# Patient Record
Sex: FEMALE | Race: WHITE | Employment: OTHER | URBAN - METROPOLITAN AREA
[De-identification: names, ages, dates, MRNs, and addresses within clinical notes are randomized per-mention and may not be internally consistent; named-entity substitution may affect disease eponyms.]

---

## 2017-01-06 RX ORDER — SITAGLIPTIN 100 MG/1
TABLET, FILM COATED ORAL
Qty: 90 TABLET | Refills: 0 | Status: SHIPPED | OUTPATIENT
Start: 2017-01-06 | End: 2017-04-12 | Stop reason: SDUPTHER

## 2017-01-13 RX ORDER — METFORMIN HYDROCHLORIDE 500 MG/1
TABLET, EXTENDED RELEASE ORAL
Qty: 90 TABLET | Refills: 0 | Status: SHIPPED | OUTPATIENT
Start: 2017-01-13 | End: 2017-08-16 | Stop reason: SDUPTHER

## 2017-01-16 ENCOUNTER — OFFICE VISIT (OUTPATIENT)
Dept: FAMILY MEDICINE CLINIC | Age: 72
End: 2017-01-16

## 2017-01-16 VITALS
WEIGHT: 282 LBS | BODY MASS INDEX: 46.98 KG/M2 | HEART RATE: 70 BPM | HEIGHT: 65 IN | DIASTOLIC BLOOD PRESSURE: 72 MMHG | TEMPERATURE: 98.7 F | RESPIRATION RATE: 14 BRPM | SYSTOLIC BLOOD PRESSURE: 130 MMHG

## 2017-01-16 DIAGNOSIS — D50.0 IRON DEFICIENCY ANEMIA DUE TO CHRONIC BLOOD LOSS: ICD-10-CM

## 2017-01-16 DIAGNOSIS — E03.9 HYPOTHYROIDISM, UNSPECIFIED TYPE: ICD-10-CM

## 2017-01-16 DIAGNOSIS — E78.2 MIXED HYPERLIPIDEMIA: ICD-10-CM

## 2017-01-16 DIAGNOSIS — E11.40 TYPE 2 DIABETES MELLITUS WITH DIABETIC NEUROPATHY, WITHOUT LONG-TERM CURRENT USE OF INSULIN (HCC): Primary | ICD-10-CM

## 2017-01-16 DIAGNOSIS — E53.8 B12 DEFICIENCY: ICD-10-CM

## 2017-01-16 DIAGNOSIS — E11.40 TYPE 2 DIABETES MELLITUS WITH DIABETIC NEUROPATHY, WITHOUT LONG-TERM CURRENT USE OF INSULIN (HCC): ICD-10-CM

## 2017-01-16 LAB
ALBUMIN SERPL-MCNC: 3.9 G/DL (ref 3.9–4.9)
ALP BLD-CCNC: 87 U/L (ref 40–130)
ALT SERPL-CCNC: 28 U/L (ref 0–33)
ANION GAP SERPL CALCULATED.3IONS-SCNC: 15 MEQ/L (ref 7–13)
ANISOCYTOSIS: 0
AST SERPL-CCNC: 19 U/L (ref 0–35)
BASOPHILS ABSOLUTE: 0 K/UL (ref 0–0.2)
BASOPHILS RELATIVE PERCENT: 1.3 %
BILIRUB SERPL-MCNC: 0.5 MG/DL (ref 0–1.2)
BUN BLDV-MCNC: 20 MG/DL (ref 8–23)
CALCIUM SERPL-MCNC: 9.5 MG/DL (ref 8.6–10.2)
CHLORIDE BLD-SCNC: 99 MEQ/L (ref 98–107)
CO2: 25 MEQ/L (ref 22–29)
CREAT SERPL-MCNC: 0.98 MG/DL (ref 0.5–0.9)
EOSINOPHILS ABSOLUTE: 0.4 K/UL (ref 0–0.7)
EOSINOPHILS RELATIVE PERCENT: 4 %
GFR AFRICAN AMERICAN: >60
GFR NON-AFRICAN AMERICAN: 55.7
GLOBULIN: 3.1 G/DL (ref 2.3–3.5)
GLUCOSE BLD-MCNC: 208 MG/DL (ref 74–109)
HBA1C MFR BLD: 7 % (ref 4.8–5.9)
HCT VFR BLD CALC: 39.2 % (ref 37–47)
HEMOGLOBIN: 13 G/DL (ref 12–16)
HYPOCHROMIA: 0
LYMPHOCYTES ABSOLUTE: 1.7 K/UL (ref 1–4.8)
LYMPHOCYTES RELATIVE PERCENT: 18 %
MACROCYTES: 0
MCH RBC QN AUTO: 30.4 PG (ref 27–31.3)
MCHC RBC AUTO-ENTMCNC: 33.1 % (ref 33–37)
MCV RBC AUTO: 91.8 FL (ref 82–100)
METAMYELOCYTES RELATIVE PERCENT: 1 %
MICROCYTES: 0
MONOCYTES ABSOLUTE: 0.8 K/UL (ref 0.2–0.8)
MONOCYTES RELATIVE PERCENT: 7.7 %
NEUTROPHILS ABSOLUTE: 6.8 K/UL (ref 1.4–6.5)
NEUTROPHILS RELATIVE PERCENT: 69 %
PDW BLD-RTO: 13.9 % (ref 11.5–14.5)
PLATELET # BLD: 279 K/UL (ref 130–400)
PLATELET SLIDE REVIEW: NORMAL
POIKILOCYTES: 0
POLYCHROMASIA: 0
POTASSIUM SERPL-SCNC: 4.6 MEQ/L (ref 3.5–5.1)
RBC # BLD: 4.27 M/UL (ref 4.2–5.4)
SMUDGE CELLS: 5.8
SODIUM BLD-SCNC: 139 MEQ/L (ref 132–144)
T4 TOTAL: 8.7 UG/DL (ref 4.5–11.7)
TOTAL PROTEIN: 7 G/DL (ref 6.4–8.1)
TSH SERPL DL<=0.05 MIU/L-ACNC: 6.81 UIU/ML (ref 0.27–4.2)
WBC # BLD: 9.7 K/UL (ref 4.8–10.8)

## 2017-01-16 PROCEDURE — 96372 THER/PROPH/DIAG INJ SC/IM: CPT | Performed by: FAMILY MEDICINE

## 2017-01-16 PROCEDURE — 1090F PRES/ABSN URINE INCON ASSESS: CPT | Performed by: FAMILY MEDICINE

## 2017-01-16 PROCEDURE — G8400 PT W/DXA NO RESULTS DOC: HCPCS | Performed by: FAMILY MEDICINE

## 2017-01-16 PROCEDURE — G8428 CUR MEDS NOT DOCUMENT: HCPCS | Performed by: FAMILY MEDICINE

## 2017-01-16 PROCEDURE — 1123F ACP DISCUSS/DSCN MKR DOCD: CPT | Performed by: FAMILY MEDICINE

## 2017-01-16 PROCEDURE — G8419 CALC BMI OUT NRM PARAM NOF/U: HCPCS | Performed by: FAMILY MEDICINE

## 2017-01-16 PROCEDURE — 4040F PNEUMOC VAC/ADMIN/RCVD: CPT | Performed by: FAMILY MEDICINE

## 2017-01-16 PROCEDURE — 3045F PR MOST RECENT HEMOGLOBIN A1C LEVEL 7.0-9.0%: CPT | Performed by: FAMILY MEDICINE

## 2017-01-16 PROCEDURE — 3017F COLORECTAL CA SCREEN DOC REV: CPT | Performed by: FAMILY MEDICINE

## 2017-01-16 PROCEDURE — G8484 FLU IMMUNIZE NO ADMIN: HCPCS | Performed by: FAMILY MEDICINE

## 2017-01-16 PROCEDURE — 3014F SCREEN MAMMO DOC REV: CPT | Performed by: FAMILY MEDICINE

## 2017-01-16 PROCEDURE — 99214 OFFICE O/P EST MOD 30 MIN: CPT | Performed by: FAMILY MEDICINE

## 2017-01-16 PROCEDURE — 1036F TOBACCO NON-USER: CPT | Performed by: FAMILY MEDICINE

## 2017-01-16 RX ORDER — CYANOCOBALAMIN 1000 UG/ML
1000 INJECTION INTRAMUSCULAR; SUBCUTANEOUS ONCE
Status: COMPLETED | OUTPATIENT
Start: 2017-01-16 | End: 2017-01-16

## 2017-01-16 RX ORDER — FLUOCINOLONE ACETONIDE 0.25 MG/G
CREAM TOPICAL
COMMUNITY
Start: 2016-11-04 | End: 2017-10-19 | Stop reason: SDUPTHER

## 2017-01-16 RX ORDER — METFORMIN HYDROCHLORIDE 500 MG/1
TABLET, EXTENDED RELEASE ORAL
Qty: 120 TABLET | Refills: 3 | Status: SHIPPED | OUTPATIENT
Start: 2017-01-16 | End: 2017-06-13 | Stop reason: SDUPTHER

## 2017-01-16 RX ADMIN — CYANOCOBALAMIN 1000 MCG: 1000 INJECTION INTRAMUSCULAR; SUBCUTANEOUS at 14:55

## 2017-01-16 ASSESSMENT — ENCOUNTER SYMPTOMS
DIARRHEA: 0
SHORTNESS OF BREATH: 0
EYES NEGATIVE: 1
NAUSEA: 0
CONSTIPATION: 0
COUGH: 0
ABDOMINAL PAIN: 0
VOMITING: 0

## 2017-01-17 ENCOUNTER — CARE COORDINATION (OUTPATIENT)
Dept: CARE COORDINATION | Age: 72
End: 2017-01-17

## 2017-01-27 ENCOUNTER — CARE COORDINATION (OUTPATIENT)
Dept: CARE COORDINATION | Age: 72
End: 2017-01-27

## 2017-02-27 RX ORDER — LEVOTHYROXINE SODIUM 175 UG/1
TABLET ORAL
Qty: 30 TABLET | Refills: 5 | Status: SHIPPED | OUTPATIENT
Start: 2017-02-27 | End: 2017-10-19 | Stop reason: SDUPTHER

## 2017-02-28 ENCOUNTER — NURSE ONLY (OUTPATIENT)
Dept: FAMILY MEDICINE CLINIC | Age: 72
End: 2017-02-28

## 2017-02-28 DIAGNOSIS — E53.8 B12 DEFICIENCY: Primary | ICD-10-CM

## 2017-03-03 PROCEDURE — 96372 THER/PROPH/DIAG INJ SC/IM: CPT | Performed by: FAMILY MEDICINE

## 2017-03-03 RX ORDER — CYANOCOBALAMIN 1000 UG/ML
1000 INJECTION INTRAMUSCULAR; SUBCUTANEOUS ONCE
Status: COMPLETED | OUTPATIENT
Start: 2017-03-03 | End: 2017-03-03

## 2017-03-03 RX ADMIN — CYANOCOBALAMIN 1000 MCG: 1000 INJECTION INTRAMUSCULAR; SUBCUTANEOUS at 10:03

## 2017-03-10 RX ORDER — ATORVASTATIN CALCIUM 20 MG/1
TABLET, FILM COATED ORAL
Qty: 90 TABLET | Refills: 1 | Status: SHIPPED | OUTPATIENT
Start: 2017-03-10 | End: 2017-09-12 | Stop reason: SDUPTHER

## 2017-03-29 ENCOUNTER — NURSE ONLY (OUTPATIENT)
Dept: FAMILY MEDICINE CLINIC | Age: 72
End: 2017-03-29

## 2017-03-29 DIAGNOSIS — E53.8 B12 DEFICIENCY: Primary | ICD-10-CM

## 2017-03-29 PROCEDURE — 96372 THER/PROPH/DIAG INJ SC/IM: CPT | Performed by: FAMILY MEDICINE

## 2017-03-29 RX ORDER — CYANOCOBALAMIN 1000 UG/ML
1000 INJECTION INTRAMUSCULAR; SUBCUTANEOUS ONCE
Status: COMPLETED | OUTPATIENT
Start: 2017-03-29 | End: 2017-03-29

## 2017-03-29 RX ADMIN — CYANOCOBALAMIN 1000 MCG: 1000 INJECTION INTRAMUSCULAR; SUBCUTANEOUS at 10:13

## 2017-04-03 RX ORDER — POTASSIUM CHLORIDE 20 MEQ/1
TABLET, EXTENDED RELEASE ORAL
Qty: 30 TABLET | Refills: 1 | Status: SHIPPED | OUTPATIENT
Start: 2017-04-03 | End: 2017-10-19 | Stop reason: SDUPTHER

## 2017-04-12 RX ORDER — SITAGLIPTIN 100 MG/1
TABLET, FILM COATED ORAL
Qty: 90 TABLET | Refills: 0 | Status: SHIPPED | OUTPATIENT
Start: 2017-04-12 | End: 2017-07-14 | Stop reason: SDUPTHER

## 2017-04-20 ENCOUNTER — CARE COORDINATION (OUTPATIENT)
Dept: FAMILY MEDICINE CLINIC | Age: 72
End: 2017-04-20

## 2017-04-28 ENCOUNTER — NURSE ONLY (OUTPATIENT)
Dept: FAMILY MEDICINE CLINIC | Age: 72
End: 2017-04-28

## 2017-04-28 DIAGNOSIS — E53.8 B12 DEFICIENCY: Primary | ICD-10-CM

## 2017-04-28 PROCEDURE — 96372 THER/PROPH/DIAG INJ SC/IM: CPT | Performed by: FAMILY MEDICINE

## 2017-04-28 RX ORDER — CYANOCOBALAMIN 1000 UG/ML
1000 INJECTION INTRAMUSCULAR; SUBCUTANEOUS ONCE
Status: COMPLETED | OUTPATIENT
Start: 2017-04-28 | End: 2017-04-28

## 2017-04-28 RX ADMIN — CYANOCOBALAMIN 1000 MCG: 1000 INJECTION INTRAMUSCULAR; SUBCUTANEOUS at 11:15

## 2017-05-22 ENCOUNTER — CARE COORDINATOR VISIT (OUTPATIENT)
Dept: FAMILY MEDICINE CLINIC | Age: 72
End: 2017-05-22

## 2017-05-22 ENCOUNTER — OFFICE VISIT (OUTPATIENT)
Dept: FAMILY MEDICINE CLINIC | Age: 72
End: 2017-05-22

## 2017-05-22 VITALS
BODY MASS INDEX: 46.82 KG/M2 | SYSTOLIC BLOOD PRESSURE: 112 MMHG | TEMPERATURE: 96.3 F | HEART RATE: 75 BPM | OXYGEN SATURATION: 96 % | DIASTOLIC BLOOD PRESSURE: 78 MMHG | WEIGHT: 281 LBS | HEIGHT: 65 IN

## 2017-05-22 DIAGNOSIS — E11.40 TYPE 2 DIABETES MELLITUS WITH DIABETIC NEUROPATHY, WITHOUT LONG-TERM CURRENT USE OF INSULIN (HCC): Primary | ICD-10-CM

## 2017-05-22 DIAGNOSIS — E53.8 B12 DEFICIENCY: ICD-10-CM

## 2017-05-22 DIAGNOSIS — E55.9 VITAMIN D DEFICIENCY: ICD-10-CM

## 2017-05-22 DIAGNOSIS — E03.9 HYPOTHYROIDISM, UNSPECIFIED TYPE: ICD-10-CM

## 2017-05-22 DIAGNOSIS — G63 POLYNEUROPATHY ASSOCIATED WITH UNDERLYING DISEASE (HCC): ICD-10-CM

## 2017-05-22 DIAGNOSIS — R53.83 FATIGUE, UNSPECIFIED TYPE: ICD-10-CM

## 2017-05-22 DIAGNOSIS — E78.2 MIXED HYPERLIPIDEMIA: ICD-10-CM

## 2017-05-22 LAB
ALBUMIN SERPL-MCNC: 3.9 G/DL (ref 3.9–4.9)
ALP BLD-CCNC: 90 U/L (ref 40–130)
ALT SERPL-CCNC: 29 U/L (ref 0–33)
ANION GAP SERPL CALCULATED.3IONS-SCNC: 13 MEQ/L (ref 7–13)
AST SERPL-CCNC: 22 U/L (ref 0–35)
BASOPHILS ABSOLUTE: 0.1 K/UL (ref 0–0.2)
BASOPHILS RELATIVE PERCENT: 1.1 %
BILIRUB SERPL-MCNC: 0.5 MG/DL (ref 0–1.2)
BUN BLDV-MCNC: 23 MG/DL (ref 8–23)
CALCIUM SERPL-MCNC: 9.4 MG/DL (ref 8.6–10.2)
CHLORIDE BLD-SCNC: 96 MEQ/L (ref 98–107)
CO2: 27 MEQ/L (ref 22–29)
CREAT SERPL-MCNC: 0.82 MG/DL (ref 0.5–0.9)
EOSINOPHILS ABSOLUTE: 0.4 K/UL (ref 0–0.7)
EOSINOPHILS RELATIVE PERCENT: 3.9 %
FOLATE: 8.9 NG/ML (ref 7.3–26.1)
GFR AFRICAN AMERICAN: >60
GFR NON-AFRICAN AMERICAN: >60
GLOBULIN: 3.1 G/DL (ref 2.3–3.5)
GLUCOSE BLD-MCNC: 208 MG/DL (ref 74–109)
HCT VFR BLD CALC: 41.4 % (ref 37–47)
HEMOGLOBIN: 13.5 G/DL (ref 12–16)
LYMPHOCYTES ABSOLUTE: 2.1 K/UL (ref 1–4.8)
LYMPHOCYTES RELATIVE PERCENT: 21.6 %
MCH RBC QN AUTO: 28.6 PG (ref 27–31.3)
MCHC RBC AUTO-ENTMCNC: 32.5 % (ref 33–37)
MCV RBC AUTO: 88.1 FL (ref 82–100)
MONOCYTES ABSOLUTE: 0.5 K/UL (ref 0.2–0.8)
MONOCYTES RELATIVE PERCENT: 5.6 %
NEUTROPHILS ABSOLUTE: 6.6 K/UL (ref 1.4–6.5)
NEUTROPHILS RELATIVE PERCENT: 67.8 %
PDW BLD-RTO: 13.4 % (ref 11.5–14.5)
PLATELET # BLD: 254 K/UL (ref 130–400)
POTASSIUM SERPL-SCNC: 5.3 MEQ/L (ref 3.5–5.1)
RBC # BLD: 4.7 M/UL (ref 4.2–5.4)
SODIUM BLD-SCNC: 136 MEQ/L (ref 132–144)
T4 FREE: 1.74 NG/DL (ref 0.93–1.7)
TOTAL PROTEIN: 7 G/DL (ref 6.4–8.1)
TSH SERPL DL<=0.05 MIU/L-ACNC: 2.98 UIU/ML (ref 0.27–4.2)
VITAMIN B-12: >2000 PG/ML (ref 211–946)
VITAMIN D 25-HYDROXY: 9.6 NG/ML (ref 30–100)
WBC # BLD: 9.8 K/UL (ref 4.8–10.8)

## 2017-05-22 PROCEDURE — G8400 PT W/DXA NO RESULTS DOC: HCPCS | Performed by: FAMILY MEDICINE

## 2017-05-22 PROCEDURE — G0444 DEPRESSION SCREEN ANNUAL: HCPCS | Performed by: FAMILY MEDICINE

## 2017-05-22 PROCEDURE — 1123F ACP DISCUSS/DSCN MKR DOCD: CPT | Performed by: FAMILY MEDICINE

## 2017-05-22 PROCEDURE — 3017F COLORECTAL CA SCREEN DOC REV: CPT | Performed by: FAMILY MEDICINE

## 2017-05-22 PROCEDURE — G8427 DOCREV CUR MEDS BY ELIG CLIN: HCPCS | Performed by: FAMILY MEDICINE

## 2017-05-22 PROCEDURE — 3045F PR MOST RECENT HEMOGLOBIN A1C LEVEL 7.0-9.0%: CPT | Performed by: FAMILY MEDICINE

## 2017-05-22 PROCEDURE — 96372 THER/PROPH/DIAG INJ SC/IM: CPT | Performed by: FAMILY MEDICINE

## 2017-05-22 PROCEDURE — G8417 CALC BMI ABV UP PARAM F/U: HCPCS | Performed by: FAMILY MEDICINE

## 2017-05-22 PROCEDURE — 1036F TOBACCO NON-USER: CPT | Performed by: FAMILY MEDICINE

## 2017-05-22 PROCEDURE — 3014F SCREEN MAMMO DOC REV: CPT | Performed by: FAMILY MEDICINE

## 2017-05-22 PROCEDURE — 1090F PRES/ABSN URINE INCON ASSESS: CPT | Performed by: FAMILY MEDICINE

## 2017-05-22 PROCEDURE — 99214 OFFICE O/P EST MOD 30 MIN: CPT | Performed by: FAMILY MEDICINE

## 2017-05-22 PROCEDURE — 4040F PNEUMOC VAC/ADMIN/RCVD: CPT | Performed by: FAMILY MEDICINE

## 2017-05-22 RX ORDER — CYANOCOBALAMIN 1000 UG/ML
1000 INJECTION INTRAMUSCULAR; SUBCUTANEOUS ONCE
Status: COMPLETED | OUTPATIENT
Start: 2017-05-22 | End: 2017-05-22

## 2017-05-22 RX ADMIN — CYANOCOBALAMIN 1000 MCG: 1000 INJECTION INTRAMUSCULAR; SUBCUTANEOUS at 12:37

## 2017-05-22 ASSESSMENT — PATIENT HEALTH QUESTIONNAIRE - PHQ9
9. THOUGHTS THAT YOU WOULD BE BETTER OFF DEAD, OR OF HURTING YOURSELF: 0
1. LITTLE INTEREST OR PLEASURE IN DOING THINGS: 3
7. TROUBLE CONCENTRATING ON THINGS, SUCH AS READING THE NEWSPAPER OR WATCHING TELEVISION: 0
8. MOVING OR SPEAKING SO SLOWLY THAT OTHER PEOPLE COULD HAVE NOTICED. OR THE OPPOSITE, BEING SO FIGETY OR RESTLESS THAT YOU HAVE BEEN MOVING AROUND A LOT MORE THAN USUAL: 0
10. IF YOU CHECKED OFF ANY PROBLEMS, HOW DIFFICULT HAVE THESE PROBLEMS MADE IT FOR YOU TO DO YOUR WORK, TAKE CARE OF THINGS AT HOME, OR GET ALONG WITH OTHER PEOPLE: 1
2. FEELING DOWN, DEPRESSED OR HOPELESS: 3
SUM OF ALL RESPONSES TO PHQ9 QUESTIONS 1 & 2: 6
3. TROUBLE FALLING OR STAYING ASLEEP: 3
SUM OF ALL RESPONSES TO PHQ QUESTIONS 1-9: 15
6. FEELING BAD ABOUT YOURSELF - OR THAT YOU ARE A FAILURE OR HAVE LET YOURSELF OR YOUR FAMILY DOWN: 0
4. FEELING TIRED OR HAVING LITTLE ENERGY: 3
5. POOR APPETITE OR OVEREATING: 3

## 2017-05-23 ENCOUNTER — TELEPHONE (OUTPATIENT)
Dept: FAMILY MEDICINE CLINIC | Age: 72
End: 2017-05-23

## 2017-05-25 DIAGNOSIS — E55.9 VITAMIN D DEFICIENCY: Primary | ICD-10-CM

## 2017-05-25 RX ORDER — ERGOCALCIFEROL 1.25 MG/1
50000 CAPSULE ORAL WEEKLY
Qty: 12 CAPSULE | Refills: 1 | Status: SHIPPED | OUTPATIENT
Start: 2017-05-25 | End: 2017-05-25 | Stop reason: SDUPTHER

## 2017-05-26 RX ORDER — ERGOCALCIFEROL 1.25 MG/1
CAPSULE ORAL
Qty: 12 CAPSULE | Refills: 1 | Status: SHIPPED | OUTPATIENT
Start: 2017-05-26 | End: 2017-10-19 | Stop reason: SDUPTHER

## 2017-06-07 RX ORDER — LEVOTHYROXINE SODIUM 175 UG/1
TABLET ORAL
Qty: 90 TABLET | Refills: 0 | Status: SHIPPED | OUTPATIENT
Start: 2017-06-07 | End: 2017-07-13 | Stop reason: SDUPTHER

## 2017-06-09 ENCOUNTER — TELEPHONE (OUTPATIENT)
Dept: FAMILY MEDICINE CLINIC | Age: 72
End: 2017-06-09

## 2017-06-10 ENCOUNTER — TELEPHONE (OUTPATIENT)
Dept: FAMILY MEDICINE CLINIC | Age: 72
End: 2017-06-10

## 2017-06-10 RX ORDER — AMOXICILLIN 875 MG/1
875 TABLET, COATED ORAL 2 TIMES DAILY
Qty: 20 TABLET | Refills: 0 | Status: SHIPPED | OUTPATIENT
Start: 2017-06-10 | End: 2017-06-20

## 2017-06-14 RX ORDER — METFORMIN HYDROCHLORIDE 500 MG/1
TABLET, EXTENDED RELEASE ORAL
Qty: 120 TABLET | Refills: 1 | Status: SHIPPED | OUTPATIENT
Start: 2017-06-14 | End: 2017-08-12 | Stop reason: SDUPTHER

## 2017-06-20 RX ORDER — CHOLECALCIFEROL (VITAMIN D3) 1250 MCG
CAPSULE ORAL
Qty: 12 CAPSULE | Refills: 1 | Status: SHIPPED | OUTPATIENT
Start: 2017-06-20 | End: 2017-10-19 | Stop reason: SDUPTHER

## 2017-07-12 ENCOUNTER — CARE COORDINATION (OUTPATIENT)
Dept: FAMILY MEDICINE CLINIC | Age: 72
End: 2017-07-12

## 2017-07-13 RX ORDER — DILTIAZEM HYDROCHLORIDE 240 MG/1
240 CAPSULE, EXTENDED RELEASE ORAL DAILY
COMMUNITY
Start: 2017-06-09 | End: 2017-10-19 | Stop reason: SDUPTHER

## 2017-07-15 RX ORDER — SITAGLIPTIN 100 MG/1
TABLET, FILM COATED ORAL
Qty: 90 TABLET | Refills: 0 | Status: SHIPPED | OUTPATIENT
Start: 2017-07-15 | End: 2017-10-06 | Stop reason: SDUPTHER

## 2017-07-17 RX ORDER — GLIMEPIRIDE 4 MG/1
TABLET ORAL
Qty: 180 TABLET | Refills: 1 | Status: SHIPPED | OUTPATIENT
Start: 2017-07-17 | End: 2017-10-19 | Stop reason: SDUPTHER

## 2017-07-27 ENCOUNTER — TELEPHONE (OUTPATIENT)
Dept: FAMILY MEDICINE CLINIC | Age: 72
End: 2017-07-27

## 2017-07-27 LAB — GLUCOSE BLD-MCNC: 289 MG/DL

## 2017-08-14 RX ORDER — METFORMIN HYDROCHLORIDE 500 MG/1
TABLET, EXTENDED RELEASE ORAL
Qty: 120 TABLET | Refills: 0 | Status: SHIPPED | OUTPATIENT
Start: 2017-08-14 | End: 2017-08-14 | Stop reason: SDUPTHER

## 2017-08-15 RX ORDER — METFORMIN HYDROCHLORIDE 500 MG/1
TABLET, EXTENDED RELEASE ORAL
Qty: 360 TABLET | Refills: 0 | Status: SHIPPED | OUTPATIENT
Start: 2017-08-15 | End: 2017-10-19 | Stop reason: SDUPTHER

## 2017-08-16 ENCOUNTER — OFFICE VISIT (OUTPATIENT)
Dept: FAMILY MEDICINE CLINIC | Age: 72
End: 2017-08-16

## 2017-08-16 VITALS
OXYGEN SATURATION: 95 % | HEIGHT: 65 IN | BODY MASS INDEX: 46.34 KG/M2 | DIASTOLIC BLOOD PRESSURE: 60 MMHG | TEMPERATURE: 97.7 F | SYSTOLIC BLOOD PRESSURE: 124 MMHG | HEART RATE: 88 BPM | WEIGHT: 278.13 LBS

## 2017-08-16 DIAGNOSIS — E11.40 TYPE 2 DIABETES MELLITUS WITH DIABETIC NEUROPATHY, WITHOUT LONG-TERM CURRENT USE OF INSULIN (HCC): Primary | ICD-10-CM

## 2017-08-16 DIAGNOSIS — E03.9 HYPOTHYROIDISM, UNSPECIFIED TYPE: ICD-10-CM

## 2017-08-16 DIAGNOSIS — E55.9 VITAMIN D DEFICIENCY: ICD-10-CM

## 2017-08-16 DIAGNOSIS — E53.8 B12 DEFICIENCY: ICD-10-CM

## 2017-08-16 DIAGNOSIS — E11.40 TYPE 2 DIABETES MELLITUS WITH DIABETIC NEUROPATHY, WITHOUT LONG-TERM CURRENT USE OF INSULIN (HCC): ICD-10-CM

## 2017-08-16 DIAGNOSIS — E78.2 MIXED HYPERLIPIDEMIA: ICD-10-CM

## 2017-08-16 DIAGNOSIS — E66.01 MORBID OBESITY WITH BMI OF 45.0-49.9, ADULT (HCC): ICD-10-CM

## 2017-08-16 LAB
ALBUMIN SERPL-MCNC: 4.1 G/DL (ref 3.9–4.9)
ALP BLD-CCNC: 86 U/L (ref 40–130)
ALT SERPL-CCNC: 37 U/L (ref 0–33)
ANION GAP SERPL CALCULATED.3IONS-SCNC: 16 MEQ/L (ref 7–13)
AST SERPL-CCNC: 31 U/L (ref 0–35)
BASOPHILS ABSOLUTE: 0.1 K/UL (ref 0–0.2)
BASOPHILS RELATIVE PERCENT: 1.1 %
BILIRUB SERPL-MCNC: 0.5 MG/DL (ref 0–1.2)
BUN BLDV-MCNC: 17 MG/DL (ref 8–23)
CALCIUM SERPL-MCNC: 9.5 MG/DL (ref 8.6–10.2)
CHLORIDE BLD-SCNC: 98 MEQ/L (ref 98–107)
CO2: 25 MEQ/L (ref 22–29)
CREAT SERPL-MCNC: 0.8 MG/DL (ref 0.5–0.9)
EOSINOPHILS ABSOLUTE: 0.2 K/UL (ref 0–0.7)
EOSINOPHILS RELATIVE PERCENT: 1.8 %
FOLATE: 8.1 NG/ML (ref 7.3–26.1)
GFR AFRICAN AMERICAN: >60
GFR NON-AFRICAN AMERICAN: >60
GLOBULIN: 3.3 G/DL (ref 2.3–3.5)
GLUCOSE BLD-MCNC: 202 MG/DL (ref 74–109)
HBA1C MFR BLD: 8.6 % (ref 4.8–5.9)
HCT VFR BLD CALC: 39.7 % (ref 37–47)
HEMOGLOBIN: 13 G/DL (ref 12–16)
LYMPHOCYTES ABSOLUTE: 1.7 K/UL (ref 1–4.8)
LYMPHOCYTES RELATIVE PERCENT: 18.8 %
MCH RBC QN AUTO: 28.6 PG (ref 27–31.3)
MCHC RBC AUTO-ENTMCNC: 32.6 % (ref 33–37)
MCV RBC AUTO: 87.5 FL (ref 82–100)
MONOCYTES ABSOLUTE: 0.5 K/UL (ref 0.2–0.8)
MONOCYTES RELATIVE PERCENT: 5.8 %
NEUTROPHILS ABSOLUTE: 6.6 K/UL (ref 1.4–6.5)
NEUTROPHILS RELATIVE PERCENT: 72.5 %
PDW BLD-RTO: 13.8 % (ref 11.5–14.5)
PLATELET # BLD: 291 K/UL (ref 130–400)
POTASSIUM SERPL-SCNC: 5.1 MEQ/L (ref 3.5–5.1)
RBC # BLD: 4.54 M/UL (ref 4.2–5.4)
SODIUM BLD-SCNC: 139 MEQ/L (ref 132–144)
TOTAL PROTEIN: 7.4 G/DL (ref 6.4–8.1)
VITAMIN B-12: 791 PG/ML (ref 211–946)
VITAMIN D 25-HYDROXY: 22.8 NG/ML (ref 30–100)
WBC # BLD: 9.1 K/UL (ref 4.8–10.8)

## 2017-08-16 PROCEDURE — 1123F ACP DISCUSS/DSCN MKR DOCD: CPT | Performed by: FAMILY MEDICINE

## 2017-08-16 PROCEDURE — 3046F HEMOGLOBIN A1C LEVEL >9.0%: CPT | Performed by: FAMILY MEDICINE

## 2017-08-16 PROCEDURE — 3014F SCREEN MAMMO DOC REV: CPT | Performed by: FAMILY MEDICINE

## 2017-08-16 PROCEDURE — G8400 PT W/DXA NO RESULTS DOC: HCPCS | Performed by: FAMILY MEDICINE

## 2017-08-16 PROCEDURE — G8417 CALC BMI ABV UP PARAM F/U: HCPCS | Performed by: FAMILY MEDICINE

## 2017-08-16 PROCEDURE — 4040F PNEUMOC VAC/ADMIN/RCVD: CPT | Performed by: FAMILY MEDICINE

## 2017-08-16 PROCEDURE — 99214 OFFICE O/P EST MOD 30 MIN: CPT | Performed by: FAMILY MEDICINE

## 2017-08-16 PROCEDURE — 96372 THER/PROPH/DIAG INJ SC/IM: CPT | Performed by: FAMILY MEDICINE

## 2017-08-16 PROCEDURE — 3017F COLORECTAL CA SCREEN DOC REV: CPT | Performed by: FAMILY MEDICINE

## 2017-08-16 PROCEDURE — G8428 CUR MEDS NOT DOCUMENT: HCPCS | Performed by: FAMILY MEDICINE

## 2017-08-16 PROCEDURE — 1036F TOBACCO NON-USER: CPT | Performed by: FAMILY MEDICINE

## 2017-08-16 PROCEDURE — 1090F PRES/ABSN URINE INCON ASSESS: CPT | Performed by: FAMILY MEDICINE

## 2017-08-16 RX ORDER — CYANOCOBALAMIN 1000 UG/ML
1000 INJECTION INTRAMUSCULAR; SUBCUTANEOUS ONCE
Status: COMPLETED | OUTPATIENT
Start: 2017-08-16 | End: 2017-08-16

## 2017-08-16 RX ADMIN — CYANOCOBALAMIN 1000 MCG: 1000 INJECTION INTRAMUSCULAR; SUBCUTANEOUS at 14:33

## 2017-08-21 ENCOUNTER — TELEPHONE (OUTPATIENT)
Dept: FAMILY MEDICINE CLINIC | Age: 72
End: 2017-08-21

## 2017-08-21 ENCOUNTER — CARE COORDINATION (OUTPATIENT)
Dept: FAMILY MEDICINE CLINIC | Age: 72
End: 2017-08-21

## 2017-08-22 ENCOUNTER — CARE COORDINATION (OUTPATIENT)
Dept: FAMILY MEDICINE CLINIC | Age: 72
End: 2017-08-22

## 2017-08-28 ENCOUNTER — TELEPHONE (OUTPATIENT)
Dept: FAMILY MEDICINE CLINIC | Age: 72
End: 2017-08-28

## 2017-09-05 ENCOUNTER — TELEPHONE (OUTPATIENT)
Dept: FAMILY MEDICINE CLINIC | Age: 72
End: 2017-09-05

## 2017-09-13 RX ORDER — ATORVASTATIN CALCIUM 20 MG/1
TABLET, FILM COATED ORAL
Qty: 90 TABLET | Refills: 1 | Status: SHIPPED | OUTPATIENT
Start: 2017-09-13 | End: 2017-10-19 | Stop reason: SDUPTHER

## 2017-09-15 ENCOUNTER — OFFICE VISIT (OUTPATIENT)
Dept: FAMILY MEDICINE CLINIC | Age: 72
End: 2017-09-15

## 2017-09-15 ENCOUNTER — TELEPHONE (OUTPATIENT)
Dept: FAMILY MEDICINE CLINIC | Age: 72
End: 2017-09-15

## 2017-09-15 ENCOUNTER — CARE COORDINATOR VISIT (OUTPATIENT)
Dept: FAMILY MEDICINE CLINIC | Age: 72
End: 2017-09-15

## 2017-09-15 VITALS
HEIGHT: 65 IN | HEART RATE: 86 BPM | BODY MASS INDEX: 45.15 KG/M2 | WEIGHT: 271 LBS | RESPIRATION RATE: 18 BRPM | DIASTOLIC BLOOD PRESSURE: 70 MMHG | SYSTOLIC BLOOD PRESSURE: 126 MMHG | TEMPERATURE: 97.9 F

## 2017-09-15 DIAGNOSIS — L24.9 IRRITANT CONTACT DERMATITIS, UNSPECIFIED TRIGGER: ICD-10-CM

## 2017-09-15 DIAGNOSIS — E53.8 B12 DEFICIENCY: ICD-10-CM

## 2017-09-15 DIAGNOSIS — E11.40 TYPE 2 DIABETES MELLITUS WITH DIABETIC NEUROPATHY, WITHOUT LONG-TERM CURRENT USE OF INSULIN (HCC): Primary | ICD-10-CM

## 2017-09-15 DIAGNOSIS — E11.40 TYPE 2 DIABETES MELLITUS WITH DIABETIC NEUROPATHY, WITHOUT LONG-TERM CURRENT USE OF INSULIN (HCC): ICD-10-CM

## 2017-09-15 DIAGNOSIS — Z23 NEED FOR INFLUENZA VACCINATION: ICD-10-CM

## 2017-09-15 LAB
CREATININE URINE: 59.8 MG/DL
MICROALBUMIN UR-MCNC: 1.8 MG/DL
MICROALBUMIN/CREAT UR-RTO: 30.1 MG/G (ref 0–30)

## 2017-09-15 PROCEDURE — 1036F TOBACCO NON-USER: CPT | Performed by: FAMILY MEDICINE

## 2017-09-15 PROCEDURE — 3017F COLORECTAL CA SCREEN DOC REV: CPT | Performed by: FAMILY MEDICINE

## 2017-09-15 PROCEDURE — 99213 OFFICE O/P EST LOW 20 MIN: CPT | Performed by: FAMILY MEDICINE

## 2017-09-15 PROCEDURE — G8400 PT W/DXA NO RESULTS DOC: HCPCS | Performed by: FAMILY MEDICINE

## 2017-09-15 PROCEDURE — G8427 DOCREV CUR MEDS BY ELIG CLIN: HCPCS | Performed by: FAMILY MEDICINE

## 2017-09-15 PROCEDURE — 1123F ACP DISCUSS/DSCN MKR DOCD: CPT | Performed by: FAMILY MEDICINE

## 2017-09-15 PROCEDURE — 3014F SCREEN MAMMO DOC REV: CPT | Performed by: FAMILY MEDICINE

## 2017-09-15 PROCEDURE — G8417 CALC BMI ABV UP PARAM F/U: HCPCS | Performed by: FAMILY MEDICINE

## 2017-09-15 PROCEDURE — 4040F PNEUMOC VAC/ADMIN/RCVD: CPT | Performed by: FAMILY MEDICINE

## 2017-09-15 PROCEDURE — 3046F HEMOGLOBIN A1C LEVEL >9.0%: CPT | Performed by: FAMILY MEDICINE

## 2017-09-15 PROCEDURE — 1090F PRES/ABSN URINE INCON ASSESS: CPT | Performed by: FAMILY MEDICINE

## 2017-09-15 PROCEDURE — 96372 THER/PROPH/DIAG INJ SC/IM: CPT | Performed by: FAMILY MEDICINE

## 2017-09-15 RX ORDER — CYANOCOBALAMIN 1000 UG/ML
1000 INJECTION INTRAMUSCULAR; SUBCUTANEOUS ONCE
Status: COMPLETED | OUTPATIENT
Start: 2017-09-15 | End: 2017-09-15

## 2017-09-15 RX ADMIN — CYANOCOBALAMIN 1000 MCG: 1000 INJECTION INTRAMUSCULAR; SUBCUTANEOUS at 11:30

## 2017-09-18 PROCEDURE — 90662 IIV NO PRSV INCREASED AG IM: CPT | Performed by: FAMILY MEDICINE

## 2017-09-18 PROCEDURE — G0008 ADMIN INFLUENZA VIRUS VAC: HCPCS | Performed by: FAMILY MEDICINE

## 2017-09-25 ENCOUNTER — TELEPHONE (OUTPATIENT)
Dept: FAMILY MEDICINE CLINIC | Age: 72
End: 2017-09-25

## 2017-10-06 RX ORDER — SITAGLIPTIN 100 MG/1
TABLET, FILM COATED ORAL
Qty: 90 TABLET | Refills: 1 | Status: SHIPPED | OUTPATIENT
Start: 2017-10-06 | End: 2017-10-19 | Stop reason: SDUPTHER

## 2017-10-19 ENCOUNTER — CARE COORDINATION (OUTPATIENT)
Dept: CARE COORDINATION | Age: 72
End: 2017-10-19

## 2017-10-19 RX ORDER — GLIMEPIRIDE 2 MG/1
2 TABLET ORAL 2 TIMES DAILY
COMMUNITY
End: 2018-05-02 | Stop reason: DRUGHIGH

## 2017-10-19 RX ORDER — MAGNESIUM OXIDE 400 MG/1
400 TABLET ORAL 2 TIMES DAILY
COMMUNITY

## 2017-10-19 RX ORDER — CHOLECALCIFEROL (VITAMIN D3) 1250 MCG
CAPSULE ORAL
COMMUNITY

## 2017-10-19 RX ORDER — CLOPIDOGREL BISULFATE 75 MG/1
75 TABLET ORAL DAILY
COMMUNITY
End: 2018-05-13 | Stop reason: SDUPTHER

## 2017-10-19 RX ORDER — NITROGLYCERIN 0.4 MG/1
0.4 TABLET SUBLINGUAL EVERY 5 MIN PRN
COMMUNITY

## 2017-10-19 RX ORDER — HYDROXYZINE HYDROCHLORIDE 10 MG/1
10 TABLET, FILM COATED ORAL DAILY
COMMUNITY

## 2017-10-19 RX ORDER — FUROSEMIDE 40 MG/1
40 TABLET ORAL SEE ADMIN INSTRUCTIONS
COMMUNITY

## 2017-10-19 RX ORDER — LOSARTAN POTASSIUM 25 MG/1
25 TABLET ORAL 2 TIMES DAILY
COMMUNITY
End: 2017-12-02 | Stop reason: SDUPTHER

## 2017-10-19 RX ORDER — CYANOCOBALAMIN (VITAMIN B-12) 1000 MCG
1000 TABLET, EXTENDED RELEASE ORAL DAILY
COMMUNITY

## 2017-10-19 RX ORDER — ATORVASTATIN CALCIUM 20 MG/1
20 TABLET, FILM COATED ORAL DAILY
COMMUNITY
End: 2018-05-02 | Stop reason: SDUPTHER

## 2017-10-19 RX ORDER — SPIRONOLACTONE 25 MG/1
25 TABLET ORAL DAILY
COMMUNITY

## 2017-10-19 RX ORDER — DILTIAZEM HYDROCHLORIDE 240 MG/1
240 CAPSULE, COATED, EXTENDED RELEASE ORAL DAILY
COMMUNITY

## 2017-10-19 RX ORDER — ALBUTEROL SULFATE 90 UG/1
2 AEROSOL, METERED RESPIRATORY (INHALATION) EVERY 4 HOURS PRN
COMMUNITY

## 2017-10-19 RX ORDER — POTASSIUM CHLORIDE 750 MG/1
20 CAPSULE, EXTENDED RELEASE ORAL DAILY
COMMUNITY

## 2017-10-19 RX ORDER — LACTOBACILLUS RHAMNOSUS GG 10B CELL
1 CAPSULE ORAL 3 TIMES DAILY
COMMUNITY

## 2017-10-19 RX ORDER — HYDROCHLOROTHIAZIDE 25 MG/1
25 TABLET ORAL DAILY
COMMUNITY
End: 2019-03-23 | Stop reason: SDUPTHER

## 2017-10-19 RX ORDER — VITAMIN B COMPLEX
100 TABLET ORAL DAILY
COMMUNITY

## 2017-10-19 RX ORDER — OXYCODONE HYDROCHLORIDE AND ACETAMINOPHEN 5; 325 MG/1; MG/1
1 TABLET ORAL EVERY 4 HOURS PRN
COMMUNITY
End: 2017-11-17

## 2017-10-19 RX ORDER — LEVOTHYROXINE SODIUM 175 UG/1
175 TABLET ORAL DAILY
COMMUNITY
End: 2018-08-15 | Stop reason: SDUPTHER

## 2017-10-19 NOTE — CARE COORDINATION
Pt was admitted to [facility] on 10/10/17 for right great toe infection. ID was consulted and pt was treated for diabetic foot ulcer and osteomyelitis with IV Zosyn. Vascular surgery was also consulted for revascularization of lower extremity and pt had an angiogram with balloon angioplasty. Pt was discharged to [address] [place] [address], will continue 2 weeks of IV antibiotics and follow up with Dr. Diaz Galdamez. Pt was given podiatrist, Dr. Melanie Worley information to schedule appointment and an appt was made with Dr. Citlalli Gallegos.

## 2017-10-21 ENCOUNTER — OFFICE VISIT (OUTPATIENT)
Dept: GERIATRIC MEDICINE | Age: 72
End: 2017-10-21

## 2017-10-21 DIAGNOSIS — I99.8 ISCHEMIA OF TOE: ICD-10-CM

## 2017-10-21 DIAGNOSIS — I25.10 CORONARY ARTERY DISEASE INVOLVING NATIVE HEART, ANGINA PRESENCE UNSPECIFIED, UNSPECIFIED VESSEL OR LESION TYPE: ICD-10-CM

## 2017-10-21 DIAGNOSIS — E11.628 TYPE 2 DIABETES MELLITUS WITH RIGHT DIABETIC FOOT INFECTION (HCC): Primary | ICD-10-CM

## 2017-10-21 DIAGNOSIS — L08.9 TYPE 2 DIABETES MELLITUS WITH RIGHT DIABETIC FOOT INFECTION (HCC): Primary | ICD-10-CM

## 2017-10-21 PROCEDURE — 99309 SBSQ NF CARE MODERATE MDM 30: CPT | Performed by: NURSE PRACTITIONER

## 2017-10-21 PROCEDURE — 1123F ACP DISCUSS/DSCN MKR DOCD: CPT | Performed by: NURSE PRACTITIONER

## 2017-10-21 PROCEDURE — 3045F PR MOST RECENT HEMOGLOBIN A1C LEVEL 7.0-9.0%: CPT | Performed by: NURSE PRACTITIONER

## 2017-10-21 PROCEDURE — 3017F COLORECTAL CA SCREEN DOC REV: CPT | Performed by: NURSE PRACTITIONER

## 2017-10-24 ENCOUNTER — OFFICE VISIT (OUTPATIENT)
Dept: GERIATRIC MEDICINE | Age: 72
End: 2017-10-24

## 2017-10-24 DIAGNOSIS — E08.621 DIABETIC ULCER OF HEEL ASSOCIATED WITH DIABETES MELLITUS DUE TO UNDERLYING CONDITION, LIMITED TO BREAKDOWN OF SKIN, UNSPECIFIED LATERALITY (HCC): Primary | ICD-10-CM

## 2017-10-24 DIAGNOSIS — G47.33 OSA ON CPAP: ICD-10-CM

## 2017-10-24 DIAGNOSIS — R53.1 WEAKNESS: ICD-10-CM

## 2017-10-24 DIAGNOSIS — L97.401 DIABETIC ULCER OF HEEL ASSOCIATED WITH DIABETES MELLITUS DUE TO UNDERLYING CONDITION, LIMITED TO BREAKDOWN OF SKIN, UNSPECIFIED LATERALITY (HCC): Primary | ICD-10-CM

## 2017-10-24 DIAGNOSIS — Z99.89 OSA ON CPAP: ICD-10-CM

## 2017-10-24 PROCEDURE — 3017F COLORECTAL CA SCREEN DOC REV: CPT | Performed by: INTERNAL MEDICINE

## 2017-10-24 PROCEDURE — 99305 1ST NF CARE MODERATE MDM 35: CPT | Performed by: INTERNAL MEDICINE

## 2017-10-24 PROCEDURE — 1123F ACP DISCUSS/DSCN MKR DOCD: CPT | Performed by: INTERNAL MEDICINE

## 2017-10-25 ENCOUNTER — OFFICE VISIT (OUTPATIENT)
Dept: GERIATRIC MEDICINE | Age: 72
End: 2017-10-25

## 2017-10-25 DIAGNOSIS — I50.9 CONGESTIVE HEART FAILURE, UNSPECIFIED CONGESTIVE HEART FAILURE CHRONICITY, UNSPECIFIED CONGESTIVE HEART FAILURE TYPE: ICD-10-CM

## 2017-10-25 LAB
AVERAGE GLUCOSE: 157
BASOPHILS ABSOLUTE: ABNORMAL /ΜL
BASOPHILS RELATIVE PERCENT: ABNORMAL %
BUN BLDV-MCNC: 30 MG/DL
CALCIUM SERPL-MCNC: 9.9 MG/DL
CHLORIDE BLD-SCNC: 91 MMOL/L
CHOLESTEROL, TOTAL: 169 MG/DL
CHOLESTEROL/HDL RATIO: 3.19
CO2: 26 MMOL/L
CREAT SERPL-MCNC: 1.81 MG/DL
EOSINOPHILS ABSOLUTE: ABNORMAL /ΜL
EOSINOPHILS RELATIVE PERCENT: ABNORMAL %
GFR CALCULATED: NORMAL
GLUCOSE BLD-MCNC: 227 MG/DL
HBA1C MFR BLD: 7.1 %
HCT VFR BLD CALC: 38.2 % (ref 36–46)
HDLC SERPL-MCNC: 32 MG/DL (ref 35–70)
HEMOGLOBIN: 11.7 G/DL (ref 12–16)
LDL CHOLESTEROL CALCULATED: 102 MG/DL (ref 0–160)
LYMPHOCYTES ABSOLUTE: ABNORMAL /ΜL
LYMPHOCYTES RELATIVE PERCENT: ABNORMAL %
MAGNESIUM: 2.3 MG/DL
MCH RBC QN AUTO: 26.7 PG
MCHC RBC AUTO-ENTMCNC: 30.6 G/DL
MCV RBC AUTO: 87 FL
MONOCYTES ABSOLUTE: ABNORMAL /ΜL
MONOCYTES RELATIVE PERCENT: ABNORMAL %
NEUTROPHILS ABSOLUTE: ABNORMAL /ΜL
NEUTROPHILS RELATIVE PERCENT: ABNORMAL %
PLATELET # BLD: 308 K/ΜL
PMV BLD AUTO: ABNORMAL FL
POTASSIUM SERPL-SCNC: 5 MMOL/L
RBC # BLD: 4.39 10^6/ΜL
SODIUM BLD-SCNC: 136 MMOL/L
TRIGL SERPL-MCNC: 174 MG/DL
TSH SERPL DL<=0.05 MIU/L-ACNC: 1.44 UIU/ML
VLDLC SERPL CALC-MCNC: 35 MG/DL
WBC # BLD: 12.2 10^3/ML

## 2017-10-25 PROCEDURE — 1123F ACP DISCUSS/DSCN MKR DOCD: CPT | Performed by: NURSE PRACTITIONER

## 2017-10-25 PROCEDURE — 3017F COLORECTAL CA SCREEN DOC REV: CPT | Performed by: NURSE PRACTITIONER

## 2017-10-25 PROCEDURE — 99308 SBSQ NF CARE LOW MDM 20: CPT | Performed by: NURSE PRACTITIONER

## 2017-10-27 LAB
BUN BLDV-MCNC: 20 MG/DL
CALCIUM SERPL-MCNC: 9.2 MG/DL
CHLORIDE BLD-SCNC: 97 MMOL/L
CO2: 27 MMOL/L
CREAT SERPL-MCNC: 1.18 MG/DL
GFR CALCULATED: NORMAL
GLUCOSE BLD-MCNC: 108 MG/DL
POTASSIUM SERPL-SCNC: 4 MMOL/L
SODIUM BLD-SCNC: 140 MMOL/L

## 2017-10-30 VITALS — DIASTOLIC BLOOD PRESSURE: 66 MMHG | HEART RATE: 86 BPM | TEMPERATURE: 98.1 F | SYSTOLIC BLOOD PRESSURE: 113 MMHG

## 2017-10-30 NOTE — PROGRESS NOTES
every 6 hours for 10 days. FAMILY HISTORY:  Positive for diabetes and coronary artery disease. SOCIAL HISTORY:  The patient is currently nonsmoker. REVIEW OF SYSTEMS:  The patient has ongoing intermittent GERD type symptoms. She has not had any confusional episodes. No bleeding diathesis. No change in her bowel or bladder habits. No evidence of bleeding diathesis. Rest of 14-point review of systems is unremarkable. PHYSICAL EXAMINATION:  The patient's vital signs are stable. She was afebrile 98.1, pulse 86, blood pressure 113/66. She is normocephalic, atraumatic. Pupils are equal and reactive. Oral mucosa is moist.  Chest showed no crackles, no wheezing. Cardiovascular exam showed a regular rate. Abdomen was soft, nontender. No involuntary guarding or rigidity. Extremity showed a +1 dorsal pedal pulse on the right. The left foot is dressed at this time. Trace edema. ASSESSMENT AND PLAN:  1. Diabetic ulcer: The patient remains on antibiotic therapy, is on Zosyn. Continue with local wound care. 2.   Uncontrolled diabetes:  Blood sugars are stable. Continue with home regimen. No symptomatic hypoglycemia. 3.   Weakness: The patient will undergo a course of physical therapy, occupational therapy, skin surveillance, nutritional support with a goal of maximization of functional status. 4.   LISA:  The patient is on CPAP. Encouraged to continue the use. The patient's sleep may be disturbed at times because of her pain, may need titration of medication. We will monitor. Please note, hospital records, imaging reports, consultant notes, and culture reports were reviewed at this time.          Electronically Signed By: Desmond Bates M.D. on 10/25/2017 23:25:19  ______________________________  Desmond Bates M.D.  [id number]  D: 10/24/2017 18:12:44  T: 10/25/2017 04:10:17    cc: - [id number]

## 2017-11-06 ENCOUNTER — OFFICE VISIT (OUTPATIENT)
Dept: INFECTIOUS DISEASES | Age: 72
End: 2017-11-06

## 2017-11-06 VITALS
HEIGHT: 65 IN | BODY MASS INDEX: 43.49 KG/M2 | SYSTOLIC BLOOD PRESSURE: 134 MMHG | HEART RATE: 95 BPM | RESPIRATION RATE: 18 BRPM | WEIGHT: 261 LBS | TEMPERATURE: 97.7 F | DIASTOLIC BLOOD PRESSURE: 66 MMHG

## 2017-11-06 DIAGNOSIS — L08.9 TYPE 2 DIABETES MELLITUS WITH RIGHT DIABETIC FOOT INFECTION (HCC): Primary | ICD-10-CM

## 2017-11-06 DIAGNOSIS — E11.628 TYPE 2 DIABETES MELLITUS WITH RIGHT DIABETIC FOOT INFECTION (HCC): ICD-10-CM

## 2017-11-06 DIAGNOSIS — E11.628 TYPE 2 DIABETES MELLITUS WITH RIGHT DIABETIC FOOT INFECTION (HCC): Primary | ICD-10-CM

## 2017-11-06 DIAGNOSIS — L08.9 TYPE 2 DIABETES MELLITUS WITH RIGHT DIABETIC FOOT INFECTION (HCC): ICD-10-CM

## 2017-11-06 DIAGNOSIS — A49.8 PSEUDOMONAS AERUGINOSA INFECTION: ICD-10-CM

## 2017-11-06 PROCEDURE — G8484 FLU IMMUNIZE NO ADMIN: HCPCS | Performed by: INTERNAL MEDICINE

## 2017-11-06 PROCEDURE — 3014F SCREEN MAMMO DOC REV: CPT | Performed by: INTERNAL MEDICINE

## 2017-11-06 PROCEDURE — 4040F PNEUMOC VAC/ADMIN/RCVD: CPT | Performed by: INTERNAL MEDICINE

## 2017-11-06 PROCEDURE — 3017F COLORECTAL CA SCREEN DOC REV: CPT | Performed by: INTERNAL MEDICINE

## 2017-11-06 PROCEDURE — 99213 OFFICE O/P EST LOW 20 MIN: CPT | Performed by: INTERNAL MEDICINE

## 2017-11-06 PROCEDURE — 3045F PR MOST RECENT HEMOGLOBIN A1C LEVEL 7.0-9.0%: CPT | Performed by: INTERNAL MEDICINE

## 2017-11-06 PROCEDURE — G8417 CALC BMI ABV UP PARAM F/U: HCPCS | Performed by: INTERNAL MEDICINE

## 2017-11-06 PROCEDURE — G8428 CUR MEDS NOT DOCUMENT: HCPCS | Performed by: INTERNAL MEDICINE

## 2017-11-06 PROCEDURE — 1090F PRES/ABSN URINE INCON ASSESS: CPT | Performed by: INTERNAL MEDICINE

## 2017-11-06 PROCEDURE — 1036F TOBACCO NON-USER: CPT | Performed by: INTERNAL MEDICINE

## 2017-11-06 PROCEDURE — G8400 PT W/DXA NO RESULTS DOC: HCPCS | Performed by: INTERNAL MEDICINE

## 2017-11-06 PROCEDURE — 1123F ACP DISCUSS/DSCN MKR DOCD: CPT | Performed by: INTERNAL MEDICINE

## 2017-11-06 RX ORDER — FLUCONAZOLE 100 MG/1
100 TABLET ORAL DAILY
Qty: 11 TABLET | Refills: 0 | Status: SHIPPED | OUTPATIENT
Start: 2017-11-06 | End: 2017-11-17

## 2017-11-07 ENCOUNTER — TELEPHONE (OUTPATIENT)
Dept: FAMILY MEDICINE CLINIC | Age: 72
End: 2017-11-07

## 2017-11-09 ENCOUNTER — TELEPHONE (OUTPATIENT)
Dept: INFECTIOUS DISEASES | Age: 72
End: 2017-11-09

## 2017-11-09 LAB
ANAEROBIC CULTURE: ABNORMAL
GRAM STAIN RESULT: ABNORMAL
ORGANISM: ABNORMAL
WOUND/ABSCESS: ABNORMAL

## 2017-11-09 RX ORDER — CIPROFLOXACIN 500 MG/1
500 TABLET, FILM COATED ORAL 2 TIMES DAILY
Qty: 56 TABLET | Refills: 0 | Status: SHIPPED | OUTPATIENT
Start: 2017-11-09 | End: 2017-12-07

## 2017-11-09 NOTE — TELEPHONE ENCOUNTER
call to patient with an update. She is to stop the glimeperide(amaryl) today and start taking the Cipro as directed. she is to update her PCP. Pt verbalized understanding. Due to the interaction of Ciprofloxacin with the glimeperide, she was advised to update her PCP. Patient states her glucose levels have been running low and she is on other meds as well. She was still at the [address] [place] [address] when we spoke. Advised the labs and Cx results were Faxed to [facility] to update Dr Kishore Banda.

## 2017-11-09 NOTE — PROGRESS NOTES
line in her left upper extremity. DSD to right foot, no drainage noted. ASSESSMENT AND PLAN:   1. Right great toe ischemia with infection. Resident is tolerating her antibiotic without incident. She will follow up with Infectious Disease as ordered. 2. CAD with CABG x3. I have discussed the importance with the resident of taking her Lasix as she has gained 20 plus pounds since her hospital stay. We will put her on daily weights. Resident assures me now that she will take her Lasix. She was not taking it before because it made her to go the bathroom too much. 3. DM. We will consult Dr. Hernán Heck from endocrinology. We will do Accu-Cheks a.c. and h.s. Return in about 1 week (around 10/28/2017) for DM.    ________________________  Kel Shaw. MADDISON Gutierrze    Cc.  [employer]

## 2017-11-12 PROBLEM — I25.10 CAD (CORONARY ARTERY DISEASE): Status: ACTIVE | Noted: 2017-10-21

## 2017-11-12 PROBLEM — I99.8 ISCHEMIA OF TOE: Status: ACTIVE | Noted: 2017-10-21

## 2017-11-13 ENCOUNTER — TELEPHONE (OUTPATIENT)
Dept: FAMILY MEDICINE CLINIC | Age: 72
End: 2017-11-13

## 2017-11-13 RX ORDER — OMEPRAZOLE 40 MG/1
CAPSULE, DELAYED RELEASE ORAL
Refills: 0 | COMMUNITY
Start: 2017-10-29 | End: 2017-11-13 | Stop reason: SDUPTHER

## 2017-11-13 RX ORDER — OMEPRAZOLE 40 MG/1
CAPSULE, DELAYED RELEASE ORAL
Qty: 30 CAPSULE | Refills: 3 | Status: SHIPPED | OUTPATIENT
Start: 2017-11-13 | End: 2018-03-12 | Stop reason: SDUPTHER

## 2017-11-13 NOTE — TELEPHONE ENCOUNTER
While pt was in the nursing home she was started on Omeprazole 40mg. Should would like to continue this medication. Can Dr Debbie Fofana call in a Rx for her?

## 2017-11-14 VITALS
DIASTOLIC BLOOD PRESSURE: 50 MMHG | HEART RATE: 93 BPM | TEMPERATURE: 97.8 F | OXYGEN SATURATION: 94 % | SYSTOLIC BLOOD PRESSURE: 110 MMHG | RESPIRATION RATE: 20 BRPM

## 2017-11-15 PROBLEM — I50.9 CHF (CONGESTIVE HEART FAILURE) (HCC): Status: ACTIVE | Noted: 2017-10-25

## 2017-11-17 ENCOUNTER — CARE COORDINATION (OUTPATIENT)
Dept: CARE COORDINATION | Age: 72
End: 2017-11-17

## 2017-11-17 NOTE — CARE COORDINATION
Goals Addressed             Most Recent     Reduce Falls    On track (11/17/2017)             I will reduce my risk of falls by the following: Remove rugs or use non slip rugs  Use walking aids like cane or walker  pacing activity    Barriers: impairment:  physical: fatigues easily  Plan for overcoming my barriers: I will take frequent rest breaks, and pace actvities, I will use a device , cane or walker, as needed for extra support, I will resume use of CPAP   Confidence: 5/10  Anticipated Goal Completion Date: 1 month       Self Monitoring   On track (11/17/2017)             Self-Monitored Blood Glucose - I will check my blood sugar Fasting blood sugar  Daily Weights - I will weight myself as directed - Daily and write down weights  I will notify my provider of any increase in weight by 3 or more pounds in 2 days OR 5 or more pounds in a week. Blood Pressure - I will take my blood pressure as directed - Daily    Patient Reported Blood Pressure No flowsheet data found. Patient Reported Weight No flowsheet data found. Patient Reported Blood Glucose No flowsheet data found. Barriers: none  Plan for overcoming my barriers: N/A  Confidence: 7/10  Anticipated Goal Completion Date: 8/22/2017              Prior to Admission medications    Medication Sig Start Date End Date Taking?  Authorizing Provider   omeprazole (PRILOSEC) 40 MG delayed release capsule One capsule daily 11/13/17  Yes Teo Aldana DO   ciprofloxacin (CIPRO) 500 MG tablet Take 1 tablet by mouth 2 times daily for 28 days 11/9/17 12/7/17 Yes Denise Short MD   aspirin 81 MG tablet Take 81 mg by mouth daily   Yes Historical Provider, MD   atorvastatin (LIPITOR) 20 MG tablet Take 20 mg by mouth daily   Yes Historical Provider, MD   clopidogrel (PLAVIX) 75 MG tablet Take 75 mg by mouth daily   Yes Historical Provider, MD   Coenzyme Q10 (COQ10) 100 MG CAPS Take 100 mg by mouth daily   Yes Historical Provider, MD   lactobacillus (Flavio Essex)

## 2017-12-04 RX ORDER — LOSARTAN POTASSIUM 25 MG/1
TABLET ORAL
Qty: 180 TABLET | Refills: 0 | Status: SHIPPED | OUTPATIENT
Start: 2017-12-04 | End: 2018-03-13 | Stop reason: SDUPTHER

## 2017-12-18 ENCOUNTER — NURSE ONLY (OUTPATIENT)
Dept: GERIATRIC MEDICINE | Age: 72
End: 2017-12-18

## 2017-12-18 DIAGNOSIS — E11.621 TYPE 2 DIABETES MELLITUS WITH DIABETIC TOE ULCER (HCC): Primary | ICD-10-CM

## 2017-12-18 DIAGNOSIS — L97.509 TYPE 2 DIABETES MELLITUS WITH DIABETIC TOE ULCER (HCC): Primary | ICD-10-CM

## 2017-12-18 PROCEDURE — G0180 MD CERTIFICATION HHA PATIENT: HCPCS | Performed by: INTERNAL MEDICINE

## 2017-12-22 ENCOUNTER — OFFICE VISIT (OUTPATIENT)
Dept: FAMILY MEDICINE CLINIC | Age: 72
End: 2017-12-22

## 2017-12-22 VITALS
SYSTOLIC BLOOD PRESSURE: 102 MMHG | WEIGHT: 258 LBS | HEIGHT: 65 IN | OXYGEN SATURATION: 96 % | RESPIRATION RATE: 16 BRPM | HEART RATE: 87 BPM | BODY MASS INDEX: 42.99 KG/M2 | DIASTOLIC BLOOD PRESSURE: 80 MMHG

## 2017-12-22 DIAGNOSIS — E53.8 B12 DEFICIENCY: ICD-10-CM

## 2017-12-22 DIAGNOSIS — L08.9 TYPE 2 DIABETES MELLITUS WITH RIGHT DIABETIC FOOT INFECTION (HCC): Primary | ICD-10-CM

## 2017-12-22 DIAGNOSIS — E11.628 TYPE 2 DIABETES MELLITUS WITH RIGHT DIABETIC FOOT INFECTION (HCC): Primary | ICD-10-CM

## 2017-12-22 LAB — HBA1C MFR BLD: 6.2 %

## 2017-12-22 PROCEDURE — 1090F PRES/ABSN URINE INCON ASSESS: CPT | Performed by: FAMILY MEDICINE

## 2017-12-22 PROCEDURE — 83036 HEMOGLOBIN GLYCOSYLATED A1C: CPT | Performed by: FAMILY MEDICINE

## 2017-12-22 PROCEDURE — 3044F HG A1C LEVEL LT 7.0%: CPT | Performed by: FAMILY MEDICINE

## 2017-12-22 PROCEDURE — 1036F TOBACCO NON-USER: CPT | Performed by: FAMILY MEDICINE

## 2017-12-22 PROCEDURE — 4040F PNEUMOC VAC/ADMIN/RCVD: CPT | Performed by: FAMILY MEDICINE

## 2017-12-22 PROCEDURE — 99213 OFFICE O/P EST LOW 20 MIN: CPT | Performed by: FAMILY MEDICINE

## 2017-12-22 PROCEDURE — G8417 CALC BMI ABV UP PARAM F/U: HCPCS | Performed by: FAMILY MEDICINE

## 2017-12-22 PROCEDURE — 96372 THER/PROPH/DIAG INJ SC/IM: CPT | Performed by: FAMILY MEDICINE

## 2017-12-22 PROCEDURE — 1123F ACP DISCUSS/DSCN MKR DOCD: CPT | Performed by: FAMILY MEDICINE

## 2017-12-22 PROCEDURE — 3014F SCREEN MAMMO DOC REV: CPT | Performed by: FAMILY MEDICINE

## 2017-12-22 PROCEDURE — G8598 ASA/ANTIPLAT THER USED: HCPCS | Performed by: FAMILY MEDICINE

## 2017-12-22 PROCEDURE — 3017F COLORECTAL CA SCREEN DOC REV: CPT | Performed by: FAMILY MEDICINE

## 2017-12-22 PROCEDURE — G8427 DOCREV CUR MEDS BY ELIG CLIN: HCPCS | Performed by: FAMILY MEDICINE

## 2017-12-22 PROCEDURE — G8484 FLU IMMUNIZE NO ADMIN: HCPCS | Performed by: FAMILY MEDICINE

## 2017-12-22 PROCEDURE — G8400 PT W/DXA NO RESULTS DOC: HCPCS | Performed by: FAMILY MEDICINE

## 2017-12-22 RX ORDER — CYANOCOBALAMIN 1000 UG/ML
1000 INJECTION INTRAMUSCULAR; SUBCUTANEOUS ONCE
Status: COMPLETED | OUTPATIENT
Start: 2017-12-22 | End: 2017-12-22

## 2017-12-22 RX ORDER — GENTAMICIN SULFATE 1 MG/G
CREAM TOPICAL
Refills: 3 | COMMUNITY
Start: 2017-12-14 | End: 2018-05-02 | Stop reason: ALTCHOICE

## 2017-12-22 RX ADMIN — CYANOCOBALAMIN 1000 MCG: 1000 INJECTION INTRAMUSCULAR; SUBCUTANEOUS at 11:45

## 2017-12-22 NOTE — PROGRESS NOTES
Subjective:      Patient ID: Kaleb Wadsworth is a 67 y.o. female.   Chief Complaint   Patient presents with    Diabetes     pt here for follow up pt checks her sugars twice a day, they have been running around 120-140's pt watches her carbs       HPI    Here today follow-up on her sugars doing okay she's taken them twice a day they're running in the 120-140 range      She will see discuss the hospital and also rehab for surgery on her right foot to increased blood flow        Also has a little ulceration ear where she is be healing nicely and is seeing Dr. Burrows Diss closely for this  Allergies   Allergen Reactions    Coreg [Carvedilol]     Lactose     Lisinopril [Lisinopril]      cough     Outpatient Encounter Prescriptions as of 12/22/2017   Medication Sig Dispense Refill    gentamicin (GARAMYCIN) 0.1 % cream APPLY QD UTD  3    losartan (COZAAR) 25 MG tablet TAKE 1 TABLET BY MOUTH TWICE DAILY 180 tablet 0    omeprazole (PRILOSEC) 40 MG delayed release capsule One capsule daily 30 capsule 3    aspirin 81 MG tablet Take 81 mg by mouth daily      atorvastatin (LIPITOR) 20 MG tablet Take 20 mg by mouth daily      clopidogrel (PLAVIX) 75 MG tablet Take 75 mg by mouth daily      Coenzyme Q10 (COQ10) 100 MG CAPS Take 100 mg by mouth daily      lactobacillus (CULTURELLE) capsule Take 1 capsule by mouth 3 times daily      diltiazem (CARDIZEM CD) 240 MG extended release capsule Take 240 mg by mouth daily      furosemide (LASIX) 40 MG tablet Take 40 mg by mouth See Admin Instructions EVERY SUN, TUE, THU, SAT;  GIVE 80 MG BY MOUTH EVERY MON, WED, FRI      glimepiride (AMARYL) 2 MG tablet Take 2 mg by mouth 2 times daily      hydrochlorothiazide (HYDRODIURIL) 25 MG tablet Take 25 mg by mouth daily      hydrOXYzine (ATARAX) 10 MG tablet Take 10 mg by mouth daily      canagliflozin (INVOKANA) 300 MG TABS tablet Take 300 mg by mouth every morning (before breakfast)      levothyroxine (SYNTHROID) 175 MCG tablet Take 175 6.2 on her hemoglobin A1c      Health Maintenance Due   Topic Date Due    Breast cancer screen  08/12/2015    Diabetic retinal exam  09/19/2015    Diabetic foot exam  06/01/2016    Pneumococcal low/med risk (2 of 2 - PPSV23) 03/01/2017             Controlled Substances Monitoring:              If anything worsens or changes please call us at once , follow-up in the office as planned,

## 2017-12-27 RX ORDER — CANAGLIFLOZIN 300 MG/1
TABLET, FILM COATED ORAL
Qty: 30 TABLET | Refills: 0 | Status: SHIPPED | OUTPATIENT
Start: 2017-12-27 | End: 2018-02-08 | Stop reason: SDUPTHER

## 2018-01-02 ENCOUNTER — CARE COORDINATION (OUTPATIENT)
Dept: CARE COORDINATION | Age: 73
End: 2018-01-02

## 2018-01-02 ENCOUNTER — TELEPHONE (OUTPATIENT)
Dept: FAMILY MEDICINE CLINIC | Age: 73
End: 2018-01-02

## 2018-01-02 NOTE — TELEPHONE ENCOUNTER
Do not want to gas. Many different things are out there.   Please give her an appointment with Dr. Panda Broussard tomorrow

## 2018-01-03 RX ORDER — METFORMIN HYDROCHLORIDE 500 MG/1
TABLET, EXTENDED RELEASE ORAL
Qty: 360 TABLET | Refills: 1 | Status: SHIPPED | OUTPATIENT
Start: 2018-01-03 | End: 2018-07-01 | Stop reason: SDUPTHER

## 2018-01-22 RX ORDER — GLIMEPIRIDE 4 MG/1
TABLET ORAL
Qty: 180 TABLET | Refills: 0 | Status: SHIPPED | OUTPATIENT
Start: 2018-01-22 | End: 2018-04-17 | Stop reason: SDUPTHER

## 2018-02-08 ENCOUNTER — OFFICE VISIT (OUTPATIENT)
Dept: FAMILY MEDICINE CLINIC | Age: 73
End: 2018-02-08
Payer: MEDICARE

## 2018-02-08 VITALS
DIASTOLIC BLOOD PRESSURE: 70 MMHG | HEIGHT: 65 IN | TEMPERATURE: 98.3 F | SYSTOLIC BLOOD PRESSURE: 130 MMHG | RESPIRATION RATE: 16 BRPM | HEART RATE: 88 BPM

## 2018-02-08 DIAGNOSIS — E66.01 MORBID OBESITY (HCC): ICD-10-CM

## 2018-02-08 DIAGNOSIS — E11.628 TYPE 2 DIABETES MELLITUS WITH RIGHT DIABETIC FOOT INFECTION (HCC): ICD-10-CM

## 2018-02-08 DIAGNOSIS — I50.9 CONGESTIVE HEART FAILURE, UNSPECIFIED CONGESTIVE HEART FAILURE CHRONICITY, UNSPECIFIED CONGESTIVE HEART FAILURE TYPE: ICD-10-CM

## 2018-02-08 DIAGNOSIS — E53.8 B12 DEFICIENCY: ICD-10-CM

## 2018-02-08 DIAGNOSIS — I50.42 HEART FAILURE, SYSTOLIC AND DIASTOLIC, CHRONIC (HCC): ICD-10-CM

## 2018-02-08 DIAGNOSIS — E66.01 MORBID OBESITY WITH BMI OF 40.0-44.9, ADULT (HCC): ICD-10-CM

## 2018-02-08 DIAGNOSIS — L30.4 INTERTRIGO: ICD-10-CM

## 2018-02-08 DIAGNOSIS — F41.9 ANXIETY: ICD-10-CM

## 2018-02-08 DIAGNOSIS — L08.9 TYPE 2 DIABETES MELLITUS WITH RIGHT DIABETIC FOOT INFECTION (HCC): ICD-10-CM

## 2018-02-08 DIAGNOSIS — R30.0 DYSURIA: Primary | ICD-10-CM

## 2018-02-08 LAB
BILIRUBIN, POC: ABNORMAL
BLOOD URINE, POC: ABNORMAL
CLARITY, POC: ABNORMAL
COLOR, POC: ABNORMAL
GLUCOSE URINE, POC: 1000
KETONES, POC: ABNORMAL
LEUKOCYTE EST, POC: 70
NITRITE, POC: ABNORMAL
PH, POC: 6
PROTEIN, POC: ABNORMAL
SPECIFIC GRAVITY, POC: 1.01
UROBILINOGEN, POC: ABNORMAL

## 2018-02-08 PROCEDURE — 3017F COLORECTAL CA SCREEN DOC REV: CPT | Performed by: FAMILY MEDICINE

## 2018-02-08 PROCEDURE — 3046F HEMOGLOBIN A1C LEVEL >9.0%: CPT | Performed by: FAMILY MEDICINE

## 2018-02-08 PROCEDURE — 81003 URINALYSIS AUTO W/O SCOPE: CPT | Performed by: FAMILY MEDICINE

## 2018-02-08 PROCEDURE — G8400 PT W/DXA NO RESULTS DOC: HCPCS | Performed by: FAMILY MEDICINE

## 2018-02-08 PROCEDURE — 1090F PRES/ABSN URINE INCON ASSESS: CPT | Performed by: FAMILY MEDICINE

## 2018-02-08 PROCEDURE — G8484 FLU IMMUNIZE NO ADMIN: HCPCS | Performed by: FAMILY MEDICINE

## 2018-02-08 PROCEDURE — 1123F ACP DISCUSS/DSCN MKR DOCD: CPT | Performed by: FAMILY MEDICINE

## 2018-02-08 PROCEDURE — 96372 THER/PROPH/DIAG INJ SC/IM: CPT | Performed by: FAMILY MEDICINE

## 2018-02-08 PROCEDURE — G8598 ASA/ANTIPLAT THER USED: HCPCS | Performed by: FAMILY MEDICINE

## 2018-02-08 PROCEDURE — 3014F SCREEN MAMMO DOC REV: CPT | Performed by: FAMILY MEDICINE

## 2018-02-08 PROCEDURE — G8427 DOCREV CUR MEDS BY ELIG CLIN: HCPCS | Performed by: FAMILY MEDICINE

## 2018-02-08 PROCEDURE — 1036F TOBACCO NON-USER: CPT | Performed by: FAMILY MEDICINE

## 2018-02-08 PROCEDURE — 4040F PNEUMOC VAC/ADMIN/RCVD: CPT | Performed by: FAMILY MEDICINE

## 2018-02-08 PROCEDURE — G8417 CALC BMI ABV UP PARAM F/U: HCPCS | Performed by: FAMILY MEDICINE

## 2018-02-08 PROCEDURE — 99213 OFFICE O/P EST LOW 20 MIN: CPT | Performed by: FAMILY MEDICINE

## 2018-02-08 RX ORDER — FLUCONAZOLE 100 MG/1
100 TABLET ORAL DAILY
Qty: 7 TABLET | Refills: 0 | Status: SHIPPED | OUTPATIENT
Start: 2018-02-08 | End: 2018-02-15

## 2018-02-08 RX ORDER — CYANOCOBALAMIN 1000 UG/ML
1000 INJECTION INTRAMUSCULAR; SUBCUTANEOUS ONCE
Status: COMPLETED | OUTPATIENT
Start: 2018-02-08 | End: 2018-02-08

## 2018-02-08 RX ORDER — KETOCONAZOLE 20 MG/G
CREAM TOPICAL
Qty: 1 TUBE | Refills: 5 | Status: SHIPPED | OUTPATIENT
Start: 2018-02-08

## 2018-02-08 RX ORDER — NITROFURANTOIN 25; 75 MG/1; MG/1
100 CAPSULE ORAL 2 TIMES DAILY
Qty: 20 CAPSULE | Refills: 0 | Status: SHIPPED | OUTPATIENT
Start: 2018-02-08 | End: 2018-02-18

## 2018-02-08 RX ADMIN — CYANOCOBALAMIN 1000 MCG: 1000 INJECTION INTRAMUSCULAR; SUBCUTANEOUS at 13:51

## 2018-02-08 NOTE — PROGRESS NOTES
 clopidogrel (PLAVIX) 75 MG tablet Take 75 mg by mouth daily      Coenzyme Q10 (COQ10) 100 MG CAPS Take 100 mg by mouth daily      lactobacillus (CULTURELLE) capsule Take 1 capsule by mouth 3 times daily      diltiazem (CARDIZEM CD) 240 MG extended release capsule Take 240 mg by mouth daily      furosemide (LASIX) 40 MG tablet Take 40 mg by mouth See Admin Instructions EVERY SUN, TUE, THU, SAT;  GIVE 80 MG BY MOUTH EVERY MON, WED, FRI      glimepiride (AMARYL) 2 MG tablet Take 2 mg by mouth 2 times daily      hydrochlorothiazide (HYDRODIURIL) 25 MG tablet Take 25 mg by mouth daily      hydrOXYzine (ATARAX) 10 MG tablet Take 10 mg by mouth daily      levothyroxine (SYNTHROID) 175 MCG tablet Take 175 mcg by mouth Daily      magnesium oxide (MAG-OX) 400 MG tablet Take 400 mg by mouth 2 times daily      metFORMIN (GLUCOPHAGE) 500 MG tablet Take 500 mg by mouth 2 times daily (with meals)      nitroGLYCERIN (NITROSTAT) 0.4 MG SL tablet Place 0.4 mg under the tongue every 5 minutes as needed for Chest pain up to max of 3 total doses. If no relief after 1 dose, call 911.  potassium chloride (MICRO-K) 10 MEQ extended release capsule Take 20 mEq by mouth daily      SITagliptin (JANUVIA) 100 MG tablet Take 100 mg by mouth daily      spironolactone (ALDACTONE) 25 MG tablet Take 25 mg by mouth daily      albuterol sulfate HFA (VENTOLIN HFA) 108 (90 Base) MCG/ACT inhaler Inhale 2 puffs into the lungs every 4 hours as needed for Shortness of Breath      Cyanocobalamin (VITAMIN B-12) 1000 MCG extended release tablet Take 1,000 mcg by mouth daily      Cholecalciferol (VITAMIN D3) 74374 units CAPS Take by mouth every 7 days       [DISCONTINUED] INVOKANA 300 MG TABS tablet TAKE 1 TABLET BY MOUTH EVERY MORNING BEFORE BREAKFAST 30 tablet 0    [] cyanocobalamin injection 1,000 mcg        No facility-administered encounter medications on file as of 2018.       Social History     Social History    Marital status:      Spouse name: N/A    Number of children: N/A    Years of education: N/A     Occupational History    Not on file. Social History Main Topics    Smoking status: Former Smoker     Quit date: 3/3/1982    Smokeless tobacco: Never Used    Alcohol use No    Drug use: No    Sexual activity: Not on file     Other Topics Concern    Not on file     Social History Narrative    No narrative on file     Family History   Problem Relation Age of Onset    Heart Disease Mother     Mental Illness Mother     Kidney Disease Father      Past Medical History:   Diagnosis Date    Hyperlipidemia     Hypothyroidism     Lupus     Rosacea     Type II or unspecified type diabetes mellitus without mention of complication, not stated as uncontrolled      Past Surgical History:   Procedure Laterality Date    CARDIAC SURGERY  12-7-12    triple bypass    FOOT SURGERY      TUBAL LIGATION           REVIEW OF SYSTEMS:   REVIEW OF SYSTEMS:   Patient seen today for exam.  Denies any problems with hearing, headaches or vision. Denies any shortness of breath, chest pain, nausea or vomiting. No black stool, no blood in the stool. No heartburn. Denies any problems with constipation or diarrhea either. No dysuria type symptoms. Objective:     /70 (Site: Left Arm, Position: Sitting, Cuff Size: Medium Adult)   Pulse 88   Temp 98.3 °F (36.8 °C) (Temporal)   Resp 16   Ht 5' 5\" (1.651 m)   LMP 03/03/1989     Physical Exam      O:  Alert and active female in no acute distress  HEENT:  TMs clear. Pharynx neg. Nares clear, no drainage noted  Neck supple/ no adenopathy   HEART:  RRR without murmur/ no carotid bruits  LUNGS:  Clear to auscultation bilaterally, no wheeze or rhonchi noted  THYROID: neg masses or nodularity  ABDOMEN:  Soft x4. Bowel sounds positive. No masses or organomegaly,  Negative tenderness, guarding or rebound.     EXTR:  Without edema./ good pulses bilat

## 2018-02-19 ENCOUNTER — CARE COORDINATION (OUTPATIENT)
Dept: CARE COORDINATION | Age: 73
End: 2018-02-19

## 2018-03-05 ENCOUNTER — CARE COORDINATION (OUTPATIENT)
Dept: CARE COORDINATION | Age: 73
End: 2018-03-05

## 2018-03-05 ENCOUNTER — TELEPHONE (OUTPATIENT)
Dept: FAMILY MEDICINE CLINIC | Age: 73
End: 2018-03-05

## 2018-03-05 NOTE — CARE COORDINATION
Ambulatory Care Coordination Note  3/5/2018  CM Risk Score: 6  Bar Mortality Risk Score: 20.93    ACC: Demetris Goodman, RN    Summary Note: Patient states doing well, continues to see Dr. Anabell Rodriguez for healing foot wound. States pleased with last A1C  6.2, current numbers range 120-150 fasting. Had called in earlier and inquired if she needed to continue Plavix, had cut finger and took forever to stop bleeding. ACC informed per Dr Key Roche she should continue until foot wound healed. Finger no longer bleeding, was cleaned and bandaged by Dr. Anabell Rodriguez. Instructed to observe for/reprot sx infection -redness, swelling, pain , drainage. She received a 3 month supply of Omega 3 capsules , she had received a call from someone , possibly her insurance company, telling her she qualified fo free Omega 3 based on her diabetes dx and lab values, wants Dr. Yohannes espinoza to take. ACC confirmed for patient the results of the  labwork done at Dr. Mirta Murdock office was also sent here as she had requested.         Care Coordination Interventions    Program Enrollment:  Rising Risk  Referral from Primary Care Provider:  No  Suggested Interventions and Community Resources  Fall Risk Prevention:  Completed  Home Health Services:  Completed (Comment: [facility] )  Meals on Wheels:  Completed (Comment: [facility])  Senior Services:  Completed  Transportation Support:  Completed  Zone Management Tools:  Completed         Goals Addressed             Most Recent     Reduce Falls    On track (3/5/2018)             I will reduce my risk of falls by the following: Remove rugs or use non slip rugs  Use walking aids like cane or walker  pacing activity    Barriers: impairment:  physical: fatigues easily  Plan for overcoming my barriers: I will take frequent rest breaks, and pace actvities, I will use a device , cane or walker, as needed for extra support, I will resume use of CPAP   Confidence: 5/10  Anticipated Goal Completion Date: 1 month  Self Monitoring   On track (3/5/2018)             Self-Monitored Blood Glucose - I will check my blood sugar Fasting blood sugar  Daily Weights - I will weight myself as directed - Daily and write down weights  I will notify my provider of any increase in weight by 3 or more pounds in 2 days OR 5 or more pounds in a week. Blood Pressure - I will take my blood pressure as directed - Daily    Patient Reported Blood Pressure No flowsheet data found. Patient Reported Weight No flowsheet data found. Patient Reported Blood Glucose No flowsheet data found. Barriers: none  Plan for overcoming my barriers: N/A  Confidence: 7/10  Anticipated Goal Completion Date: 8/22/2017              Prior to Admission medications    Medication Sig Start Date End Date Taking? Authorizing Provider   canagliflozin (INVOKANA) 300 MG TABS tablet TAKE 1 TABLET BY MOUTH EVERY MORNING BEFORE BREAKFAST 2/8/18  Yes Teo Aldana DO   ketoconazole (NIZORAL) 2 % cream Apply topically daily.  2/8/18  Yes Teo Aldana DO   glimepiride (AMARYL) 4 MG tablet TAKE 1 TABLET BY MOUTH TWICE DAILY(BEFORE BREAKFAST AND DINNER) 1/22/18  Yes Teo Aldana DO   metFORMIN (GLUCOPHAGE-XR) 500 MG extended release tablet TAKE 2 TABLET BY MOUTH EVERY MORNING AND 2 TABLET BY MOUTH EVERY EVENING WITH DINNER 1/3/18  Yes Teo Aldana DO   gentamicin (GARAMYCIN) 0.1 % cream APPLY QD UTD 12/14/17  Yes Historical Provider, MD   losartan (COZAAR) 25 MG tablet TAKE 1 TABLET BY MOUTH TWICE DAILY 12/4/17  Yes Yesenia Zhao MD   omeprazole (PRILOSEC) 40 MG delayed release capsule One capsule daily 11/13/17  Yes Teo Aldana DO   aspirin 81 MG tablet Take 81 mg by mouth daily   Yes Historical Provider, MD   atorvastatin (LIPITOR) 20 MG tablet Take 20 mg by mouth daily   Yes Historical Provider, MD   clopidogrel (PLAVIX) 75 MG tablet Take 75 mg by mouth daily   Yes Historical Provider, MD   Coenzyme Q10 (COQ10) 100 MG CAPS Take 100 mg by mouth daily   Yes Historical Provider, MD   lactobacillus (CULTURELLE) capsule Take 1 capsule by mouth 3 times daily   Yes Historical Provider, MD   diltiazem (CARDIZEM CD) 240 MG extended release capsule Take 240 mg by mouth daily   Yes Historical Provider, MD   furosemide (LASIX) 40 MG tablet Take 40 mg by mouth See Admin Instructions EVERY SUN, TUE, THU, SAT;  GIVE 80 MG BY MOUTH EVERY MON, WED, FRI   Yes Historical Provider, MD   hydrochlorothiazide (HYDRODIURIL) 25 MG tablet Take 25 mg by mouth daily   Yes Historical Provider, MD   hydrOXYzine (ATARAX) 10 MG tablet Take 10 mg by mouth daily   Yes Historical Provider, MD   levothyroxine (SYNTHROID) 175 MCG tablet Take 175 mcg by mouth Daily   Yes Historical Provider, MD   magnesium oxide (MAG-OX) 400 MG tablet Take 400 mg by mouth 2 times daily   Yes Historical Provider, MD   nitroGLYCERIN (NITROSTAT) 0.4 MG SL tablet Place 0.4 mg under the tongue every 5 minutes as needed for Chest pain up to max of 3 total doses. If no relief after 1 dose, call 911.    Yes Historical Provider, MD   potassium chloride (MICRO-K) 10 MEQ extended release capsule Take 20 mEq by mouth daily   Yes Historical Provider, MD   SITagliptin (JANUVIA) 100 MG tablet Take 100 mg by mouth daily   Yes Historical Provider, MD   albuterol sulfate HFA (VENTOLIN HFA) 108 (90 Base) MCG/ACT inhaler Inhale 2 puffs into the lungs every 4 hours as needed for Shortness of Breath   Yes Historical Provider, MD   Cyanocobalamin (VITAMIN B-12) 1000 MCG extended release tablet Take 1,000 mcg by mouth daily   Yes Historical Provider, MD   Cholecalciferol (VITAMIN D3) 73139 units CAPS Take by mouth every 7 days [facility]   Yes Historical Provider, MD   glimepiride (AMARYL) 2 MG tablet Take 2 mg by mouth 2 times daily    Historical Provider, MD   metFORMIN (GLUCOPHAGE) 500 MG tablet Take 500 mg by mouth 2 times daily (with meals)    Historical Provider, MD   spironolactone (ALDACTONE) 25 MG tablet Take 25 mg by mouth daily

## 2018-03-05 NOTE — TELEPHONE ENCOUNTER
Pt is calling because she had surgery on her leg back in October 2017 by Dr Jordyn Tejada. He put her on a blood thinner, clopidogrel 75 mg. She thought this was only going to be temporary that she was going to be on it. Yesterday, she nicked her thumb cutting vegetables and it wouldn't stop bleeding. She states it is not deep enough for stitches but she's used multiple band aids and it's not any better. Dr Jordyn Tejada, who used to be in [facility] is no longer there. He is now in [place]. They told her to call her PCP. She wanted to know if she could stop taking the Clopidogrel.  Please advise  Thanks

## 2018-03-07 ENCOUNTER — CARE COORDINATION (OUTPATIENT)
Dept: CARE COORDINATION | Age: 73
End: 2018-03-07

## 2018-03-12 RX ORDER — OMEPRAZOLE 40 MG/1
CAPSULE, DELAYED RELEASE ORAL
Qty: 30 CAPSULE | Refills: 4 | Status: SHIPPED | OUTPATIENT
Start: 2018-03-12 | End: 2018-07-02 | Stop reason: SDUPTHER

## 2018-03-13 RX ORDER — LOSARTAN POTASSIUM 25 MG/1
TABLET ORAL
Qty: 180 TABLET | Refills: 1 | Status: SHIPPED | OUTPATIENT
Start: 2018-03-13 | End: 2018-09-09 | Stop reason: SDUPTHER

## 2018-03-22 RX ORDER — SITAGLIPTIN 100 MG/1
TABLET, FILM COATED ORAL
Qty: 90 TABLET | Refills: 0 | Status: SHIPPED | OUTPATIENT
Start: 2018-03-22

## 2018-03-22 RX ORDER — LEVOTHYROXINE SODIUM 175 UG/1
TABLET ORAL
Qty: 90 TABLET | Refills: 0 | Status: SHIPPED | OUTPATIENT
Start: 2018-03-22 | End: 2018-06-20 | Stop reason: SDUPTHER

## 2018-03-28 RX ORDER — ATORVASTATIN CALCIUM 20 MG/1
TABLET, FILM COATED ORAL
Qty: 90 TABLET | Refills: 1 | Status: SHIPPED | OUTPATIENT
Start: 2018-03-28 | End: 2018-05-02 | Stop reason: SDUPTHER

## 2018-04-18 RX ORDER — GLIMEPIRIDE 4 MG/1
TABLET ORAL
Qty: 180 TABLET | Refills: 1 | Status: SHIPPED | OUTPATIENT
Start: 2018-04-18 | End: 2018-08-15 | Stop reason: SDUPTHER

## 2018-05-02 ENCOUNTER — OFFICE VISIT (OUTPATIENT)
Dept: FAMILY MEDICINE CLINIC | Age: 73
End: 2018-05-02
Payer: MEDICARE

## 2018-05-02 ENCOUNTER — TELEPHONE (OUTPATIENT)
Dept: FAMILY MEDICINE CLINIC | Age: 73
End: 2018-05-02

## 2018-05-02 ENCOUNTER — CARE COORDINATION (OUTPATIENT)
Dept: CARE COORDINATION | Age: 73
End: 2018-05-02

## 2018-05-02 VITALS
HEIGHT: 65 IN | HEART RATE: 85 BPM | RESPIRATION RATE: 12 BRPM | SYSTOLIC BLOOD PRESSURE: 118 MMHG | DIASTOLIC BLOOD PRESSURE: 60 MMHG | TEMPERATURE: 97 F | OXYGEN SATURATION: 97 %

## 2018-05-02 DIAGNOSIS — E03.9 HYPOTHYROIDISM, UNSPECIFIED TYPE: ICD-10-CM

## 2018-05-02 DIAGNOSIS — L08.9 TYPE 2 DIABETES MELLITUS WITH RIGHT DIABETIC FOOT INFECTION (HCC): Primary | ICD-10-CM

## 2018-05-02 DIAGNOSIS — N76.1 CHRONIC VAGINITIS: ICD-10-CM

## 2018-05-02 DIAGNOSIS — E53.8 B12 DEFICIENCY: ICD-10-CM

## 2018-05-02 DIAGNOSIS — E11.628 TYPE 2 DIABETES MELLITUS WITH RIGHT DIABETIC FOOT INFECTION (HCC): Primary | ICD-10-CM

## 2018-05-02 DIAGNOSIS — E78.2 MIXED HYPERLIPIDEMIA: ICD-10-CM

## 2018-05-02 LAB — HBA1C MFR BLD: 6.2 %

## 2018-05-02 PROCEDURE — 2022F DILAT RTA XM EVC RTNOPTHY: CPT | Performed by: FAMILY MEDICINE

## 2018-05-02 PROCEDURE — 4040F PNEUMOC VAC/ADMIN/RCVD: CPT | Performed by: FAMILY MEDICINE

## 2018-05-02 PROCEDURE — 1090F PRES/ABSN URINE INCON ASSESS: CPT | Performed by: FAMILY MEDICINE

## 2018-05-02 PROCEDURE — 1123F ACP DISCUSS/DSCN MKR DOCD: CPT | Performed by: FAMILY MEDICINE

## 2018-05-02 PROCEDURE — G8400 PT W/DXA NO RESULTS DOC: HCPCS | Performed by: FAMILY MEDICINE

## 2018-05-02 PROCEDURE — 99214 OFFICE O/P EST MOD 30 MIN: CPT | Performed by: FAMILY MEDICINE

## 2018-05-02 PROCEDURE — G8427 DOCREV CUR MEDS BY ELIG CLIN: HCPCS | Performed by: FAMILY MEDICINE

## 2018-05-02 PROCEDURE — 83036 HEMOGLOBIN GLYCOSYLATED A1C: CPT | Performed by: FAMILY MEDICINE

## 2018-05-02 PROCEDURE — 3044F HG A1C LEVEL LT 7.0%: CPT | Performed by: FAMILY MEDICINE

## 2018-05-02 PROCEDURE — 3017F COLORECTAL CA SCREEN DOC REV: CPT | Performed by: FAMILY MEDICINE

## 2018-05-02 PROCEDURE — 1036F TOBACCO NON-USER: CPT | Performed by: FAMILY MEDICINE

## 2018-05-02 PROCEDURE — 96372 THER/PROPH/DIAG INJ SC/IM: CPT | Performed by: FAMILY MEDICINE

## 2018-05-02 PROCEDURE — G8417 CALC BMI ABV UP PARAM F/U: HCPCS | Performed by: FAMILY MEDICINE

## 2018-05-02 PROCEDURE — G8598 ASA/ANTIPLAT THER USED: HCPCS | Performed by: FAMILY MEDICINE

## 2018-05-02 RX ORDER — FLUCONAZOLE 100 MG/1
100 TABLET ORAL DAILY
Qty: 7 TABLET | Refills: 2 | Status: SHIPPED | OUTPATIENT
Start: 2018-05-02 | End: 2018-05-09

## 2018-05-02 RX ORDER — CYANOCOBALAMIN 1000 UG/ML
1000 INJECTION INTRAMUSCULAR; SUBCUTANEOUS ONCE
Status: COMPLETED | OUTPATIENT
Start: 2018-05-02 | End: 2018-05-02

## 2018-05-02 RX ORDER — ATORVASTATIN CALCIUM 20 MG/1
TABLET, FILM COATED ORAL
Qty: 135 TABLET | Refills: 1
Start: 2018-05-02 | End: 2018-07-02 | Stop reason: SDUPTHER

## 2018-05-02 RX ADMIN — CYANOCOBALAMIN 1000 MCG: 1000 INJECTION INTRAMUSCULAR; SUBCUTANEOUS at 10:52

## 2018-05-12 ENCOUNTER — TELEPHONE (OUTPATIENT)
Dept: FAMILY MEDICINE CLINIC | Age: 73
End: 2018-05-12

## 2018-05-13 RX ORDER — CLOPIDOGREL BISULFATE 75 MG/1
75 TABLET ORAL DAILY
Qty: 30 TABLET | Refills: 5 | Status: SHIPPED | OUTPATIENT
Start: 2018-05-13

## 2018-06-20 RX ORDER — LEVOTHYROXINE SODIUM 175 UG/1
TABLET ORAL
Qty: 90 TABLET | Refills: 0 | Status: SHIPPED | OUTPATIENT
Start: 2018-06-20 | End: 2018-09-24 | Stop reason: SDUPTHER

## 2018-07-02 RX ORDER — OMEPRAZOLE 40 MG/1
CAPSULE, DELAYED RELEASE ORAL
Qty: 90 CAPSULE | Refills: 1 | Status: SHIPPED | OUTPATIENT
Start: 2018-07-02 | End: 2018-12-26 | Stop reason: SDUPTHER

## 2018-07-02 RX ORDER — METFORMIN HYDROCHLORIDE 500 MG/1
TABLET, EXTENDED RELEASE ORAL
Qty: 360 TABLET | Refills: 0 | Status: SHIPPED | OUTPATIENT
Start: 2018-07-02 | End: 2018-09-07 | Stop reason: SDUPTHER

## 2018-07-02 RX ORDER — ATORVASTATIN CALCIUM 20 MG/1
TABLET, FILM COATED ORAL
Qty: 135 TABLET | Refills: 1 | Status: SHIPPED | OUTPATIENT
Start: 2018-07-02

## 2018-07-16 ENCOUNTER — CARE COORDINATION (OUTPATIENT)
Dept: CARE COORDINATION | Age: 73
End: 2018-07-16

## 2018-07-17 ENCOUNTER — CARE COORDINATION (OUTPATIENT)
Dept: CARE COORDINATION | Age: 73
End: 2018-07-17

## 2018-07-17 RX ORDER — CEPHALEXIN 500 MG/1
500 CAPSULE ORAL 3 TIMES DAILY
Qty: 30 CAPSULE | Refills: 0 | OUTPATIENT
Start: 2018-07-17 | End: 2018-07-27

## 2018-07-17 NOTE — CARE COORDINATION
Completed (Comment: [facility] )  Meals on Wheels:  Completed (Comment: [facility])  Medi Set or Pill Pack:  Completed (Comment: self manages)  Pharmacist:  Declined (Comment: Drug plan requires she meet monthly with her pharmacist at [place] to review medications)  Senior Services:  Completed  Transportation Support:  Completed  Zone Management Tools:  Completed         Goals Addressed             Most Recent     Reduce Falls    On track (7/17/2018)             I will reduce my risk of falls by the following: Remove rugs or use non slip rugs  Use walking aids like cane or walker  pacing activity    Barriers: impairment:  physical: fatigues easily  Plan for overcoming my barriers: I will take frequent rest breaks, and pace actvities, I will use a device , cane or walker, as needed for extra support, I will resume use of CPAP   Confidence: 5/10  Anticipated Goal Completion Date: 1 month       Self Monitoring   On track (7/17/2018)             Self-Monitored Blood Glucose - I will check my blood sugar Fasting blood sugar  Daily Weights - I will weight myself as directed - Daily and write down weights: BILATERAL ORTHO DEVICES ON FEET MAKE PATIENT FEEL UNSAFE STANDING ON A BR SCALE. DOES MONITOR FOR EDEMA OF FEET AND LOWER LEGS  Blood Pressure - I will take my blood pressure as directed - Daily    Patient Reported Blood Pressure No flowsheet data found. Patient Reported Weight No flowsheet data found. Patient Reported Blood Glucose No flowsheet data found. Barriers: none  Plan for overcoming my barriers: N/A  Confidence: 7/10  Anticipated Goal Completion Date: 8/22/2017              Prior to Admission medications    Medication Sig Start Date End Date Taking?  Authorizing Provider   metFORMIN (GLUCOPHAGE-XR) 500 MG extended release tablet TAKE 2 TABLETS BY MOUTH EVERY MORNING AND 2 TABLETS BY MOUTH EVERY EVENING WITH DINNER 7/2/18  Yes Teo Aldana, DO   atorvastatin (LIPITOR) 20 MG tablet TAKE 1  1/2 tab each Assessment    Are you currently restricting fluids?:  No Restriction  Do you understand a low sodium diet?:  Yes  Do you understand how to read food labels?:  Yes  How many restaurant meals do you eat per week?:  0  Do you salt your food before tasting it?:  No     No patient-reported symptoms      Symptoms:   None:  Yes      Symptom course:  stable  Weight trend:  stable  Salt intake watch compared to last visit:  stable      and   General Assessment    Do you have any symptoms that are causing concern?:  No

## 2018-07-25 RX ORDER — SITAGLIPTIN 100 MG/1
TABLET, FILM COATED ORAL
Qty: 90 TABLET | Refills: 0 | Status: SHIPPED | OUTPATIENT
Start: 2018-07-25 | End: 2018-10-22 | Stop reason: SDUPTHER

## 2018-07-25 NOTE — TELEPHONE ENCOUNTER
Medication:   Requested Prescriptions     Pending Prescriptions Disp Refills    JANUVIA 100 MG tablet [Pharmacy Med Name: Ericka [place] 100MG TABLETS] 90 tablet 0     Sig: TAKE 1 TABLET BY MOUTH EVERY DAY         Last Office Visit: 5-2-18    Last Labs: 2-21-18    Last Filled: 3-22-18

## 2018-08-01 RX ORDER — CEPHALEXIN 500 MG/1
500 CAPSULE ORAL 4 TIMES DAILY
Qty: 40 CAPSULE | Refills: 0 | Status: SHIPPED | OUTPATIENT
Start: 2018-08-01 | End: 2018-11-19 | Stop reason: ALTCHOICE

## 2018-08-01 NOTE — TELEPHONE ENCOUNTER
Keflex was called in for pt by Dr Brandy Gonzales on 07/17/2018 Dx rash on scalp. Pt coming in on 08/15 to see Dr Disha Dawkins but would like to get another Rx for Keflex. Scalp was clearing up but feels as if the Rx wasn't long enough.

## 2018-08-15 ENCOUNTER — OFFICE VISIT (OUTPATIENT)
Dept: FAMILY MEDICINE CLINIC | Age: 73
End: 2018-08-15
Payer: MEDICARE

## 2018-08-15 ENCOUNTER — CARE COORDINATION (OUTPATIENT)
Dept: CARE COORDINATION | Age: 73
End: 2018-08-15

## 2018-08-15 VITALS
WEIGHT: 253.56 LBS | RESPIRATION RATE: 16 BRPM | BODY MASS INDEX: 42.24 KG/M2 | HEIGHT: 65 IN | DIASTOLIC BLOOD PRESSURE: 62 MMHG | SYSTOLIC BLOOD PRESSURE: 106 MMHG | TEMPERATURE: 96.3 F | OXYGEN SATURATION: 96 % | HEART RATE: 98 BPM

## 2018-08-15 DIAGNOSIS — L08.9 TYPE 2 DIABETES MELLITUS WITH RIGHT DIABETIC FOOT INFECTION (HCC): Primary | ICD-10-CM

## 2018-08-15 DIAGNOSIS — I10 ESSENTIAL HYPERTENSION: ICD-10-CM

## 2018-08-15 DIAGNOSIS — E03.9 HYPOTHYROIDISM, UNSPECIFIED TYPE: ICD-10-CM

## 2018-08-15 DIAGNOSIS — E78.2 MIXED HYPERLIPIDEMIA: ICD-10-CM

## 2018-08-15 DIAGNOSIS — L89.892 PRESSURE ULCER OF TOE OF RIGHT FOOT, STAGE 2 (HCC): ICD-10-CM

## 2018-08-15 DIAGNOSIS — E11.628 TYPE 2 DIABETES MELLITUS WITH RIGHT DIABETIC FOOT INFECTION (HCC): Primary | ICD-10-CM

## 2018-08-15 LAB — HBA1C MFR BLD: 7.4 %

## 2018-08-15 PROCEDURE — 4040F PNEUMOC VAC/ADMIN/RCVD: CPT | Performed by: FAMILY MEDICINE

## 2018-08-15 PROCEDURE — 1101F PT FALLS ASSESS-DOCD LE1/YR: CPT | Performed by: FAMILY MEDICINE

## 2018-08-15 PROCEDURE — 1036F TOBACCO NON-USER: CPT | Performed by: FAMILY MEDICINE

## 2018-08-15 PROCEDURE — G8598 ASA/ANTIPLAT THER USED: HCPCS | Performed by: FAMILY MEDICINE

## 2018-08-15 PROCEDURE — 3017F COLORECTAL CA SCREEN DOC REV: CPT | Performed by: FAMILY MEDICINE

## 2018-08-15 PROCEDURE — 2022F DILAT RTA XM EVC RTNOPTHY: CPT | Performed by: FAMILY MEDICINE

## 2018-08-15 PROCEDURE — 99213 OFFICE O/P EST LOW 20 MIN: CPT | Performed by: FAMILY MEDICINE

## 2018-08-15 PROCEDURE — G8400 PT W/DXA NO RESULTS DOC: HCPCS | Performed by: FAMILY MEDICINE

## 2018-08-15 PROCEDURE — 1123F ACP DISCUSS/DSCN MKR DOCD: CPT | Performed by: FAMILY MEDICINE

## 2018-08-15 PROCEDURE — 1090F PRES/ABSN URINE INCON ASSESS: CPT | Performed by: FAMILY MEDICINE

## 2018-08-15 PROCEDURE — G8510 SCR DEP NEG, NO PLAN REQD: HCPCS | Performed by: FAMILY MEDICINE

## 2018-08-15 PROCEDURE — 83036 HEMOGLOBIN GLYCOSYLATED A1C: CPT | Performed by: FAMILY MEDICINE

## 2018-08-15 PROCEDURE — G8427 DOCREV CUR MEDS BY ELIG CLIN: HCPCS | Performed by: FAMILY MEDICINE

## 2018-08-15 PROCEDURE — G8417 CALC BMI ABV UP PARAM F/U: HCPCS | Performed by: FAMILY MEDICINE

## 2018-08-15 PROCEDURE — 3045F PR MOST RECENT HEMOGLOBIN A1C LEVEL 7.0-9.0%: CPT | Performed by: FAMILY MEDICINE

## 2018-08-15 ASSESSMENT — PATIENT HEALTH QUESTIONNAIRE - PHQ9
2. FEELING DOWN, DEPRESSED OR HOPELESS: 0
1. LITTLE INTEREST OR PLEASURE IN DOING THINGS: 0
SUM OF ALL RESPONSES TO PHQ QUESTIONS 1-9: 0
SUM OF ALL RESPONSES TO PHQ QUESTIONS 1-9: 0
SUM OF ALL RESPONSES TO PHQ9 QUESTIONS 1 & 2: 0

## 2018-08-15 ASSESSMENT — ENCOUNTER SYMPTOMS
BLURRED VISION: 0
SHORTNESS OF BREATH: 0
ORTHOPNEA: 0

## 2018-08-15 NOTE — PROGRESS NOTES
hypertrophy, PVD or retinopathy. There is no history of chronic renal disease, coarctation of the aorta, hyperaldosteronism, hypercortisolism, hyperparathyroidism, a hypertension causing med, pheochromocytoma, renovascular disease, sleep apnea or a thyroid problem. Foot ulcer  Pt presents today for a foot ulcer on right great toe. Pt is having this problem managed by Dr. Karlene Goodwin. She admits to blood and no puss. H ypothyroidism  Pt presents today for hypothyroidism. She denies weight gain, fatigue or hair loss. Past Medical History:   Diagnosis Date    Hyperlipidemia     Hypothyroidism     Lupus     Rosacea     Type II or unspecified type diabetes mellitus without mention of complication, not stated as uncontrolled      Past Surgical History:   Procedure Laterality Date    CARDIAC SURGERY  12-7-12    triple bypass    FOOT SURGERY      TUBAL LIGATION      VEIN SURGERY Right 10/2017    balloon     Family History   Problem Relation Age of Onset    Heart Disease Mother     Mental Illness Mother     Kidney Disease Father      Social History     Social History    Marital status:      Spouse name: N/A    Number of children: N/A    Years of education: N/A     Occupational History    Not on file. Social History Main Topics    Smoking status: Former Smoker     Packs/day: 0.00     Years: 1.00     Quit date: 3/3/1982    Smokeless tobacco: Never Used    Alcohol use No    Drug use: No    Sexual activity: Not on file     Other Topics Concern    Not on file     Social History Narrative    No narrative on file     Allergies:  Coreg [carvedilol]; Lactose; and Lisinopril [lisinopril]    Review of Systems   Constitutional: Negative for malaise/fatigue. Eyes: Negative for blurred vision. Respiratory: Negative for shortness of breath. Cardiovascular: Negative for chest pain, palpitations, orthopnea and PND. Musculoskeletal: Negative for neck pain. Neurological: Negative for headaches. Objective:   /62 (Site: Left Arm, Position: Sitting, Cuff Size: Large Adult)   Pulse 98   Temp 96.3 °F (35.7 °C) (Temporal)   Resp 16   Ht 5' 5\" (1.651 m)   Wt 253 lb 9 oz (115 kg)   LMP 03/03/1989   SpO2 96%   BMI 42.20 kg/m²     Physical Exam   Musculoskeletal:        Feet:        O:  Alert and active female in no acute distress  HEENT:  TMs clear. Pharynx neg. Nares clear, no drainage noted  Neck supple/ no adenopathy   HEART:  RRR without murmur/ no carotid bruits  LUNGS:  Clear to auscultation bilaterally, no wheeze or rhonchi noted  THYROID: neg masses or nodularity  ABDOMEN:  Soft x4. Bowel sounds positive. No masses or organomegaly,  Negative tenderness, guarding or rebound. EXTR:  Without edema./ good pulses bilat    Neurologic exam unremarkable. DTRs in upper and lower extremities within normal limits. Full strength noted    Skin- no lesions noted       Assessment:       Diagnosis Orders   1. Type 2 diabetes mellitus with right diabetic foot infection (HCC)  POCT glycosylated hemoglobin (Hb A1C)   2. Hypothyroidism, unspecified type     3. Mixed hyperlipidemia     4. Essential hypertension     5. Pressure ulcer of toe of right foot, stage 2               Plan:        No orders of the defined types were placed in this encounter. Orders Placed This Encounter   Procedures    POCT glycosylated hemoglobin (Hb A1C)           Health Maintenance Due   Topic Date Due    Shingles Vaccine (1 of 2 - 2 Dose Series) 04/28/1994    Breast cancer screen  08/12/2015    Diabetic retinal exam  09/19/2015    Diabetic foot exam  06/01/2016    Pneumococcal low/med risk (2 of 2 - PPSV23) 03/01/2017        she'll can see continue seeing Dr. Lenny Tim for her foot ulceration review any C was 7.2 elevated from last time      Controlled Substances Monitoring:     No flowsheet data found.         If anything worsens or changes please call us at once , follow-up in the office as planned,       Assessment:

## 2018-08-15 NOTE — CARE COORDINATION
Teo Aldana, DO   metFORMIN (GLUCOPHAGE-XR) 500 MG extended release tablet TAKE 2 TABLETS BY MOUTH EVERY MORNING AND 2 TABLETS BY MOUTH EVERY EVENING WITH DINNER 7/2/18   Nigel South, DO   atorvastatin (LIPITOR) 20 MG tablet TAKE 1  1/2 tab each day for total ( 30 mg 7/2/18   Nigel South, DO   canagliflozin (INVOKANA) 300 MG TABS tablet TAKE 1 TABLET BY MOUTH EVERY MORNING BEFORE BREAKFAST 7/2/18   Teo Aldana, DO   omeprazole (PRILOSEC) 40 MG delayed release capsule TAKE 1 CAPSULE BY MOUTH DAILY 7/2/18   Nigel South, DO   levothyroxine (SYNTHROID) 175 MCG tablet TAKE 1 TABLET BY MOUTH DAILY 6/20/18   Nigel South, DO   clopidogrel (PLAVIX) 75 MG tablet Take 1 tablet by mouth daily 5/13/18   [facility], DO   CPAP Machine MISC by Does not apply route    Historical Provider, MD   JANUVIA 100 MG tablet TAKE 1 TABLET BY MOUTH EVERY DAY 3/22/18   Teo Aldana, DO   losartan (COZAAR) 25 MG tablet TAKE 1 TABLET BY MOUTH TWICE DAILY 3/13/18   Teo Aldana,    ketoconazole (NIZORAL) 2 % cream Apply topically daily.  2/8/18   [facility], DO   aspirin 81 MG tablet Take 81 mg by mouth daily    Historical Provider, MD   Coenzyme Q10 (COQ10) 100 MG CAPS Take 100 mg by mouth daily    Historical Provider, MD   lactobacillus (CULTURELLE) capsule Take 1 capsule by mouth 3 times daily    Historical Provider, MD   diltiazem (CARDIZEM CD) 240 MG extended release capsule Take 240 mg by mouth daily    Historical Provider, MD   furosemide (LASIX) 40 MG tablet Take 40 mg by mouth See Admin Instructions EVERY SUN, TUE, THU, SAT;  GIVE 80 MG BY MOUTH EVERY MON, WED, [address]    Historical Provider, MD   hydrochlorothiazide (HYDRODIURIL) 25 MG tablet Take 25 mg by mouth daily    Historical Provider, MD   hydrOXYzine (ATARAX) 10 MG tablet Take 10 mg by mouth daily    Historical Provider, MD   magnesium oxide (MAG-OX) 400 MG tablet Take 400 mg by mouth 2 times daily    Historical Provider, MD   nitroGLYCERIN

## 2018-09-04 ENCOUNTER — TELEPHONE (OUTPATIENT)
Dept: FAMILY MEDICINE CLINIC | Age: 73
End: 2018-09-04

## 2018-09-04 RX ORDER — NITROFURANTOIN 25; 75 MG/1; MG/1
100 CAPSULE ORAL 2 TIMES DAILY
Qty: 14 CAPSULE | Refills: 0 | Status: SHIPPED | OUTPATIENT
Start: 2018-09-04 | End: 2018-09-11

## 2018-09-04 NOTE — TELEPHONE ENCOUNTER
I will call something in for her but I want to see her later this week to see how she is feeling,, increase her fluids with this heat and may need to change her diabetic medications

## 2018-09-10 RX ORDER — LOSARTAN POTASSIUM 25 MG/1
TABLET ORAL
Qty: 180 TABLET | Refills: 1 | Status: SHIPPED | OUTPATIENT
Start: 2018-09-10 | End: 2019-03-08 | Stop reason: SDUPTHER

## 2018-09-10 RX ORDER — METFORMIN HYDROCHLORIDE 500 MG/1
TABLET, EXTENDED RELEASE ORAL
Qty: 360 TABLET | Refills: 1 | Status: SHIPPED | OUTPATIENT
Start: 2018-09-10 | End: 2019-03-10 | Stop reason: SDUPTHER

## 2018-09-18 ENCOUNTER — CARE COORDINATION (OUTPATIENT)
Dept: CARE COORDINATION | Age: 73
End: 2018-09-18

## 2018-09-19 NOTE — CARE COORDINATION
Ambulatory Care Coordination Note  9/19/2018  CM Risk Score: 6  Bar Mortality Risk Score: 17.6    ACC: Alysa Rudd, RN    Summary Note: Patient shares she is getting discouraged with blood sugar, seems to be more and more frequently 300-40 range, diet unchanged . Also discouraged over non-healing toe wound, still going to [facility] weekly and feels Dr. Son Lindsey is getting irritated wit her because it isn't healing. She is supposed to walk as little as possible using the surgical shoe  Which she says she is complaint with, only up on feet when has to be. He couldn't wear a \"boot\" he suggested which would take weight off the toe, because she couldn't alk in it, and was afraid she would fall. She requested other options for [facility]  and after discussion she will stay with Dr. Son Lindsey and Venessa Reeves [facility] for now. Discussed having outpatient or home PT to train her on walking with new boot, she sill consider and let [facility] know if she would like to pursue physical therapy. After long discussion patient scheduled f/u with PCP to discuss [facility] Medrano, has seen the commercials \"about the problems people have had with it\".          Care Coordination Interventions    Program Enrollment:  Rising Risk  Referral from Primary Care Provider:  No  Suggested Interventions and Community Resources  Fall Risk Prevention:  Completed  Home Health Services:  Completed (Comment: [facility])  Meals on Wheels:  Completed (Comment: NR Seniors)  Medi Set or Pill Pack:  Completed (Comment: self manages)  Pharmacist:  Declined (Comment: Drug plan requires she meet monthly with her pharmacist at [facility] to review medications)  Senior Services:  Completed  Transportation Support:  Completed  Zone Management Tools:  Completed         Goals Addressed             Most Recent     Reduce Falls    On track (9/18/2018)             I will reduce my risk of falls by the following: Remove rugs or use non slip rugs  Use walking aids like cane or walker  pacing activity    Barriers: impairment:  physical: fatigues easily  Plan for overcoming my barriers: I will take frequent rest breaks, and pace actvities, I will use a device , cane or walker, as needed for extra support, I will resume use of CPAP   Confidence: 5/10  Anticipated Goal Completion Date: 1 month       Self Monitoring   On track (9/18/2018)             Self-Monitored Blood Glucose - I will check my blood sugar Fasting blood sugar  Daily Weights - I will weight myself as directed - Daily and write down weights: BILATERAL ORTHO DEVICES ON FEET MAKE PATIENT FEEL UNSAFE STANDING ON A BR SCALE. DOES MONITOR FOR EDEMA OF FEET AND LOWER LEGS  Blood Pressure - I will take my blood pressure as directed - Daily    Patient Reported Blood Pressure No flowsheet data found. Patient Reported Weight No flowsheet data found. Patient Reported Blood Glucose No flowsheet data found. Barriers: none  Plan for overcoming my barriers: N/A  Confidence: 7/10  Anticipated Goal Completion Date: 8/22/2017              Prior to Admission medications    Medication Sig Start Date End Date Taking?  Authorizing Provider   metFORMIN (GLUCOPHAGE-XR) 500 MG extended release tablet TAKE 2 TABLETS BY MOUTH EVERY MORNING AND 2 TABLETS BY MOUTH EVERY EVENING WITH DINNER 9/10/18  Yes Teo Aldana DO   losartan (COZAAR) 25 MG tablet TAKE 1 TABLET BY MOUTH TWICE DAILY 9/10/18  Yes Teo Aldana DO   cephALEXin (KEFLEX) 500 MG capsule Take 1 capsule by mouth 4 times daily 8/1/18  Yes Teo Aldana DO   atorvastatin (LIPITOR) 20 MG tablet TAKE 1  1/2 tab each day for total ( 30 mg 7/2/18  Yes Teo Aldana DO   canagliflozin (INVOKANA) 300 MG TABS tablet TAKE 1 TABLET BY MOUTH EVERY MORNING BEFORE BREAKFAST 7/2/18  Yes Teo Aldana DO   omeprazole (PRILOSEC) 40 MG delayed release capsule TAKE 1 CAPSULE BY MOUTH DAILY 7/2/18  Yes Teo Aldana DO   levothyroxine (SYNTHROID) 175 MCG tablet TAKE 1 TABLET BY MOUTH DAILY 6/20/18  Yes

## 2018-09-24 RX ORDER — ATORVASTATIN CALCIUM 20 MG/1
TABLET, FILM COATED ORAL
Qty: 90 TABLET | Refills: 0 | Status: SHIPPED | OUTPATIENT
Start: 2018-09-24

## 2018-09-24 RX ORDER — LEVOTHYROXINE SODIUM 175 UG/1
TABLET ORAL
Qty: 90 TABLET | Refills: 1 | Status: SHIPPED | OUTPATIENT
Start: 2018-09-24 | End: 2019-03-23 | Stop reason: SDUPTHER

## 2018-09-26 ENCOUNTER — CARE COORDINATION (OUTPATIENT)
Dept: CARE COORDINATION | Age: 73
End: 2018-09-26

## 2018-09-26 NOTE — CARE COORDINATION
MG capsule Take 1 capsule by mouth 4 times daily 8/1/18   Junita Herter, DO   JANUVIA 100 MG tablet TAKE 1 TABLET BY MOUTH EVERY DAY 7/25/18   Junita Herter, DO   atorvastatin (LIPITOR) 20 MG tablet TAKE 1  1/2 tab each day for total ( 30 mg 7/2/18   Junita Herter, DO   canagliflozin (INVOKANA) 300 MG TABS tablet TAKE 1 TABLET BY MOUTH EVERY MORNING BEFORE BREAKFAST 7/2/18   Junita Herter, DO   omeprazole (PRILOSEC) 40 MG delayed release capsule TAKE 1 CAPSULE BY MOUTH DAILY 7/2/18   Junita Herter, DO   clopidogrel (PLAVIX) 75 MG tablet Take 1 tablet by mouth daily 5/13/18   Junita Herter, DO   CPAP Machine MISC by Does not apply route    Historical Provider, MD KWANUVIA 100 MG tablet TAKE 1 TABLET BY MOUTH EVERY DAY 3/22/18   Junita Herter, DO   ketoconazole (NIZORAL) 2 % cream Apply topically daily. 2/8/18   Junita Herter, DO   aspirin 81 MG tablet Take 81 mg by mouth daily    Historical Provider, MD   Coenzyme Q10 (COQ10) 100 MG CAPS Take 100 mg by mouth daily    Historical Provider, MD   lactobacillus (CULTURELLE) capsule Take 1 capsule by mouth 3 times daily    Historical Provider, MD   diltiazem (CARDIZEM CD) 240 MG extended release capsule Take 240 mg by mouth daily    Historical Provider, MD   furosemide (LASIX) 40 MG tablet Take 40 mg by mouth See Admin Instructions EVERY SUN, TUE, THU, SAT;  GIVE 80 MG BY MOUTH EVERY MON, WED, [address]    Historical Provider, MD   hydrochlorothiazide (HYDRODIURIL) 25 MG tablet Take 25 mg by mouth daily    Historical Provider, MD   hydrOXYzine (ATARAX) 10 MG tablet Take 10 mg by mouth daily    Historical Provider, MD   magnesium oxide (MAG-OX) 400 MG tablet Take 400 mg by mouth 2 times daily    Historical Provider, MD   nitroGLYCERIN (NITROSTAT) 0.4 MG SL tablet Place 0.4 mg under the tongue every 5 minutes as needed for Chest pain up to max of 3 total doses. If no relief after 1 dose, call 911.     Historical Provider, MD   potassium chloride (MICRO-K) 10 MEQ extended release capsule Take 20 mEq by mouth daily    Historical Provider, MD   spironolactone (ALDACTONE) 25 MG tablet Take 25 mg by mouth daily    Historical Provider, MD   albuterol sulfate HFA (VENTOLIN HFA) 108 (90 Base) MCG/ACT inhaler Inhale 2 puffs into the lungs every 4 hours as needed for Shortness of Breath    Historical Provider, MD   Cyanocobalamin (VITAMIN B-12) 1000 MCG extended release tablet Take 1,000 mcg by mouth daily    Historical Provider, MD   Cholecalciferol (VITAMIN D3) 97927 units CAPS Take by mouth every 7 days [facility] Provider, MD       Future Appointments  Date Time Provider Evita Romero   11/15/2018 1:15 PM DO Angus Chavez     ,   Diabetes Assessment    Meal Planning:  Plate Method, Avoidance of concentrated sweets, Carb counting   How often do you test your blood sugar?:  Daily (Comment: 120-150)   Do you have barriers with adherence to non-pharmacologic self-management interventions?  (Nutrition/Exercise/Self-Monitoring):  Yes   Have you ever had to go to the ED for symptoms of low blood sugar?:  No       No patient-reported symptoms      ,   Congestive Heart Failure Assessment    Are you currently restricting fluids?:  No Restriction  Do you understand a low sodium diet?:  Yes  Do you understand how to read food labels?:  Yes  How many restaurant meals do you eat per week?:  0  Do you salt your food before tasting it?:  No     No patient-reported symptoms      Symptoms:   None:  Yes          and   General Assessment

## 2018-09-26 NOTE — PATIENT INSTRUCTIONS
season. But even when the vaccine doesn't exactly match these viruses, it may still provide some protection. Flu vaccine cannot prevent:  · Flu that is caused by a virus not covered by the vaccine. · Illnesses that look like flu but are not. Some people should not get this vaccine  Tell the person who is giving you the vaccine:  · If you have any severe (life-threatening) allergies. If you ever had a life-threatening allergic reaction after a dose of flu vaccine, or have a severe allergy to any part of this vaccine, you may be advised not to get vaccinated. Most, but not all, types of flu vaccine contain a small amount of egg protein. · If you ever had Guillain-Barré syndrome (also called GBS) Some people with a history of GBS should not get this vaccine. This should be discussed with your doctor. · If you are not feeling well. It is usually okay to get flu vaccine when you have a mild illness, but you might be asked to come back when you feel better. Risks of a vaccine reaction  With any medicine, including vaccines, there is a chance of reactions. These are usually mild and go away on their own, but serious reactions are also possible. Most people who get a flu shot do not have any problems with it. Minor problems following a flu shot include:  · Soreness, redness, or swelling where the shot was given  · Hoarseness  · Sore, red or itchy eyes  · Cough  · Fever  · Aches  · Headache  · Itching  · Fatigue  If these problems occur, they usually begin soon after the shot and last 1 or 2 days. More serious problems following a flu shot can include the following:  · There may be a small increased risk of Guillain-Barré Syndrome (GBS) after inactivated flu vaccine. This risk has been estimated at 1 or 2 additional cases per million people vaccinated. This is much lower than the risk of severe complications from flu, which can be prevented by flu vaccine.   · The [employer] children who get the flu shot along with pneumococcal vaccine (PCV13) and/or DTaP vaccine at the same time might be slightly more likely to have a seizure caused by fever. Ask your doctor for more information. Tell your doctor if a child who is getting flu vaccine has ever had a seizure  Problems that could happen after any injected vaccine:  · People sometimes faint after a medical procedure, including vaccination. Sitting or lying down for about 15 minutes can help prevent fainting, and injuries caused by a fall. Tell your doctor if you feel dizzy, or have vision changes or ringing in the ears. · Some people get severe pain in the shoulder and have difficulty moving the arm where a shot was given. This happens very rarely. · Any medication can cause a severe allergic reaction. Such reactions from a vaccine are very rare, estimated at about 1 in a million doses, and would happen within a few minutes to a few hours after the vaccination. As with any medicine, there is a very remote chance of a vaccine causing a serious injury or death. The safety of vaccines is always being monitored. For more information, visit: www.cdc.gov/vaccinesafety/. What if there is a serious reaction? What should I look for? · Look for anything that concerns you, such as signs of a severe allergic reaction, very high fever, or unusual behavior. Signs of a severe allergic reaction can include hives, swelling of the face and throat, difficulty breathing, a fast heartbeat, dizziness, and weakness - usually within a few minutes to a few hours after the vaccination. What should I do? · If you think it is a severe allergic reaction or other emergency that can't wait, call 9-1-1 and get the person to the nearest hospital. Otherwise, call your doctor. · Reactions should be reported to the \"Vaccine Adverse Event Reporting System\" (VAERS). Your doctor should file this report, or you can do it yourself through the VAERS website at www.vaers. [facility].gov, or by calling 2-024-585-408-048-5285. [facility] does not give medical advice. The National Vaccine Injury Compensation Program  The National Vaccine Injury Compensation Program (VICP) is a federal program that was created to compensate people who may have been injured by certain vaccines. Persons who believe they may have been injured by a vaccine can learn about the program and about filing a claim by calling 6-665.698.6584 or visiting the [employer] myeasydocs website at www.[facility].gov/vaccinecompensation. There is a time limit to file a claim for compensation. How can I learn more? · Ask your healthcare provider. He or she can give you the vaccine package insert or suggest other sources of information. · Call your local or state health department. · Contact the Centers for Disease Control and Prevention (CDC):  ¨ Call 9-808.168.6241 (1-800-CDC-INFO) or  ¨ Visit CDC's website at www.cdc.gov/flu  Vaccine Information Statement  Inactivated Influenza Vaccine  8/7/2015)  42 RENU Bear [id number]  Department of Health and Human Services  Centers for Disease Control and Prevention  Many Vaccine Information Statements are available in Citizen of [place] and other languages. See www.immunize.org/vis. Muchas hojas de información sobre vacunas están disponibles en español y en otros idiomas. Visite www.immunize.org/vis. Care instructions adapted under license by [facility] ([facility]). If you have questions about a medical condition or this instruction, always ask your healthcare professional. [place] [address] any warranty or liability for your use of this information.

## 2018-10-22 RX ORDER — SITAGLIPTIN 100 MG/1
TABLET, FILM COATED ORAL
Qty: 90 TABLET | Refills: 0 | Status: SHIPPED | OUTPATIENT
Start: 2018-10-22 | End: 2019-01-20 | Stop reason: SDUPTHER

## 2018-10-22 RX ORDER — GLIMEPIRIDE 4 MG/1
TABLET ORAL
Qty: 180 TABLET | Refills: 0 | Status: SHIPPED | OUTPATIENT
Start: 2018-10-22 | End: 2019-01-19 | Stop reason: SDUPTHER

## 2018-11-19 ENCOUNTER — CARE COORDINATION (OUTPATIENT)
Dept: CARE COORDINATION | Age: 73
End: 2018-11-19

## 2018-11-19 NOTE — CARE COORDINATION
Prior to Admission medications    Medication Sig Start Date End Date Taking? Authorizing Provider   VENTOLIN  (90 Base) MCG/ACT inhaler INHALE 2 PUFFS INTO THE LUNGS EVERY 4 HOURS AS NEEDED FOR WHEEZING 10/31/18  Yes MD AQUILES AshtonUVIA 100 MG tablet TAKE 1 TABLET BY MOUTH EVERY DAY 10/22/18  Yes Teo Aldana DO   glimepiride (AMARYL) 4 MG tablet TAKE 1 TABLET BY MOUTH TWICE DAILY(BEFORE BREAKFAST AND DINNER) 10/22/18  Yes Teo Aldana DO   atorvastatin (LIPITOR) 20 MG tablet TAKE 1 TABLET BY MOUTH EVERY DAY 9/24/18  Yes Teo Aldana DO   levothyroxine (SYNTHROID) 175 MCG tablet TAKE 1 TABLET BY MOUTH DAILY 9/24/18  Yes Teo Aldana DO   metFORMIN (GLUCOPHAGE-XR) 500 MG extended release tablet TAKE 2 TABLETS BY MOUTH EVERY MORNING AND 2 TABLETS BY MOUTH EVERY EVENING WITH DINNER 9/10/18  Yes Teo Aldana DO   losartan (COZAAR) 25 MG tablet TAKE 1 TABLET BY MOUTH TWICE DAILY 9/10/18  Yes Teo Aldana DO   atorvastatin (LIPITOR) 20 MG tablet TAKE 1  1/2 tab each day for total ( 30 mg 7/2/18  Yes Teo Aldana DO   canagliflozin (INVOKANA) 300 MG TABS tablet TAKE 1 TABLET BY MOUTH EVERY MORNING BEFORE BREAKFAST 7/2/18  Yes Teo Aldana DO   omeprazole (PRILOSEC) 40 MG delayed release capsule TAKE 1 CAPSULE BY MOUTH DAILY 7/2/18  Yes Phuong Pretty, DO   clopidogrel (PLAVIX) 75 MG tablet Take 1 tablet by mouth daily 5/13/18  Yes Teo Aldana DO   ketoconazole (NIZORAL) 2 % cream Apply topically daily.  2/8/18  Yes Teo Aldana DO   aspirin 81 MG tablet Take 81 mg by mouth daily   Yes Historical Provider, MD   Coenzyme Q10 (COQ10) 100 MG CAPS Take 100 mg by mouth daily   Yes Historical Provider, MD   lactobacillus (CULTURELLE) capsule Take 1 capsule by mouth 3 times daily   Yes Historical Provider, MD   diltiazem (CARDIZEM CD) 240 MG extended release capsule Take 240 mg by mouth daily   Yes Historical Provider, MD   furosemide (LASIX) 40 MG tablet Take 40 mg

## 2018-12-12 ENCOUNTER — CARE COORDINATION (OUTPATIENT)
Dept: CARE COORDINATION | Age: 73
End: 2018-12-12
Payer: MEDICARE

## 2018-12-12 ENCOUNTER — OFFICE VISIT (OUTPATIENT)
Dept: FAMILY MEDICINE CLINIC | Age: 73
End: 2018-12-12
Payer: MEDICARE

## 2018-12-12 VITALS
HEART RATE: 89 BPM | WEIGHT: 247.8 LBS | SYSTOLIC BLOOD PRESSURE: 116 MMHG | DIASTOLIC BLOOD PRESSURE: 58 MMHG | TEMPERATURE: 97.6 F | BODY MASS INDEX: 41.24 KG/M2

## 2018-12-12 DIAGNOSIS — E11.628 TYPE 2 DIABETES MELLITUS WITH RIGHT DIABETIC FOOT INFECTION (HCC): Primary | ICD-10-CM

## 2018-12-12 DIAGNOSIS — L02.92 FURUNCLES: ICD-10-CM

## 2018-12-12 DIAGNOSIS — E53.8 B12 DEFICIENCY: ICD-10-CM

## 2018-12-12 DIAGNOSIS — L08.9 TYPE 2 DIABETES MELLITUS WITH RIGHT DIABETIC FOOT INFECTION (HCC): Primary | ICD-10-CM

## 2018-12-12 DIAGNOSIS — E11.628 TYPE 2 DIABETES MELLITUS WITH RIGHT DIABETIC FOOT INFECTION (HCC): ICD-10-CM

## 2018-12-12 DIAGNOSIS — L08.9 TYPE 2 DIABETES MELLITUS WITH RIGHT DIABETIC FOOT INFECTION (HCC): ICD-10-CM

## 2018-12-12 DIAGNOSIS — Z23 NEED FOR INFLUENZA VACCINATION: ICD-10-CM

## 2018-12-12 LAB
CREATININE URINE: 122 MG/DL
HBA1C MFR BLD: 6.5 %
MICROALBUMIN UR-MCNC: 3.4 MG/DL
MICROALBUMIN/CREAT UR-RTO: 27.9 MG/G (ref 0–30)

## 2018-12-12 PROCEDURE — G8598 ASA/ANTIPLAT THER USED: HCPCS | Performed by: FAMILY MEDICINE

## 2018-12-12 PROCEDURE — G8427 DOCREV CUR MEDS BY ELIG CLIN: HCPCS | Performed by: FAMILY MEDICINE

## 2018-12-12 PROCEDURE — 1101F PT FALLS ASSESS-DOCD LE1/YR: CPT | Performed by: FAMILY MEDICINE

## 2018-12-12 PROCEDURE — 3044F HG A1C LEVEL LT 7.0%: CPT | Performed by: FAMILY MEDICINE

## 2018-12-12 PROCEDURE — G0444 DEPRESSION SCREEN ANNUAL: HCPCS | Performed by: FAMILY MEDICINE

## 2018-12-12 PROCEDURE — 4040F PNEUMOC VAC/ADMIN/RCVD: CPT | Performed by: FAMILY MEDICINE

## 2018-12-12 PROCEDURE — 1090F PRES/ABSN URINE INCON ASSESS: CPT | Performed by: FAMILY MEDICINE

## 2018-12-12 PROCEDURE — 1036F TOBACCO NON-USER: CPT | Performed by: FAMILY MEDICINE

## 2018-12-12 PROCEDURE — G8400 PT W/DXA NO RESULTS DOC: HCPCS | Performed by: FAMILY MEDICINE

## 2018-12-12 PROCEDURE — G8417 CALC BMI ABV UP PARAM F/U: HCPCS | Performed by: FAMILY MEDICINE

## 2018-12-12 PROCEDURE — 3017F COLORECTAL CA SCREEN DOC REV: CPT | Performed by: FAMILY MEDICINE

## 2018-12-12 PROCEDURE — 1123F ACP DISCUSS/DSCN MKR DOCD: CPT | Performed by: FAMILY MEDICINE

## 2018-12-12 PROCEDURE — G0008 ADMIN INFLUENZA VIRUS VAC: HCPCS | Performed by: FAMILY MEDICINE

## 2018-12-12 PROCEDURE — 83036 HEMOGLOBIN GLYCOSYLATED A1C: CPT | Performed by: FAMILY MEDICINE

## 2018-12-12 PROCEDURE — 96372 THER/PROPH/DIAG INJ SC/IM: CPT | Performed by: FAMILY MEDICINE

## 2018-12-12 PROCEDURE — 99213 OFFICE O/P EST LOW 20 MIN: CPT | Performed by: FAMILY MEDICINE

## 2018-12-12 PROCEDURE — 2022F DILAT RTA XM EVC RTNOPTHY: CPT | Performed by: FAMILY MEDICINE

## 2018-12-12 PROCEDURE — 90662 IIV NO PRSV INCREASED AG IM: CPT | Performed by: FAMILY MEDICINE

## 2018-12-12 PROCEDURE — G8482 FLU IMMUNIZE ORDER/ADMIN: HCPCS | Performed by: FAMILY MEDICINE

## 2018-12-12 RX ORDER — DOXYCYCLINE HYCLATE 100 MG
100 TABLET ORAL 2 TIMES DAILY
Qty: 20 TABLET | Refills: 1 | Status: SHIPPED | OUTPATIENT
Start: 2018-12-12 | End: 2018-12-22

## 2018-12-12 RX ORDER — CYANOCOBALAMIN 1000 UG/ML
1000 INJECTION INTRAMUSCULAR; SUBCUTANEOUS ONCE
Status: COMPLETED | OUTPATIENT
Start: 2018-12-12 | End: 2018-12-12

## 2018-12-12 RX ADMIN — CYANOCOBALAMIN 1000 MCG: 1000 INJECTION INTRAMUSCULAR; SUBCUTANEOUS at 14:27

## 2018-12-12 ASSESSMENT — PATIENT HEALTH QUESTIONNAIRE - PHQ9: SUM OF ALL RESPONSES TO PHQ QUESTIONS 1-9: 2

## 2018-12-14 ENCOUNTER — TELEPHONE (OUTPATIENT)
Dept: FAMILY MEDICINE CLINIC | Age: 73
End: 2018-12-14

## 2018-12-14 NOTE — TELEPHONE ENCOUNTER
Call her pharmacy and reorder it as it was done last time as Dr. dAam Reid may have had a special way and give her refill thank you

## 2018-12-17 RX ORDER — GENTAMICIN SULFATE 1 MG/G
CREAM TOPICAL
Qty: 1 TUBE | Refills: 0 | Status: SHIPPED | OUTPATIENT
Start: 2018-12-17

## 2018-12-26 RX ORDER — OMEPRAZOLE 40 MG/1
CAPSULE, DELAYED RELEASE ORAL
Qty: 90 CAPSULE | Refills: 0 | Status: SHIPPED | OUTPATIENT
Start: 2018-12-26

## 2019-01-11 DIAGNOSIS — L08.9 TYPE 2 DIABETES MELLITUS WITH RIGHT DIABETIC FOOT INFECTION (HCC): Primary | ICD-10-CM

## 2019-01-11 DIAGNOSIS — E11.628 TYPE 2 DIABETES MELLITUS WITH RIGHT DIABETIC FOOT INFECTION (HCC): Primary | ICD-10-CM

## 2019-01-11 RX ORDER — CANAGLIFLOZIN 300 MG/1
TABLET, FILM COATED ORAL
Qty: 90 TABLET | Refills: 0 | Status: SHIPPED | OUTPATIENT
Start: 2019-01-11

## 2019-01-19 DIAGNOSIS — E11.628 TYPE 2 DIABETES MELLITUS WITH RIGHT DIABETIC FOOT INFECTION (HCC): Primary | ICD-10-CM

## 2019-01-19 DIAGNOSIS — L08.9 TYPE 2 DIABETES MELLITUS WITH RIGHT DIABETIC FOOT INFECTION (HCC): Primary | ICD-10-CM

## 2019-01-20 DIAGNOSIS — L08.9 TYPE 2 DIABETES MELLITUS WITH RIGHT DIABETIC FOOT INFECTION (HCC): Primary | ICD-10-CM

## 2019-01-20 DIAGNOSIS — E11.628 TYPE 2 DIABETES MELLITUS WITH RIGHT DIABETIC FOOT INFECTION (HCC): Primary | ICD-10-CM

## 2019-01-21 RX ORDER — SITAGLIPTIN 100 MG/1
TABLET, FILM COATED ORAL
Qty: 90 TABLET | Refills: 1 | Status: SHIPPED | OUTPATIENT
Start: 2019-01-21

## 2019-01-21 RX ORDER — GLIMEPIRIDE 4 MG/1
TABLET ORAL
Qty: 180 TABLET | Refills: 0 | Status: SHIPPED | OUTPATIENT
Start: 2019-01-21 | End: 2019-03-21 | Stop reason: SDUPTHER

## 2019-02-05 ENCOUNTER — NURSE ONLY (OUTPATIENT)
Dept: FAMILY MEDICINE CLINIC | Age: 74
End: 2019-02-05
Payer: MEDICARE

## 2019-02-05 DIAGNOSIS — E53.8 B12 DEFICIENCY: Primary | ICD-10-CM

## 2019-02-05 PROCEDURE — 96372 THER/PROPH/DIAG INJ SC/IM: CPT | Performed by: FAMILY MEDICINE

## 2019-02-05 RX ORDER — CYANOCOBALAMIN 1000 UG/ML
1000 INJECTION INTRAMUSCULAR; SUBCUTANEOUS ONCE
Status: COMPLETED | OUTPATIENT
Start: 2019-02-05 | End: 2019-02-05

## 2019-02-05 RX ADMIN — CYANOCOBALAMIN 1000 MCG: 1000 INJECTION INTRAMUSCULAR; SUBCUTANEOUS at 11:08

## 2019-02-06 RX ORDER — ALBUTEROL SULFATE 90 UG/1
AEROSOL, METERED RESPIRATORY (INHALATION)
Qty: 18 G | Refills: 3 | Status: SHIPPED | OUTPATIENT
Start: 2019-02-06

## 2019-02-27 ENCOUNTER — CARE COORDINATION (OUTPATIENT)
Dept: CARE COORDINATION | Age: 74
End: 2019-02-27

## 2019-02-27 RX ORDER — CIPROFLOXACIN 500 MG/1
500 TABLET, FILM COATED ORAL 2 TIMES DAILY
COMMUNITY
Start: 2019-02-27 | End: 2021-07-21

## 2019-03-08 RX ORDER — LOSARTAN POTASSIUM 25 MG/1
TABLET ORAL
Qty: 180 TABLET | Refills: 0 | Status: SHIPPED | OUTPATIENT
Start: 2019-03-08 | End: 2019-03-22 | Stop reason: SDUPTHER

## 2019-03-10 DIAGNOSIS — L08.9 TYPE 2 DIABETES MELLITUS WITH RIGHT DIABETIC FOOT INFECTION (HCC): Primary | ICD-10-CM

## 2019-03-10 DIAGNOSIS — E11.628 TYPE 2 DIABETES MELLITUS WITH RIGHT DIABETIC FOOT INFECTION (HCC): Primary | ICD-10-CM

## 2019-03-11 RX ORDER — METFORMIN HYDROCHLORIDE 500 MG/1
TABLET, EXTENDED RELEASE ORAL
Qty: 360 TABLET | Refills: 0 | Status: SHIPPED | OUTPATIENT
Start: 2019-03-11

## 2019-03-15 ENCOUNTER — TELEPHONE (OUTPATIENT)
Dept: FAMILY MEDICINE CLINIC | Age: 74
End: 2019-03-15

## 2019-03-15 ENCOUNTER — CARE COORDINATION (OUTPATIENT)
Dept: CARE COORDINATION | Age: 74
End: 2019-03-15

## 2019-03-20 ENCOUNTER — TELEPHONE (OUTPATIENT)
Dept: FAMILY MEDICINE CLINIC | Age: 74
End: 2019-03-20

## 2019-03-21 DIAGNOSIS — L08.9 TYPE 2 DIABETES MELLITUS WITH RIGHT DIABETIC FOOT INFECTION (HCC): ICD-10-CM

## 2019-03-21 DIAGNOSIS — E11.628 TYPE 2 DIABETES MELLITUS WITH RIGHT DIABETIC FOOT INFECTION (HCC): ICD-10-CM

## 2019-03-21 RX ORDER — GLIMEPIRIDE 4 MG/1
TABLET ORAL
Qty: 180 TABLET | Refills: 0 | Status: SHIPPED | OUTPATIENT
Start: 2019-03-21

## 2019-03-22 RX ORDER — LOSARTAN POTASSIUM 25 MG/1
TABLET ORAL
Qty: 180 TABLET | Refills: 0 | Status: SHIPPED | OUTPATIENT
Start: 2019-03-22

## 2019-03-23 DIAGNOSIS — E03.9 HYPOTHYROIDISM, UNSPECIFIED TYPE: Primary | ICD-10-CM

## 2019-03-25 RX ORDER — LEVOTHYROXINE SODIUM 175 UG/1
TABLET ORAL
Qty: 90 TABLET | Refills: 0 | Status: SHIPPED | OUTPATIENT
Start: 2019-03-25

## 2019-03-25 RX ORDER — HYDROCHLOROTHIAZIDE 25 MG/1
TABLET ORAL
Qty: 90 TABLET | Refills: 0 | Status: SHIPPED | OUTPATIENT
Start: 2019-03-25

## 2021-04-13 ENCOUNTER — VIRTUAL VISIT (OUTPATIENT)
Dept: INFECTIOUS DISEASES | Age: 76
End: 2021-04-13
Payer: MEDICARE

## 2021-04-13 DIAGNOSIS — I96 GANGRENE OF TOE OF BOTH FEET (HCC): Primary | ICD-10-CM

## 2021-04-13 DIAGNOSIS — I73.9 PVD (PERIPHERAL VASCULAR DISEASE) (HCC): ICD-10-CM

## 2021-04-13 DIAGNOSIS — M86.09 ACUTE HEMATOGENOUS OSTEOMYELITIS OF MULTIPLE SITES (HCC): ICD-10-CM

## 2021-04-13 PROCEDURE — G8428 CUR MEDS NOT DOCUMENT: HCPCS | Performed by: INTERNAL MEDICINE

## 2021-04-13 PROCEDURE — 4040F PNEUMOC VAC/ADMIN/RCVD: CPT | Performed by: INTERNAL MEDICINE

## 2021-04-13 PROCEDURE — G8421 BMI NOT CALCULATED: HCPCS | Performed by: INTERNAL MEDICINE

## 2021-04-13 PROCEDURE — 1123F ACP DISCUSS/DSCN MKR DOCD: CPT | Performed by: INTERNAL MEDICINE

## 2021-04-13 PROCEDURE — G8400 PT W/DXA NO RESULTS DOC: HCPCS | Performed by: INTERNAL MEDICINE

## 2021-04-13 PROCEDURE — 1090F PRES/ABSN URINE INCON ASSESS: CPT | Performed by: INTERNAL MEDICINE

## 2021-04-13 PROCEDURE — 4004F PT TOBACCO SCREEN RCVD TLK: CPT | Performed by: INTERNAL MEDICINE

## 2021-04-13 PROCEDURE — 99214 OFFICE O/P EST MOD 30 MIN: CPT | Performed by: INTERNAL MEDICINE

## 2021-04-13 NOTE — PROGRESS NOTES
2021    TELEHEALTH EVALUATION -- Audio/Visual (During GGTYN-18 public health emergency)      Jhonatan Ashley (:  1945) has requested an audio/video evaluation for the following concern(s):    OM and gangrene    Due to the COVID-19 outbreak, patient's visit was converted to a virtual visit. Patient agreed to proceed with a virtual visit with me via Doxy. me with my location at the office. Patient reports their location. The risks and benefits of converting to a virtual visit were discussed in light of the current infectious disease epidemic. Services were provided through an audio/video synchronous discussion virtually to substitute for in person clinic visit. THIS virtual visit was conducted via interactive/real-time audio/video. Due to this being a TeleHealth encounter, evaluation of the following organ systems is limited: Vitals/Constitutional/EENT/Resp/CV/GI//MS/Neuro/Skin/Heme-Lymph-Imm.}    Pursuant to the emergency declaration under the [facility], [facility] waiver authority and the [employer] Act, this Virtual Visit was conducted, with patient's consent, to reduce the patient's risk of exposure to COVID-19 and provide care for the patient. Infectious Disease Progress Note       2021    Patient is a followup regarding OM and gangrene of the bilateral great toes in the setting of DM2 and PAD hx    Currently on Zosyn    Subjectively, feels tired, exhausted, and depressed - on a strict diet at the SNF due to glucose levels  Was dropping to 67 up to 175    Complains that the right toe looks ok but the L toe is much more blackened. She has dressings in place, no nurse at bedside during visit, and states that she can't have the dressings removed for exam.   States that she went to see podiatry ( Dr Raúl Lucero ) last week.    Will have angio this week by Dr Krystina Bloom will be subsequently amputated       In [facility]      Lab Results   Component Value Date    WBC 8.0 02/21/2018    HGB 11.9 02/21/2018    HCT 38.1 02/21/2018    MCV 83.0 (L) 02/21/2018     02/21/2018     Lab Results   Component Value Date     02/21/2018    K 4.5 02/21/2018     02/21/2018    CO2 27 10/27/2017    BUN 20 10/27/2017    CREATININE 0.90 02/21/2018    GLUCOSE 129 02/21/2018    CALCIUM 9.6 02/21/2018        WBC trends are being monitored. Antibiotic doses are being adjusted per most recent renal labs. 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 Infected. Now looking more stable      PLAN:  Called and spoke to podiatry regarding her toes. Amputation is pending after vascular eval and revascularization. Per podiatry, the toe is blackened but there is no drainage. No foul smell. After her revasc and amputation, will place on PO Amoxicillin x 2 weeks. She may still need to stay at the facility for wound care. Imaging and labs were reviewed per medical records and any ID pertinent labs were also addressed  Time spent today in combination of reviewing patient's chart, medications, labs, pictures when pertinent, provider communication as necessary, counseling patient, care/coordination not otherwise reported here, and patient face to face virtual visit >25 min.   >50% of that time spent counseling and coordination of patient care  Addressed preventive measures such as vaccinations, the importance of annual exam with the PCP, and the importance of health maintenance by dietary and exercise regimens. All questions were answered from an ID perspective and to the best of my ability.       Social distancing measures, the importance of face masks that properly cover the nose and mouth in public, and proper hand hygiene will continue to be addressed    Risks and benefits of ID prescribed medications were discussed with patient or supporting staff caring for the patient as appropriate and feedback obtained.      Cheri Gómez DO

## 2021-06-14 LAB
ALBUMIN: 3.4 G/DL (ref 3.4–5)
ALP BLD-CCNC: 97 U/L (ref 33–136)
ALT SERPL-CCNC: 21 U/L (ref 7–45)
ANION GAP SERPL CALCULATED.3IONS-SCNC: 12 MMOL/L (ref 10–20)
AST SERPL-CCNC: 20 U/L (ref 9–39)
BICARBONATE: 29 MMOL/L (ref 21–32)
BILIRUB SERPL-MCNC: 0.4 MG/DL (ref 0–1.2)
CALCIUM SERPL-MCNC: 8.9 MG/DL (ref 8.6–10.3)
CHLORIDE BLD-SCNC: 100 MMOL/L (ref 98–107)
CREAT SERPL-MCNC: 0.85 MG/DL (ref 0.5–1)
ERYTHROCYTE [DISTWIDTH] IN BLOOD BY AUTOMATED COUNT: 14.4 % (ref 11.5–14)
GFR AFRICAN AMERICAN: >60 ML/MIN/1.73M2
GFR NON-AFRICAN AMERICAN: >60 ML/MIN/1.73M2
GLUCOSE: 121 MG/DL (ref 74–99)
HCT VFR BLD CALC: 38.4 % (ref 36–46)
HEMOGLOBIN: 12 G/DL (ref 12–16)
MCHC RBC AUTO-ENTMCNC: 31.3 G/DL (ref 32–36)
MCV RBC AUTO: 86 FL (ref 80–100)
PLATELET # BLD: 417 X10E9/L (ref 150–450)
POTASSIUM SERPL-SCNC: 4.8 MMOL/L (ref 3.5–5.3)
RBC # BLD: 4.45 X10E12/L (ref 4–5.2)
SODIUM BLD-SCNC: 136 MMOL/L (ref 136–145)
TOTAL PROTEIN: 6.6 G/DL (ref 6.4–8.2)
UREA NITROGEN: 16 MG/DL (ref 6–23)
WBC: 12.6 X10E9/L (ref 4.4–11.3)

## 2021-06-21 LAB
ALBUMIN: 3.5 G/DL (ref 3.4–5)
ALP BLD-CCNC: 94 U/L (ref 33–136)
ALT SERPL-CCNC: 21 U/L (ref 7–45)
ANION GAP SERPL CALCULATED.3IONS-SCNC: 14 MMOL/L (ref 10–20)
AST SERPL-CCNC: 25 U/L (ref 9–39)
BICARBONATE: 27 MMOL/L (ref 21–32)
BILIRUB SERPL-MCNC: 0.3 MG/DL (ref 0–1.2)
CALCIUM SERPL-MCNC: 8.9 MG/DL (ref 8.6–10.3)
CHLORIDE BLD-SCNC: 100 MMOL/L (ref 98–107)
CREAT SERPL-MCNC: 0.76 MG/DL (ref 0.5–1)
ERYTHROCYTE [DISTWIDTH] IN BLOOD BY AUTOMATED COUNT: 14.4 % (ref 11.5–14)
GFR AFRICAN AMERICAN: >60 ML/MIN/1.73M2
GFR NON-AFRICAN AMERICAN: >60 ML/MIN/1.73M2
GLUCOSE: 145 MG/DL (ref 74–99)
HCT VFR BLD CALC: 38.6 % (ref 36–46)
HEMOGLOBIN: 11.8 G/DL (ref 12–16)
MCHC RBC AUTO-ENTMCNC: 30.6 G/DL (ref 32–36)
MCV RBC AUTO: 88 FL (ref 80–100)
PLATELET # BLD: 351 X10E9/L (ref 150–450)
POTASSIUM SERPL-SCNC: 4.3 MMOL/L (ref 3.5–5.3)
RBC # BLD: 4.41 X10E12/L (ref 4–5.2)
SODIUM BLD-SCNC: 137 MMOL/L (ref 136–145)
TOTAL PROTEIN: 7 G/DL (ref 6.4–8.2)
UREA NITROGEN: 18 MG/DL (ref 6–23)
WBC: 10.1 X10E9/L (ref 4.4–11.3)

## 2021-06-28 LAB
ANION GAP SERPL CALCULATED.3IONS-SCNC: 14 MMOL/L (ref 10–20)
BICARBONATE: 29 MMOL/L (ref 21–32)
C-REACTIVE PROTEIN: 1.3 MG/DL
CALCIUM SERPL-MCNC: 9 MG/DL (ref 8.6–10.3)
CHLORIDE BLD-SCNC: 99 MMOL/L (ref 98–107)
CREAT SERPL-MCNC: 0.85 MG/DL (ref 0.5–1)
ERYTHROCYTE [DISTWIDTH] IN BLOOD BY AUTOMATED COUNT: 14 % (ref 11.5–14)
GFR AFRICAN AMERICAN: >60 ML/MIN/1.73M2
GFR NON-AFRICAN AMERICAN: >60 ML/MIN/1.73M2
GLUCOSE: 95 MG/DL (ref 74–99)
HCT VFR BLD CALC: 39.3 % (ref 36–46)
HEMOGLOBIN: 12 G/DL (ref 12–16)
MCHC RBC AUTO-ENTMCNC: 30.5 G/DL (ref 32–36)
MCV RBC AUTO: 87 FL (ref 80–100)
PLATELET # BLD: 388 X10E9/L (ref 150–450)
POTASSIUM SERPL-SCNC: 5.1 MMOL/L (ref 3.5–5.3)
RBC # BLD: 4.52 X10E12/L (ref 4–5.2)
SODIUM BLD-SCNC: 137 MMOL/L (ref 136–145)
UREA NITROGEN: 18 MG/DL (ref 6–23)
WBC: 11.4 X10E9/L (ref 4.4–11.3)

## 2021-07-05 LAB
ALBUMIN: 3.7 G/DL (ref 3.4–5)
ALP BLD-CCNC: 104 U/L (ref 33–136)
ALT SERPL-CCNC: 22 U/L (ref 7–45)
ANION GAP SERPL CALCULATED.3IONS-SCNC: 11 MMOL/L (ref 10–20)
AST SERPL-CCNC: 23 U/L (ref 9–39)
BICARBONATE: 29 MMOL/L (ref 21–32)
BILIRUB SERPL-MCNC: 0.4 MG/DL (ref 0–1.2)
CALCIUM SERPL-MCNC: 9.3 MG/DL (ref 8.6–10.3)
CHLORIDE BLD-SCNC: 102 MMOL/L (ref 98–107)
CREAT SERPL-MCNC: 0.73 MG/DL (ref 0.5–1)
ERYTHROCYTE [DISTWIDTH] IN BLOOD BY AUTOMATED COUNT: 13.9 % (ref 11.5–14)
GFR AFRICAN AMERICAN: >60 ML/MIN/1.73M2
GFR NON-AFRICAN AMERICAN: >60 ML/MIN/1.73M2
GLUCOSE: 80 MG/DL (ref 74–99)
HCT VFR BLD CALC: 37.2 % (ref 36–46)
HEMOGLOBIN: 11.8 G/DL (ref 12–16)
MCHC RBC AUTO-ENTMCNC: 31.7 G/DL (ref 32–36)
MCV RBC AUTO: 84 FL (ref 80–100)
PLATELET # BLD: 342 X10E9/L (ref 150–450)
POTASSIUM SERPL-SCNC: 4.4 MMOL/L (ref 3.5–5.3)
RBC # BLD: 4.42 X10E12/L (ref 4–5.2)
SODIUM BLD-SCNC: 138 MMOL/L (ref 136–145)
TOTAL PROTEIN: 6.9 G/DL (ref 6.4–8.2)
UREA NITROGEN: 24 MG/DL (ref 6–23)
WBC: 12.5 X10E9/L (ref 4.4–11.3)

## 2021-07-09 ENCOUNTER — TELEPHONE (OUTPATIENT)
Dept: INFECTIOUS DISEASES | Age: 76
End: 2021-07-09

## 2021-07-09 NOTE — TELEPHONE ENCOUNTER
Patient called to inquire of Labs and to Update ID of her healing. Goes to [facility] with Dr Mariel Perez. Advised the labs have resulted and will update ID physician, and a Follow up with Id is expected. Patient not sure if she can complete a Virtual visit, and would prefer to be seen, although she does not like the drive to [employer]. Advised the soonest possible appt will be 7-20-21 or after. Pt verbalized understanding.

## 2021-07-12 LAB
ALBUMIN: 3.6 G/DL (ref 3.4–5)
ALP BLD-CCNC: 98 U/L (ref 33–136)
ALT SERPL-CCNC: 23 U/L (ref 7–45)
ANION GAP SERPL CALCULATED.3IONS-SCNC: 10 MMOL/L (ref 10–20)
AST SERPL-CCNC: 24 U/L (ref 9–39)
BICARBONATE: 29 MMOL/L (ref 21–32)
BILIRUB SERPL-MCNC: 0.3 MG/DL (ref 0–1.2)
CALCIUM SERPL-MCNC: 9.2 MG/DL (ref 8.6–10.3)
CHLORIDE BLD-SCNC: 100 MMOL/L (ref 98–107)
CREAT SERPL-MCNC: 0.81 MG/DL (ref 0.5–1)
ERYTHROCYTE [DISTWIDTH] IN BLOOD BY AUTOMATED COUNT: 14.3 % (ref 11.5–14)
GFR AFRICAN AMERICAN: >60 ML/MIN/1.73M2
GFR NON-AFRICAN AMERICAN: >60 ML/MIN/1.73M2
GLUCOSE: 90 MG/DL (ref 74–99)
HCT VFR BLD CALC: 40.1 % (ref 36–46)
HEMOGLOBIN: 12.3 G/DL (ref 12–16)
MCHC RBC AUTO-ENTMCNC: 30.7 G/DL (ref 32–36)
MCV RBC AUTO: 86 FL (ref 80–100)
PLATELET # BLD: 362 X10E9/L (ref 150–450)
POTASSIUM SERPL-SCNC: 4.4 MMOL/L (ref 3.5–5.3)
RBC # BLD: 4.69 X10E12/L (ref 4–5.2)
SODIUM BLD-SCNC: 135 MMOL/L (ref 136–145)
TOTAL PROTEIN: 7.1 G/DL (ref 6.4–8.2)
UREA NITROGEN: 20 MG/DL (ref 6–23)
WBC: 10.2 X10E9/L (ref 4.4–11.3)

## 2021-07-19 LAB
ALBUMIN: 3.6 G/DL (ref 3.4–5)
ALP BLD-CCNC: 98 U/L (ref 33–136)
ALT SERPL-CCNC: 19 U/L (ref 7–45)
ANION GAP SERPL CALCULATED.3IONS-SCNC: 22 MMOL/L (ref 10–20)
AST SERPL-CCNC: 24 U/L (ref 9–39)
BICARBONATE: 24 MMOL/L (ref 21–32)
BILIRUB SERPL-MCNC: 0.4 MG/DL (ref 0–1.2)
CALCIUM SERPL-MCNC: 9.2 MG/DL (ref 8.6–10.3)
CHLORIDE BLD-SCNC: 97 MMOL/L (ref 98–107)
CREAT SERPL-MCNC: 0.82 MG/DL (ref 0.5–1)
ERYTHROCYTE [DISTWIDTH] IN BLOOD BY AUTOMATED COUNT: 14.3 % (ref 11.5–14)
GFR AFRICAN AMERICAN: >60 ML/MIN/1.73M2
GFR NON-AFRICAN AMERICAN: >60 ML/MIN/1.73M2
GLUCOSE: 93 MG/DL (ref 74–99)
HCT VFR BLD CALC: 42 % (ref 36–46)
HEMOGLOBIN: 12.4 G/DL (ref 12–16)
MCHC RBC AUTO-ENTMCNC: 29.5 G/DL (ref 32–36)
MCV RBC AUTO: 88 FL (ref 80–100)
PLATELET # BLD: 346 X10E9/L (ref 150–450)
POTASSIUM SERPL-SCNC: 4.3 MMOL/L (ref 3.5–5.3)
RBC # BLD: 4.78 X10E12/L (ref 4–5.2)
SODIUM BLD-SCNC: 139 MMOL/L (ref 136–145)
TOTAL PROTEIN: 7.2 G/DL (ref 6.4–8.2)
UREA NITROGEN: 22 MG/DL (ref 6–23)
WBC: 11.7 X10E9/L (ref 4.4–11.3)

## 2021-07-20 ENCOUNTER — OFFICE VISIT (OUTPATIENT)
Dept: INFECTIOUS DISEASES | Age: 76
End: 2021-07-20
Payer: MEDICARE

## 2021-07-20 VITALS — DIASTOLIC BLOOD PRESSURE: 59 MMHG | TEMPERATURE: 97.2 F | SYSTOLIC BLOOD PRESSURE: 134 MMHG | HEART RATE: 73 BPM

## 2021-07-20 DIAGNOSIS — M86.172 OTHER ACUTE OSTEOMYELITIS OF LEFT FOOT (HCC): Primary | ICD-10-CM

## 2021-07-20 PROCEDURE — 1123F ACP DISCUSS/DSCN MKR DOCD: CPT | Performed by: INTERNAL MEDICINE

## 2021-07-20 PROCEDURE — G8421 BMI NOT CALCULATED: HCPCS | Performed by: INTERNAL MEDICINE

## 2021-07-20 PROCEDURE — 4040F PNEUMOC VAC/ADMIN/RCVD: CPT | Performed by: INTERNAL MEDICINE

## 2021-07-20 PROCEDURE — G8427 DOCREV CUR MEDS BY ELIG CLIN: HCPCS | Performed by: INTERNAL MEDICINE

## 2021-07-20 PROCEDURE — 1090F PRES/ABSN URINE INCON ASSESS: CPT | Performed by: INTERNAL MEDICINE

## 2021-07-20 PROCEDURE — 1036F TOBACCO NON-USER: CPT | Performed by: INTERNAL MEDICINE

## 2021-07-20 PROCEDURE — 99213 OFFICE O/P EST LOW 20 MIN: CPT | Performed by: INTERNAL MEDICINE

## 2021-07-20 PROCEDURE — G8400 PT W/DXA NO RESULTS DOC: HCPCS | Performed by: INTERNAL MEDICINE

## 2021-07-20 RX ORDER — CEPHALEXIN 500 MG/1
500 CAPSULE ORAL 3 TIMES DAILY
Qty: 90 CAPSULE | Refills: 0 | Status: SHIPPED | OUTPATIENT
Start: 2021-07-20 | End: 2021-08-19

## 2021-07-20 ASSESSMENT — ENCOUNTER SYMPTOMS
RESPIRATORY NEGATIVE: 1
GASTROINTESTINAL NEGATIVE: 1

## 2021-07-20 NOTE — PROGRESS NOTES
MG extended release tablet TAKE 2 TABLETS BY MOUTH EVERY MORNING AND 2 TABLETS BY MOUTH EVERY EVENING WITH DINNER 360 tablet 0    ciprofloxacin (CIPRO) 500 MG tablet Take 500 mg by mouth 2 times daily      albuterol sulfate HFA (VENTOLIN HFA) 108 (90 Base) MCG/ACT inhaler INHALE 2 PUFFS INTO THE LUNGS EVERY 4 HOURS AS NEEDED FOR WHEEZING 18 g 3    JANUVIA 100 MG tablet TAKE ONE TABLET BY MOUTH EVERY DAY 90 tablet 1    INVOKANA 300 MG TABS tablet TAKE 1 TABLET BY MOUTH EVERY MORNING BEFORE BREAKFAST 90 tablet 0    omeprazole (PRILOSEC) 40 MG delayed release capsule TAKE 1 CAPSULE BY MOUTH DAILY 90 capsule 0    gentamicin (GARAMYCIN) 0.1 % cream Apply topically 3 times daily. 1 Tube 0    atorvastatin (LIPITOR) 20 MG tablet TAKE 1 TABLET BY MOUTH EVERY DAY 90 tablet 0    atorvastatin (LIPITOR) 20 MG tablet TAKE 1  1/2 tab each day for total ( 30 mg 135 tablet 1    clopidogrel (PLAVIX) 75 MG tablet Take 1 tablet by mouth daily 30 tablet 5    CPAP Machine MISC by Does not apply route      JANUVIA 100 MG tablet TAKE 1 TABLET BY MOUTH EVERY DAY 90 tablet 0    ketoconazole (NIZORAL) 2 % cream Apply topically daily. 1 Tube 5    aspirin 81 MG tablet Take 81 mg by mouth daily      Coenzyme Q10 (COQ10) 100 MG CAPS Take 100 mg by mouth daily      lactobacillus (CULTURELLE) capsule Take 1 capsule by mouth 3 times daily      diltiazem (CARDIZEM CD) 240 MG extended release capsule Take 240 mg by mouth daily      furosemide (LASIX) 40 MG tablet Take 40 mg by mouth See Admin Instructions EVERY SUN, TUE, THU, SAT;  GIVE 80 MG BY MOUTH EVERY MON, WED, FRI      hydrOXYzine (ATARAX) 10 MG tablet Take 10 mg by mouth daily      magnesium oxide (MAG-OX) 400 MG tablet Take 400 mg by mouth 2 times daily      nitroGLYCERIN (NITROSTAT) 0.4 MG SL tablet Place 0.4 mg under the tongue every 5 minutes as needed for Chest pain up to max of 3 total doses. If no relief after 1 dose, call 911.       potassium chloride (MICRO-K) 10 MEQ extended release capsule Take 20 mEq by mouth daily      spironolactone (ALDACTONE) 25 MG tablet Take 25 mg by mouth daily      albuterol sulfate HFA (VENTOLIN HFA) 108 (90 Base) MCG/ACT inhaler Inhale 2 puffs into the lungs every 4 hours as needed for Shortness of Breath      Cyanocobalamin (VITAMIN B-12) 1000 MCG extended release tablet Take 1,000 mcg by mouth daily      Cholecalciferol (VITAMIN D3) 29648 units CAPS Take by mouth every 7 days WEDNESDAYS       No current facility-administered medications on file prior to visit. Allergies   Allergen Reactions    Coreg [Carvedilol]     Lactose     Lisinopril [Lisinopril]      cough         Family History   Problem Relation Age of Onset    Heart Disease Mother     Mental Illness Mother     Kidney Disease Father          Physical Exam:      Physical Exam  Cardiovascular:      Pulses: Normal pulses. Heart sounds: Normal heart sounds. No murmur heard. Pulmonary:      Effort: Pulmonary effort is normal. No respiratory distress. Breath sounds: Normal breath sounds. No stridor. No wheezing or rales. Abdominal:      General: Abdomen is flat. Bowel sounds are normal. There is no distension. Palpations: Abdomen is soft. There is no mass. Tenderness: There is no abdominal tenderness. There is no guarding or rebound. Hernia: No hernia is present. Musculoskeletal:        Feet:    Neurological:      Mental Status: She is alert.            .   Lab Results   Component Value Date    WBC 11.7 (H) 07/19/2021    HGB 12.4 07/19/2021    HCT 42.0 07/19/2021    MCV 88 07/19/2021     07/19/2021     Lab Results   Component Value Date     07/19/2021    K 4.3 07/19/2021    CL 97 07/19/2021    CO2 27 10/27/2017    BUN 20 10/27/2017    CREATININE 0.82 07/19/2021    GLUCOSE 93 07/19/2021    CALCIUM 9.2 07/19/2021                ASSESSMENT:  PLAN:    Osteomyelitis left foot    Has completed Invanz therapy discontinue PICC    Place patient on oral Keflex 500 mg 3 times a day for 1 more month follow-up 1 month

## 2022-09-28 ENCOUNTER — TELEPHONE (OUTPATIENT)
Dept: INFECTIOUS DISEASES | Age: 77
End: 2022-09-28

## 2022-09-28 NOTE — TELEPHONE ENCOUNTER
[facility] Nurse calls with Update; states no blood return from [facility]. Patient able to Infuse. She had to draw labs peripherally today. Patient was Dc from [facility] recently on IV Vanco for Dx of DM Foot Ulcer R. Grt toe + L. Lateral foot wound. Request Cath Grzegorz. Will update ID Physician for recommendations and/or orders.
Per consult with Dr Martita Fields in [facility] to provide Cath Grzegorz. Will update [facility] for the Order, per [facility] Nurse Renata Sees. Lmovm with pharmacist Shannan Boland. Order Faxed.
no

## 2022-10-21 ENCOUNTER — OFFICE VISIT (OUTPATIENT)
Dept: INFECTIOUS DISEASES | Age: 77
End: 2022-10-21
Payer: MEDICARE

## 2022-10-21 VITALS — HEART RATE: 74 BPM | SYSTOLIC BLOOD PRESSURE: 137 MMHG | TEMPERATURE: 97.4 F | DIASTOLIC BLOOD PRESSURE: 65 MMHG

## 2022-10-21 DIAGNOSIS — L08.9 TYPE 2 DIABETES MELLITUS WITH LEFT DIABETIC FOOT INFECTION (HCC): Primary | ICD-10-CM

## 2022-10-21 DIAGNOSIS — L97.519 DIABETIC ULCER OF RIGHT GREAT TOE (HCC): ICD-10-CM

## 2022-10-21 DIAGNOSIS — E11.628 TYPE 2 DIABETES MELLITUS WITH LEFT DIABETIC FOOT INFECTION (HCC): Primary | ICD-10-CM

## 2022-10-21 DIAGNOSIS — E11.621 DIABETIC ULCER OF RIGHT GREAT TOE (HCC): ICD-10-CM

## 2022-10-21 PROCEDURE — G8400 PT W/DXA NO RESULTS DOC: HCPCS | Performed by: INTERNAL MEDICINE

## 2022-10-21 PROCEDURE — 1090F PRES/ABSN URINE INCON ASSESS: CPT | Performed by: INTERNAL MEDICINE

## 2022-10-21 PROCEDURE — 1123F ACP DISCUSS/DSCN MKR DOCD: CPT | Performed by: INTERNAL MEDICINE

## 2022-10-21 PROCEDURE — G8421 BMI NOT CALCULATED: HCPCS | Performed by: INTERNAL MEDICINE

## 2022-10-21 PROCEDURE — G8484 FLU IMMUNIZE NO ADMIN: HCPCS | Performed by: INTERNAL MEDICINE

## 2022-10-21 PROCEDURE — G8427 DOCREV CUR MEDS BY ELIG CLIN: HCPCS | Performed by: INTERNAL MEDICINE

## 2022-10-21 PROCEDURE — 99213 OFFICE O/P EST LOW 20 MIN: CPT | Performed by: INTERNAL MEDICINE

## 2022-10-21 PROCEDURE — 1036F TOBACCO NON-USER: CPT | Performed by: INTERNAL MEDICINE

## 2022-10-21 RX ORDER — SULFAMETHOXAZOLE AND TRIMETHOPRIM 800; 160 MG/1; MG/1
1 TABLET ORAL 2 TIMES DAILY
Qty: 60 TABLET | Refills: 1 | Status: SHIPPED | OUTPATIENT
Start: 2022-10-21 | End: 2022-11-20

## 2022-10-21 ASSESSMENT — ENCOUNTER SYMPTOMS
GASTROINTESTINAL NEGATIVE: 1
RESPIRATORY NEGATIVE: 1

## 2022-10-21 NOTE — PROGRESS NOTES
INHALE 2 PUFFS INTO THE LUNGS EVERY 4 HOURS AS NEEDED FOR WHEEZING 18 g 3    JANUVIA 100 MG tablet TAKE ONE TABLET BY MOUTH EVERY DAY 90 tablet 1    INVOKANA 300 MG TABS tablet TAKE 1 TABLET BY MOUTH EVERY MORNING BEFORE BREAKFAST 90 tablet 0    omeprazole (PRILOSEC) 40 MG delayed release capsule TAKE 1 CAPSULE BY MOUTH DAILY 90 capsule 0    gentamicin (GARAMYCIN) 0.1 % cream Apply topically 3 times daily. 1 Tube 0    atorvastatin (LIPITOR) 20 MG tablet TAKE 1 TABLET BY MOUTH EVERY DAY 90 tablet 0    atorvastatin (LIPITOR) 20 MG tablet TAKE 1  1/2 tab each day for total ( 30 mg 135 tablet 1    clopidogrel (PLAVIX) 75 MG tablet Take 1 tablet by mouth daily 30 tablet 5    CPAP Machine MISC by Does not apply route      JANUVIA 100 MG tablet TAKE 1 TABLET BY MOUTH EVERY DAY 90 tablet 0    ketoconazole (NIZORAL) 2 % cream Apply topically daily. 1 Tube 5    aspirin 81 MG tablet Take 81 mg by mouth daily      Coenzyme Q10 (COQ10) 100 MG CAPS Take 100 mg by mouth daily      lactobacillus (CULTURELLE) capsule Take 1 capsule by mouth 3 times daily      diltiazem (CARDIZEM CD) 240 MG extended release capsule Take 240 mg by mouth daily      furosemide (LASIX) 40 MG tablet Take 40 mg by mouth See Admin Instructions EVERY SUN, TUE, THU, SAT;  GIVE 80 MG BY MOUTH EVERY MON, WED, FRI      hydrOXYzine (ATARAX) 10 MG tablet Take 10 mg by mouth daily      magnesium oxide (MAG-OX) 400 MG tablet Take 400 mg by mouth 2 times daily      nitroGLYCERIN (NITROSTAT) 0.4 MG SL tablet Place 0.4 mg under the tongue every 5 minutes as needed for Chest pain up to max of 3 total doses. If no relief after 1 dose, call 911.       potassium chloride (MICRO-K) 10 MEQ extended release capsule Take 20 mEq by mouth daily      spironolactone (ALDACTONE) 25 MG tablet Take 25 mg by mouth daily      albuterol sulfate HFA (VENTOLIN HFA) 108 (90 Base) MCG/ACT inhaler Inhale 2 puffs into the lungs every 4 hours as needed for Shortness of Breath Cyanocobalamin (VITAMIN B-12) 1000 MCG extended release tablet Take 1,000 mcg by mouth daily      Cholecalciferol (VITAMIN D3) 36436 units CAPS Take by mouth every 7 days WEDNESDAYS       No current facility-administered medications on file prior to visit. Allergies   Allergen Reactions    Coreg [Carvedilol]     Lactose     Lisinopril [Lisinopril]      cough         Family History   Problem Relation Age of Onset    Heart Disease Mother     Mental Illness Mother     Kidney Disease Father          Physical Exam:      Physical Exam  Constitutional:       Appearance: She is not ill-appearing. Cardiovascular:      Heart sounds: Normal heart sounds. No murmur heard. Pulmonary:      Effort: Pulmonary effort is normal. No respiratory distress. Breath sounds: No wheezing, rhonchi or rales. Abdominal:      General: Abdomen is flat. Bowel sounds are normal. There is no distension. Palpations: There is no mass. Tenderness: There is no abdominal tenderness. There is no guarding or rebound. Hernia: No hernia is present. Musculoskeletal:        Feet:        Last menstrual period 03/03/1989, not currently breastfeeding.       .   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

## 2022-11-04 NOTE — TELEPHONE ENCOUNTER
Received a fax from the pharmacy with Dr Sravanthi Machado old rx for Gentamicin 1% cream with directions (scanned in)   Rx pending Walk in

## 2022-11-17 ENCOUNTER — TELEMEDICINE (OUTPATIENT)
Dept: INFECTIOUS DISEASES | Age: 77
End: 2022-11-17
Payer: MEDICARE

## 2022-11-17 DIAGNOSIS — E11.621 DIABETIC ULCER OF RIGHT GREAT TOE (HCC): ICD-10-CM

## 2022-11-17 DIAGNOSIS — L08.9 TYPE 2 DIABETES MELLITUS WITH LEFT DIABETIC FOOT INFECTION (HCC): Primary | ICD-10-CM

## 2022-11-17 DIAGNOSIS — L97.519 DIABETIC ULCER OF RIGHT GREAT TOE (HCC): ICD-10-CM

## 2022-11-17 DIAGNOSIS — E11.628 TYPE 2 DIABETES MELLITUS WITH LEFT DIABETIC FOOT INFECTION (HCC): Primary | ICD-10-CM

## 2022-11-17 PROCEDURE — G8428 CUR MEDS NOT DOCUMENT: HCPCS | Performed by: INTERNAL MEDICINE

## 2022-11-17 PROCEDURE — 99213 OFFICE O/P EST LOW 20 MIN: CPT | Performed by: INTERNAL MEDICINE

## 2022-11-17 PROCEDURE — 1090F PRES/ABSN URINE INCON ASSESS: CPT | Performed by: INTERNAL MEDICINE

## 2022-11-17 PROCEDURE — G8400 PT W/DXA NO RESULTS DOC: HCPCS | Performed by: INTERNAL MEDICINE

## 2022-11-17 PROCEDURE — 1123F ACP DISCUSS/DSCN MKR DOCD: CPT | Performed by: INTERNAL MEDICINE

## 2022-11-17 NOTE — PROGRESS NOTES
Roberto Moran is a 65 y.o. female, Established patient, here for evaluation of the following chief complaint(s): No chief complaint on file. ASSESSMENT/PLAN:   Diagnosis Orders   1. Type 2 diabetes mellitus with left diabetic foot infection (HCC)  CBC with Auto Differential    Comprehensive Metabolic Panel    C-Reactive Protein    Sedimentation Rate      2. Diabetic ulcer of right great toe (HCC)  CBC with Auto Differential    Comprehensive Metabolic Panel    C-Reactive Protein    Sedimentation Rate        Toe ulcer itself looks to be healthy clean-based granulation tissue but not completely closing there is hyperkeratotic callus formation around it which is being removed on a regular basis. She unfortunately has no more podiatry follow-up she is following with wound care as per podiatrist they are not going to [facility] any further. We are treating her for suspicion of osteomyelitis although it was not completely definitive based on her work-up in the hospital.  She finished IV vancomycin course is on Bactrim. Check lab work see back in 1 month  SUBJECTIVE/OBJECTIVE:  HPI  Patient feeling okay overall she is tolerated 1 month of Bactrim she had some nausea initially with it. Toe ulcer itself she is going to wound care she has home health it stable but not closing.   I am told that she still has a large amount of callus around it that seems to cause difficulty with closing no fevers night sweats    Review of Systems   See HPI      Physical Exam  [INSTRUCTIONS:  \"[x]\" Indicates a positive item  \"[]\" Indicates a negative item  -- DELETE ALL ITEMS NOT EXAMINED]    Constitutional: [x] Appears well-developed and well-nourished [x] No apparent distress      [] Abnormal -     Mental status: [x] Alert and awake  [x] Oriented to person/place/time [x] Able to follow commands    [] Abnormal -     Eyes:   EOM    [x]  Normal    [] Abnormal -   Sclera  [x]  Normal    [] Abnormal -          Discharge [x]  None visible   [] Abnormal -     HENT: [x] Normocephalic, atraumatic  [] Abnormal -   [x] Mouth/Throat: Mucous membranes are moist    External Ears [x] Normal  [] Abnormal -    Neck: [x] No visualized mass [] Abnormal -     Pulmonary/Chest: [x] Respiratory effort normal   [x] No visualized signs of difficulty breathing or respiratory distress        [] Abnormal -      Musculoskeletal:   [x] Normal gait with no signs of ataxia         [x] Normal range of motion of neck        [] Abnormal -     Neurological:        [x] No Facial Asymmetry (Cranial nerve 7 motor function) (limited exam due to video visit)          [x] No gaze palsy        [] Abnormal -          Skin:        [] No significant exanthematous lesions or discoloration noted on facial skin         [x] Abnormal -plantar toe ulcer roughly 1 and half centimeters by 1 cm good granulation tissue hyperkeratotic callus formation around it no significant erythema           Psychiatric:       [x] Normal Affect [] Abnormal -        [x] No Hallucinations    Other pertinent observable physical exam findings:-        On this date 11/17/2022 I have spent 31 minutes reviewing previous notes, test results and face to face (virtual) with the patient discussing the diagnosis and importance of compliance with the treatment plan as well as documenting on the day of the visit. Kvng White, was evaluated through a synchronous (real-time) audio-video  encounter. The patient (or guardian if applicable) is aware that this is a billable  service, which includes applicable co-pays. This Virtual Visit was conducted with  patient's (and/or legal guardian's) consent. The visit was conducted pursuant to  the emergency declaration under the [address] [place] [address], [address] [place] [address] authority and the [employer] Act. Patient identification was verified,  and a caregiver was present when appropriate.  The patient was located in a  state where the provider was licensed to provide care    An electronic signature was used to authenticate this note.     --Carol Grimes MD

## 2022-11-23 ENCOUNTER — TELEPHONE (OUTPATIENT)
Dept: INFECTIOUS DISEASES | Age: 77
End: 2022-11-23

## 2022-11-23 NOTE — TELEPHONE ENCOUNTER
Follow up call to patient. LMOVM. Must request if she still has [facility] and if they are able to obtain Labs per Dr Chary Acevedo. Request a return call. 12:07pm. Patient returned call and states she still has [facility] and they obtained labs on Monday, 11/21/22. She states she expects to have XR's of foot or leg ordered by [facility] prior to 12/15/22.

## 2023-02-24 ENCOUNTER — DOCUMENTATION (OUTPATIENT)
Dept: PRIMARY CARE | Facility: CLINIC | Age: 78
End: 2023-02-24
Payer: COMMERCIAL

## 2023-03-13 DIAGNOSIS — F32.A DEPRESSION, UNSPECIFIED DEPRESSION TYPE: ICD-10-CM

## 2023-03-13 DIAGNOSIS — E78.5 HYPERLIPIDEMIA, UNSPECIFIED HYPERLIPIDEMIA TYPE: Primary | ICD-10-CM

## 2023-03-13 DIAGNOSIS — I10 HYPERTENSION, UNSPECIFIED TYPE: ICD-10-CM

## 2023-03-13 DIAGNOSIS — E11.69 TYPE 2 DIABETES MELLITUS WITH OTHER SPECIFIED COMPLICATION, WITHOUT LONG-TERM CURRENT USE OF INSULIN (MULTI): ICD-10-CM

## 2023-03-14 RX ORDER — SERTRALINE HYDROCHLORIDE 50 MG/1
TABLET, FILM COATED ORAL
Qty: 90 TABLET | Refills: 1 | Status: SHIPPED | OUTPATIENT
Start: 2023-03-14 | End: 2023-07-06 | Stop reason: ALTCHOICE

## 2023-03-14 RX ORDER — METFORMIN HYDROCHLORIDE 500 MG/1
TABLET, EXTENDED RELEASE ORAL
Qty: 360 TABLET | Refills: 1 | Status: SHIPPED | OUTPATIENT
Start: 2023-03-14 | End: 2023-10-02

## 2023-03-14 RX ORDER — SPIRONOLACTONE 25 MG/1
TABLET ORAL
Qty: 90 TABLET | Refills: 1 | Status: SHIPPED | OUTPATIENT
Start: 2023-03-14 | End: 2023-10-02 | Stop reason: SDUPTHER

## 2023-03-14 RX ORDER — ROSUVASTATIN CALCIUM 10 MG/1
TABLET, COATED ORAL
Qty: 90 TABLET | Refills: 1 | Status: SHIPPED | OUTPATIENT
Start: 2023-03-14 | End: 2023-10-04

## 2023-03-14 RX ORDER — EMPAGLIFLOZIN 10 MG/1
TABLET, FILM COATED ORAL
Qty: 90 TABLET | Refills: 1 | Status: SHIPPED | OUTPATIENT
Start: 2023-03-14 | End: 2023-06-02 | Stop reason: DRUGHIGH

## 2023-03-14 RX ORDER — ROSUVASTATIN CALCIUM 10 MG/1
10 TABLET, COATED ORAL DAILY
Qty: 30 TABLET | Refills: 2 | Status: SHIPPED | OUTPATIENT
Start: 2023-03-14 | End: 2023-04-06 | Stop reason: SDUPTHER

## 2023-03-14 RX ORDER — HYDROCHLOROTHIAZIDE 25 MG/1
TABLET ORAL
Qty: 90 TABLET | Refills: 1 | Status: SHIPPED | OUTPATIENT
Start: 2023-03-14 | End: 2023-10-02 | Stop reason: SDUPTHER

## 2023-03-17 DIAGNOSIS — K21.9 GASTROESOPHAGEAL REFLUX DISEASE WITHOUT ESOPHAGITIS: ICD-10-CM

## 2023-03-17 DIAGNOSIS — I10 PRIMARY HYPERTENSION: ICD-10-CM

## 2023-03-17 RX ORDER — LOSARTAN POTASSIUM 25 MG/1
TABLET ORAL
Qty: 180 TABLET | Refills: 0 | Status: SHIPPED | OUTPATIENT
Start: 2023-03-17 | End: 2023-07-05 | Stop reason: SDUPTHER

## 2023-03-17 RX ORDER — OMEPRAZOLE 40 MG/1
CAPSULE, DELAYED RELEASE ORAL
Qty: 180 CAPSULE | Refills: 0 | Status: SHIPPED | OUTPATIENT
Start: 2023-03-17 | End: 2023-06-19 | Stop reason: SDUPTHER

## 2023-03-17 NOTE — TELEPHONE ENCOUNTER
Pt called stating she has recently been released from the hospital due to a severe toe infection. She is requesting a letter for meals to be delivered to her home. [facility]  [phone]  [phone]    Spoke to [email or web address], stated letter must contain pt name and  and state that she is cleared for a regular diet.     Please advise I was present during the key portion of the procedure.

## 2023-03-27 ENCOUNTER — TELEPHONE (OUTPATIENT)
Dept: PRIMARY CARE | Facility: CLINIC | Age: 78
End: 2023-03-27
Payer: COMMERCIAL

## 2023-03-27 DIAGNOSIS — N30.00 ACUTE CYSTITIS WITHOUT HEMATURIA: Primary | ICD-10-CM

## 2023-03-27 RX ORDER — NITROFURANTOIN 25; 75 MG/1; MG/1
100 CAPSULE ORAL 2 TIMES DAILY
Qty: 14 CAPSULE | Refills: 0 | Status: SHIPPED | OUTPATIENT
Start: 2023-03-27 | End: 2023-04-06 | Stop reason: SINTOL

## 2023-03-27 NOTE — TELEPHONE ENCOUNTER
PATIENT SAW YOU IN 01/2023 WANTS TO KNOW IF YOU CAN SEND IN A ANTIBIOTIC FOR A  UTI.  HER HOME NURSE TRIAGED HER SYMPTOMS AND SHE  STATED SHE NEEDED TO CALL AND ASK FOR THIS.  SHE IS ASKING IF YOU DO PRESCRIBE IF YOU COULD  NOT PRESCRIBE BACTRIM,SHE SAID THIS GIVES HER YEAST INFECTIONS AND MAKES HER SICK.  SHE IS ON A LONG TERM ANTIBIOTIC FOR HER DIABETIC FOOT INFECTION.  PATIENT STATED SHE IS UNABLE TO COME IN TO GIVE SAMPLE.  ALSO HER BLOOD SUGARS ARE VERY  HIGH. THEY ARE IN THE 2-300'S     PLEASE ADVISE    [facility]

## 2023-03-31 ENCOUNTER — TELEPHONE (OUTPATIENT)
Dept: PRIMARY CARE | Facility: CLINIC | Age: 78
End: 2023-03-31
Payer: COMMERCIAL

## 2023-03-31 DIAGNOSIS — N30.00 ACUTE CYSTITIS WITHOUT HEMATURIA: Primary | ICD-10-CM

## 2023-03-31 RX ORDER — CIPROFLOXACIN 500 MG/1
500 TABLET ORAL 2 TIMES DAILY
Qty: 20 TABLET | Refills: 0 | Status: SHIPPED | OUTPATIENT
Start: 2023-03-31 | End: 2023-04-10

## 2023-03-31 NOTE — TELEPHONE ENCOUNTER
PATIENT CALLED AND STATED THAT SINCE SHE HAS BEEN ON MACROBID FOR HER UTI.  Wednesday EVENING HER BLOOD SUGAR .  LAST NIGHT IT .  SHE HAS BEEN VOMITNG AND DIZZY WITH THE BLOOD SUGARS HAVE BEEN SO HIGH.    SHE ALSO STATES THAT SHE THINKS ITS A KIDNEY INFECTION BECAUSE THE PAIN IS IN HER LOWER BACK.      PLEASE ADVISE  PHONE  [phone]

## 2023-04-03 DIAGNOSIS — E11.9 TYPE 2 DIABETES MELLITUS WITHOUT COMPLICATION, UNSPECIFIED WHETHER LONG TERM INSULIN USE (MULTI): ICD-10-CM

## 2023-04-05 RX ORDER — DULAGLUTIDE 0.75 MG/.5ML
INJECTION, SOLUTION SUBCUTANEOUS
Qty: 2 ML | Refills: 1 | Status: SHIPPED | OUTPATIENT
Start: 2023-04-05 | End: 2023-06-02

## 2023-04-06 ENCOUNTER — PATIENT OUTREACH (OUTPATIENT)
Dept: PRIMARY CARE | Facility: CLINIC | Age: 78
End: 2023-04-06
Payer: MEDICARE

## 2023-04-06 DIAGNOSIS — E11.51 TYPE II DIABETES MELLITUS WITH PERIPHERAL CIRCULATORY DISORDER (MULTI): ICD-10-CM

## 2023-04-06 DIAGNOSIS — E66.01 MORBID OBESITY (MULTI): ICD-10-CM

## 2023-04-06 DIAGNOSIS — F32.A DEPRESSION, UNSPECIFIED DEPRESSION TYPE: ICD-10-CM

## 2023-04-06 PROCEDURE — 99439 CHRNC CARE MGMT STAF EA ADDL: CPT | Performed by: FAMILY MEDICINE

## 2023-04-06 PROCEDURE — 99490 CHRNC CARE MGMT STAFF 1ST 20: CPT | Performed by: FAMILY MEDICINE

## 2023-04-06 RX ORDER — CALCIUM CARBONATE 300MG(750)
400 TABLET,CHEWABLE ORAL DAILY
COMMUNITY

## 2023-04-06 RX ORDER — DILTIAZEM HYDROCHLORIDE 240 MG/1
240 CAPSULE, COATED, EXTENDED RELEASE ORAL DAILY
COMMUNITY
End: 2024-02-07 | Stop reason: WASHOUT

## 2023-04-06 RX ORDER — GLIMEPIRIDE 4 MG/1
4 TABLET ORAL 2 TIMES DAILY
COMMUNITY
Start: 2017-04-17 | End: 2023-07-05 | Stop reason: SDUPTHER

## 2023-04-06 RX ORDER — VITAMIN E (DL,TOCOPHERYL ACET) 90 MG
1 CAPSULE ORAL NIGHTLY
COMMUNITY

## 2023-04-06 RX ORDER — ASPIRIN 81 MG/1
81 TABLET ORAL NIGHTLY
COMMUNITY

## 2023-04-06 RX ORDER — DOCUSATE SODIUM 100 MG/1
100 CAPSULE, LIQUID FILLED ORAL 2 TIMES DAILY PRN
COMMUNITY
End: 2023-04-28 | Stop reason: SDUPTHER

## 2023-04-06 RX ORDER — LEVOTHYROXINE SODIUM 150 UG/1
150 TABLET ORAL
COMMUNITY
End: 2023-06-02 | Stop reason: ENTERED-IN-ERROR

## 2023-04-06 NOTE — CARE PLAN
"Chart review completed. CCM outreach made to patient. Patient is a very pleasant 77 year old. She is A&Ox3. She recently has been having problems with a UTI. She was placed on Macrobid, but she reacted terribly to it. She states her sugar got so high she thoughts she was going to have a stroke. But now she is on Cipro and feeling much better. Her glucose last night was 291 but this morning it was 133. Previously she was getting numbers in the 300's. Patient is compliant with her medications which we reviewed in detail. Her medication list has been updated. She has a history of a CABGx3 and recently followed up with Cardiology and got a \"good checkup\". She had her left big toe amputated about 5 years ago. She has a current wound on her right big toe that she sees the wound center for every 2 weeks. She has [facility] coming out 3xweek for wound care. The nurse checks her vitals including BP, so patient does not check hers herself. Overall she is doing ok at this time. She was advised to please reach out if anything changes.   "

## 2023-04-28 DIAGNOSIS — K59.00 CONSTIPATION, UNSPECIFIED CONSTIPATION TYPE: ICD-10-CM

## 2023-04-28 RX ORDER — DOCUSATE SODIUM 100 MG/1
100 CAPSULE, LIQUID FILLED ORAL 2 TIMES DAILY PRN
Qty: 60 CAPSULE | Refills: 3 | Status: SHIPPED | OUTPATIENT
Start: 2023-04-28 | End: 2023-09-27

## 2023-04-28 NOTE — TELEPHONE ENCOUNTER
Rx Refill Request Telephone Encounter    Name:  Diane Montemayor  : 1945     Medication Name:  DOCUSATE SODIUM  Dose (Optional):    100 MG  Quantity (Optional):      Directions (Optional):   1 CAPSULE TWICE A DAY    ALLERGIES:       Specific Pharmacy location:  [facility]    Date of last appointment:  23  Date of next appointment:      Best number to reach patient:

## 2023-05-22 ENCOUNTER — APPOINTMENT (OUTPATIENT)
Dept: PRIMARY CARE | Facility: CLINIC | Age: 78
End: 2023-05-22
Payer: MEDICARE

## 2023-06-02 ENCOUNTER — OFFICE VISIT (OUTPATIENT)
Dept: PRIMARY CARE | Facility: CLINIC | Age: 78
End: 2023-06-02
Payer: MEDICARE

## 2023-06-02 VITALS
TEMPERATURE: 97 F | RESPIRATION RATE: 16 BRPM | DIASTOLIC BLOOD PRESSURE: 72 MMHG | SYSTOLIC BLOOD PRESSURE: 114 MMHG | HEIGHT: 65 IN | BODY MASS INDEX: 40.82 KG/M2 | WEIGHT: 245 LBS | OXYGEN SATURATION: 96 % | HEART RATE: 71 BPM

## 2023-06-02 DIAGNOSIS — B37.9 YEAST INFECTION: ICD-10-CM

## 2023-06-02 DIAGNOSIS — E66.01 CLASS 3 SEVERE OBESITY DUE TO EXCESS CALORIES WITH SERIOUS COMORBIDITY AND BODY MASS INDEX (BMI) OF 40.0 TO 44.9 IN ADULT (MULTI): ICD-10-CM

## 2023-06-02 DIAGNOSIS — E03.9 HYPOTHYROIDISM, UNSPECIFIED TYPE: ICD-10-CM

## 2023-06-02 DIAGNOSIS — M62.838 MUSCLE SPASMS OF NECK: ICD-10-CM

## 2023-06-02 DIAGNOSIS — I25.10 CORONARY ARTERY DISEASE DUE TO CALCIFIED CORONARY LESION: ICD-10-CM

## 2023-06-02 DIAGNOSIS — I25.84 CORONARY ARTERY DISEASE DUE TO CALCIFIED CORONARY LESION: ICD-10-CM

## 2023-06-02 DIAGNOSIS — D64.9 ANEMIA, UNSPECIFIED TYPE: ICD-10-CM

## 2023-06-02 DIAGNOSIS — E11.69 TYPE 2 DIABETES MELLITUS WITH OTHER SPECIFIED COMPLICATION, UNSPECIFIED WHETHER LONG TERM INSULIN USE (MULTI): Primary | ICD-10-CM

## 2023-06-02 DIAGNOSIS — R06.02 SOB (SHORTNESS OF BREATH): ICD-10-CM

## 2023-06-02 DIAGNOSIS — M25.472 LEFT ANKLE SWELLING: ICD-10-CM

## 2023-06-02 PROBLEM — E66.813 CLASS 3 SEVERE OBESITY DUE TO EXCESS CALORIES WITH SERIOUS COMORBIDITY AND BODY MASS INDEX (BMI) OF 40.0 TO 44.9 IN ADULT: Status: ACTIVE | Noted: 2023-06-02

## 2023-06-02 LAB — POC HEMOGLOBIN A1C: 7.5 % (ref 4.2–6.5)

## 2023-06-02 PROCEDURE — 83036 HEMOGLOBIN GLYCOSYLATED A1C: CPT | Performed by: FAMILY MEDICINE

## 2023-06-02 PROCEDURE — 99215 OFFICE O/P EST HI 40 MIN: CPT | Performed by: FAMILY MEDICINE

## 2023-06-02 PROCEDURE — 1159F MED LIST DOCD IN RCRD: CPT | Performed by: FAMILY MEDICINE

## 2023-06-02 PROCEDURE — 1036F TOBACCO NON-USER: CPT | Performed by: FAMILY MEDICINE

## 2023-06-02 RX ORDER — LEVOTHYROXINE SODIUM 150 UG/1
150 TABLET ORAL DAILY
Qty: 90 TABLET | Refills: 1
Start: 2023-06-02 | End: 2023-07-21

## 2023-06-02 RX ORDER — ALBUTEROL SULFATE 90 UG/1
2 AEROSOL, METERED RESPIRATORY (INHALATION) EVERY 4 HOURS PRN
COMMUNITY
End: 2023-06-30

## 2023-06-02 RX ORDER — FLUCONAZOLE 150 MG/1
150 TABLET ORAL WEEKLY
Qty: 12 TABLET | Refills: 1 | Status: SHIPPED | OUTPATIENT
Start: 2023-06-02 | End: 2024-01-22 | Stop reason: SDUPTHER

## 2023-06-02 ASSESSMENT — PATIENT HEALTH QUESTIONNAIRE - PHQ9
2. FEELING DOWN, DEPRESSED OR HOPELESS: NOT AT ALL
SUM OF ALL RESPONSES TO PHQ9 QUESTIONS 1 AND 2: 0
1. LITTLE INTEREST OR PLEASURE IN DOING THINGS: NOT AT ALL

## 2023-06-02 ASSESSMENT — ENCOUNTER SYMPTOMS
LOSS OF SENSATION IN FEET: 0
OCCASIONAL FEELINGS OF UNSTEADINESS: 0
DEPRESSION: 0

## 2023-06-02 NOTE — PROGRESS NOTES
Subjective   Patient ID: Diane Montemayor is a 78 y.o. female who presents for Diabetes, Hypothyroidism, and Shortness of Breath.    HPI    DM  Does check glucose at home   Today glucose was 153 and last night as high 356 to 238  Eats a generally healthy diet  Exercises  Currently taking Trulicity, Jardiance, Glimepiride,Metformin  Last A1c on 6/2/23@ 7.5 %  Last eye exam 4 month  Does see a Podiatrist     She states she has been having severe cramps in upper thighs.  She thinks this is due to Trulicity  Does not believe the Trulicity is helping.  Glucose does go up to 300 and 400.  Has been injecting this into her thighs.    Headaches.  Also has neck cramps.  Does take a cholesterol medication.  Admits to taking More Lasix.    Admits to weakness yesterday.    SOB  Has been using inhaler.  Has to get up at night and sit up.  Had a stress test.  Has appointment with Andriy in July.    Has appointment with Ashley for legs.    Has severe constipation.  Admits she sometimes has just little grapes as stool.  Takes Colace.  Has had upset stomach also.    Left ankle swelling  Denies any pain.    Review of systems  ; Patient seen today for exam denies any problems with headaches or vision, denies any shortness of breath chest pain nausea or vomiting, no black stool no blood in the stool no heartburn type   denies any problems with constipation or diarrhea, and no dysuria-type symptoms    The patient's allergies medications were reviewed with them today    The patient's social family and surgical history or also reviewed here today, along with her past medical history.     Objective     Alert and active in  no acute distress  HEENT TMs clear oropharynx negative nares clear no drainage noted neck supple  With no adenopathy   Heart regular rate and rhythm without murmur and no carotid bruits  Lungs- clear to auscultation bilaterally, no wheeze or rhonchi noted  Thyroid -negative masses or nodularity  Abdomen- soft times four  "quadrants , bowel sounds positive no masses or organomegaly, negative tenderness guarding or rebound  Neurological exam unremarkable- DTRs in upper and lower extremities within normal limits.   skin -no lesions noted    Left ankle - swelling//positive fat pad small area  at her lateral fibula no pain to touch      /72 (BP Location: Left arm, Patient Position: Sitting, BP Cuff Size: Adult)   Pulse 71   Temp 36.1 °C (97 °F) (Temporal)   Resp 16   Ht 1.651 m (5' 5\")   Wt 111 kg (245 lb)   SpO2 96%   BMI 40.77 kg/m²     Allergies   Allergen Reactions    Lisinopril Cough       Assessment/Plan   Problem List Items Addressed This Visit          Endocrine/Metabolic    Class 3 severe obesity due to excess calories with serious comorbidity and body mass index (BMI) of 40.0 to 44.9 in adult (CMS/[facility])    Hypothyroidism    Relevant Medications    Synthroid 150 mcg tablet    Other Relevant Orders    Thyroid Stimulating Hormone    Thyroxine, Free     Other Visit Diagnoses       Type 2 diabetes mellitus with other specified complication, unspecified whether long term insulin use (CMS/[facility])    -  Primary    Relevant Medications    empagliflozin (Jardiance) 25 mg    Other Relevant Orders    POCT glycosylated hemoglobin (Hb A1C) manually resulted (Completed)    Follow Up In [facility] - PCP    CBC and Auto Differential    Basic Metabolic Panel    BMI 40.0-44.9, adult (CMS/[facility])        Yeast infection        Relevant Medications    fluconazole (Diflucan) 150 mg tablet    Anemia, unspecified type        Relevant Orders    Ferritin    Iron and TIBC    SOB (shortness of breath)        Relevant Orders    B-type natriuretic peptide    Left ankle swelling        Muscle spasms of neck        Coronary artery disease due to calcified coronary lesion              Discussed patient's BMI and to institute calorie reduction and increase exercise to decrease risk of diabetes and heart disease in the future.    A1C performed " today, 7.5%.  This is elevated some.    Stop Trulicity.  Increase Jardiance to 25 mg.    Diflucan prescribed today.    Continue to follow up with specialists as scheduled.    Discussed with her that increasing Lasix can cause cramps.    If she worsens, we can order a chest x-ray    Labs have been ordered, she/he will have these performed and we will contact her/him with results.  (CBC, BNP, BMP, Iron & TIBC, Ferritin, TSH, T4)    If anything worsens or changes please call us at once, follow up in the office as planned.    Scribe Attestation  By signing my name below, I, Bonnie Diane MA, Scribe   attest that this documentation has been prepared under the direction and in the presence of Ramses Black DO.

## 2023-06-19 ENCOUNTER — DOCUMENTATION (OUTPATIENT)
Dept: PRIMARY CARE | Facility: CLINIC | Age: 78
End: 2023-06-19
Payer: MEDICARE

## 2023-06-19 DIAGNOSIS — K21.9 GASTROESOPHAGEAL REFLUX DISEASE WITHOUT ESOPHAGITIS: ICD-10-CM

## 2023-06-19 RX ORDER — OMEPRAZOLE 40 MG/1
40 CAPSULE, DELAYED RELEASE ORAL 2 TIMES DAILY
Qty: 180 CAPSULE | Refills: 0 | Status: SHIPPED | OUTPATIENT
Start: 2023-06-19 | End: 2023-09-18

## 2023-06-19 NOTE — TELEPHONE ENCOUNTER
Pt is calling because she states she left a message about her rx last week (nothing in chart)    Rx Refill Request Telephone Encounter    Name:  Diane Montemayor  : 1945     Medication Name:  Omeprazole  Dose (Optional):    40mg  Quantity (Optional):    #180  Directions (Optional):   Take 1 capsule by mouth twice daily    ALLERGIES:   see list    Specific Pharmacy location:  [facility]    Date of last appointment:  23  Date of next appointment:  none    Best number to reach patient:  [phone]

## 2023-06-20 ENCOUNTER — TELEPHONE (OUTPATIENT)
Dept: PRIMARY CARE | Facility: CLINIC | Age: 78
End: 2023-06-20

## 2023-06-20 ENCOUNTER — DOCUMENTATION (OUTPATIENT)
Dept: PRIMARY CARE | Facility: CLINIC | Age: 78
End: 2023-06-20

## 2023-06-20 ENCOUNTER — PATIENT OUTREACH (OUTPATIENT)
Dept: PRIMARY CARE | Facility: CLINIC | Age: 78
End: 2023-06-20
Payer: MEDICARE

## 2023-06-20 ENCOUNTER — DOCUMENTATION (OUTPATIENT)
Dept: PRIMARY CARE | Facility: CLINIC | Age: 78
End: 2023-06-20
Payer: MEDICARE

## 2023-06-20 DIAGNOSIS — E11.69 TYPE 2 DIABETES MELLITUS WITH OTHER SPECIFIED COMPLICATION, UNSPECIFIED WHETHER LONG TERM INSULIN USE (MULTI): ICD-10-CM

## 2023-06-20 DIAGNOSIS — R06.02 SOB (SHORTNESS OF BREATH): ICD-10-CM

## 2023-06-20 DIAGNOSIS — R06.02 SOB (SHORTNESS OF BREATH): Primary | ICD-10-CM

## 2023-06-20 PROCEDURE — 99490 CHRNC CARE MGMT STAFF 1ST 20: CPT | Performed by: FAMILY MEDICINE

## 2023-06-20 NOTE — TELEPHONE ENCOUNTER
Dr Caraballo's office is calling because Diane has an appointment for pulmonary this Thursday 6/22/23 and they need the referral faxed to them along with recent office notes. There isn't a referral in her chart. Ok for referral to Dr Caraballo?   Please advise  Thanks    Dr Caraballo's fax # [phone]  Dr Caraballo's phone # [phone]    Aware you are out of the office today

## 2023-06-20 NOTE — PROGRESS NOTES
Chart reviewed.   CCM outreached patient.   Patient aware that I am the new [facility] and was provided with my contact information. Is aware to reach out if anything is needed.    Patient is very pleasant, says she lives with her son and granddaughter. Says if she needs assistance he is able to be with her only on Fridays as it is his only day off.     She did expressed frustration regarding having to visit an alternative location to have labs drawn. States [facility] was going to do it for her since they come 2-3 times a week. However her normal nurse has been unable to come and now they wont do it. Plans to go to [facility] in [place] tomorrow (6/21/23). States she has orders in hand.     Patient states her shortness of breath has improved as well as her yeast infection. Expresses she really enjoys being on the Diflucan. Says she only has shortness of breath now when she has high blood sugar and believes that is due to her being anxious about her glucose levels. States this morning it was 135 but at night its been uncontrolled. Last night she states it was 320. Does acknowledge she does not always adhere to her diets. Patient education provided and will send some educational materials to her. Patient is also questioning if the increase of Jardiance is working. Patient educated it may take several weeks for improvement from increased dose. Will await lab results as well.    States she is worried about her BP, Patient states its 110/60 most of the time. Does state the nurse from [facility] took it and it was 120/70, patient educated that BP is within normal limits and support given.     She is hoping to have R toe amputated as the wound is a chronic issue and does not seem to be improving. No date scheduled for that yet.     Denies any needs at this time.     Will touch base again next month.

## 2023-06-21 ENCOUNTER — LAB (OUTPATIENT)
Dept: LAB | Facility: LAB | Age: 78
End: 2023-06-21
Payer: MEDICARE

## 2023-06-21 DIAGNOSIS — Z00.00 ROUTINE ADULT HEALTH MAINTENANCE: ICD-10-CM

## 2023-06-21 DIAGNOSIS — E61.1 LOW IRON: ICD-10-CM

## 2023-06-21 DIAGNOSIS — K59.00 CONSTIPATION: ICD-10-CM

## 2023-06-21 DIAGNOSIS — E11.69 TYPE 2 DIABETES MELLITUS WITH OTHER SPECIFIED COMPLICATION, UNSPECIFIED WHETHER LONG TERM INSULIN USE (MULTI): ICD-10-CM

## 2023-06-21 DIAGNOSIS — E03.9 HYPOTHYROIDISM, UNSPECIFIED TYPE: ICD-10-CM

## 2023-06-21 DIAGNOSIS — D64.9 ANEMIA, UNSPECIFIED TYPE: ICD-10-CM

## 2023-06-21 DIAGNOSIS — R06.02 SOB (SHORTNESS OF BREATH): ICD-10-CM

## 2023-06-21 LAB
ALANINE AMINOTRANSFERASE (SGPT) (U/L) IN SER/PLAS: 18 U/L (ref 7–45)
ALBUMIN (G/DL) IN SER/PLAS: 4.2 G/DL (ref 3.4–5)
ALKALINE PHOSPHATASE (U/L) IN SER/PLAS: 68 U/L (ref 33–136)
ANION GAP IN SER/PLAS: 14 MMOL/L (ref 10–20)
ASPARTATE AMINOTRANSFERASE (SGOT) (U/L) IN SER/PLAS: 19 U/L (ref 9–39)
BASOPHILS (10*3/UL) IN BLOOD BY AUTOMATED COUNT: 0.08 X10E9/L (ref 0–0.1)
BASOPHILS/100 LEUKOCYTES IN BLOOD BY AUTOMATED COUNT: 0.8 % (ref 0–2)
BILIRUBIN TOTAL (MG/DL) IN SER/PLAS: 0.4 MG/DL (ref 0–1.2)
CALCIUM (MG/DL) IN SER/PLAS: 9.6 MG/DL (ref 8.6–10.3)
CARBON DIOXIDE, TOTAL (MMOL/L) IN SER/PLAS: 25 MMOL/L (ref 21–32)
CHLORIDE (MMOL/L) IN SER/PLAS: 99 MMOL/L (ref 98–107)
CHOLESTEROL (MG/DL) IN SER/PLAS: 163 MG/DL (ref 0–199)
CHOLESTEROL IN HDL (MG/DL) IN SER/PLAS: 47.6 MG/DL
CHOLESTEROL/HDL RATIO: 3.4
COBALAMIN (VITAMIN B12) (PG/ML) IN SER/PLAS: 246 PG/ML (ref 211–911)
CREATININE (MG/DL) IN SER/PLAS: 0.98 MG/DL (ref 0.5–1.05)
EOSINOPHILS (10*3/UL) IN BLOOD BY AUTOMATED COUNT: 0.21 X10E9/L (ref 0–0.4)
EOSINOPHILS/100 LEUKOCYTES IN BLOOD BY AUTOMATED COUNT: 2.2 % (ref 0–6)
ERYTHROCYTE DISTRIBUTION WIDTH (RATIO) BY AUTOMATED COUNT: 16.4 % (ref 11.5–14.5)
ERYTHROCYTE MEAN CORPUSCULAR HEMOGLOBIN CONCENTRATION (G/DL) BY AUTOMATED: 30.9 G/DL (ref 32–36)
ERYTHROCYTE MEAN CORPUSCULAR VOLUME (FL) BY AUTOMATED COUNT: 80 FL (ref 80–100)
ERYTHROCYTES (10*6/UL) IN BLOOD BY AUTOMATED COUNT: 4.82 X10E12/L (ref 4–5.2)
GFR FEMALE: 59 ML/MIN/1.73M2
GLUCOSE (MG/DL) IN SER/PLAS: 89 MG/DL (ref 74–99)
HEMATOCRIT (%) IN BLOOD BY AUTOMATED COUNT: 38.5 % (ref 36–46)
HEMOGLOBIN (G/DL) IN BLOOD: 11.9 G/DL (ref 12–16)
IMMATURE GRANULOCYTES/100 LEUKOCYTES IN BLOOD BY AUTOMATED COUNT: 0.4 % (ref 0–0.9)
IRON (UG/DL) IN SER/PLAS: 31 UG/DL (ref 35–150)
IRON BINDING CAPACITY (UG/DL) IN SER/PLAS: 419 UG/DL (ref 240–445)
IRON SATURATION (%) IN SER/PLAS: 7 % (ref 25–45)
LDL: 86 MG/DL (ref 0–99)
LEUKOCYTES (10*3/UL) IN BLOOD BY AUTOMATED COUNT: 9.6 X10E9/L (ref 4.4–11.3)
LYMPHOCYTES (10*3/UL) IN BLOOD BY AUTOMATED COUNT: 2.49 X10E9/L (ref 0.8–3)
LYMPHOCYTES/100 LEUKOCYTES IN BLOOD BY AUTOMATED COUNT: 26 % (ref 13–44)
MONOCYTES (10*3/UL) IN BLOOD BY AUTOMATED COUNT: 0.59 X10E9/L (ref 0.05–0.8)
MONOCYTES/100 LEUKOCYTES IN BLOOD BY AUTOMATED COUNT: 6.2 % (ref 2–10)
NATRIURETIC PEPTIDE B (PG/ML) IN SER/PLAS: 55 PG/ML (ref 0–99)
NEUTROPHILS (10*3/UL) IN BLOOD BY AUTOMATED COUNT: 6.17 X10E9/L (ref 1.6–5.5)
NEUTROPHILS/100 LEUKOCYTES IN BLOOD BY AUTOMATED COUNT: 64.4 % (ref 40–80)
PLATELETS (10*3/UL) IN BLOOD AUTOMATED COUNT: 354 X10E9/L (ref 150–450)
POTASSIUM (MMOL/L) IN SER/PLAS: 4.5 MMOL/L (ref 3.5–5.3)
PROTEIN TOTAL: 7.6 G/DL (ref 6.4–8.2)
SODIUM (MMOL/L) IN SER/PLAS: 133 MMOL/L (ref 136–145)
THYROTROPIN (MIU/L) IN SER/PLAS BY DETECTION LIMIT <= 0.05 MIU/L: 7.08 MIU/L (ref 0.44–3.98)
THYROXINE (T4) FREE (NG/DL) IN SER/PLAS: 1.07 NG/DL (ref 0.61–1.12)
TRIGLYCERIDE (MG/DL) IN SER/PLAS: 146 MG/DL (ref 0–149)
UREA NITROGEN (MG/DL) IN SER/PLAS: 23 MG/DL (ref 6–23)
VLDL: 29 MG/DL (ref 0–40)

## 2023-06-21 PROCEDURE — 83550 IRON BINDING TEST: CPT

## 2023-06-21 PROCEDURE — 85025 COMPLETE CBC W/AUTO DIFF WBC: CPT

## 2023-06-21 PROCEDURE — 83880 ASSAY OF NATRIURETIC PEPTIDE: CPT

## 2023-06-21 PROCEDURE — 36415 COLL VENOUS BLD VENIPUNCTURE: CPT

## 2023-06-21 PROCEDURE — 80053 COMPREHEN METABOLIC PANEL: CPT

## 2023-06-21 PROCEDURE — 80061 LIPID PANEL: CPT

## 2023-06-21 PROCEDURE — G0179 MD RECERTIFICATION HHA PT: HCPCS | Performed by: FAMILY MEDICINE

## 2023-06-21 PROCEDURE — 83540 ASSAY OF IRON: CPT

## 2023-06-21 PROCEDURE — 84439 ASSAY OF FREE THYROXINE: CPT

## 2023-06-21 PROCEDURE — 82607 VITAMIN B-12: CPT

## 2023-06-21 PROCEDURE — 84443 ASSAY THYROID STIM HORMONE: CPT

## 2023-06-21 PROCEDURE — 82728 ASSAY OF FERRITIN: CPT

## 2023-06-22 LAB — FERRITIN (UG/LL) IN SER/PLAS: 9 UG/L (ref 8–150)

## 2023-06-23 DIAGNOSIS — D64.9 ANEMIA, UNSPECIFIED TYPE: ICD-10-CM

## 2023-06-23 RX ORDER — LEVOTHYROXINE SODIUM 150 UG/1
TABLET ORAL
Qty: 102 TABLET | Refills: 1 | Status: CANCELLED
Start: 2023-06-23

## 2023-06-23 RX ORDER — FERROUS SULFATE 325(65) MG
65 TABLET ORAL 2 TIMES DAILY
Qty: 60 TABLET | Refills: 2 | Status: SHIPPED | OUTPATIENT
Start: 2023-06-23 | End: 2023-09-27

## 2023-06-23 RX ORDER — FERROUS SULFATE 325(65) MG
65 TABLET ORAL 2 TIMES DAILY
COMMUNITY
End: 2023-06-23 | Stop reason: SDUPTHER

## 2023-06-23 NOTE — TELEPHONE ENCOUNTER
Rx Refill Request Telephone Encounter    Name:  Diane Montemayor  : 1945     Medication Name:  Ferous sulfate 325 MG  Directions (Optional):   Take 1 tablet 2 times daily    Specific Pharmacy location:  [facility]    Date of last appointment:  23

## 2023-06-30 DIAGNOSIS — R06.02 SOB (SHORTNESS OF BREATH): ICD-10-CM

## 2023-06-30 LAB — SEDIMENTATION RATE, ERYTHROCYTE: 46 MM/H (ref 0–30)

## 2023-06-30 RX ORDER — ALBUTEROL SULFATE 90 UG/1
AEROSOL, METERED RESPIRATORY (INHALATION)
Qty: 20.1 G | Refills: 1 | Status: SHIPPED | OUTPATIENT
Start: 2023-06-30 | End: 2023-11-01

## 2023-07-03 ENCOUNTER — PATIENT OUTREACH (OUTPATIENT)
Dept: PRIMARY CARE | Facility: CLINIC | Age: 78
End: 2023-07-03
Payer: MEDICARE

## 2023-07-03 DIAGNOSIS — E11.621 DIABETIC ULCER OF TOE OF RIGHT FOOT ASSOCIATED WITH TYPE 2 DIABETES MELLITUS, UNSPECIFIED ULCER STAGE (MULTI): ICD-10-CM

## 2023-07-03 DIAGNOSIS — L97.519 DIABETIC ULCER OF TOE OF RIGHT FOOT ASSOCIATED WITH TYPE 2 DIABETES MELLITUS, UNSPECIFIED ULCER STAGE (MULTI): ICD-10-CM

## 2023-07-03 NOTE — PROGRESS NOTES
Discharge facility: [facility]  Discharge diagnosis: Diabetic foot wound  Admission date: 6/29/2023  Discharge date: 7/30/2023    PCP Appointment Date: 7/6/2023  Hospital Encounter and Summary: Linked   See Discharge assessment below for further details      Engagement  Call Start Time: 1053 (7/3/2023 10:53 AM)    Medications  Medications reviewed with patient/caregiver?: Yes (7/3/2023 10:53 AM)  Is the patient having any side effects they believe may be caused by any medication additions or changes?: No (7/3/2023 10:53 AM)  Does the patient have all medications ordered at discharge?: Not applicable (no new medications added) (7/3/2023 10:53 AM)  Care Management Interventions: Provided patient education (7/3/2023 10:53 AM)  Is the patient taking all medications as directed (includes completed medication regime)?: Yes (7/3/2023 10:53 AM)  Care Management Interventions: Provided patient education (7/3/2023 10:53 AM)    Appointments  Does the patient have a primary care provider?: Yes (7/3/2023 10:53 AM)  Care Management Interventions: Verified appointment date/time/provider; Educated patient on importance of making appointment (7/3/2023 10:53 AM)  Has the patient kept scheduled appointments due by today?: Yes (7/3/2023 10:53 AM)  Care Management Interventions: Advised patient to keep appointment; Educated on importance of keeping appointment (7/3/2023 10:53 AM)  Follow Up Tasks: Appointments (7/3/2023 10:53 AM)    Self Management  Has home health visited the patient within 72 hours of discharge?: Yes ([facility], coming today) (7/3/2023 10:53 AM)    Patient Teaching  Does the patient have access to their discharge instructions?: Yes (7/3/2023 10:53 AM)  Care Management Interventions: Reviewed instructions with patient; Educated on MyChart (7/3/2023 10:53 AM)  What is the patient's perception of their health status since discharge?: Improving (7/3/2023 10:53 AM)  Is the patient/caregiver able to teach back the  hierarchy of who to call/visit for symptoms/problems? PCP, Specialist, Home Health nurse, Urgent Care, ED, 911: Yes (7/3/2023 10:53 AM)    Wrap Up  Is the patient/caregiver familiar with Advance Care Planning?: Yes (7/3/2023 10:53 AM)  Would the patient like more information on Advance Care Planning?: No (7/3/2023 10:53 AM)  Wrap Up Additional Comments: Patient states she was admitted due to possible TIA or allergic reaction to Zosyn. Originally sent to ED from podiatry clinic for antibiotics due to believed infection of right toe. She said while recieveing the antibiotic she lost conciousness and they are unsure why. Patient states all scans came back clear. We revied discharge intrustions. PAtient has no anam medications Rx. Patient says while in hospital they held all her diabetic medications, however shes been back on them since discharge. Denies any symptoms other than fatigue. (7/3/2023 10:53 AM)  Call End Time: 1112 (7/3/2023 10:53 AM)

## 2023-07-05 ENCOUNTER — PATIENT OUTREACH (OUTPATIENT)
Dept: PRIMARY CARE | Facility: CLINIC | Age: 78
End: 2023-07-05
Payer: MEDICARE

## 2023-07-05 DIAGNOSIS — I10 PRIMARY HYPERTENSION: ICD-10-CM

## 2023-07-05 DIAGNOSIS — E11.69 TYPE 2 DIABETES MELLITUS WITH OTHER SPECIFIED COMPLICATION, UNSPECIFIED WHETHER LONG TERM INSULIN USE (MULTI): Primary | ICD-10-CM

## 2023-07-05 DIAGNOSIS — E11.69 TYPE 2 DIABETES MELLITUS WITH OTHER SPECIFIED COMPLICATION, UNSPECIFIED WHETHER LONG TERM INSULIN USE (MULTI): ICD-10-CM

## 2023-07-05 RX ORDER — LOSARTAN POTASSIUM 25 MG/1
25 TABLET ORAL 2 TIMES DAILY
Qty: 180 TABLET | Refills: 3 | Status: SHIPPED | OUTPATIENT
Start: 2023-07-05

## 2023-07-05 RX ORDER — GLIMEPIRIDE 4 MG/1
4 TABLET ORAL 2 TIMES DAILY
Qty: 180 TABLET | Refills: 3 | Status: SHIPPED | OUTPATIENT
Start: 2023-07-05

## 2023-07-05 NOTE — PROGRESS NOTES
Patient reached out regarding medication refills. Chart reviewed. Medication request complete. Call placed to patient to update that the refills have been ordered and she should be able to  in 24-48 hours. I reminded her of her appointment tomorrow, she said she'd be there. Denies further needs at this time.     While on phone we discussed how patient was doing otherwise. Patient states her blood sugar still seems to be all over the place. Says this morning it was in the 130's though. Patient says while in hospital they took her off her medications. However she is back on at this time. Education provided regarding diabetes and medication. After appointment tomorrow I will follow up with the patient later this month.

## 2023-07-06 ENCOUNTER — OFFICE VISIT (OUTPATIENT)
Dept: PRIMARY CARE | Facility: CLINIC | Age: 78
End: 2023-07-06
Payer: MEDICARE

## 2023-07-06 ENCOUNTER — APPOINTMENT (OUTPATIENT)
Dept: PRIMARY CARE | Facility: CLINIC | Age: 78
End: 2023-07-06
Payer: MEDICARE

## 2023-07-06 VITALS
HEIGHT: 65 IN | SYSTOLIC BLOOD PRESSURE: 112 MMHG | RESPIRATION RATE: 16 BRPM | HEART RATE: 84 BPM | DIASTOLIC BLOOD PRESSURE: 64 MMHG | OXYGEN SATURATION: 97 % | WEIGHT: 247.6 LBS | BODY MASS INDEX: 41.25 KG/M2 | TEMPERATURE: 96.7 F

## 2023-07-06 DIAGNOSIS — I63.9 CEREBROVASCULAR ACCIDENT (CVA), UNSPECIFIED MECHANISM (MULTI): ICD-10-CM

## 2023-07-06 DIAGNOSIS — R55 SYNCOPE, UNSPECIFIED SYNCOPE TYPE: ICD-10-CM

## 2023-07-06 DIAGNOSIS — E66.01 CLASS 3 SEVERE OBESITY DUE TO EXCESS CALORIES WITH SERIOUS COMORBIDITY AND BODY MASS INDEX (BMI) OF 40.0 TO 44.9 IN ADULT (MULTI): ICD-10-CM

## 2023-07-06 DIAGNOSIS — Z09 HOSPITAL DISCHARGE FOLLOW-UP: Primary | ICD-10-CM

## 2023-07-06 DIAGNOSIS — E11.621 DIABETIC ULCER OF TOE OF RIGHT FOOT ASSOCIATED WITH TYPE 2 DIABETES MELLITUS, UNSPECIFIED ULCER STAGE (MULTI): ICD-10-CM

## 2023-07-06 DIAGNOSIS — L97.519 DIABETIC ULCER OF TOE OF RIGHT FOOT ASSOCIATED WITH TYPE 2 DIABETES MELLITUS, UNSPECIFIED ULCER STAGE (MULTI): ICD-10-CM

## 2023-07-06 DIAGNOSIS — E53.8 VITAMIN B12 DEFICIENCY: ICD-10-CM

## 2023-07-06 PROCEDURE — 99214 OFFICE O/P EST MOD 30 MIN: CPT | Performed by: FAMILY MEDICINE

## 2023-07-06 PROCEDURE — 1159F MED LIST DOCD IN RCRD: CPT | Performed by: FAMILY MEDICINE

## 2023-07-06 PROCEDURE — 1126F AMNT PAIN NOTED NONE PRSNT: CPT | Performed by: FAMILY MEDICINE

## 2023-07-06 PROCEDURE — 96372 THER/PROPH/DIAG INJ SC/IM: CPT | Performed by: FAMILY MEDICINE

## 2023-07-06 PROCEDURE — 1036F TOBACCO NON-USER: CPT | Performed by: FAMILY MEDICINE

## 2023-07-06 RX ORDER — CYANOCOBALAMIN 1000 UG/ML
1000 INJECTION, SOLUTION INTRAMUSCULAR; SUBCUTANEOUS ONCE
Status: COMPLETED | OUTPATIENT
Start: 2023-07-06 | End: 2023-07-06

## 2023-07-06 RX ADMIN — CYANOCOBALAMIN 1000 MCG: 1000 INJECTION, SOLUTION INTRAMUSCULAR; SUBCUTANEOUS at 11:49

## 2023-07-06 ASSESSMENT — PATIENT HEALTH QUESTIONNAIRE - PHQ9
1. LITTLE INTEREST OR PLEASURE IN DOING THINGS: NOT AT ALL
2. FEELING DOWN, DEPRESSED OR HOPELESS: NOT AT ALL
SUM OF ALL RESPONSES TO PHQ9 QUESTIONS 1 AND 2: 0

## 2023-07-06 NOTE — PROGRESS NOTES
"Subjective   Patient ID: Diane Montemayor is a 78 y.o. female who presents for Hospital Follow-up.    [facility]    Hospital follow up.  KYLER complete.  Admitted to [facility].  Admission dates 6/29/23 to 6/30/23  Admitted for Type 2 diabetes with foot ulcer  Days since discharge 6 days.   Patient had XR chest, BW, XR foot, CT brain attack head,    Patient advised to follow up with PCP.  Patient was prescribed nothing  Pt states still not feeling well.    She was seen Dr. Mares and was sent to ER to be admitted.  She states she was given and IV but the needle was not put in completely.  She was also told she was dehydrated.  Admits she was drinking 6-7 Cokes daily.    No other concern  Review of systems  ; Patient seen today for exam denies any problems with headaches or vision, denies any shortness of breath chest pain nausea or vomiting, no black stool no blood in the stool no heartburn type symptoms denies any problems with constipation or diarrhea, and no dysuria-type symptoms    The patient's allergies medications were reviewed with them today    The patient's social family and surgical history or also reviewed here today, along with her past medical history.     Objective     Alert and active in  no acute distress  HEENT TMs clear oropharynx negative nares clear no drainage noted neck supple  With no adenopathy   Heart regular rate and rhythm without murmur and no carotid bruits  Lungs- clear to auscultation bilaterally, no wheeze or rhonchi noted  Thyroid -negative masses or nodularity  Abdomen- soft times four quadrants , bowel sounds positive no masses or organomegaly, negative tenderness guarding or rebound  Neurological exam unremarkable- DTRs in upper and lower extremities within normal limits.   skin -no lesions noted      /64 (BP Location: Right arm, Patient Position: Sitting, BP Cuff Size: Large adult)   Pulse 84   Temp 35.9 °C (96.7 °F) (Temporal)   Resp 16   Ht 1.651 m (5' 5\")   Wt 112 kg (247 lb 9.6 " oz)   SpO2 97%   BMI 41.20 kg/m²     Allergies   Allergen Reactions    Lisinopril Cough       Assessment/Plan   Problem List Items Addressed This Visit       Class 3 severe obesity due to excess calories with serious comorbidity and body mass index (BMI) of 40.0 to 44.9 in adult (CMS/[facility])    BMI 40.0-44.9, adult (CMS/[facility])     Other Visit Diagnoses       Hospital discharge follow-up    -  Primary    Syncope, unspecified syncope type        Cerebrovascular accident (CVA), unspecified mechanism (CMS/[facility])        Diabetic ulcer of toe of right foot associated with type 2 diabetes mellitus, unspecified ulcer stage (CMS/[facility])        Vitamin B12 deficiency        Relevant Medications    cyanocobalamin (Vitamin B-12) injection 1,000 mcg (Completed)          Discussed patient's BMI and to institute calorie reduction and increase exercise to decrease risk of diabetes and heart disease in the future.    Reviewed XR chest, BW, XR foot, CT brain attack head.    Recommend taking her Advil with food, such as dinner.  She should not take it right at bed.    Recommend Vitamin B12 sublingual tablets.    She should not take Amaryl if she has not eaten.    She will continue to follow up with Dr. Ashraf as scheduled.    Increase to full Aspirin.  Increase fluid intake.    B12 administered today.    If anything worsens or changes please call us at once, follow up in the office as planned.    Scribe Attestation  By signing my name below, I, Bonnie Diane MA, Scribe   attest that this documentation has been prepared under the direction and in the presence of Ramses Black DO.

## 2023-07-10 PROBLEM — E66.01 MORBIDLY OBESE (MULTI): Status: ACTIVE | Noted: 2023-07-10

## 2023-07-10 PROBLEM — E11.9 TYPE 2 DIABETES MELLITUS (MULTI): Status: ACTIVE | Noted: 2017-11-06

## 2023-07-12 DIAGNOSIS — E11.69 TYPE 2 DIABETES MELLITUS WITH OTHER SPECIFIED COMPLICATION, UNSPECIFIED WHETHER LONG TERM INSULIN USE (MULTI): Primary | ICD-10-CM

## 2023-07-12 DIAGNOSIS — Z86.73 HISTORY OF STROKE: ICD-10-CM

## 2023-07-13 ENCOUNTER — PATIENT OUTREACH (OUTPATIENT)
Dept: PRIMARY CARE | Facility: CLINIC | Age: 78
End: 2023-07-13
Payer: MEDICARE

## 2023-07-13 DIAGNOSIS — F32.A DEPRESSION, UNSPECIFIED DEPRESSION TYPE: ICD-10-CM

## 2023-07-13 DIAGNOSIS — E11.621 DIABETIC ULCER OF TOE OF RIGHT FOOT ASSOCIATED WITH TYPE 2 DIABETES MELLITUS, UNSPECIFIED ULCER STAGE (MULTI): ICD-10-CM

## 2023-07-13 DIAGNOSIS — I63.9 CEREBROVASCULAR ACCIDENT (CVA), UNSPECIFIED MECHANISM (MULTI): ICD-10-CM

## 2023-07-13 DIAGNOSIS — L97.519 DIABETIC ULCER OF TOE OF RIGHT FOOT ASSOCIATED WITH TYPE 2 DIABETES MELLITUS, UNSPECIFIED ULCER STAGE (MULTI): ICD-10-CM

## 2023-07-13 DIAGNOSIS — E11.69 TYPE 2 DIABETES MELLITUS WITH OTHER SPECIFIED COMPLICATION, UNSPECIFIED WHETHER LONG TERM INSULIN USE (MULTI): ICD-10-CM

## 2023-07-13 RX ORDER — BUPROPION HYDROCHLORIDE 150 MG/1
150 TABLET, EXTENDED RELEASE ORAL 2 TIMES DAILY
Qty: 60 TABLET | Refills: 1 | Status: CANCELLED | OUTPATIENT
Start: 2023-07-13 | End: 2023-09-11

## 2023-07-13 RX ORDER — DULOXETIN HYDROCHLORIDE 20 MG/1
20 CAPSULE, DELAYED RELEASE ORAL 2 TIMES DAILY
Qty: 60 CAPSULE | Refills: 5 | Status: CANCELLED | OUTPATIENT
Start: 2023-07-13 | End: 2024-01-09

## 2023-07-13 NOTE — PROGRESS NOTES
Following up with patient after PCP appointment on 7/6.     Patient expresses many concerns and is very anxious:  - change in medications and the appropriate way to take them, fears changing schedule and missing a medication.   - claims glucose levels are all over the place  - fears repeat cerebral vascular episode  - having a hard time coping with life changes over last few years  -increased insomnia    At the start of our conversation we touched base on her appointment on 7/6. Patient states Dr. Black and Dr. Ashraf believe her last ED visit resulted from a cerebral vascular event although the ED was unable to confirm from the studies they conducted. States she is supposed to have a coronary artery test complete. Patient educated on what to expect while the test is performed as she was concerned since she has claustrophobia. She states understanding regarding what to expect.     Discussed her toe that is supposed to be amputated. This is to be complete in the next few weeks by Dr. Rodrigues (podiatry). Looking at getting it complete on a Friday so her son can drive for her. Says that the toe was doing better for a bit but now its worsened and that wound care came to her house yesterday and states its gotten bigger. We discussed importance of foot care and patient declares she has learned not to cut her own toe nails and goes and sees Dr. Rodrigues.     Addressed her blood sugars and she still remains concerned that they are all over the place. She states that last night she was 140. Then woke up today and was 182. We discussed to importance of not checking the BS after eating. It is unclear is she is following this guidance and when I asked she just told me she had a late dinner and when asked how close to the meal it was she couldn't recall. Will continue to push Diabetes education. Thi discussion brought us on a whirlwind of other topics such as how Dr. Black recommended she take her Advil with food and  "increased the dose. Same with adding B12 sublingual and iron. She states\"I have a routine, I'm [age] years old and do forget things and I fear I will forget medications if I change things\". I have educated on importance of increase in Asprin, especially with a belived CVA. Also discussed importance of some medications being taken with food to not cause GI upset. Patient states understanding but is still having a hard time grasping conversation.     She then states her BP are also elevated as they took it yesterday while she had wound care. We discussed that she sees worried a lot and that stress and anxiety can lead to the BP being elevated and then that could also lead to other issues. She states that's why she is so worried. We discussed whether she is anxious a lot and she says yes. She states she used to be on a Zoloft for anxiety but experienced extrapyramidal effects. Thus she stopped taking it. Says she also tried Prozac and didn't like it. She is receptive to trying a new medication but has concerns if she's going to have side effects because she believes the reverse always happens with every medication she takes.     Diane also identifies that she has been having a hard time coping over the last few years as she lost her brother to COVID and was unable to say bye, lost two half sisters and her home and had to move in with her son all within a period of time. Support provided to patient. I discussed if she would be interested in speaking with someone such as our  team and she denied at this time. She wants to try the medication first. However we may try later if things do not improve.     All concerns and questions addressed. Denies anything further is needed at this time. Aware to call if she needs anything, otherwise I will call with updates.   "

## 2023-07-14 DIAGNOSIS — F32.A DEPRESSION, UNSPECIFIED DEPRESSION TYPE: Primary | ICD-10-CM

## 2023-07-14 NOTE — PROGRESS NOTES
Call placed to patient to notify about message I received from Dr. Black. Currently has nurse from [facility] there. Updated on Rx and if she wants to come in and speak with Dr. Black further we can set up an appointment. Denies need to speak in person at this time. Receptive to trying Viibryn. Denies further needs and understanding to call if anything is needed.

## 2023-07-17 RX ORDER — VILAZODONE HYDROCHLORIDE 10 MG/1
TABLET ORAL
Qty: 30 TABLET | Refills: 2 | Status: SHIPPED | OUTPATIENT
Start: 2023-07-17 | End: 2023-10-02 | Stop reason: DRUGHIGH

## 2023-07-19 ENCOUNTER — TELEPHONE (OUTPATIENT)
Dept: PRIMARY CARE | Facility: CLINIC | Age: 78
End: 2023-07-19
Payer: MEDICARE

## 2023-07-20 ENCOUNTER — TELEPHONE (OUTPATIENT)
Dept: PRIMARY CARE | Facility: CLINIC | Age: 78
End: 2023-07-20
Payer: MEDICARE

## 2023-07-20 NOTE — TELEPHONE ENCOUNTER
PRIOR AUTHORIZATION REQUEST     Diane E Sim  1945  [phone]      Medication Name: Vibryd 10 MG  QTY: 30 Refill: 2  Directions: 1 x daily with food     Insurance Co: Medicare A & B  Insurance ID: [id number]  Insurance PH: [phone]

## 2023-07-21 DIAGNOSIS — E03.9 HYPOTHYROIDISM, UNSPECIFIED TYPE: ICD-10-CM

## 2023-07-21 RX ORDER — LEVOTHYROXINE SODIUM 150 UG/1
150 TABLET ORAL DAILY
Qty: 90 TABLET | Refills: 0 | Status: SHIPPED | OUTPATIENT
Start: 2023-07-21 | End: 2023-10-02 | Stop reason: SDUPTHER

## 2023-07-24 ENCOUNTER — PATIENT OUTREACH (OUTPATIENT)
Dept: PRIMARY CARE | Facility: CLINIC | Age: 78
End: 2023-07-24
Payer: MEDICARE

## 2023-07-24 DIAGNOSIS — F32.A DEPRESSION, UNSPECIFIED DEPRESSION TYPE: ICD-10-CM

## 2023-07-24 NOTE — PROGRESS NOTES
Call received from patient regarding new Rx for Viibryd. Patient states frustration as she has not been able to  medication as they told her it is waiting on a prior authorization. Diane states she would just pick it up but its $200 out of pocket for 30 pills and cannot afford that. I notified Diane that we are aware of the hold up and are currently working on the prior authorization.     I spoke to our prior authorization person in office, Estefany. She told me she would be looking into it today.     Diane aware I will keep her updated with the process.     Denies further needs or issues at this time.

## 2023-07-24 NOTE — TELEPHONE ENCOUNTER
PRIOR AUTHORIZATION HAS BEEN COMPLETED VIA [employer] MED[employer] FOR VIIBRYD AND HAS BEEN APPROVED:    Approved: Today 07/24/20263  PA Case: [id number]  Status: Approved  Coverage Starts on: 1/1/2023   Coverage Ends on: 12/31/2023  Drug: Vilazodone HCl 10MG tablets  Form: [employer] Electronic PA Form

## 2023-07-25 ENCOUNTER — DOCUMENTATION (OUTPATIENT)
Dept: PRIMARY CARE | Facility: CLINIC | Age: 78
End: 2023-07-25
Payer: MEDICARE

## 2023-07-25 ENCOUNTER — TELEPHONE (OUTPATIENT)
Dept: PRIMARY CARE | Facility: CLINIC | Age: 78
End: 2023-07-25
Payer: MEDICARE

## 2023-07-25 DIAGNOSIS — F32.A DEPRESSION, UNSPECIFIED DEPRESSION TYPE: ICD-10-CM

## 2023-07-25 PROCEDURE — 99487 CPLX CHRNC CARE 1ST 60 MIN: CPT | Performed by: FAMILY MEDICINE

## 2023-07-25 NOTE — PROGRESS NOTES
"Patient notified medications ready for . Patient expresses fustration that this has taken so long to be filled as \"mental health is a real thing\". Support provided. Patient is sure to express gratitude for me following up with all care regarding med.     Patient has other questions not regarding current illness. Provided with information.    Patient denies further needs currently and states she will be picking med up today. She asked if she should wait to take it till Monday. Patient notified she should take it when she picks it up and to make sure she takes it around the same time daily with food. Patient states understanding.   "

## 2023-07-25 NOTE — PROGRESS NOTES
Patient notified medications ready for . Patient expresses fustration that this has taken so long to be filled

## 2023-07-26 ENCOUNTER — PATIENT OUTREACH (OUTPATIENT)
Dept: PHARMACY | Facility: HOSPITAL | Age: 78
End: 2023-07-26
Payer: MEDICARE

## 2023-07-26 NOTE — PROGRESS NOTES
Transitional Care Management: Pharmacy    Subjective   Diane Montemayor is a 78 y.o. female who was referred to Clinical Pharmacy Team to complete TCM medication reconciliation prior to office visit with Ramses Black DO. A comprehensive medication review was not completed, patient did not answer after 3 attempts. Patient has also been referred to [facility] in the past with unsuccessful outreach.    Admission Date: 6/29/2023  Discharge Date: 6/30/2023    Notable medication changes following discharge:  none    MEDICATION RECONCILIATION  Unable to complete    Allergies   Allergen Reactions    Lisinopril Cough       [id number] [place], [place] - [address] [place] [address] AT [facility] & [address] [address]  Phone: [phone] Fax: [phone]       HPI    Objective       Lab Results   Component Value Date    BILITOT 0.4 06/29/2023    CALCIUM CANCELED 07/01/2023    CO2 CANCELED 07/01/2023    CL CANCELED 07/01/2023    CREATININE CANCELED 07/01/2023    GLUCOSE CANCELED 07/01/2023    ALKPHOS 72 06/29/2023    K CANCELED 07/01/2023    PROT 7.7 06/29/2023    NA CANCELED 07/01/2023    AST 18 06/29/2023    ALT 17 06/29/2023    BUN CANCELED 07/01/2023    ANIONGAP CANCELED 07/01/2023    MG CANCELED 07/01/2023    PHOS 3.3 03/12/2020    ALBUMIN 4.4 06/29/2023     Lab Results   Component Value Date    TRIG 146 06/21/2023    CHOL 163 06/21/2023    HDL 47.6 06/21/2023     Lab Results   Component Value Date    HGBA1C 7.4 (A) 06/29/2023         Comments/Recommendations to PCP:  -Unable to complete CMR, patient did not answer or return phone calls after 3 attempts.  -Preformed chart review of patients medications and labs and have no recommendations at this time.    Rubi De LunaD  PGY-1 Resident    I reviewed the progress note and agree with the resident’s findings and plans as written. Case discussed with resident.    Ev Alvarado PharmD    Verbal consent to  manage patient's drug therapy was obtained from the patient and/or an individual authorized to act on behalf of a patient. They were informed they may decline to participate or withdraw from participation in pharmacy services at any time.

## 2023-08-15 ENCOUNTER — PATIENT OUTREACH (OUTPATIENT)
Dept: PRIMARY CARE | Facility: CLINIC | Age: 78
End: 2023-08-15
Payer: MEDICARE

## 2023-08-15 DIAGNOSIS — E11.621 DIABETIC ULCER OF TOE OF RIGHT FOOT ASSOCIATED WITH TYPE 2 DIABETES MELLITUS, UNSPECIFIED ULCER STAGE (MULTI): ICD-10-CM

## 2023-08-15 DIAGNOSIS — L97.519 DIABETIC ULCER OF TOE OF RIGHT FOOT ASSOCIATED WITH TYPE 2 DIABETES MELLITUS, UNSPECIFIED ULCER STAGE (MULTI): ICD-10-CM

## 2023-08-15 DIAGNOSIS — E03.9 HYPOTHYROIDISM, UNSPECIFIED TYPE: ICD-10-CM

## 2023-08-15 DIAGNOSIS — E11.69 TYPE 2 DIABETES MELLITUS WITH OTHER SPECIFIED COMPLICATION, UNSPECIFIED WHETHER LONG TERM INSULIN USE (MULTI): ICD-10-CM

## 2023-08-15 DIAGNOSIS — I25.10 CORONARY ARTERY DISEASE DUE TO CALCIFIED CORONARY LESION: ICD-10-CM

## 2023-08-15 DIAGNOSIS — I25.84 CORONARY ARTERY DISEASE DUE TO CALCIFIED CORONARY LESION: ICD-10-CM

## 2023-08-15 PROCEDURE — 99490 CHRNC CARE MGMT STAFF 1ST 20: CPT | Performed by: FAMILY MEDICINE

## 2023-08-15 NOTE — PROGRESS NOTES
Chart review complete.     Monthly outreach complete.    Patient main concerns at this time:  - has surgery for toe amputation on 8/18, wants to be sure Dr. Black sent over medical clearance    Diane, states everything is going well at this time. She states she just started taking the antidepressant that was ordered at our last outreach. Says since she has felt some nausea but denies it impending her day. She plans to continue to take. Educated on ways to assist with nausea and to notify me or the office if it persists. Dr. Black also stated when he ordered the Viibryd she could also schedule a f/u with him for 4-6 weeks if she'd like. We got her scheduled for 9/29.     Surgery for toe removal on Friday. States she's ready for it to be gone since she's delt with it for 7 years as of October. Denies need for transportation. Has son who will assist on day of surgery and appointments to office till she is able to drive again herself. She does mention concern for balance, educated on optional referrals if she would be interested/ would need it. She does request our office ensures [facility] has what they need in order to perfrom surgery Friday. She states they need camille from Dr. Black.    Denies further assistance or needs at this time.    I called and spoke to Sharron at [facility] regarding clearance. She transferred me to Yesika, who states they need an H&P from Dr. Black. I have faxed two different H&P's ([phone]). Requested if they needed something more to call office.

## 2023-08-17 ENCOUNTER — TELEPHONE (OUTPATIENT)
Dept: PRIMARY CARE | Facility: CLINIC | Age: 78
End: 2023-08-17
Payer: MEDICARE

## 2023-08-17 ENCOUNTER — PATIENT OUTREACH (OUTPATIENT)
Dept: PRIMARY CARE | Facility: CLINIC | Age: 78
End: 2023-08-17
Payer: MEDICARE

## 2023-08-17 DIAGNOSIS — E11.69 TYPE 2 DIABETES MELLITUS WITH OTHER SPECIFIED COMPLICATION, UNSPECIFIED WHETHER LONG TERM INSULIN USE (MULTI): ICD-10-CM

## 2023-08-17 DIAGNOSIS — E11.621 DIABETIC ULCER OF TOE OF RIGHT FOOT ASSOCIATED WITH TYPE 2 DIABETES MELLITUS, UNSPECIFIED ULCER STAGE (MULTI): ICD-10-CM

## 2023-08-17 DIAGNOSIS — L97.519 DIABETIC ULCER OF TOE OF RIGHT FOOT ASSOCIATED WITH TYPE 2 DIABETES MELLITUS, UNSPECIFIED ULCER STAGE (MULTI): ICD-10-CM

## 2023-08-17 NOTE — PROGRESS NOTES
Spoke with Senait, clinic manager at our location. Diane called clerical team and is speaking with Estefany. States they have updated patient.

## 2023-08-17 NOTE — PROGRESS NOTES
Call from Diane, states she's having issues. Supposed to have surgery tomorrow for toe amputation but the office called and says she hasn't had a face to face with Dr. Black in the last 30 days so the H&P's sent wont cover the medical clearance. Requesting the office have Dr. Black sign a note that states she is cleared for surgery by tomorrow, I am tasking to office staff.

## 2023-08-17 NOTE — TELEPHONE ENCOUNTER
Please see message sent by Tiki Stern on 08/17/2023.      Per protocol, patient must have a pre  op face to face with the physician 30 days or less prior to surgery. Patient will need to be seen in office.    This message has been forwarded to Clerical staff as well

## 2023-08-18 ENCOUNTER — HOSPITAL ENCOUNTER (OUTPATIENT)
Dept: DATA CONVERSION | Facility: HOSPITAL | Age: 78
End: 2023-08-18
Attending: PODIATRIST | Admitting: PODIATRIST
Payer: MEDICARE

## 2023-08-18 DIAGNOSIS — B35.1 TINEA UNGUIUM: ICD-10-CM

## 2023-08-18 DIAGNOSIS — L97.519 NON-PRESSURE CHRONIC ULCER OF OTHER PART OF RIGHT FOOT WITH UNSPECIFIED SEVERITY (MULTI): ICD-10-CM

## 2023-08-18 DIAGNOSIS — M20.21 HALLUX RIGIDUS, RIGHT FOOT: ICD-10-CM

## 2023-08-18 DIAGNOSIS — L03.031 CELLULITIS OF RIGHT TOE: ICD-10-CM

## 2023-08-20 LAB
GRAM STAIN: NORMAL
TISSUE/WOUND CULTURE/SMEAR: NORMAL

## 2023-08-28 LAB
COMPLETE PATHOLOGY REPORT: NORMAL
CONVERTED CLINICAL DIAGNOSIS-HISTORY: NORMAL
CONVERTED FINAL DIAGNOSIS: NORMAL
CONVERTED FINAL REPORT PDF LINK TO COPY AND PASTE: NORMAL
CONVERTED GROSS DESCRIPTION: NORMAL

## 2023-09-15 ENCOUNTER — PATIENT OUTREACH (OUTPATIENT)
Dept: PRIMARY CARE | Facility: CLINIC | Age: 78
End: 2023-09-15
Payer: MEDICARE

## 2023-09-15 DIAGNOSIS — F32.A DEPRESSION, UNSPECIFIED DEPRESSION TYPE: ICD-10-CM

## 2023-09-15 DIAGNOSIS — E11.69 TYPE 2 DIABETES MELLITUS WITH OTHER SPECIFIED COMPLICATION, UNSPECIFIED WHETHER LONG TERM INSULIN USE (MULTI): ICD-10-CM

## 2023-09-15 PROCEDURE — 99490 CHRNC CARE MGMT STAFF 1ST 20: CPT | Performed by: FAMILY MEDICINE

## 2023-09-15 NOTE — PROGRESS NOTES
Chart review complete.     Monthly outreach complete.    Patient denies any medical concerns at this time. She was able to get her toe amputation last month. States everything went well. After surgery she did develop a callus on her opposite foot (left) due to overcompensation. Says wound clinic has taken care of it and she is now discharged from physical therapy and the wound clinic. She is looking forward to having more free time now as it has been 6 years. States Dr. Rodrigues Rx's special shoes for her and recommended a over the counter cream to aid in preventing calluses. She cannot recall the name of the cream, she states shell call Dr. Rodrigues Monday to get the name.     Discussed mental health since starting Viibryn. She is unsure if it is working. Says she does feel better but doesn't know if that's because she's hopeful to have more free time now. We discussed importance of not stopping medication on her own and confirmed her appointment with Dr. Black on 9/29. She states she doesn't love to take medications so doesn't know if shell want to be on it long term. Discussed option of in practice  referral and she seems pretty receptive but wants to wait till she sees PCP.     Additionally, Diane wanted to inquire regarding our protocol in office for controlled substances. States she is upset at the office regarding something that happened with her son, Ramakrishna Montemayor. Support provided and addressed concerns. Asks I notify  regarding manner - message sent.    States understanding to call if any other needs arise. She is trying to become more active and we will address goals at next visit.

## 2023-09-16 DIAGNOSIS — K21.9 GASTROESOPHAGEAL REFLUX DISEASE WITHOUT ESOPHAGITIS: ICD-10-CM

## 2023-09-18 RX ORDER — OMEPRAZOLE 40 MG/1
CAPSULE, DELAYED RELEASE ORAL
Qty: 180 CAPSULE | Refills: 0 | Status: SHIPPED | OUTPATIENT
Start: 2023-09-18 | End: 2023-12-12 | Stop reason: SDUPTHER

## 2023-09-27 DIAGNOSIS — K59.00 CONSTIPATION, UNSPECIFIED CONSTIPATION TYPE: ICD-10-CM

## 2023-09-27 DIAGNOSIS — D64.9 ANEMIA, UNSPECIFIED TYPE: ICD-10-CM

## 2023-09-27 RX ORDER — DOCUSATE SODIUM 100 MG/1
CAPSULE, LIQUID FILLED ORAL
Qty: 60 CAPSULE | Refills: 3 | Status: SHIPPED | OUTPATIENT
Start: 2023-09-27 | End: 2024-04-29 | Stop reason: SDUPTHER

## 2023-09-27 RX ORDER — FERROUS SULFATE TAB 325 MG (65 MG ELEMENTAL FE) 325 (65 FE) MG
1 TAB ORAL 2 TIMES DAILY
Qty: 60 TABLET | Refills: 3 | Status: SHIPPED | OUTPATIENT
Start: 2023-09-27 | End: 2024-02-02

## 2023-09-29 ENCOUNTER — APPOINTMENT (OUTPATIENT)
Dept: PRIMARY CARE | Facility: CLINIC | Age: 78
End: 2023-09-29
Payer: MEDICARE

## 2023-09-29 VITALS
WEIGHT: 242.51 LBS | HEIGHT: 65 IN | DIASTOLIC BLOOD PRESSURE: 67 MMHG | HEART RATE: 70 BPM | BODY MASS INDEX: 40.4 KG/M2 | SYSTOLIC BLOOD PRESSURE: 150 MMHG | RESPIRATION RATE: 16 BRPM | TEMPERATURE: 98.2 F

## 2023-09-29 PROBLEM — R42 DIZZINESS: Status: ACTIVE | Noted: 2023-09-29

## 2023-09-29 PROBLEM — M54.50 LOW BACK PAIN: Status: ACTIVE | Noted: 2023-09-29

## 2023-09-29 PROBLEM — R53.81 MALAISE AND FATIGUE: Status: ACTIVE | Noted: 2023-09-29

## 2023-09-29 PROBLEM — F32.A DEPRESSION, UNSPECIFIED: Status: ACTIVE | Noted: 2021-06-01

## 2023-09-29 PROBLEM — R53.83 MALAISE AND FATIGUE: Status: ACTIVE | Noted: 2023-09-29

## 2023-09-29 PROBLEM — E78.00 HYPERCHOLESTEREMIA: Status: ACTIVE | Noted: 2023-09-29

## 2023-09-29 PROBLEM — R53.83 FATIGUE: Status: ACTIVE | Noted: 2023-09-29

## 2023-09-29 NOTE — PROGRESS NOTES
"Subjective   Patient ID: Diane Montemayor is a 78 y.o. female who presents for Depression and Anxiety.    HPI    Anxiety and depression follow up  Taking the Vilazodone  Working well   70 % effective   Denies any side effects   Last took last night  Pt want to discuss higher dose     Pt is also having severe issues with insomnia  Ongoing for over a month  Pt states when sleep she feels like she had awake   Pt states the feels like there a cloud over her.  Pt states feeling fatigue and tired all time.    Pt is still BW to do    Pt is getting a flu vaccine.    No other concern    Review of systems  ; Patient seen today for exam denies any problems with headaches or vision, denies any shortness of breath chest pain nausea or vomiting, no black stool no blood in the stool no heartburn type symptoms denies any problems with constipation or diarrhea, and no dysuria-type symptoms    The patient's allergies medications were reviewed with them today    The patient's social family and surgical history or also reviewed here today, along with her past medical history.     Objective     Alert and active in  no acute distress  HEENT TMs clear oropharynx negative nares clear no drainage noted neck supple  With no adenopathy   Heart regular rate and rhythm without murmur and no carotid bruits  Lungs- clear to auscultation bilaterally, no wheeze or rhonchi noted  Thyroid -negative masses or nodularity  Abdomen- soft times four quadrants , bowel sounds positive no masses or organomegaly, negative tenderness guarding or rebound  Neurological exam unremarkable- DTRs in upper and lower extremities within normal limits.   skin -no lesions noted      /68 (BP Location: Right arm, Patient Position: Sitting, BP Cuff Size: Large adult)   Pulse 67   Temp 36.4 °C (97.5 °F) (Temporal)   Resp 16   Ht 1.651 m (5' 5\")   Wt 111 kg (244 lb 1.6 oz)   SpO2 96%   BMI 40.62 kg/m²     Allergies   Allergen Reactions    Lisinopril Cough "       Assessment/Plan   Problem List Items Addressed This Visit       Hypothyroidism    Type 2 diabetes mellitus (CMS/[facility])    Depression, unspecified    Hyperlipidemia    Malaise and fatigue     Other Visit Diagnoses       Hypertension, unspecified type        Flu vaccine need              Discussed patient's BMI and to institute calorie reduction and increase exercise to decrease risk of diabetes and heart disease in the future.    Refill Jardiance, hydrochlorothiazide, Synthroid, Spironolactone.    Fluzone administered today.    Recommend she take Viibryd in the morning.  Increase Viibryd to 20 mg daily.    She was reminded to have BW performed.     Take Aspirin with food.  She can go back to 81 mg tablet.    She can take 2 Tylenol at bedtime.    B12 administered.     If anything worsens or changes please call us at once, follow up in the office as planned.    Scribe Attestation  By signing my name below, IBonnie MA, Scribe   attest that this documentation has been prepared under the direction and in the presence of Ramses Black DO.

## 2023-09-30 DIAGNOSIS — E11.69 TYPE 2 DIABETES MELLITUS WITH OTHER SPECIFIED COMPLICATION, WITHOUT LONG-TERM CURRENT USE OF INSULIN (MULTI): ICD-10-CM

## 2023-09-30 NOTE — H&P
History of Present Illness:   History Present Illness:  Reason for surgery: Nonhealing ulceration right hallux   HPI:    Patient is a 78 year old female with history of ulceration to right great toe for over 5 years without healing. Patient has tried numerous wound care treatments without  results and is requesting amputation of toe today for surgical offloading of wound.    Allergies:        Allergies:  ·  lisinopril : Itching, Cough  ·  Coreg : Unknown    Home Medication Review:   Home Medications Reviewed: yes     Impression/Procedure:   ·  Impression and Planned Procedure: Chronic non healing wound       ERAS (Enhanced Recovery After Surgery):  ·  ERAS Patient: no       Vital Signs:  Temperature C: 36.8 degrees C   Temperature F: 98.2 degrees F   Heart Rate: 70 beats per minute   Respiratory Rate: 16 breath per minute   Blood Pressure Systolic: 150 mm/Hg   Blood Pressure Diastolic: 67 mm/Hg     Physical Exam by System:    Constitutional: Well developed, awake/alert/oriented  x3, no distress, alert and cooperative   Respiratory/Thorax: normal rate   Cardiovascular: pedal pulses faintly palpable, recent  angio with enough flow to heal   Musculoskeletal: Hallux rigidus with severely plantarflexed  hallux   Extremities: minimal edema bilateral   Neurological: complete loss of protective sensation   Psychological: Appropriate mood and behavior   Skin: ulceration deep to fat layer and joint capsule  right hallux, mild surrounding erythema     Consent:   COVID-19 Consent:  ·  COVID-19 Risk Consent Surgeon has reviewed key risks related to the risk of ellen COVID-19 and if they contract COVID-19 what the risks are.       Electronic Signatures:  Anna Marie Rodrigues (CAMELIA)  (Signed 18-Aug-2023 11:48)   Entered: History of Present Illness, Allergies, Home  Medication Review, Impression/Procedure, ERAS, Physical Exam, Consent   Authored: History of Present Illness, Allergies, Home Medication Review,  Impression/Procedure, ERAS, Physical Exam, Consent, Note Completion      Last Updated: 18-Aug-2023 11:48 by Anna Marie Rodrigues (CAMELIA)

## 2023-10-01 DIAGNOSIS — E11.69 TYPE 2 DIABETES MELLITUS WITH OTHER SPECIFIED COMPLICATION, WITHOUT LONG-TERM CURRENT USE OF INSULIN (MULTI): ICD-10-CM

## 2023-10-01 NOTE — OP NOTE
Post Operative Note:     PreOp Diagnosis: Chronic ulceration/infection/Hallux  rigidus right   Post-Procedure Diagnosis: Chronic ulceration/infection/Hallux  rigidus right   Procedure: 1. Amputation right hallux At MPJ  2.   3.   4.   5.   Surgeon: Anna Marie Rodrigues DPM   Resident/Fellow/Other Assistant: KATY Lopez   Anesthesia: local only   Estimated Blood Loss (mL): 10mL   Specimen: yes   Complications: none   Findings: large ulceration deep to joint capsule   Patient Returned To/Condition: Pacu in satisfactory  condition   Tourniquet Times: 10 Min ankle tourniquet     Operative Report Dictated:  Dictation: not applicable - note contains Operative  Report   Operative Report:    Patient is a 78 year old diabetic female with 5 year history of right hallux ulceration. She has had chronic infections with multiple hospitalizations and has chosen  amputation at this time.  Discussed risks and complications in detail, SHe has been NPO since midnight consent signed and placed into chart.  Patient was taken to OR and placed into supine position. 8cc 1% lidocaine plain given in mesa block. Foot was  prepped and draped in the usual aseptic manner.  Attention was directed to the right hallux where incision was made deep to bone proximal to ulceration in fishmouth incision with #15 blade deep to bone, soft tissue dissection with mesa scissors to disarticulate  hallux at MPJ, soft bone noted and sent for culture.  All necrotic tissue debrided and flushed with copious amounts of sterile saline and vashe. Amputation site closed with 4-0 nylon. Dressed with, [id number]  kerlix ace. Patient tolerated the procedure  well and will continue oral antibiotics post operatively.        Electronic Signatures:  Anna Marie Rodrigues)  (Signed 18-Aug-2023 16:51)   Authored: Post Operative Note, Note Completion      Last Updated: 18-Aug-2023 16:51 by Anna Marie Rodrigues (CAMELIA)

## 2023-10-02 ENCOUNTER — OFFICE VISIT (OUTPATIENT)
Dept: PRIMARY CARE | Facility: CLINIC | Age: 78
End: 2023-10-02
Payer: MEDICARE

## 2023-10-02 VITALS
HEIGHT: 65 IN | WEIGHT: 244.1 LBS | DIASTOLIC BLOOD PRESSURE: 68 MMHG | SYSTOLIC BLOOD PRESSURE: 120 MMHG | BODY MASS INDEX: 40.67 KG/M2 | TEMPERATURE: 97.5 F | OXYGEN SATURATION: 96 % | RESPIRATION RATE: 16 BRPM | HEART RATE: 67 BPM

## 2023-10-02 DIAGNOSIS — R53.83 MALAISE AND FATIGUE: ICD-10-CM

## 2023-10-02 DIAGNOSIS — E11.69 TYPE 2 DIABETES MELLITUS WITH OTHER SPECIFIED COMPLICATION, UNSPECIFIED WHETHER LONG TERM INSULIN USE (MULTI): ICD-10-CM

## 2023-10-02 DIAGNOSIS — E03.9 HYPOTHYROIDISM, UNSPECIFIED TYPE: ICD-10-CM

## 2023-10-02 DIAGNOSIS — I10 HYPERTENSION, UNSPECIFIED TYPE: ICD-10-CM

## 2023-10-02 DIAGNOSIS — R53.81 MALAISE AND FATIGUE: ICD-10-CM

## 2023-10-02 DIAGNOSIS — G47.09 OTHER INSOMNIA: ICD-10-CM

## 2023-10-02 DIAGNOSIS — E53.8 VITAMIN B12 DEFICIENCY: ICD-10-CM

## 2023-10-02 DIAGNOSIS — E78.2 MIXED HYPERLIPIDEMIA: ICD-10-CM

## 2023-10-02 DIAGNOSIS — F32.A DEPRESSION, UNSPECIFIED DEPRESSION TYPE: ICD-10-CM

## 2023-10-02 DIAGNOSIS — Z23 FLU VACCINE NEED: ICD-10-CM

## 2023-10-02 PROCEDURE — 1126F AMNT PAIN NOTED NONE PRSNT: CPT | Performed by: FAMILY MEDICINE

## 2023-10-02 PROCEDURE — 99214 OFFICE O/P EST MOD 30 MIN: CPT | Performed by: FAMILY MEDICINE

## 2023-10-02 PROCEDURE — G0008 ADMIN INFLUENZA VIRUS VAC: HCPCS | Performed by: FAMILY MEDICINE

## 2023-10-02 PROCEDURE — 96372 THER/PROPH/DIAG INJ SC/IM: CPT | Performed by: FAMILY MEDICINE

## 2023-10-02 PROCEDURE — 1159F MED LIST DOCD IN RCRD: CPT | Performed by: FAMILY MEDICINE

## 2023-10-02 PROCEDURE — 90662 IIV NO PRSV INCREASED AG IM: CPT | Performed by: FAMILY MEDICINE

## 2023-10-02 PROCEDURE — 3074F SYST BP LT 130 MM HG: CPT | Performed by: FAMILY MEDICINE

## 2023-10-02 PROCEDURE — 3078F DIAST BP <80 MM HG: CPT | Performed by: FAMILY MEDICINE

## 2023-10-02 PROCEDURE — 1036F TOBACCO NON-USER: CPT | Performed by: FAMILY MEDICINE

## 2023-10-02 RX ORDER — HYDROXYZINE PAMOATE 25 MG/1
CAPSULE ORAL
Qty: 60 CAPSULE | Refills: 2 | Status: SHIPPED | OUTPATIENT
Start: 2023-10-02 | End: 2024-01-15

## 2023-10-02 RX ORDER — LEVOTHYROXINE SODIUM 150 UG/1
150 TABLET ORAL DAILY
Qty: 90 TABLET | Refills: 1 | Status: SHIPPED | OUTPATIENT
Start: 2023-10-02 | End: 2023-10-17 | Stop reason: DRUGHIGH

## 2023-10-02 RX ORDER — CYANOCOBALAMIN 1000 UG/ML
1000 INJECTION, SOLUTION INTRAMUSCULAR; SUBCUTANEOUS ONCE
Status: COMPLETED | OUTPATIENT
Start: 2023-10-02 | End: 2023-10-02

## 2023-10-02 RX ORDER — VILAZODONE HYDROCHLORIDE 20 MG/1
20 TABLET ORAL
Qty: 30 TABLET | Refills: 2 | Status: SHIPPED | OUTPATIENT
Start: 2023-10-02 | End: 2023-12-27

## 2023-10-02 RX ORDER — SPIRONOLACTONE 25 MG/1
25 TABLET ORAL DAILY
Qty: 90 TABLET | Refills: 1 | Status: SHIPPED | OUTPATIENT
Start: 2023-10-02 | End: 2024-03-26 | Stop reason: SDUPTHER

## 2023-10-02 RX ORDER — HYDROCHLOROTHIAZIDE 25 MG/1
25 TABLET ORAL DAILY
Qty: 90 TABLET | Refills: 1 | Status: SHIPPED | OUTPATIENT
Start: 2023-10-02 | End: 2024-03-28

## 2023-10-02 RX ORDER — METFORMIN HYDROCHLORIDE 500 MG/1
TABLET, EXTENDED RELEASE ORAL
Qty: 360 TABLET | Refills: 0 | OUTPATIENT
Start: 2023-10-02

## 2023-10-02 RX ORDER — METFORMIN HYDROCHLORIDE 500 MG/1
TABLET, EXTENDED RELEASE ORAL
Qty: 360 TABLET | Refills: 0 | Status: SHIPPED | OUTPATIENT
Start: 2023-10-02 | End: 2024-01-02 | Stop reason: SDUPTHER

## 2023-10-02 RX ADMIN — CYANOCOBALAMIN 1000 MCG: 1000 INJECTION, SOLUTION INTRAMUSCULAR; SUBCUTANEOUS at 12:05

## 2023-10-03 DIAGNOSIS — E78.5 HYPERLIPIDEMIA, UNSPECIFIED HYPERLIPIDEMIA TYPE: ICD-10-CM

## 2023-10-04 RX ORDER — ROSUVASTATIN CALCIUM 10 MG/1
TABLET, COATED ORAL
Qty: 90 TABLET | Refills: 1 | Status: SHIPPED | OUTPATIENT
Start: 2023-10-04 | End: 2024-04-02

## 2023-10-06 ENCOUNTER — LAB (OUTPATIENT)
Dept: LAB | Facility: LAB | Age: 78
End: 2023-10-06
Payer: MEDICARE

## 2023-10-06 DIAGNOSIS — I25.10 CORONARY ARTERY DISEASE DUE TO CALCIFIED CORONARY LESION: ICD-10-CM

## 2023-10-06 DIAGNOSIS — E11.69 TYPE 2 DIABETES MELLITUS WITH OTHER SPECIFIED COMPLICATION, UNSPECIFIED WHETHER LONG TERM INSULIN USE (MULTI): ICD-10-CM

## 2023-10-06 DIAGNOSIS — E03.9 HYPOTHYROIDISM, UNSPECIFIED TYPE: ICD-10-CM

## 2023-10-06 DIAGNOSIS — I25.84 CORONARY ARTERY DISEASE DUE TO CALCIFIED CORONARY LESION: ICD-10-CM

## 2023-10-06 LAB
EST. AVERAGE GLUCOSE BLD GHB EST-MCNC: 177 MG/DL
HBA1C MFR BLD: 7.8 %
HCV AB SER QL: NONREACTIVE
TSH SERPL-ACNC: 11.58 MIU/L (ref 0.44–3.98)

## 2023-10-06 PROCEDURE — 86803 HEPATITIS C AB TEST: CPT

## 2023-10-06 PROCEDURE — 83036 HEMOGLOBIN GLYCOSYLATED A1C: CPT

## 2023-10-06 PROCEDURE — 84443 ASSAY THYROID STIM HORMONE: CPT

## 2023-10-06 PROCEDURE — 36415 COLL VENOUS BLD VENIPUNCTURE: CPT

## 2023-10-10 ENCOUNTER — TELEPHONE (OUTPATIENT)
Dept: PRIMARY CARE | Facility: CLINIC | Age: 78
End: 2023-10-10
Payer: MEDICARE

## 2023-10-10 DIAGNOSIS — E03.9 HYPOTHYROIDISM, UNSPECIFIED TYPE: ICD-10-CM

## 2023-10-10 DIAGNOSIS — E11.69 TYPE 2 DIABETES MELLITUS WITH OTHER SPECIFIED COMPLICATION, UNSPECIFIED WHETHER LONG TERM INSULIN USE (MULTI): ICD-10-CM

## 2023-10-10 NOTE — TELEPHONE ENCOUNTER
Ramses Black, DO [id number] [id number] Clinical Support Staff  Her sugars are trending worse, which does not really surprising with her recent infections,, clarify her dose of levothyroxine she is low if she taking it every day on an empty stomach,, let me know how she is taking it and how many to be exact if she is changed anything

## 2023-10-10 NOTE — TELEPHONE ENCOUNTER
SHE IS AWARE YOU ARE NOT HERE TODAY PATIENT CALLED BACK.  SHE IS AWARE OF MESSAGE -  SHE TAKES THE THYROID MED 1 PILL EVERYDAY AND ON THE 7TH DAY SHE TAKES 2.  SHE ALSO HAS ALWAYS TAKEN SYNTHROID AND THE PAST TWO TIMES IT HAS BEEN LEVOTHYROXINE    SHE IS AWARE OF THE GLUCOSE BEING HIGH AND WONDERED IF SHE SHOULD DO ANYTHING DIFFERENT   PLEASE ADVISE

## 2023-10-12 NOTE — TELEPHONE ENCOUNTER
Pt calling, she wanted to make sure her thyroid medication didn't need to be changed?    She states that you last told her to take 1 tablet for 6 days and 2 tablets (300 mg) on the 7th day. She does take this on an empty stomach.     Pt was also asking if her thyroid levels could effect her A1C?    Please advise

## 2023-10-13 RX ORDER — LEVOTHYROXINE SODIUM 150 UG/1
TABLET ORAL
Qty: 102 TABLET | Refills: 1 | Status: CANCELLED
Start: 2023-10-13

## 2023-10-13 NOTE — TELEPHONE ENCOUNTER
I spoke with Mrs. Montemayor we will go ahead and have her increase her Synthroid to 300 mg on Tuesdays and Thursdays,, she is aware,,, lets fix her MAR,,, and then add a TSH T4 and hemoglobin A1c in 8 weeks or so thank you very much     Med fixed, set to No print.

## 2023-10-17 RX ORDER — LEVOTHYROXINE SODIUM 150 UG/1
TABLET ORAL
COMMUNITY
End: 2024-01-15 | Stop reason: SDUPTHER

## 2023-10-19 ENCOUNTER — OFFICE VISIT (OUTPATIENT)
Dept: WOUND CARE | Facility: CLINIC | Age: 78
End: 2023-10-19
Payer: MEDICARE

## 2023-10-19 PROCEDURE — 97597 DBRDMT OPN WND 1ST 20 CM/<: CPT | Mod: PO

## 2023-10-25 ENCOUNTER — PATIENT OUTREACH (OUTPATIENT)
Dept: PRIMARY CARE | Facility: CLINIC | Age: 78
End: 2023-10-25
Payer: MEDICARE

## 2023-10-25 DIAGNOSIS — E66.01 MORBID OBESITY (MULTI): ICD-10-CM

## 2023-10-25 DIAGNOSIS — E11.69 TYPE 2 DIABETES MELLITUS WITH OTHER SPECIFIED COMPLICATION, UNSPECIFIED WHETHER LONG TERM INSULIN USE (MULTI): ICD-10-CM

## 2023-10-25 DIAGNOSIS — D64.9 ANEMIA, UNSPECIFIED TYPE: ICD-10-CM

## 2023-10-25 DIAGNOSIS — I10 HYPERTENSION, UNSPECIFIED TYPE: ICD-10-CM

## 2023-10-25 DIAGNOSIS — E03.9 HYPOTHYROIDISM, UNSPECIFIED TYPE: ICD-10-CM

## 2023-10-25 NOTE — PROGRESS NOTES
Patient notified this nurse her youngest son just passed away and she is in [place] for the . Patient is not interested in discussing health or talking at this time. Will check up with patient next month.

## 2023-11-01 DIAGNOSIS — R06.02 SOB (SHORTNESS OF BREATH): ICD-10-CM

## 2023-11-01 RX ORDER — ALBUTEROL SULFATE 90 UG/1
AEROSOL, METERED RESPIRATORY (INHALATION)
Qty: 20.1 G | Refills: 3 | Status: SHIPPED | OUTPATIENT
Start: 2023-11-01 | End: 2023-11-29 | Stop reason: SDUPTHER

## 2023-11-29 DIAGNOSIS — R06.02 SOB (SHORTNESS OF BREATH): ICD-10-CM

## 2023-11-29 RX ORDER — ALBUTEROL SULFATE 90 UG/1
AEROSOL, METERED RESPIRATORY (INHALATION)
Qty: 54 G | Refills: 1 | Status: SHIPPED
Start: 2023-11-29 | End: 2024-01-04 | Stop reason: SDUPTHER

## 2023-11-29 NOTE — TELEPHONE ENCOUNTER
Rx Refill Request Telephone Encounter    Name:  Diane Montemayor  : 1945     Medication Name:  ALBUTEROL   Dose (Optional):    90  Quantity (Optional):      Directions (Optional):   INHALE 2 PUFFS BY MOUTH EVERY 4 HRS DIRECTED IF NEEDED FOR COUGH OR WHEEZING     ALLERGIES:   ON FILE     Specific Pharmacy location: [facility]    Date of last appointment:  10/2/23  Date of next appointment:      Best number to reach patient:  [phone]

## 2023-12-06 ENCOUNTER — PATIENT OUTREACH (OUTPATIENT)
Dept: PRIMARY CARE | Facility: CLINIC | Age: 78
End: 2023-12-06
Payer: MEDICARE

## 2023-12-06 ENCOUNTER — TELEPHONE (OUTPATIENT)
Dept: PRIMARY CARE | Facility: CLINIC | Age: 78
End: 2023-12-06
Payer: MEDICARE

## 2023-12-06 DIAGNOSIS — E11.69 TYPE 2 DIABETES MELLITUS WITH OTHER SPECIFIED COMPLICATION, UNSPECIFIED WHETHER LONG TERM INSULIN USE (MULTI): ICD-10-CM

## 2023-12-06 DIAGNOSIS — Z20.822 EXPOSURE TO COVID-19 VIRUS: ICD-10-CM

## 2023-12-06 DIAGNOSIS — F32.A DEPRESSION, UNSPECIFIED DEPRESSION TYPE: ICD-10-CM

## 2023-12-06 DIAGNOSIS — L97.519 DIABETIC ULCER OF TOE OF RIGHT FOOT ASSOCIATED WITH TYPE 2 DIABETES MELLITUS, UNSPECIFIED ULCER STAGE (MULTI): ICD-10-CM

## 2023-12-06 DIAGNOSIS — E11.621 DIABETIC ULCER OF TOE OF RIGHT FOOT ASSOCIATED WITH TYPE 2 DIABETES MELLITUS, UNSPECIFIED ULCER STAGE (MULTI): ICD-10-CM

## 2023-12-06 PROCEDURE — 99490 CHRNC CARE MGMT STAFF 1ST 20: CPT | Performed by: FAMILY MEDICINE

## 2023-12-06 RX ORDER — DOXYCYCLINE 100 MG/1
100 CAPSULE ORAL 2 TIMES DAILY
Qty: 28 CAPSULE | Refills: 0 | Status: SHIPPED | OUTPATIENT
Start: 2023-12-06 | End: 2023-12-20

## 2023-12-06 ASSESSMENT — SOCIAL DETERMINANTS OF HEALTH (SDOH)
HOW OFTEN DO YOU GET TOGETHER WITH FRIENDS OR RELATIVES?: MORE THAN THREE TIMES A WEEK
IN A TYPICAL WEEK, HOW MANY TIMES DO YOU TALK ON THE PHONE WITH FAMILY, FRIENDS, OR NEIGHBORS?: MORE THAN THREE TIMES A WEEK
DO YOU BELONG TO ANY CLUBS OR ORGANIZATIONS SUCH AS CHURCH GROUPS UNIONS, FRATERNAL OR ATHLETIC GROUPS, OR SCHOOL GROUPS?: NO

## 2023-12-06 NOTE — PROGRESS NOTES
"Chart review complete.     Monthly outreach complete.    Patient main concerns at this time:  -foot wound: new wound to right 2nd toe. Patient states she has been checking feet flavio, but with loss of son she up and left without all her supplies and believes the walking in [place] caused friction to her foot resulting in wound. She has tried to contact her Podiatrist but its been unsuccessful. Request order for doxycycline - PCP ordered. Patient does acknowledge her blood glucose levels have been high. Provided education, if cont discussed additional support from pharmacy and dietician. Patient is receptive but not at this time because she doesn't want to talk to many people right now.   - COVID exposure: Over the holiday she was surrounded by 4-5 family members who have tested + for COVID at this time she denies any symptoms.   - grief reaction: patient lost her son last month suddenly from a heart attack. Patient states \"I can't come to \". Support provided. Notified patient of  services within practice for additional support - denies. At this time she just wants to grieve on her own terms but states she will let the office know if she wishes to in future. States she has a good support system as she lives with her son and her daughter visits frequently. Her brother and sister also call her daily.      Patient denies further needs or concerns at this time. States understanding to call if any needs arise.  "

## 2023-12-07 DIAGNOSIS — R06.02 SOB (SHORTNESS OF BREATH): ICD-10-CM

## 2023-12-07 RX ORDER — IPRATROPIUM BROMIDE 0.5 MG/2.5ML
SOLUTION RESPIRATORY (INHALATION)
Qty: 75 ML | Refills: 1 | Status: SHIPPED
Start: 2023-12-07 | End: 2023-12-20 | Stop reason: SDUPTHER

## 2023-12-07 RX ORDER — IPRATROPIUM BROMIDE 0.5 MG/2.5ML
SOLUTION RESPIRATORY (INHALATION)
COMMUNITY
Start: 2023-06-23 | End: 2023-12-07 | Stop reason: SDUPTHER

## 2023-12-07 NOTE — TELEPHONE ENCOUNTER
Rx Refill Request Telephone Encounter    Name:  Diane Montemayor  : 1945     Medication Name:  Nebulizer solution    Medication Name:  Nebulizer supplies    Specific Pharmacy location:  [facility]    Date of last appointment:  10/2/23

## 2023-12-12 DIAGNOSIS — K21.9 GASTROESOPHAGEAL REFLUX DISEASE WITHOUT ESOPHAGITIS: ICD-10-CM

## 2023-12-12 NOTE — TELEPHONE ENCOUNTER
Rx Refill Request Telephone Encounter    Name:  Diane Montemayor  : 1945     Medication Name:  omeprazole   Dose (Optional):    40 mg   Quantity (Optional):    180 capsules   Directions (Optional):   take 1 capsule by mouth twice daily do not crush or chew     ALLERGIES:   lisinopril    Specific Pharmacy location:  [facility]     Date of last appointment:  10/02/23  Date of next appointment:  not scheduled     Best number to reach patient:  [phone]

## 2023-12-13 RX ORDER — OMEPRAZOLE 40 MG/1
CAPSULE, DELAYED RELEASE ORAL
Qty: 90 CAPSULE | Refills: 1 | Status: SHIPPED
Start: 2023-12-13 | End: 2023-12-14 | Stop reason: SDUPTHER

## 2023-12-14 ENCOUNTER — DOCUMENTATION (OUTPATIENT)
Dept: PRIMARY CARE | Facility: CLINIC | Age: 78
End: 2023-12-14
Payer: MEDICARE

## 2023-12-14 DIAGNOSIS — K21.9 GASTROESOPHAGEAL REFLUX DISEASE WITHOUT ESOPHAGITIS: ICD-10-CM

## 2023-12-14 DIAGNOSIS — E11.69 TYPE 2 DIABETES MELLITUS WITH OTHER SPECIFIED COMPLICATION, UNSPECIFIED WHETHER LONG TERM INSULIN USE (MULTI): ICD-10-CM

## 2023-12-14 RX ORDER — OMEPRAZOLE 40 MG/1
CAPSULE, DELAYED RELEASE ORAL
Qty: 90 CAPSULE | Refills: 2 | Status: SHIPPED | OUTPATIENT
Start: 2023-12-14 | End: 2023-12-14 | Stop reason: SDUPTHER

## 2023-12-14 RX ORDER — OMEPRAZOLE 40 MG/1
CAPSULE, DELAYED RELEASE ORAL
Qty: 180 CAPSULE | OUTPATIENT
Start: 2023-12-14

## 2023-12-14 RX ORDER — OMEPRAZOLE 40 MG/1
CAPSULE, DELAYED RELEASE ORAL
Qty: 90 CAPSULE | Refills: 2 | Status: SHIPPED | OUTPATIENT
Start: 2023-12-14 | End: 2024-03-13

## 2023-12-14 NOTE — PROGRESS NOTES
Call from patient, need order of omeprazole 40 mg.     Chart reviewed, order was placed to [facility] on 12/13.     Call to [facility] - they only do nebulization treatment fulfillment.     Order pended to PCP for [facility].    Call placed to patient, left VM with update - requested call back if any other issue arise.    Patient called back - update provided, states understanding.

## 2023-12-20 DIAGNOSIS — R06.02 SOB (SHORTNESS OF BREATH): ICD-10-CM

## 2023-12-20 RX ORDER — IPRATROPIUM BROMIDE 0.5 MG/2.5ML
SOLUTION RESPIRATORY (INHALATION)
Qty: 75 ML | Refills: 1 | Status: SHIPPED | OUTPATIENT
Start: 2023-12-20

## 2023-12-20 NOTE — TELEPHONE ENCOUNTER
Tyra from [facility] is calling because this patient is requesting nebulizer supplies and solution to be sent  PRINT BOTH    Rx Refill Request Telephone Encounter    Name:  Diane Montemayor  : 1945     Medication Name:  Nebulizer kit  Dose (Optional):      Quantity (Optional):    50 each  Directions (Optional):   Use 4 times daily    Medication Name:  Ipratropium   Dose (Optional):   0.02% nebulizer solution   Quantity (Optional):      Directions (Optional) :Use 1 vial via nebulizer four times daily    ALLERGIES:   see list     Specific Pharmacy location:  [facility] (PRINT FAX TO [facility] [phone])    Date of last appointment:  10/2/23  Date of next appointment:  none    Best number to reach patient:  Alisia (Tyra) # [phone] ext. [phone]

## 2023-12-27 DIAGNOSIS — F32.A DEPRESSION, UNSPECIFIED DEPRESSION TYPE: ICD-10-CM

## 2023-12-27 RX ORDER — VILAZODONE HYDROCHLORIDE 20 MG/1
TABLET ORAL
Qty: 90 TABLET | Refills: 0 | Status: SHIPPED | OUTPATIENT
Start: 2023-12-27 | End: 2024-02-07 | Stop reason: WASHOUT

## 2023-12-29 DIAGNOSIS — E11.69 TYPE 2 DIABETES MELLITUS WITH OTHER SPECIFIED COMPLICATION, WITHOUT LONG-TERM CURRENT USE OF INSULIN (MULTI): ICD-10-CM

## 2024-01-02 ENCOUNTER — PATIENT OUTREACH (OUTPATIENT)
Dept: PRIMARY CARE | Facility: CLINIC | Age: 79
End: 2024-01-02
Payer: MEDICARE

## 2024-01-02 DIAGNOSIS — E11.69 TYPE 2 DIABETES MELLITUS WITH OTHER SPECIFIED COMPLICATION, WITHOUT LONG-TERM CURRENT USE OF INSULIN (MULTI): ICD-10-CM

## 2024-01-02 DIAGNOSIS — E11.621 DIABETIC ULCER OF TOE OF RIGHT FOOT ASSOCIATED WITH TYPE 2 DIABETES MELLITUS, UNSPECIFIED ULCER STAGE (MULTI): ICD-10-CM

## 2024-01-02 DIAGNOSIS — E11.51 TYPE II DIABETES MELLITUS WITH PERIPHERAL CIRCULATORY DISORDER (MULTI): ICD-10-CM

## 2024-01-02 DIAGNOSIS — L97.519 DIABETIC ULCER OF TOE OF RIGHT FOOT ASSOCIATED WITH TYPE 2 DIABETES MELLITUS, UNSPECIFIED ULCER STAGE (MULTI): ICD-10-CM

## 2024-01-02 PROCEDURE — 99490 CHRNC CARE MGMT STAFF 1ST 20: CPT | Performed by: FAMILY MEDICINE

## 2024-01-02 RX ORDER — METFORMIN HYDROCHLORIDE 500 MG/1
TABLET, EXTENDED RELEASE ORAL
Qty: 360 TABLET | Refills: 0 | Status: SHIPPED | OUTPATIENT
Start: 2024-01-02 | End: 2024-04-02 | Stop reason: SDUPTHER

## 2024-01-02 NOTE — PROGRESS NOTES
Chart review complete.     Monthly outreach complete.    Patient main concerns at this time:  - Medication Refill: Patient call, stated she requested refill of metformin and [facility] still doesn't have ready. Calling to ask for it to be sent over. Notified medication was pended as Dr. Black is out of office till Wednesday. Requesting refill to be complete by another provider as she is going to be out tomorrow. Pended to another provider in office today - order fulfilled.   - right 2nd toe wound: patient reports wound is better since dose of doxycycline. However, patient states she remains unable to get an appointment with Dr. Mares and that her office is giving her a hard time getting an appointment. Patient notified she can go to wound clinic through  - denies referral at this time. States understanding to call if she remains unsuccessful with Dr. Mares's office.  - COVID exposure: Patient previously states exposure to COVID. States she's tested and was negative. Patient states she previously did experience a cough, sinus drainage with sputum production. However, now states she is feeling better and sputum is clear.   - grief reaction: remains unchanged. Patient not currently interested in  services.     Patient denies further needs or concerns at this time. States understanding to call if any needs arise.

## 2024-01-03 RX ORDER — METFORMIN HYDROCHLORIDE 500 MG/1
TABLET, EXTENDED RELEASE ORAL
Qty: 360 TABLET | Refills: 0 | OUTPATIENT
Start: 2024-01-03

## 2024-01-04 DIAGNOSIS — R06.02 SOB (SHORTNESS OF BREATH): ICD-10-CM

## 2024-01-04 RX ORDER — ALBUTEROL SULFATE 90 UG/1
AEROSOL, METERED RESPIRATORY (INHALATION)
Qty: 54 G | Refills: 1 | Status: SHIPPED | OUTPATIENT
Start: 2024-01-04 | End: 2024-04-05

## 2024-01-04 NOTE — TELEPHONE ENCOUNTER
Rx Refill Request Telephone Encounter    Name:  Diane Montemayor  : 1945     Medication Name:  ALBUTEROL INHALER  Dose (Optional):    90 MCG  Quantity (Optional):    54 G  Directions (Optional):   INHALE 2 PUFFS EVERY 4 HOURS    ALLERGIES:   SEE LIST    Specific Pharmacy location:  [facility]    Date of last appointment:  10/02/2023  Date of next appointment:  NONE    Best number to reach patient:  [phone]

## 2024-01-11 ENCOUNTER — OFFICE VISIT (OUTPATIENT)
Dept: WOUND CARE | Facility: CLINIC | Age: 79
End: 2024-01-11
Payer: MEDICARE

## 2024-01-11 ENCOUNTER — LAB REQUISITION (OUTPATIENT)
Dept: LAB | Facility: HOSPITAL | Age: 79
End: 2024-01-11
Payer: MEDICARE

## 2024-01-11 DIAGNOSIS — L97.514 NON-PRESSURE CHRONIC ULCER OF OTHER PART OF RIGHT FOOT WITH NECROSIS OF BONE (MULTI): ICD-10-CM

## 2024-01-11 DIAGNOSIS — E11.621 TYPE 2 DIABETES MELLITUS WITH FOOT ULCER (CODE) (MULTI): ICD-10-CM

## 2024-01-11 PROCEDURE — 99213 OFFICE O/P EST LOW 20 MIN: CPT | Mod: 25

## 2024-01-11 PROCEDURE — 11044 DBRDMT BONE 1ST 20 SQ CM/<: CPT

## 2024-01-11 PROCEDURE — 87186 SC STD MICRODIL/AGAR DIL: CPT | Mod: OUT,ELYLAB | Performed by: PODIATRIST

## 2024-01-12 ENCOUNTER — APPOINTMENT (OUTPATIENT)
Dept: PRIMARY CARE | Facility: CLINIC | Age: 79
End: 2024-01-12
Payer: MEDICARE

## 2024-01-12 NOTE — PROGRESS NOTES
Subjective   Patient ID: Diane Montemayor is a 78 y.o. female who presents for No chief complaint on file..  HPI  Patient presents today for a Medicare AWV, and follow-up on Diabetes, and Hyperlipidemia. *** follow a low sugar, and low fat diet. *** check her sugar at home.     Thyroid tests, and A1C ordered in October. Still needs to be done.    Saw wound care yesterday for a diabetic ulcer between her right toes.      Has no other new problem /question.    Review of systems  ; Patient seen today for exam denies any problems with headaches or vision, denies any shortness of breath chest pain nausea or vomiting, no black stool no blood in the stool no heartburn type symptoms denies any problems with constipation or diarrhea, and no dysuria-type symptoms    The patient's allergies medications were reviewed with them today    The patient's social family and surgical history or also reviewed here today, along with her past medical history.     Objective     Alert and active in  no acute distress  HEENT TMs clear oropharynx negative nares clear no drainage noted neck supple  With no adenopathy   Heart regular rate and rhythm without murmur and no carotid bruits  Lungs- clear to auscultation bilaterally, no wheeze or rhonchi noted  Thyroid -negative masses or nodularity  Abdomen- soft times four quadrants , bowel sounds positive no masses or organomegaly, negative tenderness guarding or rebound  Neurological exam unremarkable- DTRs in upper and lower extremities within normal limits.   skin -no lesions noted      There were no vitals taken for this visit.    Allergies   Allergen Reactions    Lisinopril Cough       Assessment/Plan   Problem List Items Addressed This Visit    None      Medicare wellness questionnaire reviewed in detail.   No problems with activities of daily living. Home safety issues reviewed.   Reminded to have an updated will if needed.    If anything worsens or changes please call us at once, follow up in  the office as planned,

## 2024-01-13 DIAGNOSIS — G47.09 OTHER INSOMNIA: ICD-10-CM

## 2024-01-14 LAB
BACTERIA SPEC CULT: ABNORMAL
GRAM STN SPEC: ABNORMAL
GRAM STN SPEC: ABNORMAL

## 2024-01-15 DIAGNOSIS — E03.9 HYPOTHYROIDISM, UNSPECIFIED TYPE: ICD-10-CM

## 2024-01-15 RX ORDER — LEVOTHYROXINE SODIUM 150 UG/1
TABLET ORAL
Qty: 114 TABLET | Refills: 0 | Status: SHIPPED | OUTPATIENT
Start: 2024-01-15 | End: 2024-05-04 | Stop reason: SDUPTHER

## 2024-01-15 RX ORDER — HYDROXYZINE PAMOATE 25 MG/1
CAPSULE ORAL
Qty: 60 CAPSULE | Refills: 2 | Status: SHIPPED | OUTPATIENT
Start: 2024-01-15 | End: 2024-02-07 | Stop reason: ALTCHOICE

## 2024-01-15 RX ORDER — LEVOTHYROXINE SODIUM 150 UG/1
TABLET ORAL
Qty: 116 TABLET | OUTPATIENT
Start: 2024-01-15

## 2024-01-15 NOTE — TELEPHONE ENCOUNTER
Rx Refill Request Telephone Encounter    Name:  Diane Montemayor  : 1945     Medication Name:  LEVOTHYROXINE  Dose (Optional):    150 MCG  Quantity (Optional):      Directions (Optional):   1 TABLET DAILY, EXCEPT ON TUESDAY & THURSDAY TAKE 2 TABLETS    ALLERGIES:   ON FILE    Specific Pharmacy location:  [facility]    Date of last appointment:  10/2/23  Date of next appointment:  24    Best number to reach patient:

## 2024-01-18 ENCOUNTER — APPOINTMENT (OUTPATIENT)
Dept: WOUND CARE | Facility: CLINIC | Age: 79
End: 2024-01-18
Payer: MEDICARE

## 2024-01-19 NOTE — PROGRESS NOTES
"Subjective   Patient ID: Diane Montemayor is a 78 y.o. female who presents for Medicare Annual Wellness Visit Subsequent, Hypothyroidism, Hypertension, Hyperlipidemia, and Diabetes.    HPI    Pt is being seen for AWV    DM  Does check glucose at home   Today glucose was 183  Tries to Eats a generally healthy diet  active  Currently taking Jardiance, Glimepiride,Metformin  Last A1c on 10/6/23@ 7.8%  Last eye exam over 1 year  Does see a Podiatrist Dr Carrasquillo     Hypothyroidism  Taking Synthroid 150 mcg  Taking on an empty stomach  No significant weight loss/gain  No heat/cold intolerances  No increase in hair loss/dry skin    HTN and HLD follow up  Denies chest pain,SOB, swelling, headaches, lightheadedness or dizziness.   Does check BP at home.   Currently taking dilTIAZem CD,hydroCHLOROthiazide, losartan ,spironolactone, rosuvastatin      Pt is also having possible ear infection in right ear keeps popping   Ongoing for 1 week     She states her son passed away in October.  She states she has been depressed.  She has been \"binge eating.\"    No other concern / question     Advanced Care Planning  Diane Montemayor and I discussed their advance care plan preferences such as living will, and durable health care power of , which include: yes Attempt Resuscitation, no Intubate & Ventilate, no Comfort Care or Life- Sustaning Care. I reminded patient to talk with their health care agent, son Cali Montemayor, about their health care goals. Note reflects patient's free will and care goals as expressed to me.      Review of systems  ; Patient seen today for exam denies any problems with headaches or vision, denies any shortness of breath chest pain nausea or vomiting, no black stool no blood in the stool no heartburn type symptoms denies any problems with constipation or diarrhea, and no dysuria-type symptoms    The patient's allergies medications were reviewed with them today    The patient's social family and surgical history or " "also reviewed here today, along with her past medical history.     Objective     Alert and active in  no acute distress  HEENT TMs clear oropharynx negative nares clear no drainage noted neck supple  With no adenopathy   Heart regular rate and rhythm without murmur and no carotid bruits  Lungs- clear to auscultation bilaterally, no wheeze or rhonchi noted  Thyroid -negative masses or nodularity  Abdomen- soft times four quadrants , bowel sounds positive no masses or organomegaly, negative tenderness guarding or rebound  Neurological exam unremarkable- DTRs in upper and lower extremities within normal limits.   skin -no lesions noted    Extremities with no edema is      /54 (BP Location: Left arm, Patient Position: Sitting, BP Cuff Size: Large adult)   Pulse 72   Temp 36 °C (96.8 °F) (Temporal)   Resp 16   Ht 1.651 m (5' 5\")   Wt 112 kg (246 lb)   SpO2 97%   BMI 40.94 kg/m²     Allergies   Allergen Reactions    Lisinopril Cough       Assessment/Plan   Problem List Items Addressed This Visit       Class 3 severe obesity due to excess calories with serious comorbidity and body mass index (BMI) of 40.0 to 44.9 in adult (CMS/[facility])    Hypothyroidism    BMI 40.0-44.9, adult (CMS/[facility])    Type 2 diabetes mellitus (CMS/[facility])    Relevant Medications    tirzepatide (Mounjaro) 2.5 mg/0.5 mL pen injector    Other Relevant Orders    POCT glycosylated hemoglobin (Hb A1C) manually resulted (Completed)    Follow Up In Advanced Primary Care - Pharmacy    Depression, unspecified    Relevant Medications    vilazodone (Viibryd) 40 mg tablet    B12 deficiency    Hypercholesteremia    Hyperlipidemia    Medicare annual wellness visit, subsequent    Yeast infection    Relevant Medications    fluconazole (Diflucan) 150 mg tablet     Medicare wellness questionnaire reviewed in detail. Advanced Directives reviewed today. Patient advised to provide the office with a copy if has not already done so. No problems with activities of daily " living. Falls risks reviewed.    Discussed patient's BMI and to institute calorie reduction and increase exercise to decrease risk of diabetes and heart disease in the future.    Refill Diflucan.     Increase Viibryd to 40 mg every day.     Mounjaro 2.5 mg prescribed today.  If she is doing well, we will increase to next dose in 1 month.     Referral to [facility] pharmacist placed today.    She can use nasal sprays as needed.    Follow up in 8 weeks.     If anything worsens or changes please call us at once, follow up in the office as planned.    Scribe Attestation  By signing my name below, I, Bonnie Diane MA, Scribe   attest that this documentation has been prepared under the direction and in the presence of Ramses Black DO.

## 2024-01-22 ENCOUNTER — OFFICE VISIT (OUTPATIENT)
Dept: PRIMARY CARE | Facility: CLINIC | Age: 79
End: 2024-01-22
Payer: MEDICARE

## 2024-01-22 VITALS
BODY MASS INDEX: 40.98 KG/M2 | SYSTOLIC BLOOD PRESSURE: 110 MMHG | OXYGEN SATURATION: 97 % | TEMPERATURE: 96.8 F | HEIGHT: 65 IN | HEART RATE: 72 BPM | RESPIRATION RATE: 16 BRPM | DIASTOLIC BLOOD PRESSURE: 54 MMHG | WEIGHT: 246 LBS

## 2024-01-22 DIAGNOSIS — E78.00 HYPERCHOLESTEREMIA: ICD-10-CM

## 2024-01-22 DIAGNOSIS — E53.8 B12 DEFICIENCY: ICD-10-CM

## 2024-01-22 DIAGNOSIS — E78.2 MIXED HYPERLIPIDEMIA: ICD-10-CM

## 2024-01-22 DIAGNOSIS — E03.9 HYPOTHYROIDISM, UNSPECIFIED TYPE: ICD-10-CM

## 2024-01-22 DIAGNOSIS — E11.69 TYPE 2 DIABETES MELLITUS WITH OTHER SPECIFIED COMPLICATION, UNSPECIFIED WHETHER LONG TERM INSULIN USE (MULTI): ICD-10-CM

## 2024-01-22 DIAGNOSIS — F32.A DEPRESSION, UNSPECIFIED DEPRESSION TYPE: ICD-10-CM

## 2024-01-22 DIAGNOSIS — Z00.00 MEDICARE ANNUAL WELLNESS VISIT, SUBSEQUENT: Primary | ICD-10-CM

## 2024-01-22 DIAGNOSIS — E66.01 CLASS 3 SEVERE OBESITY DUE TO EXCESS CALORIES WITH SERIOUS COMORBIDITY AND BODY MASS INDEX (BMI) OF 40.0 TO 44.9 IN ADULT (MULTI): ICD-10-CM

## 2024-01-22 DIAGNOSIS — B37.9 YEAST INFECTION: ICD-10-CM

## 2024-01-22 LAB — POC HEMOGLOBIN A1C: 9.4 % (ref 4.2–6.5)

## 2024-01-22 PROCEDURE — 99213 OFFICE O/P EST LOW 20 MIN: CPT | Performed by: FAMILY MEDICINE

## 2024-01-22 PROCEDURE — 1036F TOBACCO NON-USER: CPT | Performed by: FAMILY MEDICINE

## 2024-01-22 PROCEDURE — 1157F ADVNC CARE PLAN IN RCRD: CPT | Performed by: FAMILY MEDICINE

## 2024-01-22 PROCEDURE — 1126F AMNT PAIN NOTED NONE PRSNT: CPT | Performed by: FAMILY MEDICINE

## 2024-01-22 PROCEDURE — 1159F MED LIST DOCD IN RCRD: CPT | Performed by: FAMILY MEDICINE

## 2024-01-22 PROCEDURE — 1170F FXNL STATUS ASSESSED: CPT | Performed by: FAMILY MEDICINE

## 2024-01-22 PROCEDURE — G0439 PPPS, SUBSEQ VISIT: HCPCS | Performed by: FAMILY MEDICINE

## 2024-01-22 PROCEDURE — 83036 HEMOGLOBIN GLYCOSYLATED A1C: CPT | Performed by: FAMILY MEDICINE

## 2024-01-22 RX ORDER — FLUCONAZOLE 150 MG/1
150 TABLET ORAL WEEKLY
Qty: 12 TABLET | Refills: 1 | Status: SHIPPED | OUTPATIENT
Start: 2024-01-22

## 2024-01-22 RX ORDER — TIRZEPATIDE 2.5 MG/.5ML
2.5 INJECTION, SOLUTION SUBCUTANEOUS
Qty: 2 ML | Refills: 1 | Status: SHIPPED | OUTPATIENT
Start: 2024-01-22 | End: 2024-02-07 | Stop reason: DRUGHIGH

## 2024-01-22 RX ORDER — VILAZODONE HYDROCHLORIDE 40 MG/1
40 TABLET ORAL
Qty: 30 TABLET | Refills: 2 | Status: SHIPPED | OUTPATIENT
Start: 2024-01-22 | End: 2024-04-23 | Stop reason: SDUPTHER

## 2024-01-22 ASSESSMENT — PATIENT HEALTH QUESTIONNAIRE - PHQ9
SUM OF ALL RESPONSES TO PHQ QUESTIONS 1-9: 17
5. POOR APPETITE OR OVEREATING: MORE THAN HALF THE DAYS
SUM OF ALL RESPONSES TO PHQ9 QUESTIONS 1 AND 2: 6
10. IF YOU CHECKED OFF ANY PROBLEMS, HOW DIFFICULT HAVE THESE PROBLEMS MADE IT FOR YOU TO DO YOUR WORK, TAKE CARE OF THINGS AT HOME, OR GET ALONG WITH OTHER PEOPLE: EXTREMELY DIFFICULT
1. LITTLE INTEREST OR PLEASURE IN DOING THINGS: NEARLY EVERY DAY
7. TROUBLE CONCENTRATING ON THINGS, SUCH AS READING THE NEWSPAPER OR WATCHING TELEVISION: NEARLY EVERY DAY
6. FEELING BAD ABOUT YOURSELF - OR THAT YOU ARE A FAILURE OR HAVE LET YOURSELF OR YOUR FAMILY DOWN: NOT AT ALL
2. FEELING DOWN, DEPRESSED OR HOPELESS: NEARLY EVERY DAY
4. FEELING TIRED OR HAVING LITTLE ENERGY: NEARLY EVERY DAY
9. THOUGHTS THAT YOU WOULD BE BETTER OFF DEAD, OR OF HURTING YOURSELF: NOT AT ALL
3. TROUBLE FALLING OR STAYING ASLEEP OR SLEEPING TOO MUCH: NEARLY EVERY DAY
8. MOVING OR SPEAKING SO SLOWLY THAT OTHER PEOPLE COULD HAVE NOTICED. OR THE OPPOSITE, BEING SO FIGETY OR RESTLESS THAT YOU HAVE BEEN MOVING AROUND A LOT MORE THAN USUAL: NOT AT ALL

## 2024-01-22 ASSESSMENT — ACTIVITIES OF DAILY LIVING (ADL)
MANAGING_FINANCES: INDEPENDENT
DRESSING: INDEPENDENT
GROCERY_SHOPPING: INDEPENDENT
TAKING_MEDICATION: INDEPENDENT
BATHING: INDEPENDENT
DOING_HOUSEWORK: INDEPENDENT

## 2024-01-25 ENCOUNTER — OFFICE VISIT (OUTPATIENT)
Dept: WOUND CARE | Facility: CLINIC | Age: 79
End: 2024-01-25
Payer: MEDICARE

## 2024-01-25 PROCEDURE — 99213 OFFICE O/P EST LOW 20 MIN: CPT | Mod: 25

## 2024-01-30 DIAGNOSIS — I10 HYPERTENSION, UNSPECIFIED TYPE: ICD-10-CM

## 2024-01-30 RX ORDER — DILTIAZEM HYDROCHLORIDE 240 MG/1
240 CAPSULE, EXTENDED RELEASE ORAL DAILY
Qty: 90 CAPSULE | Refills: 1 | Status: SHIPPED | OUTPATIENT
Start: 2024-01-30

## 2024-02-02 DIAGNOSIS — D64.9 ANEMIA, UNSPECIFIED TYPE: ICD-10-CM

## 2024-02-02 RX ORDER — FERROUS SULFATE TAB 325 MG (65 MG ELEMENTAL FE) 325 (65 FE) MG
1 TAB ORAL 2 TIMES DAILY
Qty: 60 TABLET | Refills: 0 | Status: SHIPPED | OUTPATIENT
Start: 2024-02-02 | End: 2024-04-08 | Stop reason: SDUPTHER

## 2024-02-07 ENCOUNTER — TELEMEDICINE (OUTPATIENT)
Dept: PHARMACY | Facility: HOSPITAL | Age: 79
End: 2024-02-07
Payer: MEDICARE

## 2024-02-07 DIAGNOSIS — E11.69 TYPE 2 DIABETES MELLITUS WITH OTHER SPECIFIED COMPLICATION, UNSPECIFIED WHETHER LONG TERM INSULIN USE (MULTI): Primary | ICD-10-CM

## 2024-02-07 RX ORDER — TIRZEPATIDE 5 MG/.5ML
5 INJECTION, SOLUTION SUBCUTANEOUS
Qty: 2 ML | Refills: 1 | Status: SHIPPED | OUTPATIENT
Start: 2024-02-07 | End: 2024-03-06 | Stop reason: DRUGHIGH

## 2024-02-07 NOTE — ASSESSMENT & PLAN NOTE
Patient identified self-management goal:  sugars to goal  Patient's goal A1c is < 8%.  Is pt at goal? No, 9.4% on 1.22.24  Patient's SMBGs are at goal.   Rationale for plan: Patient recently started on Mounjaro by PCP. Tolerating well and says she has already noticed improvement in sugar. Will continue Mounjaro titration for additional BG control and weight loss benefit.    Medication Changes:  INCREASE  Mounjaro to 5mg once weekly      Monitoring and Education:  Diabetes Education  Rule of 15: eating ~15 g of carbs when BG less than 80 (half cup juice, 3-4 glucose tabs).  Recognize symptoms of high and low blood sugar.   Eat a realistic healthy diet consisting of fruits, vegetables, fiber, protein food choices on a regular basis and be aware of portion/serving sizes. Reduce carbohydrate consumption and always consume with protein and fat. Avoid foods high in saturated/trans fat, high salt content, and sweets and beverages with added sugars.  Limit alcohol consumption; alcohol may affect your blood sugar and cause hypoglycemia.   Stay active and incorporate ~30 mins of exercise into your daily routine to manage your weight and increase the body's acceptance of insulin.    Mounjaro Education:   - Counseled patient on MOA, expectations, side effects, duration of therapy, contraindications, administration, and monitoring parameters  - Answered all patient questions and concerns  - Counseled patient on Mounjaro MOA, expectations, side effects, duration of therapy, administration, and monitoring parameters.  - Provided detailed dosing and administration counseling to ensure proper technique.   - Reviewed Mounjaro titration schedule, starting with 2.5 mg once weekly to a goal of 15 mg once weekly if tolerated  - Counseled patient on the benefits of GLP-1ra glycemic control and weight loss  - Reviewed storage requirements of Mounjaro when not in use, and when to administer the medication if a dose is missed.  - Advised  patient that they may experience improved satiety after meals and portion sizes of meals may be reduced as doses of Mounjaro increase.

## 2024-02-07 NOTE — PROGRESS NOTES
Attempt 1: 1551 - LVM  Attempt 2: 1600 - LVM to call back  Attempt 3: 1610 - success      Patient ID: Diane Montemayor is a 78 y.o. female who presents for Diabetes.    Referring Provider: Ramses Black DO  PCP: Ramses Black DO    Pt is here for First appointment. Last visit with PCP: 24    Verbal consent to manage patient's drug therapy was obtained from patient. They were informed they may decline to participate or withdraw from participation in pharmacy services at any time. Patient is  agreeable to detailed voice messages if unable to be reached.     Does patient see endocrine? No,    Who are the patient's medications managed by? PCP        Subjective   Treatment Adherence:   Patient did take medications today.   Number of missed doses in last 7 days: 0.  Why? N/A     Preferred pharmacy: [facility]  Can patient afford prescribed medications: No, declines cost issues at this time     Current exercise: nothing formal; walking in the hallway at home, grocery shopping, chair exercises, etc.  Current diet: cut out chips and snacks  Drinks 1 protein shake per day (30G)    HPI  DIABETES MELLITUS TYPE 2:  Diagnosed with diabetes:  >40 years. Known diabetic complications: toe amputation, recent toe infection.  Last optometry exam: last year  Most recent visit in Podiatry: sees at wound center; -- patient denies sores or cuts on feet today      Current diabetic medications include:  Mounjaro 2.5mg weekly  Jardiance 25mg daily  Glimepiride 4mg BID  Metformin XR 1000mg BID    Clarifications to above regimen: Mounjaro on  (has had 3 doses)    Patient tests SMBGS twice daily  Home blood glucose records (mg/dL)  FB,121,99,131,137,138,98  Bedtime: 88, 89, 90    Any episodes of hypoglycemia? Yes, occasionally .  Did patient treat episode of hypoglycemia appropriately? Yes,    Does pt have proteinuria? Yes If appropriate, is patient on ACEi/ARB? Yes,        Secondary Prevention  Statin? Yes  ACE-I/ARB?  Yes  Aspirin? Yes    Pertinent PMH Review:  PMH of Pancreatitis: No  PMH of Retinopathy: No  PMH of Urinary Tract Infections: No; + yeast infection  PMH of MTC: No        Review of Systems    Objective     There were no vitals taken for this visit.     Labs  Lab Results   Component Value Date    BILITOT 0.4 06/29/2023    CALCIUM CANCELED 07/01/2023    CO2 CANCELED 07/01/2023    CL CANCELED 07/01/2023    CREATININE CANCELED 07/01/2023    GLUCOSE CANCELED 07/01/2023    ALKPHOS 72 06/29/2023    K CANCELED 07/01/2023    PROT 7.7 06/29/2023    NA CANCELED 07/01/2023    AST 18 06/29/2023    ALT 17 06/29/2023    BUN CANCELED 07/01/2023    ANIONGAP CANCELED 07/01/2023    MG CANCELED 07/01/2023    PHOS 3.3 03/12/2020    ALBUMIN 4.4 06/29/2023    GFRF CANCELED 07/01/2023    GFRMALE CANCELED 07/01/2023     Lab Results   Component Value Date    TRIG 146 06/21/2023    CHOL 163 06/21/2023    HDL 47.6 06/21/2023     Lab Results   Component Value Date    HGBA1C 9.4 (A) 01/22/2024       Current Outpatient Medications   Medication Instructions    albuterol 90 mcg/actuation inhaler INHALE 2 PUFFS BY MOUTH EVERY 4 HOURS AS DIRECTED AS NEEDED FOR COUGH OR WHEEZING    aspirin 81 mg, oral, Nightly    coenzyme Q10 400 mg capsule 1 capsule, oral, Nightly    dilTIAZem CD (CARDIZEM CD) 240 mg, oral, Daily    dilTIAZem XR (DILACOR XR) 240 mg, oral, Daily    docusate sodium (Colace) 100 mg capsule TAKE 1 CAPSULE(100 MG) BY MOUTH TWICE DAILY AS NEEDED FOR CONSTIPATION    empagliflozin (JARDIANCE) 25 mg, oral, Daily    FeroSuL tablet 1 tablet, oral, 2 times daily    fluconazole (DIFLUCAN) 150 mg, oral, Weekly    glimepiride (AMARYL) 4 mg, oral, 2 times daily    hydroCHLOROthiazide (HYDRODIURIL) 25 mg, oral, Daily    hydrOXYzine pamoate (Vistaril) 25 mg capsule TAKE 1 TO 2 CAPSULES BY MOUTH AT BEDTIME FOR INSOMNIA    ipratropium (Atrovent) 0.02 % nebulizer solution USE 1 VIAL VIA NEBULIZER FOUR TIMES DAILY    lactobacillus (Culturelle) 10 billion  cell capsule 1 capsule, oral, Daily    levothyroxine (Synthroid, Levoxyl) 150 mcg tablet Take 1 tablet (150 mcg) daily except on Tuesdays and Thursdays take 2 tablets (300 mcg)    losartan (COZAAR) 25 mg, oral, 2 times daily    magnesium oxide (MAG-OX) 400 mg, oral, Daily    metFORMIN  mg 24 hr tablet TAKE 2 TABLETS BY MOUTH EVERY MORNING AND EVERY EVENING WITH DINNER    Mounjaro 2.5 mg, subcutaneous, Weekly    nebulizer accessories kit USE 4 TIMES DAILY    omeprazole (PriLOSEC) 40 mg DR capsule TAKE 1 CAPSULE(40 MG) BY MOUTH TWICE DAILY. DO NOT CRUSH OR CHEW    rosuvastatin (Crestor) 10 mg tablet TAKE 1 TABLET BY MOUTH EVERY DAY AT BEDTIME    spironolactone (ALDACTONE) 25 mg, oral, Daily    vilazodone (Viibryd) 20 mg tablet TAKE 1 TABLET(20 MG) BY MOUTH EVERY DAY WITH A MEAL    vilazodone (VIIBRYD) 40 mg, oral, Daily with breakfast        Assessment/Plan   Problem List Items Addressed This Visit             ICD-10-CM    Type 2 diabetes mellitus (CMS/[facility]) E11.9     Patient identified self-management goal:  sugars to goal  Patient's goal A1c is < 8%.  Is pt at goal? No, 9.4% on 1.22.24  Patient's SMBGs are at goal.   Rationale for plan: Patient recently started on Mounjaro by PCP. Tolerating well and says she has already noticed improvement in sugar. Will continue Mounjaro titration for additional BG control and weight loss benefit.    Medication Changes:  INCREASE  Mounjaro to 5mg once weekly      Monitoring and Education:  Diabetes Education  Rule of 15: eating ~15 g of carbs when BG less than 80 (half cup juice, 3-4 glucose tabs).  Recognize symptoms of high and low blood sugar.   Eat a realistic healthy diet consisting of fruits, vegetables, fiber, protein food choices on a regular basis and be aware of portion/serving sizes. Reduce carbohydrate consumption and always consume with protein and fat. Avoid foods high in saturated/trans fat, high salt content, and sweets and beverages with added sugars.  Limit  alcohol consumption; alcohol may affect your blood sugar and cause hypoglycemia.   Stay active and incorporate ~30 mins of exercise into your daily routine to manage your weight and increase the body's acceptance of insulin.    Mounjaro Education:   - Counseled patient on MOA, expectations, side effects, duration of therapy, contraindications, administration, and monitoring parameters  - Answered all patient questions and concerns  - Counseled patient on Mounjaro MOA, expectations, side effects, duration of therapy, administration, and monitoring parameters.  - Provided detailed dosing and administration counseling to ensure proper technique.   - Reviewed Mounjaro titration schedule, starting with 2.5 mg once weekly to a goal of 15 mg once weekly if tolerated  - Counseled patient on the benefits of GLP-1ra glycemic control and weight loss  - Reviewed storage requirements of Mounjaro when not in use, and when to administer the medication if a dose is missed.  - Advised patient that they may experience improved satiety after meals and portion sizes of meals may be reduced as doses of Mounjaro increase.                Labs ordered:  none     Referrals:  none     Follow-up: March 6 at 1300 via telephone with [facility]     Time spent with pt: Total length of time 25 (minutes) of the encounter and more than 50% was spent counseling the patient.      [facility] Chan PharmD, LIS  Clinical Pharmacist  Pharmacy Services  [phone]    Continue all meds under the continuation of care with the referring provider and clinical pharmacy team.    Verbal consent to manage patient's drug therapy was obtained from the patient. They were informed they may decline to participate or withdraw from participation in pharmacy services at any time.

## 2024-02-14 ENCOUNTER — TELEPHONE (OUTPATIENT)
Dept: PRIMARY CARE | Facility: CLINIC | Age: 79
End: 2024-02-14

## 2024-02-14 ENCOUNTER — PATIENT OUTREACH (OUTPATIENT)
Dept: PRIMARY CARE | Facility: CLINIC | Age: 79
End: 2024-02-14
Payer: MEDICARE

## 2024-02-14 DIAGNOSIS — L97.519 DIABETIC ULCER OF TOE OF RIGHT FOOT ASSOCIATED WITH TYPE 2 DIABETES MELLITUS, UNSPECIFIED ULCER STAGE (MULTI): ICD-10-CM

## 2024-02-14 DIAGNOSIS — E78.00 HYPERCHOLESTEREMIA: ICD-10-CM

## 2024-02-14 DIAGNOSIS — I10 HYPERTENSION, UNSPECIFIED TYPE: ICD-10-CM

## 2024-02-14 DIAGNOSIS — E11.621 DIABETIC ULCER OF TOE OF RIGHT FOOT ASSOCIATED WITH TYPE 2 DIABETES MELLITUS, UNSPECIFIED ULCER STAGE (MULTI): ICD-10-CM

## 2024-02-14 DIAGNOSIS — F32.A DEPRESSION, UNSPECIFIED DEPRESSION TYPE: ICD-10-CM

## 2024-02-14 DIAGNOSIS — J06.9 UPPER RESPIRATORY TRACT INFECTION, UNSPECIFIED TYPE: Primary | ICD-10-CM

## 2024-02-14 DIAGNOSIS — E11.69 TYPE 2 DIABETES MELLITUS WITH OTHER SPECIFIED COMPLICATION, UNSPECIFIED WHETHER LONG TERM INSULIN USE (MULTI): ICD-10-CM

## 2024-02-14 PROCEDURE — 99490 CHRNC CARE MGMT STAFF 1ST 20: CPT | Performed by: FAMILY MEDICINE

## 2024-02-14 RX ORDER — AZITHROMYCIN 250 MG/1
TABLET, FILM COATED ORAL
Qty: 6 TABLET | Refills: 0 | Status: SHIPPED | OUTPATIENT
Start: 2024-02-14 | End: 2024-02-18

## 2024-02-14 NOTE — PROGRESS NOTES
Chart review complete.     Monthly outreach complete.    Patient main concerns at this time:  - cough/ nasal drainage: Patient is having complaints of cough and nasal drainage. Inquiring if PCP can order a Rx for antibiotics. Discussed use of OTC medication to try and patient states she has with no resolve. States in past had same issue and PCP prescribed medication that cleared it up. She is denying and shortness of breath or other complications. Made aware PCP is out of office, other providers are covering - message to Dr. Mathis for further guidance.   - right 2nd toe wound: Patient has been able to get care from Dr. Mares. Denies current needs from our office regarding toe    Patient denies further needs or concerns at this time. States understanding to call if any needs arise.

## 2024-03-06 ENCOUNTER — TELEMEDICINE (OUTPATIENT)
Dept: PHARMACY | Facility: HOSPITAL | Age: 79
End: 2024-03-06
Payer: MEDICARE

## 2024-03-06 DIAGNOSIS — E11.69 TYPE 2 DIABETES MELLITUS WITH OTHER SPECIFIED COMPLICATION, UNSPECIFIED WHETHER LONG TERM INSULIN USE (MULTI): ICD-10-CM

## 2024-03-06 RX ORDER — TIRZEPATIDE 7.5 MG/.5ML
7.5 INJECTION, SOLUTION SUBCUTANEOUS
Qty: 2 ML | Refills: 0 | Status: SHIPPED | OUTPATIENT
Start: 2024-03-06 | End: 2024-04-03 | Stop reason: SDUPTHER

## 2024-03-06 NOTE — ASSESSMENT & PLAN NOTE
Patient identified self-management goal:  sugars to goal  Patient's goal A1c is < 8%.  Is pt at goal? No, 9.4% on 1.22.24  Patient's SMBGs are at goal.   Rationale for plan: Tolerating Mounjaro well and BG continues to improve. Will continue Mounjaro titration for additional BG control and weight loss benefit. With next dose increase of Mounjaro, will decrease glimepiride 2mg BID to minimize hypoglycemia risk.      Medication Changes:  INCREASE  Mounjaro to 7.5mg once weekly (starting 3/20)    DECREASE  Glimepiride to 2mg (half tablet) BID (starting 3/20)      Monitoring and Education:  Diabetes Education  Rule of 15: eating ~15 g of carbs when BG less than 80 (half cup juice, 3-4 glucose tabs).  Recognize symptoms of high and low blood sugar.   Eat a realistic healthy diet consisting of fruits, vegetables, fiber, protein food choices on a regular basis and be aware of portion/serving sizes. Reduce carbohydrate consumption and always consume with protein and fat. Avoid foods high in saturated/trans fat, high salt content, and sweets and beverages with added sugars.  Limit alcohol consumption; alcohol may affect your blood sugar and cause hypoglycemia.   Stay active and incorporate ~30 mins of exercise into your daily routine to manage your weight and increase the body's acceptance of insulin.    Mounjaro Education:   - Counseled patient on MOA, expectations, side effects, duration of therapy, contraindications, administration, and monitoring parameters  - Answered all patient questions and concerns  - Counseled patient on Mounjaro MOA, expectations, side effects, duration of therapy, administration, and monitoring parameters.  - Provided detailed dosing and administration counseling to ensure proper technique.   - Reviewed Mounjaro titration schedule, starting with 2.5 mg once weekly to a goal of 15 mg once weekly if tolerated  - Counseled patient on the benefits of GLP-1ra glycemic control and weight loss  -  Reviewed storage requirements of Mounjaro when not in use, and when to administer the medication if a dose is missed.  - Advised patient that they may experience improved satiety after meals and portion sizes of meals may be reduced as doses of Mounjaro increase.

## 2024-03-06 NOTE — PROGRESS NOTES
Patient ID: Diane Montemayor is a 78 y.o. female who presents for Diabetes.    Referring Provider: Ramses Blcak DO  PCP: Ramses Black DO    Pt is here for Follow Up appointment. Last visit with PCP: 24    Verbal consent to manage patient's drug therapy was obtained from patient. They were informed they may decline to participate or withdraw from participation in pharmacy services at any time. Patient is  agreeable to detailed voice messages if unable to be reached.     Does patient see endocrine? No,    Who are the patient's medications managed by? PCP    Patient enrolled in [facility]    Subjective   Treatment Adherence:   Patient did take medications today.   Number of missed doses in last 7 days: 0.  Why? N/A     Preferred pharmacy: [facility]  Can patient afford prescribed medications: No, declines cost issues at this time     Current exercise: nothing formal; walking in the hallway at home, grocery shopping, chair exercises, etc.  Current diet: cut out chips and snacks  Drinks 1 protein shake per day (30G)    HPI  DIABETES MELLITUS TYPE 2:  Diagnosed with diabetes:  >40 years. Known diabetic complications: toe amputation, recent toe infection.  Last optometry exam:   Most recent visit in Podiatry: sees at wound center; -- patient denies sores or cuts on feet today      Current diabetic medications include:  Mounjaro 5mg weekly on  (3 doses)  Jardiance 25mg daily  Glimepiride 4mg BID  Metformin XR 1000mg BID    Patient tests SMBGS twice daily  Home blood glucose records (mg/dL)  FB, 99, 90, 88, 120s, 82, 94, 119, 137, 81, 108, 92    FB,121,99,131,137,138,98  Bedtime: 88, 89, 90    Any episodes of hypoglycemia? Yes, x 1 in the evening before bed, 67 .  Did patient treat episode of hypoglycemia appropriately? Yes, PB and cracker  Does pt have proteinuria? Yes If appropriate, is patient on ACEi/ARB? Yes,      Secondary Prevention  Statin? Yes  ACE-I/ARB? Yes  Aspirin?  Yes    Pertinent PMH Review:  PMH of Pancreatitis: No  PMH of Retinopathy: No  PMH of Urinary Tract Infections: No; + yeast infection  PMH of MTC: No      Review of Systems    Objective     There were no vitals taken for this visit.     Labs  Lab Results   Component Value Date    BILITOT 0.4 06/29/2023    CALCIUM CANCELED 07/01/2023    CO2 CANCELED 07/01/2023    CL CANCELED 07/01/2023    CREATININE CANCELED 07/01/2023    GLUCOSE CANCELED 07/01/2023    ALKPHOS 72 06/29/2023    K CANCELED 07/01/2023    PROT 7.7 06/29/2023    NA CANCELED 07/01/2023    AST 18 06/29/2023    ALT 17 06/29/2023    BUN CANCELED 07/01/2023    ANIONGAP CANCELED 07/01/2023    MG CANCELED 07/01/2023    PHOS 3.3 03/12/2020    ALBUMIN 4.4 06/29/2023    GFRF CANCELED 07/01/2023    GFRMALE CANCELED 07/01/2023     Lab Results   Component Value Date    TRIG 146 06/21/2023    CHOL 163 06/21/2023    HDL 47.6 06/21/2023     Lab Results   Component Value Date    HGBA1C 9.4 (A) 01/22/2024       Current Outpatient Medications   Medication Instructions    albuterol 90 mcg/actuation inhaler INHALE 2 PUFFS BY MOUTH EVERY 4 HOURS AS DIRECTED AS NEEDED FOR COUGH OR WHEEZING    aspirin 81 mg, oral, Nightly    coenzyme Q10 400 mg capsule 1 capsule, oral, Nightly    dilTIAZem XR (DILACOR XR) 240 mg, oral, Daily    docusate sodium (Colace) 100 mg capsule TAKE 1 CAPSULE(100 MG) BY MOUTH TWICE DAILY AS NEEDED FOR CONSTIPATION    empagliflozin (JARDIANCE) 25 mg, oral, Daily    FeroSuL tablet 1 tablet, oral, 2 times daily    fluconazole (DIFLUCAN) 150 mg, oral, Weekly    glimepiride (AMARYL) 4 mg, oral, 2 times daily    hydroCHLOROthiazide (HYDRODIURIL) 25 mg, oral, Daily    ipratropium (Atrovent) 0.02 % nebulizer solution USE 1 VIAL VIA NEBULIZER FOUR TIMES DAILY    lactobacillus (Culturelle) 10 billion cell capsule 1 capsule, oral, Daily    levothyroxine (Synthroid, Levoxyl) 150 mcg tablet Take 1 tablet (150 mcg) daily except on Tuesdays and Thursdays take 2 tablets  (300 mcg)    losartan (COZAAR) 25 mg, oral, 2 times daily    magnesium oxide (MAG-OX) 400 mg, oral, Daily    metFORMIN  mg 24 hr tablet TAKE 2 TABLETS BY MOUTH EVERY MORNING AND EVERY EVENING WITH DINNER    Mounjaro 5 mg, subcutaneous, Weekly    nebulizer accessories kit USE 4 TIMES DAILY    omeprazole (PriLOSEC) 40 mg DR capsule TAKE 1 CAPSULE(40 MG) BY MOUTH TWICE DAILY. DO NOT CRUSH OR CHEW    rosuvastatin (Crestor) 10 mg tablet TAKE 1 TABLET BY MOUTH EVERY DAY AT BEDTIME    spironolactone (ALDACTONE) 25 mg, oral, Daily    vilazodone (VIIBRYD) 40 mg, oral, Daily with breakfast        Assessment/Plan   Problem List Items Addressed This Visit             ICD-10-CM    Type 2 diabetes mellitus (CMS/[facility]) E11.9     Patient identified self-management goal:  sugars to goal  Patient's goal A1c is < 8%.  Is pt at goal? No, 9.4% on 1.22.24  Patient's SMBGs are at goal.   Rationale for plan: Tolerating Mounjaro well and BG continues to improve. Will continue Mounjaro titration for additional BG control and weight loss benefit. With next dose increase of Mounjaro, will decrease glimepiride 2mg BID to minimize hypoglycemia risk.      Medication Changes:  INCREASE  Mounjaro to 7.5mg once weekly (starting 3/20)    DECREASE  Glimepiride to 2mg (half tablet) BID (starting 3/20)      Monitoring and Education:  Diabetes Education  Rule of 15: eating ~15 g of carbs when BG less than 80 (half cup juice, 3-4 glucose tabs).  Recognize symptoms of high and low blood sugar.   Eat a realistic healthy diet consisting of fruits, vegetables, fiber, protein food choices on a regular basis and be aware of portion/serving sizes. Reduce carbohydrate consumption and always consume with protein and fat. Avoid foods high in saturated/trans fat, high salt content, and sweets and beverages with added sugars.  Limit alcohol consumption; alcohol may affect your blood sugar and cause hypoglycemia.   Stay active and incorporate ~30 mins of exercise  "into your daily routine to manage your weight and increase the body's acceptance of insulin.    Mounjaro Education:   - Counseled patient on MOA, expectations, side effects, duration of therapy, contraindications, administration, and monitoring parameters  - Answered all patient questions and concerns  - Counseled patient on Mounjaro MOA, expectations, side effects, duration of therapy, administration, and monitoring parameters.  - Provided detailed dosing and administration counseling to ensure proper technique.   - Reviewed Mounjaro titration schedule, starting with 2.5 mg once weekly to a goal of 15 mg once weekly if tolerated  - Counseled patient on the benefits of GLP-1ra glycemic control and weight loss  - Reviewed storage requirements of Mounjaro when not in use, and when to administer the medication if a dose is missed.  - Advised patient that they may experience improved satiety after meals and portion sizes of meals may be reduced as doses of Mounjaro increase.          Patient inquired about stomach upset to iron (she has decreased to every other day). Encouraged to take with orange juice and look into getting a cast iron skillet or \"iron fish' from online as additional supplementation. Advised to also discuss this at her next PCP appt.     Labs ordered:  none     Referrals:  none     Follow-up: April 3 at 1030 via telephone with Sadie     Time spent with pt: Total length of time 20 (minutes) of the encounter and more than 50% was spent counseling the patient.      Sadie Montgomery, PharmD, LIS  Clinical Pharmacist  [phone]  [email or web address]    Continue all meds under the continuation of care with the referring provider and clinical pharmacy team.    Verbal consent to manage patient's drug therapy was obtained from the patient. They were informed they may decline to participate or withdraw from participation in pharmacy services at any time.  "

## 2024-03-11 DIAGNOSIS — K21.9 GASTROESOPHAGEAL REFLUX DISEASE WITHOUT ESOPHAGITIS: ICD-10-CM

## 2024-03-13 ENCOUNTER — TELEPHONE (OUTPATIENT)
Dept: PHARMACY | Facility: HOSPITAL | Age: 79
End: 2024-03-13
Payer: MEDICARE

## 2024-03-13 RX ORDER — OMEPRAZOLE 40 MG/1
CAPSULE, DELAYED RELEASE ORAL
Qty: 180 CAPSULE | Refills: 0 | Status: SHIPPED | OUTPATIENT
Start: 2024-03-13

## 2024-03-13 NOTE — TELEPHONE ENCOUNTER
Pt LVM stating her Mounjaro pen malfunctioned. (Pen 4 of the 5mg dose)    Patient explained she did not take cap off the pen before injecting and dose was wasted.     Does have 7.5mg pens at home already. Will start these tonight. Aware of increased chance of GI upset with dose increase.    Will maintain next appt on April 3.     All questions/concerns addressed. Patient expressed understanding of plance.    Encouraged to call prior to next appt with any additional questions.    Sadie Montgomery, PharmD, LIS  Clinical Pharmacist  [phone]  [email or web address]

## 2024-03-15 ENCOUNTER — PATIENT OUTREACH (OUTPATIENT)
Dept: PRIMARY CARE | Facility: CLINIC | Age: 79
End: 2024-03-15
Payer: MEDICARE

## 2024-03-15 DIAGNOSIS — E11.69 TYPE 2 DIABETES MELLITUS WITH OTHER SPECIFIED COMPLICATION, UNSPECIFIED WHETHER LONG TERM INSULIN USE (MULTI): ICD-10-CM

## 2024-03-15 DIAGNOSIS — D64.9 ANEMIA, UNSPECIFIED TYPE: ICD-10-CM

## 2024-03-15 DIAGNOSIS — I10 HYPERTENSION, UNSPECIFIED TYPE: ICD-10-CM

## 2024-03-15 DIAGNOSIS — E78.00 HYPERCHOLESTEREMIA: ICD-10-CM

## 2024-03-15 PROCEDURE — 99490 CHRNC CARE MGMT STAFF 1ST 20: CPT | Performed by: FAMILY MEDICINE

## 2024-03-15 NOTE — PROGRESS NOTES
"Chart review complete.     Confirmation of at least 2 patient identifiers.  Monthly outreach complete.    Patient main concerns at this time:  - \"not feeling great today\": patient reports she has upped her Mounjaro to 7.5mg on 3/13 after pen malfunction of 5mg dose. See Sadie, Pharm D note for further info. Since, patient reports having nausea, denies other new GI symptoms. Reviewed phagological OTC options and non-pharmacological options. Patient states understanding and has been trying Tums and 0 sugar ginger ale. States she's also had decrease in appetite but that has been going on since before the 13th and patient does not have any worries. Has also reported 10 lb weight loss since January. Patient asked to notify office if weight loss continues and becomes worrisome.   - Diet: Patient reports glucose levels to this nurse (121, 123, 99, 88, 115, 97, 82). Reports she notices which foods increase blood sugar, primarily chips/ snacks, and is trying to cut back. Reviewed dietary modifications with patient and she plans to make modifications. Denies dietary referral at this time, will notify if she changes her mind. Patient is able to relay s/s of hyper/hypoglycemia to watch for and is aware how to manage when it may occur.  - Foot care: Patient reports for the first time in 6 years she has no foot wounds, calluses on her feet. States understanding of yearly foot exam by provider and to check her own feet daily. Does state she will maybe need to get assistance with nail cqare as Dr. Mcneal is leaving [facility] by end of March. Inquired with office regarding Nail Care by Dr. Black, which he can perform. Patient does not need appointment for nail care at this time she reports but will keep in mind. Does have PCP appointment in April.  Grief: Patient grief waxes and wanes. Does have good support system and discussed healthy coping skills.    Patient denies further needs or concerns at this time. States understanding " to call if any needs arise.

## 2024-03-18 ENCOUNTER — APPOINTMENT (OUTPATIENT)
Dept: PRIMARY CARE | Facility: CLINIC | Age: 79
End: 2024-03-18
Payer: MEDICARE

## 2024-03-25 DIAGNOSIS — I10 HYPERTENSION, UNSPECIFIED TYPE: ICD-10-CM

## 2024-03-26 RX ORDER — SPIRONOLACTONE 25 MG/1
25 TABLET ORAL DAILY
Qty: 90 TABLET | Refills: 0 | Status: SHIPPED | OUTPATIENT
Start: 2024-03-26

## 2024-03-28 DIAGNOSIS — I10 HYPERTENSION, UNSPECIFIED TYPE: ICD-10-CM

## 2024-03-28 RX ORDER — HYDROCHLOROTHIAZIDE 25 MG/1
25 TABLET ORAL DAILY
Qty: 90 TABLET | Refills: 0 | Status: SHIPPED | OUTPATIENT
Start: 2024-03-28

## 2024-04-02 ENCOUNTER — LAB (OUTPATIENT)
Dept: LAB | Facility: LAB | Age: 79
End: 2024-04-02
Payer: MEDICARE

## 2024-04-02 DIAGNOSIS — E11.69 TYPE 2 DIABETES MELLITUS WITH OTHER SPECIFIED COMPLICATION, UNSPECIFIED WHETHER LONG TERM INSULIN USE (MULTI): ICD-10-CM

## 2024-04-02 DIAGNOSIS — E03.9 HYPOTHYROIDISM, UNSPECIFIED TYPE: ICD-10-CM

## 2024-04-02 DIAGNOSIS — D64.9 ANEMIA, UNSPECIFIED TYPE: ICD-10-CM

## 2024-04-02 DIAGNOSIS — E11.69 TYPE 2 DIABETES MELLITUS WITH OTHER SPECIFIED COMPLICATION, WITHOUT LONG-TERM CURRENT USE OF INSULIN (MULTI): ICD-10-CM

## 2024-04-02 DIAGNOSIS — E78.00 HYPERCHOLESTEREMIA: ICD-10-CM

## 2024-04-02 DIAGNOSIS — I10 HYPERTENSION, UNSPECIFIED TYPE: ICD-10-CM

## 2024-04-02 DIAGNOSIS — E78.5 HYPERLIPIDEMIA, UNSPECIFIED HYPERLIPIDEMIA TYPE: ICD-10-CM

## 2024-04-02 LAB
ANION GAP SERPL CALC-SCNC: 17 MMOL/L (ref 10–20)
BUN SERPL-MCNC: 21 MG/DL (ref 6–23)
CALCIUM SERPL-MCNC: 9.8 MG/DL (ref 8.6–10.3)
CHLORIDE SERPL-SCNC: 101 MMOL/L (ref 98–107)
CO2 SERPL-SCNC: 24 MMOL/L (ref 21–32)
CREAT SERPL-MCNC: 1.17 MG/DL (ref 0.5–1.05)
EGFRCR SERPLBLD CKD-EPI 2021: 48 ML/MIN/1.73M*2
GLUCOSE SERPL-MCNC: 129 MG/DL (ref 74–99)
POTASSIUM SERPL-SCNC: 4.7 MMOL/L (ref 3.5–5.3)
SODIUM SERPL-SCNC: 137 MMOL/L (ref 136–145)
T4 FREE SERPL-MCNC: 1.12 NG/DL (ref 0.61–1.12)
TSH SERPL-ACNC: 3.91 MIU/L (ref 0.44–3.98)

## 2024-04-02 PROCEDURE — 83036 HEMOGLOBIN GLYCOSYLATED A1C: CPT

## 2024-04-02 PROCEDURE — 84439 ASSAY OF FREE THYROXINE: CPT

## 2024-04-02 PROCEDURE — 84443 ASSAY THYROID STIM HORMONE: CPT

## 2024-04-02 PROCEDURE — 80048 BASIC METABOLIC PNL TOTAL CA: CPT

## 2024-04-02 PROCEDURE — 36415 COLL VENOUS BLD VENIPUNCTURE: CPT

## 2024-04-02 RX ORDER — ROSUVASTATIN CALCIUM 10 MG/1
TABLET, COATED ORAL
Qty: 90 TABLET | Refills: 0 | Status: SHIPPED | OUTPATIENT
Start: 2024-04-02

## 2024-04-02 RX ORDER — METFORMIN HYDROCHLORIDE 500 MG/1
TABLET, EXTENDED RELEASE ORAL
Qty: 360 TABLET | Refills: 0 | Status: SHIPPED | OUTPATIENT
Start: 2024-04-02

## 2024-04-02 NOTE — PROGRESS NOTES
Subjective   Patient ID: Diane Montemayor is a 78 y.o. female who presents for Diabetes, Hypothyroidism, and Results.    HPI    DM  Does check glucose at home   Today glucose was 118  Eats a generally healthy diet  Active with limits  Currently taking Mounjaro 7.5mg, Jardiance, glimepiride ,metFORMIN  mg   Last A1c on 4/2/24 @ 6.4%  Pt had lost 14lb since being on the Mounjaro  She states she is only sick the first day of the injection.  Last eye exam little over a years   Does see a Podiatrist  States her feet have been better.     Hypothyroidism  Taking Synthroid 150 mcg  Taking on an empty stomach  No significant weight loss/gain  No heat/cold intolerances  No increase in hair loss/dry skin     Pt also had BW done on 4/2/24  Pt want to go over result     She is still grieving over the loss of her son.  States she does not sleep well at night.  Some nights she wakes up at 3 am.   She does sometimes naps during the day.     Medication were reviewed no refills at this time     No other concern /question     Review of systems  ; Patient seen today for exam denies any problems with headaches or vision, denies any shortness of breath chest pain nausea or vomiting, no black stool no blood in the stool no heartburn type symptoms denies any problems with constipation or diarrhea, and no dysuria-type symptoms    The patient's allergies medications were reviewed with them today    The patient's social family and surgical history or also reviewed here today, along with her past medical history.     Objective     Alert and active in  no acute distress  HEENT TMs clear oropharynx negative nares clear no drainage noted neck supple  With no adenopathy   Heart regular rate and rhythm without murmur and no carotid bruits  Lungs- clear to auscultation bilaterally, no wheeze or rhonchi noted  Thyroid -negative masses or nodularity  Abdomen- soft times four quadrants , bowel sounds positive no masses or organomegaly, negative  "tenderness guarding or rebound  Neurological exam unremarkable- DTRs in upper and lower extremities within normal limits.   skin -no lesions noted      /56 (BP Location: Left arm, Patient Position: Sitting, BP Cuff Size: Adult)   Pulse 79   Temp 35.9 °C (96.7 °F) (Temporal)   Resp 16   Ht 1.651 m (5' 5\")   Wt 105 kg (232 lb)   SpO2 96%   BMI 38.61 kg/m²     Allergies   Allergen Reactions    Lisinopril Cough       Assessment/Plan   Problem List Items Addressed This Visit       Hypothyroidism    Type 2 diabetes mellitus (CMS/HCC) - Primary    Depression, unspecified    B12 deficiency    Dizziness    Hypercholesteremia    Hyperlipidemia    Malaise and fatigue    Rheumatoid arthritis involving both shoulders, unspecified whether rheumatoid factor present (CMS/[facility])    Acquired absence of left great toe (CMS/[facility])    Atherosclerosis of native artery of both lower extremities with rest pain (CMS/[facility])    Other acute osteomyelitis, unspecified site (CMS/[facility])    Benign carcinoid tumors of other sites    Pulmonary emphysema, unspecified emphysema type (CMS/[facility])    Moderate episode of recurrent major depressive disorder (CMS/[facility])    Type 2 diabetes mellitus with diabetic peripheral angiopathy without gangrene, without long-term current use of insulin (CMS/[facility])    Stage 3a chronic kidney disease (CMS/[facility])     Other Visit Diagnoses       BMI 38.0-38.9,adult        Class 2 severe obesity due to excess calories with serious comorbidity and body mass index (BMI) of 38.0 to 38.9 in adult (CMS/[facility])        Grief              Discussed patient's BMI and to institute calorie reduction and increase exercise to decrease risk of diabetes and heart disease in the future.    Reviewed labs.  Discussed how to take Glimepiride.  Eventually, we'd like to get her off of this medication.    Continue same dose of thyroid medications.  With her GI symptoms having just resolved I would stay to 7.5 she is losing weight nicely her sugars are " better every is going the right direction Stilling a lot of grief over her son's death    She can take Tylenol PM if needed for sleep.     If anything worsens or changes please call us at once, follow up in the office as planned.    Scribe Attestation  By signing my name below, I, Bonnie Diane MA, Scribe   attest that this documentation has been prepared under the direction and in the presence of Ramses Black DO.

## 2024-04-02 NOTE — TELEPHONE ENCOUNTER
Rx Refill Request Telephone Encounter    Name:  Diane Montemayor  : 1945     Medication Name:  metFORMIN  mg 24 hr tablet   Quantity (Optional):    360  Directions (Optional):   TAKE 2 TABLETS BY MOUTH EVERY MORNING AND EVERY EVENING WITH DINNER     Specific Pharmacy location:  [facility]    Date of last appointment:  24  Date of next appointment:  4/3/24    Pt is OUT of medication. She is aware of her appointment tomorrow, but wanted his called in case she didn't make it tomorrow.

## 2024-04-03 ENCOUNTER — OFFICE VISIT (OUTPATIENT)
Dept: PRIMARY CARE | Facility: CLINIC | Age: 79
End: 2024-04-03
Payer: MEDICARE

## 2024-04-03 ENCOUNTER — TELEMEDICINE (OUTPATIENT)
Dept: PHARMACY | Facility: HOSPITAL | Age: 79
End: 2024-04-03
Payer: MEDICARE

## 2024-04-03 VITALS
BODY MASS INDEX: 38.65 KG/M2 | RESPIRATION RATE: 16 BRPM | HEIGHT: 65 IN | HEART RATE: 79 BPM | DIASTOLIC BLOOD PRESSURE: 56 MMHG | WEIGHT: 232 LBS | SYSTOLIC BLOOD PRESSURE: 118 MMHG | TEMPERATURE: 96.7 F | OXYGEN SATURATION: 96 %

## 2024-04-03 DIAGNOSIS — R53.83 MALAISE AND FATIGUE: ICD-10-CM

## 2024-04-03 DIAGNOSIS — N18.31 STAGE 3A CHRONIC KIDNEY DISEASE (MULTI): ICD-10-CM

## 2024-04-03 DIAGNOSIS — I70.223 ATHEROSCLEROSIS OF NATIVE ARTERY OF BOTH LOWER EXTREMITIES WITH REST PAIN (MULTI): ICD-10-CM

## 2024-04-03 DIAGNOSIS — E03.9 HYPOTHYROIDISM, UNSPECIFIED TYPE: ICD-10-CM

## 2024-04-03 DIAGNOSIS — E78.2 MIXED HYPERLIPIDEMIA: ICD-10-CM

## 2024-04-03 DIAGNOSIS — Z89.412 ACQUIRED ABSENCE OF LEFT GREAT TOE (MULTI): ICD-10-CM

## 2024-04-03 DIAGNOSIS — R42 DIZZINESS: ICD-10-CM

## 2024-04-03 DIAGNOSIS — E11.69 TYPE 2 DIABETES MELLITUS WITH OTHER SPECIFIED COMPLICATION, UNSPECIFIED WHETHER LONG TERM INSULIN USE (MULTI): Primary | ICD-10-CM

## 2024-04-03 DIAGNOSIS — F43.21 GRIEF: ICD-10-CM

## 2024-04-03 DIAGNOSIS — E11.51 TYPE 2 DIABETES MELLITUS WITH DIABETIC PERIPHERAL ANGIOPATHY WITHOUT GANGRENE, WITHOUT LONG-TERM CURRENT USE OF INSULIN (MULTI): ICD-10-CM

## 2024-04-03 DIAGNOSIS — E66.01 CLASS 2 SEVERE OBESITY DUE TO EXCESS CALORIES WITH SERIOUS COMORBIDITY AND BODY MASS INDEX (BMI) OF 38.0 TO 38.9 IN ADULT (MULTI): ICD-10-CM

## 2024-04-03 DIAGNOSIS — E53.8 B12 DEFICIENCY: ICD-10-CM

## 2024-04-03 DIAGNOSIS — M06.9 RHEUMATOID ARTHRITIS INVOLVING BOTH SHOULDERS, UNSPECIFIED WHETHER RHEUMATOID FACTOR PRESENT (MULTI): ICD-10-CM

## 2024-04-03 DIAGNOSIS — F32.A DEPRESSION, UNSPECIFIED DEPRESSION TYPE: ICD-10-CM

## 2024-04-03 DIAGNOSIS — J43.9 PULMONARY EMPHYSEMA, UNSPECIFIED EMPHYSEMA TYPE (MULTI): ICD-10-CM

## 2024-04-03 DIAGNOSIS — M86.10 OTHER ACUTE OSTEOMYELITIS, UNSPECIFIED SITE (MULTI): ICD-10-CM

## 2024-04-03 DIAGNOSIS — D3A.098: ICD-10-CM

## 2024-04-03 DIAGNOSIS — E78.00 HYPERCHOLESTEREMIA: ICD-10-CM

## 2024-04-03 DIAGNOSIS — R53.81 MALAISE AND FATIGUE: ICD-10-CM

## 2024-04-03 DIAGNOSIS — F33.1 MODERATE EPISODE OF RECURRENT MAJOR DEPRESSIVE DISORDER (MULTI): ICD-10-CM

## 2024-04-03 LAB
EST. AVERAGE GLUCOSE BLD GHB EST-MCNC: 137 MG/DL
HBA1C MFR BLD: 6.4 %

## 2024-04-03 PROCEDURE — 1158F ADVNC CARE PLAN TLK DOCD: CPT | Performed by: FAMILY MEDICINE

## 2024-04-03 PROCEDURE — 1157F ADVNC CARE PLAN IN RCRD: CPT | Performed by: FAMILY MEDICINE

## 2024-04-03 PROCEDURE — 1123F ACP DISCUSS/DSCN MKR DOCD: CPT | Performed by: FAMILY MEDICINE

## 2024-04-03 PROCEDURE — 1159F MED LIST DOCD IN RCRD: CPT | Performed by: FAMILY MEDICINE

## 2024-04-03 PROCEDURE — 99214 OFFICE O/P EST MOD 30 MIN: CPT | Performed by: FAMILY MEDICINE

## 2024-04-03 PROCEDURE — 1036F TOBACCO NON-USER: CPT | Performed by: FAMILY MEDICINE

## 2024-04-03 RX ORDER — TIRZEPATIDE 7.5 MG/.5ML
7.5 INJECTION, SOLUTION SUBCUTANEOUS
Qty: 2 ML | Refills: 0 | Status: SHIPPED | OUTPATIENT
Start: 2024-04-03 | End: 2024-04-10 | Stop reason: SDUPTHER

## 2024-04-03 ASSESSMENT — PATIENT HEALTH QUESTIONNAIRE - PHQ9
2. FEELING DOWN, DEPRESSED OR HOPELESS: NOT AT ALL
1. LITTLE INTEREST OR PLEASURE IN DOING THINGS: NOT AT ALL
SUM OF ALL RESPONSES TO PHQ9 QUESTIONS 1 AND 2: 0

## 2024-04-03 NOTE — ASSESSMENT & PLAN NOTE
Patient identified self-management goal:  sugars to goal  Patient's goal A1c is < 8%.  Is pt at goal? Yes, 6.4% on 4/2/24  Patient's SMBGs are at goal.   Rationale for plan: Tolerating Mounjaro well (only nauseous day after) and BG at goal. Will continue current regimen and assess for changes at next appt. Will assess for ability to decrease/stop glimepiride and metformin as SMBGs allow.      Medication Changes:  CONTINUE:  Mounjaro 7.5mg once weekly  Jardiance 25mg daily  Glimepiride 2mg BID  Metformin XR 1000mg BID      Monitoring and Education:  Diabetes Education  Rule of 15: eating ~15 g of carbs when BG less than 80 (half cup juice, 3-4 glucose tabs).  Recognize symptoms of high and low blood sugar.   Eat a realistic healthy diet consisting of fruits, vegetables, fiber, protein food choices on a regular basis and be aware of portion/serving sizes. Reduce carbohydrate consumption and always consume with protein and fat. Avoid foods high in saturated/trans fat, high salt content, and sweets and beverages with added sugars.  Limit alcohol consumption; alcohol may affect your blood sugar and cause hypoglycemia.   Stay active and incorporate ~30 mins of exercise into your daily routine to manage your weight and increase the body's acceptance of insulin.    Mounjaro Education:   - Counseled patient on MOA, expectations, side effects, duration of therapy, contraindications, administration, and monitoring parameters  - Answered all patient questions and concerns  - Counseled patient on Mounjaro MOA, expectations, side effects, duration of therapy, administration, and monitoring parameters.  - Provided detailed dosing and administration counseling to ensure proper technique.   - Reviewed Mounjaro titration schedule, starting with 2.5 mg once weekly to a goal of 15 mg once weekly if tolerated  - Counseled patient on the benefits of GLP-1ra glycemic control and weight loss  - Reviewed storage requirements of Mounjaro  when not in use, and when to administer the medication if a dose is missed.  - Advised patient that they may experience improved satiety after meals and portion sizes of meals may be reduced as doses of Mounjaro increase.

## 2024-04-03 NOTE — PROGRESS NOTES
Patient ID: Diane Montemayor is a 78 y.o. female who presents for Diabetes.    Referring Provider: Ramses Black DO  PCP: Ramses Black DO    Pt is here for Follow Up appointment. Last visit with PCP: 24, next visit: 4/3/24    Verbal consent to manage patient's drug therapy was obtained from patient. They were informed they may decline to participate or withdraw from participation in pharmacy services at any time. Patient is  agreeable to detailed voice messages if unable to be reached.     Patient enrolled in [facility]      Subjective   Treatment Adherence:   Patient did take medications today.   Number of missed doses in last 7 days: 0.  Why? N/A     Preferred pharmacy: [facility]  Can patient afford prescribed medications: No, declines cost issues at this time     Current exercise: nothing formal; walking in the hallway at home, grocery shopping, chair exercises, etc.  Current diet: cut out chips and snacks  Drinks 1 protein shake per day (30G)    HPI  DIABETES MELLITUS TYPE 2:  Diagnosed with diabetes:  >40 years. Known diabetic complications: toe amputation, recent toe infection.  Last optometry exam:   Most recent visit in Podiatry: sees at wound center; -- patient denies sores or cuts on feet today      Current diabetic medications include:  Mounjaro 7.5mg weekly on  (3rd dose this week)  Jardiance 25mg daily  Glimepiride 2mg BID  Metformin XR 1000mg BID      Home blood glucose records (mg/dL)  FB, 87, 90, 120, 118, 130, 121, 126, 146 (ate out before), 118    FB, 99, 90, 88, 120s, 82, 94, 119, 137, 81, 108, 92    FB,121,99,131,137,138,98  Bedtime: 88, 89, 90    Any episodes of hypoglycemia? No, patient denies .  Did patient treat episode of hypoglycemia appropriately? N/A  Does pt have proteinuria? Yes If appropriate, is patient on ACEi/ARB? Yes,      Secondary Prevention  Statin? Yes  ACE-I/ARB? Yes  Aspirin? Yes    Pertinent PMH Review:  PMH of Pancreatitis: No  PMH  of Retinopathy: No  PMH of Urinary Tract Infections: No; + yeast infection  PMH of MTC: No      Review of Systems    Objective     There were no vitals taken for this visit.     Labs  Lab Results   Component Value Date    BILITOT 0.4 06/29/2023    CALCIUM 9.8 04/02/2024    CO2 24 04/02/2024     04/02/2024    CREATININE 1.17 (H) 04/02/2024    GLUCOSE 129 (H) 04/02/2024    ALKPHOS 72 06/29/2023    K 4.7 04/02/2024    PROT 7.7 06/29/2023     04/02/2024    AST 18 06/29/2023    ALT 17 06/29/2023    BUN 21 04/02/2024    ANIONGAP 17 04/02/2024    MG CANCELED 07/01/2023    PHOS 3.3 03/12/2020    ALBUMIN 4.4 06/29/2023    GFRF CANCELED 07/01/2023    GFRMALE CANCELED 07/01/2023     Lab Results   Component Value Date    TRIG 146 06/21/2023    CHOL 163 06/21/2023    HDL 47.6 06/21/2023     Lab Results   Component Value Date    HGBA1C 6.4 (H) 04/02/2024       Current Outpatient Medications   Medication Instructions    albuterol 90 mcg/actuation inhaler INHALE 2 PUFFS BY MOUTH EVERY 4 HOURS AS DIRECTED AS NEEDED FOR COUGH OR WHEEZING    aspirin 81 mg, oral, Nightly    coenzyme Q10 400 mg capsule 1 capsule, oral, Nightly    dilTIAZem XR (DILACOR XR) 240 mg, oral, Daily    docusate sodium (Colace) 100 mg capsule TAKE 1 CAPSULE(100 MG) BY MOUTH TWICE DAILY AS NEEDED FOR CONSTIPATION    empagliflozin (JARDIANCE) 25 mg, oral, Daily    FeroSuL tablet 1 tablet, oral, 2 times daily    fluconazole (DIFLUCAN) 150 mg, oral, Weekly    glimepiride (AMARYL) 4 mg, oral, 2 times daily    hydroCHLOROthiazide (HYDRODIURIL) 25 mg, oral, Daily    ipratropium (Atrovent) 0.02 % nebulizer solution USE 1 VIAL VIA NEBULIZER FOUR TIMES DAILY    lactobacillus (Culturelle) 10 billion cell capsule 1 capsule, oral, Daily    levothyroxine (Synthroid, Levoxyl) 150 mcg tablet Take 1 tablet (150 mcg) daily except on Tuesdays and Thursdays take 2 tablets (300 mcg)    losartan (COZAAR) 25 mg, oral, 2 times daily    magnesium oxide (MAG-OX) 400 mg, oral,  Daily    metFORMIN  mg 24 hr tablet TAKE 2 TABLETS BY MOUTH EVERY MORNING AND EVERY EVENING WITH DINNER    Mounjaro 7.5 mg, subcutaneous, Weekly    nebulizer accessories kit USE 4 TIMES DAILY    omeprazole (PriLOSEC) 40 mg DR capsule TAKE 1 CAPSULE BY MOUTH TWICE DAILY    rosuvastatin (Crestor) 10 mg tablet TAKE 1 TABLET BY MOUTH EVERY DAY AT BEDTIME    spironolactone (ALDACTONE) 25 mg, oral, Daily    vilazodone (VIIBRYD) 40 mg, oral, Daily with breakfast        Assessment/Plan   Problem List Items Addressed This Visit             ICD-10-CM    Type 2 diabetes mellitus (CMS/[facility]) E11.9     Patient identified self-management goal:  sugars to goal  Patient's goal A1c is < 8%.  Is pt at goal? Yes, 6.4% on 4/2/24  Patient's SMBGs are at goal.   Rationale for plan: Tolerating Mounjaro well (only nauseous day after) and BG at goal. Will continue current regimen and assess for changes at next appt. Will assess for ability to decrease/stop glimepiride and metformin as SMBGs allow.      Medication Changes:  CONTINUE:  Mounjaro 7.5mg once weekly  Jardiance 25mg daily  Glimepiride 2mg BID  Metformin XR 1000mg BID      Monitoring and Education:  Diabetes Education  Rule of 15: eating ~15 g of carbs when BG less than 80 (half cup juice, 3-4 glucose tabs).  Recognize symptoms of high and low blood sugar.   Eat a realistic healthy diet consisting of fruits, vegetables, fiber, protein food choices on a regular basis and be aware of portion/serving sizes. Reduce carbohydrate consumption and always consume with protein and fat. Avoid foods high in saturated/trans fat, high salt content, and sweets and beverages with added sugars.  Limit alcohol consumption; alcohol may affect your blood sugar and cause hypoglycemia.   Stay active and incorporate ~30 mins of exercise into your daily routine to manage your weight and increase the body's acceptance of insulin.    Mounjaro Education:   - Counseled patient on MOA, expectations, side  effects, duration of therapy, contraindications, administration, and monitoring parameters  - Answered all patient questions and concerns  - Counseled patient on Mounjaro MOA, expectations, side effects, duration of therapy, administration, and monitoring parameters.  - Provided detailed dosing and administration counseling to ensure proper technique.   - Reviewed Mounjaro titration schedule, starting with 2.5 mg once weekly to a goal of 15 mg once weekly if tolerated  - Counseled patient on the benefits of GLP-1ra glycemic control and weight loss  - Reviewed storage requirements of Mounjaro when not in use, and when to administer the medication if a dose is missed.  - Advised patient that they may experience improved satiety after meals and portion sizes of meals may be reduced as doses of Mounjaro increase.          Labs ordered:  none     Referrals:  none     Follow-up: May 1 at 0930 via telephone with Sadie     Time spent with pt: Total length of time 15 (minutes) of the encounter and more than 50% was spent counseling the patient.    Sadie Montgomery PharmD, LIS  Clinical Pharmacist  [phone]  [email or web address]    Continue all meds under the continuation of care with the referring provider and clinical pharmacy team.    Verbal consent to manage patient's drug therapy was obtained from the patient. They were informed they may decline to participate or withdraw from participation in pharmacy services at any time.

## 2024-04-05 DIAGNOSIS — R06.02 SOB (SHORTNESS OF BREATH): ICD-10-CM

## 2024-04-05 RX ORDER — ALBUTEROL SULFATE 90 UG/1
AEROSOL, METERED RESPIRATORY (INHALATION)
Qty: 54 G | Refills: 1 | Status: SHIPPED | OUTPATIENT
Start: 2024-04-05

## 2024-04-08 DIAGNOSIS — D64.9 ANEMIA, UNSPECIFIED TYPE: ICD-10-CM

## 2024-04-08 RX ORDER — FERROUS SULFATE 325(65) MG
1 TABLET ORAL 2 TIMES DAILY
Qty: 60 TABLET | Refills: 0 | Status: SHIPPED | OUTPATIENT
Start: 2024-04-08

## 2024-04-10 ENCOUNTER — TELEPHONE (OUTPATIENT)
Dept: PRIMARY CARE | Facility: CLINIC | Age: 79
End: 2024-04-10
Payer: MEDICARE

## 2024-04-10 DIAGNOSIS — E11.69 TYPE 2 DIABETES MELLITUS WITH OTHER SPECIFIED COMPLICATION, UNSPECIFIED WHETHER LONG TERM INSULIN USE (MULTI): ICD-10-CM

## 2024-04-10 PROCEDURE — RXMED WILLOW AMBULATORY MEDICATION CHARGE

## 2024-04-10 RX ORDER — TIRZEPATIDE 7.5 MG/.5ML
7.5 INJECTION, SOLUTION SUBCUTANEOUS
Qty: 2 ML | Refills: 3 | Status: SHIPPED | OUTPATIENT
Start: 2024-04-14 | End: 2024-05-01 | Stop reason: SDUPTHER

## 2024-04-10 NOTE — TELEPHONE ENCOUNTER
Patient is calling because she takes Mounjaro 7.5 mg one time per week. She is due to inject tomorrow but all of the pharmacies around here are all out.   [facility] won't have it in until 4/19/24  She wasn't sure if it would be ok to miss a week or can she get samples of this?  Please advise  Thanks      She wanted to leave this message with you and to leave a message for Sadie to call her back (she states she left a message yesterday for Sadie)     Patient's # [phone]

## 2024-04-10 NOTE — TELEPHONE ENCOUNTER
Mounjaro reordered to [facility] for patient. LVM for patient with this information.

## 2024-04-11 ENCOUNTER — PHARMACY VISIT (OUTPATIENT)
Dept: PHARMACY | Facility: CLINIC | Age: 79
End: 2024-04-11
Payer: COMMERCIAL

## 2024-04-15 ENCOUNTER — PATIENT OUTREACH (OUTPATIENT)
Dept: PRIMARY CARE | Facility: CLINIC | Age: 79
End: 2024-04-15
Payer: MEDICARE

## 2024-04-15 DIAGNOSIS — F43.21 GRIEF: ICD-10-CM

## 2024-04-15 DIAGNOSIS — E11.69 TYPE 2 DIABETES MELLITUS WITH OTHER SPECIFIED COMPLICATION, UNSPECIFIED WHETHER LONG TERM INSULIN USE (MULTI): ICD-10-CM

## 2024-04-15 DIAGNOSIS — E11.51 TYPE II DIABETES MELLITUS WITH PERIPHERAL CIRCULATORY DISORDER (MULTI): ICD-10-CM

## 2024-04-15 PROCEDURE — 99490 CHRNC CARE MGMT STAFF 1ST 20: CPT | Performed by: FAMILY MEDICINE

## 2024-04-15 NOTE — PROGRESS NOTES
Chart review complete.     Confirmation of at least 2 patient identifiers.  Monthly outreach complete.    Denies any new questions or concerns since PCP appointment 4/3/24.     Patient main concerns at this time:  -nausea/vomiting: occurs after taking dose of Mounjaro. States recently increased to 7.5 mg and since has been getting sick. Patient states she takes medication on Thursdays. Today since its been a few days since taking she states she is feeling better. Discussed interventions that can be done to help with the symptoms she is experiencing and she states understanding. Patient brings up order for Zofran but states she does not want it at this time. Discussed medications and is taking as Rx'd.   - foot care: denies any new wounds, ulcers, calluses. Patient would like referral to podiatry as she does not believe her podiatrist is with [facility] any longer. Referral placed.   - grief: ongoing, patient admits worsening since loss of son in November. Is agreeable to  referral and wants TELE/ phone visits. Message sent to TIBURC[facility] Sapp. Patient does include that she was so depressed after the loss of her son that she quit going to bible study because she was angry that god would take her son. Support provided. She does state she's been going back to bible study.    Patient denies further needs or concerns at this time. States understanding to call if any needs arise.

## 2024-04-23 ENCOUNTER — TELEPHONE (OUTPATIENT)
Dept: PRIMARY CARE | Facility: CLINIC | Age: 79
End: 2024-04-23
Payer: MEDICARE

## 2024-04-23 DIAGNOSIS — I25.10 CORONARY ARTERY DISEASE: Primary | ICD-10-CM

## 2024-04-23 DIAGNOSIS — F32.A DEPRESSION, UNSPECIFIED DEPRESSION TYPE: ICD-10-CM

## 2024-04-23 RX ORDER — VILAZODONE HYDROCHLORIDE 40 MG/1
40 TABLET ORAL
Qty: 30 TABLET | Refills: 4 | Status: SHIPPED | OUTPATIENT
Start: 2024-04-23

## 2024-04-23 NOTE — TELEPHONE ENCOUNTER
PATIENT AWARE THAT YOU ARE OUT OF THE OFFICE AND WANTED MESSAGE LEFT FOR WHEN YOU RETURN.    PATIENT IS CALLING FOR A RENEWAL ON HER HANDICAP PLACARD.  IF OK WHAT DIAGNOSIS SHOULD WE USE?  PLEASE ADVISE.    WHEN IT IS APPROVED AND SIGNED, PATIENT WOULD LIKE FOR US TO MAIL IT TO HER.

## 2024-04-24 NOTE — TELEPHONE ENCOUNTER
Lucio created and awaiting signature. Placard in folder   · Mood stable at time of assessment  · Very interactive  · Able to express needs  · Continue supportive care while at short-term rehab  · Encourage participation in group activities   · Continue to monitor

## 2024-04-29 DIAGNOSIS — K59.00 CONSTIPATION, UNSPECIFIED CONSTIPATION TYPE: ICD-10-CM

## 2024-04-29 RX ORDER — DOCUSATE SODIUM 100 MG/1
CAPSULE, LIQUID FILLED ORAL
Qty: 30 CAPSULE | Refills: 0 | Status: SHIPPED | OUTPATIENT
Start: 2024-04-29

## 2024-04-29 NOTE — TELEPHONE ENCOUNTER
Rx Refill Request Telephone Encounter    Name:  Diane Montemayor  :  [id number]  Medication Name:  docusate sodium (Colace) 100 mg capsule     Specific Pharmacy location:  [facility]  Date of last appointment:  4/3/2024  Date of next appointment: 7/3/2024  Best number to reach patient:  [phone]

## 2024-05-01 ENCOUNTER — TELEMEDICINE (OUTPATIENT)
Dept: PHARMACY | Facility: HOSPITAL | Age: 79
End: 2024-05-01
Payer: MEDICARE

## 2024-05-01 DIAGNOSIS — E11.69 TYPE 2 DIABETES MELLITUS WITH OTHER SPECIFIED COMPLICATION, UNSPECIFIED WHETHER LONG TERM INSULIN USE (MULTI): ICD-10-CM

## 2024-05-01 RX ORDER — TIRZEPATIDE 7.5 MG/.5ML
7.5 INJECTION, SOLUTION SUBCUTANEOUS
Qty: 2 ML | Refills: 3 | Status: SHIPPED | OUTPATIENT
Start: 2024-05-05

## 2024-05-01 NOTE — ASSESSMENT & PLAN NOTE
Patient's goal A1c is < 8%.  Is pt at goal? Yes, 6.4% on 4/2/24  Patient's SMBGs are at goal.   Rationale for plan: Tolerating Mounjaro well and BG at goal. Some lows in the mornings without sx. Will decrease glimepiride to 2mg once daily and continue current Mounjaro dose.      Medication Changes:  CONTINUE:  Mounjaro 7.5mg once weekly  Jardiance 25mg daily  Metformin XR 1000mg BID    DECREASE glimepiride to 2mg once daily with dinner      Monitoring and Education:  Diabetes Education  Rule of 15: eating ~15 g of carbs when BG less than 80 (half cup juice, 3-4 glucose tabs).  Recognize symptoms of high and low blood sugar.   Eat a realistic healthy diet consisting of fruits, vegetables, fiber, protein food choices on a regular basis and be aware of portion/serving sizes. Reduce carbohydrate consumption and always consume with protein and fat. Avoid foods high in saturated/trans fat, high salt content, and sweets and beverages with added sugars.  Limit alcohol consumption; alcohol may affect your blood sugar and cause hypoglycemia.   Stay active and incorporate ~30 mins of exercise into your daily routine to manage your weight and increase the body's acceptance of insulin.    Mounjaro Education:   - Counseled patient on MOA, expectations, side effects, duration of therapy, contraindications, administration, and monitoring parameters  - Answered all patient questions and concerns  - Counseled patient on Mounjaro MOA, expectations, side effects, duration of therapy, administration, and monitoring parameters.  - Provided detailed dosing and administration counseling to ensure proper technique.   - Reviewed Mounjaro titration schedule, starting with 2.5 mg once weekly to a goal of 15 mg once weekly if tolerated  - Counseled patient on the benefits of GLP-1ra glycemic control and weight loss  - Reviewed storage requirements of Mounjaro when not in use, and when to administer the medication if a dose is missed.  - Advised  patient that they may experience improved satiety after meals and portion sizes of meals may be reduced as doses of Mounjaro increase.

## 2024-05-01 NOTE — PROGRESS NOTES
Patient ID: Diane Montemayor is a 79 y.o. female who presents for Diabetes Pt is here for Follow Up appointment.    PCP/ Referring Provider: Ramses Black DO  Last visit with PCP: 4.3.24  next visit: 7.3.24    Verbal consent to manage patient's drug therapy was obtained from patient. They were informed they may decline to participate or withdraw from participation in pharmacy services at any time. Patient is  agreeable to detailed voice messages if unable to be reached.     Patient enrolled in [facility]      Subjective   Treatment Adherence:   Patient did take medications today.   Number of missed doses in last 7 days: 0.  Why? N/A     Preferred pharmacy: [facility]  Can patient afford prescribed medications: Yes, denies cost issues at this time     Current exercise: nothing formal; walking in the hallway at home, grocery shopping, chair exercises, etc.  Current diet: cut out chips and snacks  Drinks 1 protein shake per day (30G)      HPI  DIABETES MELLITUS TYPE 2:  Diagnosed with diabetes:  >40 years. Known diabetic complications: toe amputation, recent toe infection.  Last optometry exam:   Most recent visit in Podiatry: sees at wound center; -- patient denies sores or cuts on feet today      Current diabetic medications include:  Mounjaro 7.5mg weekly on  (6th dose this week)  Jardiance 25mg daily  Glimepiride 2mg BID  Metformin XR 1000mg BID      Home blood glucose records (mg/dL)  FBG: highest - 134 (ate out for dinner night before), 90s usually    FB, 87, 90, 120, 118, 130, 121, 126, 146 (ate out before), 118  FB,121,99,131,137,138,98    Any episodes of hypoglycemia? No, patient denies .  Did patient treat episode of hypoglycemia appropriately? N/A  Does pt have proteinuria? Yes If appropriate, is patient on ACEi/ARB? Yes,      Secondary Prevention  Statin? Yes  ACE-I/ARB? Yes  Aspirin? Yes    Pertinent PMH Review:  PMH of Pancreatitis: No  PMH of Retinopathy: No  PMH of Urinary  Tract Infections: No; + yeast infection  PMH of MTC: No        Review of Systems    Objective     There were no vitals taken for this visit.     Labs  Lab Results   Component Value Date    BILITOT 0.4 06/29/2023    CALCIUM 9.8 04/02/2024    CO2 24 04/02/2024     04/02/2024    CREATININE 1.17 (H) 04/02/2024    GLUCOSE 129 (H) 04/02/2024    ALKPHOS 72 06/29/2023    K 4.7 04/02/2024    PROT 7.7 06/29/2023     04/02/2024    AST 18 06/29/2023    ALT 17 06/29/2023    BUN 21 04/02/2024    ANIONGAP 17 04/02/2024    MG CANCELED 07/01/2023    PHOS 3.3 03/12/2020    ALBUMIN 4.4 06/29/2023    GFRF CANCELED 07/01/2023    GFRMALE CANCELED 07/01/2023     Lab Results   Component Value Date    TRIG 146 06/21/2023    CHOL 163 06/21/2023    HDL 47.6 06/21/2023     Lab Results   Component Value Date    HGBA1C 6.4 (H) 04/02/2024       Current Outpatient Medications   Medication Instructions    albuterol 90 mcg/actuation inhaler INHALE 2 PUFFS BY MOUTH AS DIRECTED EVERY 4 HOURS AS NEEDED FOR COUGH OR WHEEZING    aspirin 81 mg, oral, Nightly    coenzyme Q10 400 mg capsule 1 capsule, oral, Nightly    dilTIAZem XR (DILACOR XR) 240 mg, oral, Daily    docusate sodium (Colace) 100 mg capsule TAKE 1 CAPSULE(100 MG) BY MOUTH TWICE DAILY AS NEEDED FOR CONSTIPATION    empagliflozin (JARDIANCE) 25 mg, oral, Daily    ferrous sulfate, 325 mg ferrous sulfate, (FeroSuL) tablet 1 tablet, oral, 2 times daily    fluconazole (DIFLUCAN) 150 mg, oral, Weekly    glimepiride (AMARYL) 4 mg, oral, 2 times daily    hydroCHLOROthiazide (HYDRODIURIL) 25 mg, oral, Daily    ipratropium (Atrovent) 0.02 % nebulizer solution USE 1 VIAL VIA NEBULIZER FOUR TIMES DAILY    lactobacillus (Culturelle) 10 billion cell capsule 1 capsule, oral, Daily    levothyroxine (Synthroid, Levoxyl) 150 mcg tablet Take 1 tablet (150 mcg) daily except on Tuesdays and Thursdays take 2 tablets (300 mcg)    losartan (COZAAR) 25 mg, oral, 2 times daily    magnesium oxide (MAG-OX) 400  mg, oral, Daily    metFORMIN  mg 24 hr tablet TAKE 2 TABLETS BY MOUTH EVERY MORNING AND EVERY EVENING WITH DINNER    Mounjaro 7.5 mg, subcutaneous, Once Weekly    nebulizer accessories kit USE 4 TIMES DAILY    omeprazole (PriLOSEC) 40 mg DR capsule TAKE 1 CAPSULE BY MOUTH TWICE DAILY    rosuvastatin (Crestor) 10 mg tablet TAKE 1 TABLET BY MOUTH EVERY DAY AT BEDTIME    spironolactone (ALDACTONE) 25 mg, oral, Daily    vilazodone (VIIBRYD) 40 mg, oral, Daily with breakfast        Assessment/Plan   Problem List Items Addressed This Visit             ICD-10-CM    Type 2 diabetes mellitus (Multi) E11.9     Patient's goal A1c is < 8%.  Is pt at goal? Yes, 6.4% on 4/2/24  Patient's SMBGs are at goal.   Rationale for plan: Tolerating Mounjaro well and BG at goal. Some lows in the mornings without sx. Will decrease glimepiride to 2mg once daily and continue current Mounjaro dose.      Medication Changes:  CONTINUE:  Mounjaro 7.5mg once weekly  Jardiance 25mg daily  Metformin XR 1000mg BID    DECREASE glimepiride to 2mg once daily with dinner      Monitoring and Education:  Diabetes Education  Rule of 15: eating ~15 g of carbs when BG less than 80 (half cup juice, 3-4 glucose tabs).  Recognize symptoms of high and low blood sugar.   Eat a realistic healthy diet consisting of fruits, vegetables, fiber, protein food choices on a regular basis and be aware of portion/serving sizes. Reduce carbohydrate consumption and always consume with protein and fat. Avoid foods high in saturated/trans fat, high salt content, and sweets and beverages with added sugars.  Limit alcohol consumption; alcohol may affect your blood sugar and cause hypoglycemia.   Stay active and incorporate ~30 mins of exercise into your daily routine to manage your weight and increase the body's acceptance of insulin.    Mounjaro Education:   - Counseled patient on MOA, expectations, side effects, duration of therapy, contraindications, administration, and  monitoring parameters  - Answered all patient questions and concerns  - Counseled patient on Mounjaro MOA, expectations, side effects, duration of therapy, administration, and monitoring parameters.  - Provided detailed dosing and administration counseling to ensure proper technique.   - Reviewed Mounjaro titration schedule, starting with 2.5 mg once weekly to a goal of 15 mg once weekly if tolerated  - Counseled patient on the benefits of GLP-1ra glycemic control and weight loss  - Reviewed storage requirements of Mounjaro when not in use, and when to administer the medication if a dose is missed.  - Advised patient that they may experience improved satiety after meals and portion sizes of meals may be reduced as doses of Mounjaro increase.            Labs ordered:  none     Referrals:  none     Follow-up: May 22 at 0830 via telephone with Sadie     Time spent with pt: Total length of time 15 (minutes) of the encounter and more than 50% was spent counseling the patient.    Sadie Montgomery, PharmD, LIS  Clinical Pharmacist  [phone]  [email or web address]    Continue all meds under the continuation of care with the referring provider and clinical pharmacy team.    Verbal consent to manage patient's drug therapy was obtained from the patient. They were informed they may decline to participate or withdraw from participation in pharmacy services at any time.

## 2024-05-02 ENCOUNTER — OFFICE VISIT (OUTPATIENT)
Dept: WOUND CARE | Facility: CLINIC | Age: 79
End: 2024-05-02
Payer: MEDICARE

## 2024-05-02 PROCEDURE — 99213 OFFICE O/P EST LOW 20 MIN: CPT

## 2024-05-02 PROCEDURE — 97597 DBRDMT OPN WND 1ST 20 CM/<: CPT | Mod: Q7

## 2024-05-02 PROCEDURE — 11721 DEBRIDE NAIL 6 OR MORE: CPT | Mod: Q7

## 2024-05-03 DIAGNOSIS — E03.9 HYPOTHYROIDISM, UNSPECIFIED TYPE: ICD-10-CM

## 2024-05-04 RX ORDER — LEVOTHYROXINE SODIUM 150 UG/1
TABLET ORAL
Qty: 114 TABLET | Refills: 1 | Status: SHIPPED | OUTPATIENT
Start: 2024-05-04

## 2024-05-10 ENCOUNTER — PATIENT OUTREACH (OUTPATIENT)
Dept: PRIMARY CARE | Facility: CLINIC | Age: 79
End: 2024-05-10
Payer: MEDICARE

## 2024-05-10 DIAGNOSIS — I25.10 CORONARY ARTERY DISEASE, UNSPECIFIED VESSEL OR LESION TYPE, UNSPECIFIED WHETHER ANGINA PRESENT, UNSPECIFIED WHETHER NATIVE OR TRANSPLANTED HEART: ICD-10-CM

## 2024-05-10 DIAGNOSIS — I10 HYPERTENSION, UNSPECIFIED TYPE: ICD-10-CM

## 2024-05-10 DIAGNOSIS — F43.21 GRIEF: ICD-10-CM

## 2024-05-10 DIAGNOSIS — E11.69 TYPE 2 DIABETES MELLITUS WITH OTHER SPECIFIED COMPLICATION, UNSPECIFIED WHETHER LONG TERM INSULIN USE (MULTI): ICD-10-CM

## 2024-05-10 PROCEDURE — 99490 CHRNC CARE MGMT STAFF 1ST 20: CPT | Performed by: FAMILY MEDICINE

## 2024-05-10 NOTE — PROGRESS NOTES
Chart review complete.     Confirmation of at least 2 patient identifiers.  Monthly outreach complete.    Patient denies any main concerns at this time. States things are going fairly well. Previously was experiencing sickness with use of Mounjaro. Reports issue has resolved and its been 3 weeks since she's gotten sick or had diarrhea from medication. She did report the start of a callus on her foot so she went in to see Dr. Rodrigues. Reports she plans to stay with Dr. Rodrigues even though provider is changing practices. Will notify if any issues occur from that transition.     Previously referral was placed to  services - [facility] S[facility]. They have an appointment set for next Thursday in person. Diane is resistant to in person appointments due to less freedom and having to travel but states she's going to try and keep the appointment and is hoping later to continue through TELE services.Reports she knows she would benefit from counseling.     Patient denies further needs or concerns at this time. States understanding to call if any needs arise.

## 2024-05-16 ENCOUNTER — APPOINTMENT (OUTPATIENT)
Dept: PRIMARY CARE | Facility: CLINIC | Age: 79
End: 2024-05-16
Payer: MEDICARE

## 2024-05-21 NOTE — PROGRESS NOTES
Patient ID: Diane Montemayor is a 79 y.o. female who presents for Diabetes Pt is here for Follow Up appointment.    PCP/ Referring Provider: Ramses Black DO  Last visit with PCP: 4.3.24  next visit: 7.3.24    Verbal consent to manage patient's drug therapy was obtained from patient. They were informed they may decline to participate or withdraw from participation in pharmacy services at any time. Patient is  agreeable to detailed voice messages if unable to be reached.     Patient enrolled in [facility]      Subjective   Treatment Adherence:   Patient did take medications today.   Number of missed doses in last 7 days: 0.  Why? N/A     Preferred pharmacy: [facility]  Can patient afford prescribed medications: Yes, denies cost issues at this time     Current exercise: nothing formal; walking in the hallway at home, grocery shopping, chair exercises, etc.  Current diet: cut out chips and snacks  Drinks 1 protein shake per day (30G)      HPI  DIABETES MELLITUS TYPE 2:  Diagnosed with diabetes:  >40 years. Known diabetic complications: toe amputation, recent toe infection.  Last optometry exam:   Most recent visit in Podiatry: sees at wound center; -- patient denies sores or cuts on feet today      Current diabetic medications include:  Mounjaro 7.5mg weekly on  (6th dose this week)  Jardiance 25mg daily  Glimepiride 2mg once daily with dinner  Metformin XR 1000mg BID      Home blood glucose records (mg/dL)  FB this morning (had chocolate icecream last night), usually 80-90s  FBG: highest - 134 (ate out for dinner night before), 90s usually      Any episodes of hypoglycemia? No, patient denies .  Did patient treat episode of hypoglycemia appropriately? N/A  Does pt have proteinuria? Yes If appropriate, is patient on ACEi/ARB? Yes,      Secondary Prevention  Statin? Yes  ACE-I/ARB? Yes  Aspirin? Yes    Pertinent PMH Review:  PMH of Pancreatitis: No  PMH of Retinopathy: No  PMH of Urinary Tract  Infections: No; + yeast infection  PMH of MTC: No        Review of Systems    Objective     There were no vitals taken for this visit.     Labs  Lab Results   Component Value Date    BILITOT 0.4 06/29/2023    CALCIUM 9.8 04/02/2024    CO2 24 04/02/2024     04/02/2024    CREATININE 1.17 (H) 04/02/2024    GLUCOSE 129 (H) 04/02/2024    ALKPHOS 72 06/29/2023    K 4.7 04/02/2024    PROT 7.7 06/29/2023     04/02/2024    AST 18 06/29/2023    ALT 17 06/29/2023    BUN 21 04/02/2024    ANIONGAP 17 04/02/2024    MG CANCELED 07/01/2023    PHOS 3.3 03/12/2020    ALBUMIN 4.4 06/29/2023    GFRF CANCELED 07/01/2023    GFRMALE CANCELED 07/01/2023     Lab Results   Component Value Date    TRIG 146 06/21/2023    CHOL 163 06/21/2023    HDL 47.6 06/21/2023     Lab Results   Component Value Date    HGBA1C 6.4 (H) 04/02/2024       Current Outpatient Medications   Medication Instructions    albuterol 90 mcg/actuation inhaler INHALE 2 PUFFS BY MOUTH AS DIRECTED EVERY 4 HOURS AS NEEDED FOR COUGH OR WHEEZING    aspirin 81 mg, oral, Nightly    coenzyme Q10 400 mg capsule 1 capsule, oral, Nightly    dilTIAZem XR (DILACOR XR) 240 mg, oral, Daily    docusate sodium (Colace) 100 mg capsule TAKE 1 CAPSULE(100 MG) BY MOUTH TWICE DAILY AS NEEDED FOR CONSTIPATION    empagliflozin (JARDIANCE) 25 mg, oral, Daily    ferrous sulfate, 325 mg ferrous sulfate, (FeroSuL) tablet 1 tablet, oral, 2 times daily    fluconazole (DIFLUCAN) 150 mg, oral, Weekly    glimepiride (AMARYL) 4 mg, oral, 2 times daily    hydroCHLOROthiazide (HYDRODIURIL) 25 mg, oral, Daily    ipratropium (Atrovent) 0.02 % nebulizer solution USE 1 VIAL VIA NEBULIZER FOUR TIMES DAILY    lactobacillus (Culturelle) 10 billion cell capsule 1 capsule, oral, Daily    levothyroxine (Synthroid, Levoxyl) 150 mcg tablet TAKE 1 TABLET BY MOUTH DAILY EXCEPT 2 TABLETS ON TUESDAY AND THURSDAY    losartan (COZAAR) 25 mg, oral, 2 times daily    magnesium oxide (MAG-OX) 400 mg, oral, Daily     metFORMIN  mg 24 hr tablet TAKE 2 TABLETS BY MOUTH EVERY MORNING AND EVERY EVENING WITH DINNER    Mounjaro 7.5 mg, subcutaneous, Once Weekly    nebulizer accessories kit USE 4 TIMES DAILY    omeprazole (PriLOSEC) 40 mg DR capsule TAKE 1 CAPSULE BY MOUTH TWICE DAILY    rosuvastatin (Crestor) 10 mg tablet TAKE 1 TABLET BY MOUTH EVERY DAY AT BEDTIME    spironolactone (ALDACTONE) 25 mg, oral, Daily    vilazodone (VIIBRYD) 40 mg, oral, Daily with breakfast        Assessment/Plan   Problem List Items Addressed This Visit             ICD-10-CM    Type 2 diabetes mellitus (Multi) E11.9     Patient's goal A1c is < 8%.  Is pt at goal? Yes, 6.4% on 4/2/24  Patient's SMBGs are at goal.   Rationale for plan: Sugars stable with decreased dose of glimepiride. Will stop completely and assess. Continue all other medications. Can continue Mounjaro titration in future if needed for additional control.      Medication Changes:  CONTINUE:  Mounjaro 7.5mg once weekly  Jardiance 25mg daily  Metformin XR 1000mg BID    STOP glimepiride      Monitoring and Education:  Diabetes Education  Rule of 15: eating ~15 g of carbs when BG less than 80 (half cup juice, 3-4 glucose tabs).  Recognize symptoms of high and low blood sugar.   Eat a realistic healthy diet consisting of fruits, vegetables, fiber, protein food choices on a regular basis and be aware of portion/serving sizes. Reduce carbohydrate consumption and always consume with protein and fat. Avoid foods high in saturated/trans fat, high salt content, and sweets and beverages with added sugars.  Limit alcohol consumption; alcohol may affect your blood sugar and cause hypoglycemia.   Stay active and incorporate ~30 mins of exercise into your daily routine to manage your weight and increase the body's acceptance of insulin.    Mounjaro Education:   - Counseled patient on MOA, expectations, side effects, duration of therapy, contraindications, administration, and monitoring  parameters  - Answered all patient questions and concerns  - Counseled patient on Mounjaro MOA, expectations, side effects, duration of therapy, administration, and monitoring parameters.  - Provided detailed dosing and administration counseling to ensure proper technique.   - Reviewed Mounjaro titration schedule, starting with 2.5 mg once weekly to a goal of 15 mg once weekly if tolerated  - Counseled patient on the benefits of GLP-1ra glycemic control and weight loss  - Reviewed storage requirements of Mounjaro when not in use, and when to administer the medication if a dose is missed.  - Advised patient that they may experience improved satiety after meals and portion sizes of meals may be reduced as doses of Mounjaro increase.         Relevant Orders    Follow Up In [facility] - Pharmacy         Labs ordered:  none     Referrals:  none     Follow-up: July 17 at 0830 via telephone     Time spent with pt: Total length of time 20 (minutes) of the encounter and more than 50% was spent counseling the patient.    Sadie Montgomery, PharmD, LIS  Clinical Pharmacist  [phone]  [email or web address]    Continue all meds under the continuation of care with the referring provider and clinical pharmacy team.    Verbal consent to manage patient's drug therapy was obtained from the patient. They were informed they may decline to participate or withdraw from participation in pharmacy services at any time.

## 2024-05-22 ENCOUNTER — TELEMEDICINE (OUTPATIENT)
Dept: PHARMACY | Facility: HOSPITAL | Age: 79
End: 2024-05-22
Payer: MEDICARE

## 2024-05-22 DIAGNOSIS — E11.69 TYPE 2 DIABETES MELLITUS WITH OTHER SPECIFIED COMPLICATION, UNSPECIFIED WHETHER LONG TERM INSULIN USE (MULTI): ICD-10-CM

## 2024-05-22 NOTE — ASSESSMENT & PLAN NOTE
Patient's goal A1c is < 8%.  Is pt at goal? Yes, 6.4% on 4/2/24  Patient's SMBGs are at goal.   Rationale for plan: Sugars stable with decreased dose of glimepiride. Will stop completely and assess. Continue all other medications. Can continue Mounjaro titration in future if needed for additional control.      Medication Changes:  CONTINUE:  Mounjaro 7.5mg once weekly  Jardiance 25mg daily  Metformin XR 1000mg BID    STOP glimepiride      Monitoring and Education:  Diabetes Education  Rule of 15: eating ~15 g of carbs when BG less than 80 (half cup juice, 3-4 glucose tabs).  Recognize symptoms of high and low blood sugar.   Eat a realistic healthy diet consisting of fruits, vegetables, fiber, protein food choices on a regular basis and be aware of portion/serving sizes. Reduce carbohydrate consumption and always consume with protein and fat. Avoid foods high in saturated/trans fat, high salt content, and sweets and beverages with added sugars.  Limit alcohol consumption; alcohol may affect your blood sugar and cause hypoglycemia.   Stay active and incorporate ~30 mins of exercise into your daily routine to manage your weight and increase the body's acceptance of insulin.    Mounjaro Education:   - Counseled patient on MOA, expectations, side effects, duration of therapy, contraindications, administration, and monitoring parameters  - Answered all patient questions and concerns  - Counseled patient on Mounjaro MOA, expectations, side effects, duration of therapy, administration, and monitoring parameters.  - Provided detailed dosing and administration counseling to ensure proper technique.   - Reviewed Mounjaro titration schedule, starting with 2.5 mg once weekly to a goal of 15 mg once weekly if tolerated  - Counseled patient on the benefits of GLP-1ra glycemic control and weight loss  - Reviewed storage requirements of Mounjaro when not in use, and when to administer the medication if a dose is missed.  - Advised  patient that they may experience improved satiety after meals and portion sizes of meals may be reduced as doses of Mounjaro increase.

## 2024-05-23 DIAGNOSIS — E11.69 TYPE 2 DIABETES MELLITUS WITH OTHER SPECIFIED COMPLICATION, UNSPECIFIED WHETHER LONG TERM INSULIN USE (MULTI): ICD-10-CM

## 2024-05-23 RX ORDER — EMPAGLIFLOZIN 25 MG/1
25 TABLET, FILM COATED ORAL DAILY
Qty: 90 TABLET | Refills: 0 | Status: SHIPPED | OUTPATIENT
Start: 2024-05-23

## 2024-06-11 ENCOUNTER — PATIENT OUTREACH (OUTPATIENT)
Dept: PRIMARY CARE | Facility: CLINIC | Age: 79
End: 2024-06-11
Payer: MEDICARE

## 2024-06-11 DIAGNOSIS — E11.69 TYPE 2 DIABETES MELLITUS WITH OTHER SPECIFIED COMPLICATION, UNSPECIFIED WHETHER LONG TERM INSULIN USE (MULTI): ICD-10-CM

## 2024-06-11 DIAGNOSIS — F32.A DEPRESSION, UNSPECIFIED DEPRESSION TYPE: ICD-10-CM

## 2024-06-11 DIAGNOSIS — F43.21 GRIEF: ICD-10-CM

## 2024-06-11 NOTE — PROGRESS NOTES
"Chart review complete.     Confirmation of at least 2 patient identifiers.  Monthly outreach complete.    Associated Chronic Conditions:  Type 2 diabetes mellitus with other specified complication, unspecified whether long term insulin use (Multi)  Grief  Depression, unspecified depression type     Patient main concerns at this time:  -Grief: Patient was to have appointment with  services within [facility]. Patient reports cancelling appointment due to not wanting to leave house, uncomfortable driving. Patient states she is \"shattered from grief, doesn't want to have to go anywhere or talk to anyone\". Reports she is not sleeping, can't concentrate, lacks sabrina. Watching a lot of TV/ Netflix. Attempted to get back into david via prayer book/Bible study - not doing it anymore, says she's angry. Patient states she's in a \"deep black hole\". Gets up its dark, goes to bed and its dark. Home alone a lot because son travels. Provider utilized empathic listening and discussed coping skills patient can use.     Discussed use of community resources to aid in companionship and transportation. Patient is not opposed to idea. States in past she contacted her [employer] and they were very supportive. Patient is in agreeance to reach out to [employer] and ask for assistance. Aware this nurse will reach out in the coming week to FU on progress and assist further if needed.     Patient denies thoughts of harming herself or others.    -Foot wound: Reports new wound on foot and that makes her upset. Thinks its new shoes she got. States she is still FU with podiatry.     - DM: Feels very good about where she is at with her diabeties. Was 130 today. Does occasionally have lows such as today at 69 , reports symptoms when low - nausea. Thinks its the same day she takes Mounjaro. States she lacks an appetite and attributes not eating to the lows. Discussed maintaining meals and fast carbs in times of low blood sugar. Patient states understanding and is " aware of s/s to monitor for. Is keeping track of numbers. Has lost 32 lbs. Does report some difficulty getting meds from pharmacy.     Patient denies further needs or concerns at this time. States understanding to call if any needs arise. Otherwise is aware nurse will reach out in coming week to FU.

## 2024-06-18 NOTE — PROGRESS NOTES
Outreach to patient to follow up after talk last week. SG requesting return call.     Patient returned call. Says she did reach out to [employer] and left a message requesting a return call and assistance. She states she also reached out to MARCELO Sapp to get rescheduled.     Patient reports things are still hard but she's trying to make the steps necessary to start feeling better.     Addressed pharmacy concerns. Recommended patient notify this nurse or clinical pharmacist if she starts having difficulty getting Mounjaro from [facility].     Patient needs assistance accessing [employer]. Reset [employer]t with patient and attempted to help her login remotely. Will contact nurse if does not access or needs anything additional.

## 2024-06-21 DIAGNOSIS — E11.69 TYPE 2 DIABETES MELLITUS WITH OTHER SPECIFIED COMPLICATION, UNSPECIFIED WHETHER LONG TERM INSULIN USE (MULTI): ICD-10-CM

## 2024-06-21 DIAGNOSIS — I10 HYPERTENSION, UNSPECIFIED TYPE: ICD-10-CM

## 2024-06-21 DIAGNOSIS — E78.5 HYPERLIPIDEMIA, UNSPECIFIED HYPERLIPIDEMIA TYPE: ICD-10-CM

## 2024-06-21 DIAGNOSIS — E11.69 TYPE 2 DIABETES MELLITUS WITH OTHER SPECIFIED COMPLICATION, WITHOUT LONG-TERM CURRENT USE OF INSULIN (MULTI): ICD-10-CM

## 2024-06-21 DIAGNOSIS — I10 PRIMARY HYPERTENSION: ICD-10-CM

## 2024-06-21 RX ORDER — METFORMIN HYDROCHLORIDE 500 MG/1
TABLET, EXTENDED RELEASE ORAL
Qty: 360 TABLET | Refills: 0 | Status: SHIPPED | OUTPATIENT
Start: 2024-06-21

## 2024-06-21 RX ORDER — LOSARTAN POTASSIUM 25 MG/1
25 TABLET ORAL 2 TIMES DAILY
Qty: 180 TABLET | Refills: 0 | Status: SHIPPED | OUTPATIENT
Start: 2024-06-21

## 2024-06-21 RX ORDER — ROSUVASTATIN CALCIUM 10 MG/1
10 TABLET, COATED ORAL NIGHTLY
Qty: 90 TABLET | Refills: 0 | Status: SHIPPED | OUTPATIENT
Start: 2024-06-21

## 2024-06-21 RX ORDER — GLIMEPIRIDE 4 MG/1
4 TABLET ORAL 2 TIMES DAILY
Qty: 180 TABLET | Refills: 0 | Status: SHIPPED | OUTPATIENT
Start: 2024-06-21

## 2024-06-21 RX ORDER — SPIRONOLACTONE 25 MG/1
25 TABLET ORAL DAILY
Qty: 90 TABLET | Refills: 0 | Status: SHIPPED | OUTPATIENT
Start: 2024-06-21

## 2024-06-21 RX ORDER — HYDROCHLOROTHIAZIDE 25 MG/1
25 TABLET ORAL DAILY
Qty: 90 TABLET | Refills: 0 | Status: SHIPPED | OUTPATIENT
Start: 2024-06-21

## 2024-07-03 ENCOUNTER — APPOINTMENT (OUTPATIENT)
Dept: PRIMARY CARE | Facility: CLINIC | Age: 79
End: 2024-07-03
Payer: MEDICARE

## 2024-07-11 ENCOUNTER — PATIENT OUTREACH (OUTPATIENT)
Dept: PRIMARY CARE | Facility: CLINIC | Age: 79
End: 2024-07-11
Payer: MEDICARE

## 2024-07-11 DIAGNOSIS — E11.621 DIABETIC ULCER OF TOE OF RIGHT FOOT ASSOCIATED WITH TYPE 2 DIABETES MELLITUS, UNSPECIFIED ULCER STAGE (MULTI): ICD-10-CM

## 2024-07-11 DIAGNOSIS — F32.A DEPRESSION, UNSPECIFIED DEPRESSION TYPE: ICD-10-CM

## 2024-07-11 DIAGNOSIS — L97.519 DIABETIC ULCER OF TOE OF RIGHT FOOT ASSOCIATED WITH TYPE 2 DIABETES MELLITUS, UNSPECIFIED ULCER STAGE (MULTI): ICD-10-CM

## 2024-07-11 DIAGNOSIS — E11.51 TYPE 2 DIABETES MELLITUS WITH DIABETIC PERIPHERAL ANGIOPATHY WITHOUT GANGRENE, WITHOUT LONG-TERM CURRENT USE OF INSULIN (MULTI): ICD-10-CM

## 2024-07-11 RX ORDER — DOXYCYCLINE 100 MG/1
100 CAPSULE ORAL 2 TIMES DAILY
Qty: 28 CAPSULE | Refills: 0 | Status: CANCELLED | OUTPATIENT
Start: 2024-07-11 | End: 2024-07-25

## 2024-07-11 NOTE — PROGRESS NOTES
Patient call, states new wound to foot. Unable to get in to see podiatrist till Aug 5th. Worries of infection due to history, requesting Rx of Docycline and Vashe solution. Pended to PCP.     PCP would like patient to come in for appointment today. Called and discussed with patient. Denied coming in this week. Patient is open to coming in next week on Monday - scheduled. While on phone patient discloses this morning she fell. Was in bathroom and blacked out and fell off toilet. Denies hitting head. Denies any injuries. Reports after checked VS and glucose - all WNL. Discussed patient intake and she reports only taking in 2 bottles of water daily. States understanding to push fluids.     Also reports depressive symptoms, not improving. Is to meet with MARCELO Sapp next week for additional services with .     Inquiries if labs are needed prior to PCP appointment. Does not appeare any pressing needs for labs.     Patient denies further needs or concerns today. States understanding to call if anything would be needed.    PCP aware of all findings and requests.

## 2024-07-12 NOTE — PROGRESS NOTES
Subjective   Patient ID: Diane Montemayor is a 79 y.o. female who presents for Fall, Foot Wound Check, and Cyst.    HPI    pt is being seen for falls and right middle toe wound   Incident happen on 7/11/24   other symptoms includes sores or ulcer   Has tried to keep it clean ever day    Patient states this occurred due to hypoglycemia or low blood pressure she is not sure she has not taking the Amaryl in quite some time which is good which can cause low sugars  Patient has been taking mounjauro   States she has been losing weight   Last IO janina 232  Today 215 lb    Pt also has possible cyst or boil on coccyx 2 month   Pt state that cyst has been discharge   There is a couple of cyst in the area  Very sore at times   Pt has tried nothing for the symptoms     Pt fell last week.      She states that she checked her sugar, and her sugar was good.  She stood up in the bathroom and went down.   She was scare because she had a mini stroke before.          No other concern/question   Review of systems  ; Patient seen today for exam denies any problems with headaches or vision, denies any shortness of breath chest pain nausea or vomiting, no black stool no blood in the stool no heartburn type symptoms denies any problems with constipation or diarrhea, and no dysuria-type symptoms    The patient's allergies medications were reviewed with them today    The patient's social family and surgical history or also reviewed here today, along with her past medical history.     Objective     Alert and active in  no acute distress  HEENT TMs clear oropharynx negative nares clear no drainage noted neck supple  With no adenopathy   Heart regular rate and rhythm without murmur and no carotid bruits  Lungs- clear to auscultation bilaterally, no wheeze or rhonchi noted  Thyroid -negative masses or nodularity  Abdomen- soft times four quadrants , bowel sounds positive no masses or organomegaly, negative tenderness guarding or  "rebound  Neurological exam unremarkable- DTRs in upper and lower extremities within normal limits.   skin - mole noted in coccyx area.  With some irritation no signs of fistula or secondary infection    Extremities trace of edema    Right middle toe shows bony irritation from her second and third toe rubbing small mild erythema with her history of osteomyelitis will be aggressive      /56 (BP Location: Left arm, Patient Position: Sitting, BP Cuff Size: Adult)   Pulse 86   Temp 36.2 °C (97.2 °F) (Temporal)   Resp 16   Ht 1.651 m (5' 5\")   Wt 97.5 kg (215 lb)   SpO2 96%   BMI 35.78 kg/m²     Allergies   Allergen Reactions    Lisinopril Cough       Assessment/Plan   Problem List Items Addressed This Visit       Depression, unspecified    Type 2 diabetes mellitus with diabetic peripheral angiopathy without gangrene, without long-term current use of insulin (Multi)    Relevant Orders    POCT glycosylated hemoglobin (Hb A1C) manually resulted (Completed)    POCT fingerstick glucose manually resulted (Completed)     Other Visit Diagnoses       Hypertension, unspecified type        Relevant Medications    dilTIAZem XR (Dilacor XR) 240 mg 24 hr capsule    Pressure injury of skin of toe of right foot, unspecified injury stage        Relevant Medications    doxycycline (Vibramycin) 100 mg capsule    Atypical mole              Discussed patient's BMI and to institute calorie reduction and increase exercise to decrease risk of diabetes and heart disease in the future.    Refill Diltiazem.     A1C performed, 5.4%.    Stop Amaryl.    Hold hydrochlorothiazide.     Keep Bandaid or Coban on toe to add extra protection.  Use soap and water to cleanse toe.     Doxycycline prescribed today.  If any erythema or worsening let us know she may need to go to wound clinic again was seeing Dr. Mares has an appointment with him in a few weeks    If anything worsens or changes please call us at once, follow up in the office as " planned.    Scribe Attestation  By signing my name below, I, Bonnie Diane MA, [place]   attest that this documentation has been prepared under the direction and in the presence of Ramses Black DO.

## 2024-07-13 DIAGNOSIS — K21.9 GASTROESOPHAGEAL REFLUX DISEASE WITHOUT ESOPHAGITIS: ICD-10-CM

## 2024-07-13 DIAGNOSIS — R06.02 SOB (SHORTNESS OF BREATH): ICD-10-CM

## 2024-07-13 DIAGNOSIS — I10 HYPERTENSION, UNSPECIFIED TYPE: ICD-10-CM

## 2024-07-15 ENCOUNTER — APPOINTMENT (OUTPATIENT)
Dept: PRIMARY CARE | Facility: CLINIC | Age: 79
End: 2024-07-15
Payer: MEDICARE

## 2024-07-15 VITALS
HEART RATE: 86 BPM | OXYGEN SATURATION: 96 % | SYSTOLIC BLOOD PRESSURE: 100 MMHG | BODY MASS INDEX: 35.82 KG/M2 | TEMPERATURE: 97.2 F | DIASTOLIC BLOOD PRESSURE: 56 MMHG | RESPIRATION RATE: 16 BRPM | WEIGHT: 215 LBS | HEIGHT: 65 IN

## 2024-07-15 DIAGNOSIS — E11.51 TYPE 2 DIABETES MELLITUS WITH DIABETIC PERIPHERAL ANGIOPATHY WITHOUT GANGRENE, WITHOUT LONG-TERM CURRENT USE OF INSULIN (MULTI): ICD-10-CM

## 2024-07-15 DIAGNOSIS — L89.899 PRESSURE INJURY OF SKIN OF TOE OF RIGHT FOOT, UNSPECIFIED INJURY STAGE: ICD-10-CM

## 2024-07-15 DIAGNOSIS — I10 HYPERTENSION, UNSPECIFIED TYPE: ICD-10-CM

## 2024-07-15 DIAGNOSIS — D22.9 ATYPICAL MOLE: ICD-10-CM

## 2024-07-15 DIAGNOSIS — F32.A DEPRESSION, UNSPECIFIED DEPRESSION TYPE: ICD-10-CM

## 2024-07-15 LAB
POC FINGERSTICK BLOOD GLUCOSE: 110 MG/DL (ref 70–100)
POC HEMOGLOBIN A1C: 5.4 % (ref 4.2–6.5)

## 2024-07-15 PROCEDURE — 1157F ADVNC CARE PLAN IN RCRD: CPT | Performed by: FAMILY MEDICINE

## 2024-07-15 PROCEDURE — 1159F MED LIST DOCD IN RCRD: CPT | Performed by: FAMILY MEDICINE

## 2024-07-15 PROCEDURE — 99215 OFFICE O/P EST HI 40 MIN: CPT | Performed by: FAMILY MEDICINE

## 2024-07-15 PROCEDURE — 82962 GLUCOSE BLOOD TEST: CPT | Performed by: FAMILY MEDICINE

## 2024-07-15 PROCEDURE — 83036 HEMOGLOBIN GLYCOSYLATED A1C: CPT | Performed by: FAMILY MEDICINE

## 2024-07-15 PROCEDURE — 1158F ADVNC CARE PLAN TLK DOCD: CPT | Performed by: FAMILY MEDICINE

## 2024-07-15 PROCEDURE — 3074F SYST BP LT 130 MM HG: CPT | Performed by: FAMILY MEDICINE

## 2024-07-15 PROCEDURE — 1123F ACP DISCUSS/DSCN MKR DOCD: CPT | Performed by: FAMILY MEDICINE

## 2024-07-15 PROCEDURE — 1036F TOBACCO NON-USER: CPT | Performed by: FAMILY MEDICINE

## 2024-07-15 PROCEDURE — 1160F RVW MEDS BY RX/DR IN RCRD: CPT | Performed by: FAMILY MEDICINE

## 2024-07-15 PROCEDURE — 3078F DIAST BP <80 MM HG: CPT | Performed by: FAMILY MEDICINE

## 2024-07-15 RX ORDER — DILTIAZEM HYDROCHLORIDE 240 MG/1
240 CAPSULE, EXTENDED RELEASE ORAL DAILY
Qty: 90 CAPSULE | Refills: 1 | Status: SHIPPED | OUTPATIENT
Start: 2024-07-15

## 2024-07-15 RX ORDER — LANOLIN ALCOHOL/MO/W.PET/CERES
1000 CREAM (GRAM) TOPICAL DAILY
COMMUNITY

## 2024-07-15 RX ORDER — ALBUTEROL SULFATE 90 UG/1
AEROSOL, METERED RESPIRATORY (INHALATION)
Qty: 54 G | Refills: 1 | Status: SHIPPED | OUTPATIENT
Start: 2024-07-15

## 2024-07-15 RX ORDER — DOXYCYCLINE 100 MG/1
100 CAPSULE ORAL 2 TIMES DAILY
Qty: 14 CAPSULE | Refills: 1 | Status: SHIPPED | OUTPATIENT
Start: 2024-07-15 | End: 2024-07-22

## 2024-07-15 RX ORDER — OMEPRAZOLE 40 MG/1
CAPSULE, DELAYED RELEASE ORAL
Qty: 90 CAPSULE | Refills: 0 | Status: SHIPPED | OUTPATIENT
Start: 2024-07-15

## 2024-07-15 RX ORDER — SPIRONOLACTONE 25 MG/1
25 TABLET ORAL DAILY
Qty: 90 TABLET | Refills: 0 | Status: SHIPPED | OUTPATIENT
Start: 2024-07-15

## 2024-07-15 RX ORDER — HYDROCHLOROTHIAZIDE 25 MG/1
25 TABLET ORAL DAILY
Qty: 90 TABLET | Refills: 0 | Status: SHIPPED | OUTPATIENT
Start: 2024-07-15

## 2024-07-15 ASSESSMENT — ENCOUNTER SYMPTOMS
DEPRESSION: 0
OCCASIONAL FEELINGS OF UNSTEADINESS: 0
LOSS OF SENSATION IN FEET: 0

## 2024-07-15 NOTE — PATIENT INSTRUCTIONS

## 2024-07-17 ENCOUNTER — APPOINTMENT (OUTPATIENT)
Dept: PHARMACY | Facility: HOSPITAL | Age: 79
End: 2024-07-17
Payer: MEDICARE

## 2024-07-18 DIAGNOSIS — E03.9 HYPOTHYROIDISM, UNSPECIFIED TYPE: ICD-10-CM

## 2024-07-22 ENCOUNTER — TELEPHONE (OUTPATIENT)
Dept: PRIMARY CARE | Facility: CLINIC | Age: 79
End: 2024-07-22
Payer: MEDICARE

## 2024-07-22 DIAGNOSIS — K21.9 GASTROESOPHAGEAL REFLUX DISEASE WITHOUT ESOPHAGITIS: ICD-10-CM

## 2024-07-22 RX ORDER — LEVOTHYROXINE SODIUM 150 UG/1
TABLET ORAL
Qty: 114 TABLET | Refills: 1 | Status: SHIPPED | OUTPATIENT
Start: 2024-07-22

## 2024-07-22 NOTE — TELEPHONE ENCOUNTER
RECEIVED A PRIOR AUTHORIZATION FOR PRILOSEC 40 MG # 90 TAKE 1 TWICE DAILY.     IS THE QUANTITY  SUPPOSE TO BE 60 AND NOT 90.       IF IT IS SUPPOSE TO BE FOR 60 PILLS PER 30 DAYS A NEW SCRIPT NEEDS TO BE SENT TO PHARMACY.

## 2024-07-23 ENCOUNTER — TELEPHONE (OUTPATIENT)
Dept: PRIMARY CARE | Facility: CLINIC | Age: 79
End: 2024-07-23
Payer: MEDICARE

## 2024-07-23 NOTE — TELEPHONE ENCOUNTER
Give her a call and make sure she taking the omeprazole twice a day or once a day the pharmacist is given his grief and 60 or 90 days       Called patient, she states that she is taking the medication twice a day and usually gets  a 90-day supply so the quantity should be #180.

## 2024-07-23 NOTE — TELEPHONE ENCOUNTER
CAN A NEW ORDER FOR THE CORRECT QUANTITY BE RESENT TO PHARMACY.    PRILOSEC 40 MG # 180 TAKE BID    SEND TO [facility].    THANK YOU.

## 2024-07-23 NOTE — TELEPHONE ENCOUNTER
"Spoke with patient, she states she is having more fluid retention.  She has stopped the hydrochlorothiazide as instructed but was wondering if she should go back on it until she sees Dr. Ashraf?  She states that she would feel more comfortable on it as she has a \"leaky heart valve.\"  You originally took her off of it as her BP may have gone too low and that could have been the cause of her falling.  Please advise.   "

## 2024-07-24 RX ORDER — OMEPRAZOLE 40 MG/1
CAPSULE, DELAYED RELEASE ORAL
Qty: 180 CAPSULE | Refills: 1 | Status: SHIPPED | OUTPATIENT
Start: 2024-07-24

## 2024-07-26 ENCOUNTER — OFFICE VISIT (OUTPATIENT)
Dept: CARDIOLOGY | Facility: CLINIC | Age: 79
End: 2024-07-26
Payer: MEDICARE

## 2024-07-26 VITALS
HEIGHT: 63 IN | HEART RATE: 68 BPM | DIASTOLIC BLOOD PRESSURE: 60 MMHG | BODY MASS INDEX: 38.09 KG/M2 | SYSTOLIC BLOOD PRESSURE: 118 MMHG

## 2024-07-26 DIAGNOSIS — R55 SYNCOPE AND COLLAPSE: ICD-10-CM

## 2024-07-26 DIAGNOSIS — I25.10 CORONARY ARTERY DISEASE INVOLVING NATIVE CORONARY ARTERY OF NATIVE HEART WITHOUT ANGINA PECTORIS: ICD-10-CM

## 2024-07-26 DIAGNOSIS — Z78.9 NEVER SMOKED CIGARETTES: ICD-10-CM

## 2024-07-26 DIAGNOSIS — I10 ESSENTIAL HYPERTENSION: ICD-10-CM

## 2024-07-26 DIAGNOSIS — E11.69 TYPE 2 DIABETES MELLITUS WITH OTHER SPECIFIED COMPLICATION, UNSPECIFIED WHETHER LONG TERM INSULIN USE (MULTI): ICD-10-CM

## 2024-07-26 NOTE — PROGRESS NOTES
Referred by Dr. Darling ref. provider found provider found for   Chief Complaint   Patient presents with    Follow-up     Patient presents with dizziness, Dr. Black referred because she passed out        History of Present Illness  Diane Montemayor is a 79 y.o. year old female patient is here for follow-up.  She had an episode where middle of the night she stood up and felt very dizzy and almost blacked out.  She was not sure what her blood pressure was at this time..  She apparently lost some weight recently.  She is taking multiple medication for blood pressure.  I discussed with the patient length that most likely is her blood pressure went down.  Will have her hold her hydrochlorothiazide and repeat the blood pressure at home.  Will obtain also 24 hours Holter and based on that further recommendation will be made    Past Medical History  Past Medical History:   Diagnosis Date    Body mass index (BMI) 39.0-39.9, adult 02/23/2022    BMI 39.0-39.9,adult    Body mass index (BMI) 39.0-39.9, adult 06/30/2021    BMI 39.0-39.9,adult    Morbid (severe) obesity due to excess calories (Multi) 02/23/2022    Class 2 severe obesity due to excess calories with serious comorbidity and body mass index (BMI) of 39.0 to 39.9 in adult    Morbid (severe) obesity due to excess calories (Multi) 06/30/2021    Class 2 severe obesity due to excess calories with serious comorbidity and body mass index (BMI) of 37.0 to 37.9 in adult    Other acute sinusitis 12/31/2019    Other acute sinusitis, recurrence not specified    Personal history of other diseases of the musculoskeletal system and connective tissue     History of osteomyelitis    Personal history of other diseases of the respiratory system     History of chronic obstructive lung disease    Personal history of other drug therapy 11/14/2019    History of influenza vaccination    Personal history of other endocrine, nutritional and metabolic disease     History of hyperlipidemia    Personal  history of other endocrine, nutritional and metabolic disease     History of hypothyroidism    Personal history of other mental and behavioral disorders     History of major depression    Personal history of other specified conditions 09/04/2019    History of wheezing    Systemic lupus erythematosus, unspecified (Multi)     Lupus    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)     Type 2 diabetes mellitus with other neurologic complication, with long-term current use of insulin       Social History  Social History     Tobacco Use    Smoking status: Never    Smokeless tobacco: Never   Vaping Use    Vaping status: Never Used   Substance Use Topics    Alcohol use: Not Currently    Drug use: Never       Family History     Family History   Problem Relation Name Age of Onset    Heart failure Mother      Heart attack Mother      Coronary artery disease Mother      Coronary artery disease Father      Heart failure Father      Kidney failure Father         Review of Systems  As per HPI, all other systems reviewed and negative.    Allergies:  Allergies   Allergen Reactions    Lisinopril Cough        Outpatient Medications:  Current Outpatient Medications   Medication Instructions    albuterol 90 mcg/actuation inhaler INHALE 2 PUFFS BY MOUTH EVERY 4 HOURS AS DIRECTED AS NEEDED FOR COUGH OR WHEEZING    aspirin 81 mg, oral, Nightly    coenzyme Q10 400 mg capsule 1 capsule, oral, Nightly    cyanocobalamin (VITAMIN B-12) 1,000 mcg, oral, Daily    dilTIAZem XR (DILACOR XR) 240 mg, oral, Daily    docusate sodium (Colace) 100 mg capsule TAKE 1 CAPSULE(100 MG) BY MOUTH TWICE DAILY AS NEEDED FOR CONSTIPATION    ferrous sulfate, 325 mg ferrous sulfate, (FeroSuL) tablet 1 tablet, oral, 2 times daily    fluconazole (DIFLUCAN) 150 mg, oral, Weekly    glimepiride (AMARYL) 4 mg, oral, 2 times daily    hydroCHLOROthiazide (HYDRODIURIL) 25 mg, oral, Daily    ipratropium (Atrovent) 0.02 % nebulizer solution USE  1 VIAL VIA NEBULIZER FOUR TIMES DAILY    Jardiance 25 mg, oral, Daily    lactobacillus (Culturelle) 10 billion cell capsule 1 capsule, oral, Daily    levothyroxine (Synthroid, Levoxyl) 150 mcg tablet TAKE 1 TABLET BY MOUTH DAILY EXCEPT 2 TABLETS ON TUESDAY AND THURSDAY    losartan (COZAAR) 25 mg, oral, 2 times daily    magnesium oxide (MAG-OX) 400 mg, oral, Daily    metFORMIN  mg 24 hr tablet TAKE 2 TABLETS BY MOUTH EVERY MORNING AND EVERY EVENING WITH DINNER    Mounjaro 7.5 mg, subcutaneous, Once Weekly    nebulizer accessories kit USE 4 TIMES DAILY    omeprazole (PriLOSEC) 40 mg DR capsule TAKE 1 CAPSULE BY MOUTH TWICE DAILY    rosuvastatin (CRESTOR) 10 mg, oral, Nightly    spironolactone (ALDACTONE) 25 mg, oral, Daily    vilazodone (VIIBRYD) 40 mg, oral, Daily with breakfast         Vitals:  Vitals:    07/26/24 1302   BP: 118/60   Pulse: 68       Physical Exam:  Physical Exam  Vitals and nursing note reviewed.   Constitutional:       Appearance: Normal appearance.   HENT:      Head: Normocephalic.   Eyes:      Pupils: Pupils are equal, round, and reactive to light.   Cardiovascular:      Rate and Rhythm: Normal rate and regular rhythm.      Pulses: Normal pulses.      Heart sounds: Normal heart sounds.   Pulmonary:      Effort: Pulmonary effort is normal.      Breath sounds: Normal breath sounds.   Musculoskeletal:         General: Normal range of motion.      Cervical back: Normal range of motion.   Skin:     General: Skin is dry.   Neurological:      General: No focal deficit present.      Mental Status: She is alert and oriented to person, place, and time.   Psychiatric:         Behavior: Behavior is cooperative.             Assessment/Plan   Diagnoses and all orders for this visit:  Coronary artery disease involving native coronary artery of native heart without angina pectoris  Type 2 diabetes mellitus with other specified complication, unspecified whether long term insulin use (Multi)  Essential  hypertension  BMI 38.0-38.9,adult  Syncope and collapse  Never smoked cigarettes          Connie Ashraf MD [facility]  Interventional Cardiology   of [facility]     Thank you for allowing me to participate in the care of this patient. Please do not hesitate to contact me with any further questions or concerns.

## 2024-07-26 NOTE — PATIENT INSTRUCTIONS
Patient instructed to stop hydrochlorothiazide for now  24 hour holter monitor will be done.  Will call patient with test results once reviewed by physician    Continue same medications/treatment.  Patient educated on proper medication use.  Patient educated on risk factor modification.  Please bring any lab results from other providers / physicians to your next appointment.    Please bring all medicines, vitamins and herbal supplements with you when you come to the office.    Prescriptions will not be filled unless you are compliant with your follow up appointments or have a follow up  appointment scheduled as per instruction of your physician.  Refills should be requested at the time of  Your visit.    Follow up office visit in 6 months.    Lulu COTE LPN, am scribing for and in the presence of  Dr. Connie Ashraf MD, FACC

## 2024-08-05 ENCOUNTER — APPOINTMENT (OUTPATIENT)
Dept: CARDIOLOGY | Facility: CLINIC | Age: 79
End: 2024-08-05
Payer: MEDICARE

## 2024-08-05 DIAGNOSIS — R55 SYNCOPE AND COLLAPSE: ICD-10-CM

## 2024-08-05 PROCEDURE — 93227 XTRNL ECG REC<48 HR R&I: CPT | Performed by: INTERNAL MEDICINE

## 2024-08-05 PROCEDURE — 93225 XTRNL ECG REC<48 HRS REC: CPT | Performed by: INTERNAL MEDICINE

## 2024-08-08 ENCOUNTER — TELEPHONE (OUTPATIENT)
Dept: CARDIOLOGY | Facility: CLINIC | Age: 79
End: 2024-08-08
Payer: MEDICARE

## 2024-08-08 NOTE — TELEPHONE ENCOUNTER
Holter monitor was placed on 8/5/24- this was a 24 hour Holter.   Patient returned 8/7/24- this has not been downloaded nor dictated yet.     I LM for patient advising that once information is downloaded and dictated, we will call with results.     Postponing this note until Monday again to review.

## 2024-08-12 ENCOUNTER — APPOINTMENT (OUTPATIENT)
Dept: PRIMARY CARE | Facility: CLINIC | Age: 79
End: 2024-08-12
Payer: MEDICARE

## 2024-08-13 ENCOUNTER — PATIENT OUTREACH (OUTPATIENT)
Dept: PRIMARY CARE | Facility: CLINIC | Age: 79
End: 2024-08-13
Payer: MEDICARE

## 2024-08-13 DIAGNOSIS — E11.51 TYPE 2 DIABETES MELLITUS WITH DIABETIC PERIPHERAL ANGIOPATHY WITHOUT GANGRENE, WITHOUT LONG-TERM CURRENT USE OF INSULIN (MULTI): ICD-10-CM

## 2024-08-13 DIAGNOSIS — I10 HYPERTENSION, UNSPECIFIED TYPE: ICD-10-CM

## 2024-08-13 DIAGNOSIS — L89.899 PRESSURE INJURY OF SKIN OF TOE OF RIGHT FOOT, UNSPECIFIED INJURY STAGE: ICD-10-CM

## 2024-08-13 NOTE — PROGRESS NOTES
Chart review complete.     Confirmation of at least 2 patient identifiers.  Monthly outreach complete.    Associated Chronic Conditions:  Pressure injury of skin of toe of right foot, unspecified injury stage  Type 2 diabetes mellitus with diabetic peripheral angiopathy without gangrene, without long-term current use of insulin (Multi)  Hypertension, unspecified type     Patient main concerns at this time:  - wound: patient did follow up with PCP 7/15. Doxycycline prescribed. However patient reports toe is not better and can see bone fragments. Has been unable to get into see podiatrist and is running out of Vashe solution. Vashe solution is OTC notified patient and states she will see if she can get. Patient reports in past she hd HHC for wounds and found it helpful. She is homebound, order placed for HHC through [facility] as patient would benefit from immediate evaluation and care and cannot get into podiatrist sooner. Previously worked with Caridad Ramirez at [facility] and request that be placed with referral. Referral faxed, [phone].  - falls/ lightheaded: 7/15 PCP discontinued hydrochlorothiazide. Was concerned due to previously being told if she had high Bps it would cause valvues in her heart to leak. She did follow up with cardiology where a holter monitor was placed. Results have not been transcribed yet or reviewed with patient. Patient reports she has not been monitoring BP, needed new BP cuff and got it yesterday. Plans to start recording today. Reviewed s/s of hypotension and Hypertension with patient, states understanding. Denies new falls since last talk but does have occasional dizziness. Discussed orthostatic hypotension and interventions to take to reduce dizziness. Encouraged increased oral intake of fluids.   -DM management: A1C came back improved. Patient is down in weight. Reviewed dietary intake and patient is on healthy diet. Reports blood sugars have been under 120. Today 110. Has had 2  episodes of hypoglycemia since last speaking with this nurse. Reports at those times 60-70 and symptomatic, reviewed treatment with fast carbs and monitoring. Reports no longer experiencing stomach upset with Mounjaro.     Patient denies further needs or concerns at this time. States understanding to call if any needs arise.

## 2024-08-16 NOTE — TELEPHONE ENCOUNTER
Per Dr. Connie Ashraf MD, [facility] Holter is okay. Patient informed and verbalized understanding.

## 2024-08-20 ENCOUNTER — APPOINTMENT (OUTPATIENT)
Dept: PRIMARY CARE | Facility: CLINIC | Age: 79
End: 2024-08-20
Payer: MEDICARE

## 2024-08-20 ASSESSMENT — ANXIETY QUESTIONNAIRES
1. FEELING NERVOUS, ANXIOUS, OR ON EDGE: NEARLY EVERY DAY
5. BEING SO RESTLESS THAT IT IS HARD TO SIT STILL: MORE THAN HALF THE DAYS
6. BECOMING EASILY ANNOYED OR IRRITABLE: NEARLY EVERY DAY
GAD7 TOTAL SCORE: 20
4. TROUBLE RELAXING: NEARLY EVERY DAY
IF YOU CHECKED OFF ANY PROBLEMS ON THIS QUESTIONNAIRE, HOW DIFFICULT HAVE THESE PROBLEMS MADE IT FOR YOU TO DO YOUR WORK, TAKE CARE OF THINGS AT HOME, OR GET ALONG WITH OTHER PEOPLE: EXTREMELY DIFFICULT
7. FEELING AFRAID AS IF SOMETHING AWFUL MIGHT HAPPEN: NEARLY EVERY DAY
2. NOT BEING ABLE TO STOP OR CONTROL WORRYING: NEARLY EVERY DAY
3. WORRYING TOO MUCH ABOUT DIFFERENT THINGS: NEARLY EVERY DAY

## 2024-08-20 ASSESSMENT — PATIENT HEALTH QUESTIONNAIRE - PHQ9
6. FEELING BAD ABOUT YOURSELF - OR THAT YOU ARE A FAILURE OR HAVE LET YOURSELF OR YOUR FAMILY DOWN: MORE THAN HALF THE DAYS
SUM OF ALL RESPONSES TO PHQ9 QUESTIONS 1 & 2: 6
5. POOR APPETITE OR OVEREATING: NEARLY EVERY DAY
2. FEELING DOWN, DEPRESSED OR HOPELESS: NEARLY EVERY DAY
9. THOUGHTS THAT YOU WOULD BE BETTER OFF DEAD, OR OF HURTING YOURSELF: SEVERAL DAYS
8. MOVING OR SPEAKING SO SLOWLY THAT OTHER PEOPLE COULD HAVE NOTICED. OR THE OPPOSITE, BEING SO FIGETY OR RESTLESS THAT YOU HAVE BEEN MOVING AROUND A LOT MORE THAN USUAL: NOT AT ALL
7. TROUBLE CONCENTRATING ON THINGS, SUCH AS READING THE NEWSPAPER OR WATCHING TELEVISION: NEARLY EVERY DAY
4. FEELING TIRED OR HAVING LITTLE ENERGY: NEARLY EVERY DAY
10. IF YOU CHECKED OFF ANY PROBLEMS, HOW DIFFICULT HAVE THESE PROBLEMS MADE IT FOR YOU TO DO YOUR WORK, TAKE CARE OF THINGS AT HOME, OR GET ALONG WITH OTHER PEOPLE: EXTREMELY DIFFICULT
SUM OF ALL RESPONSES TO PHQ QUESTIONS 1-9: 21
1. LITTLE INTEREST OR PLEASURE IN DOING THINGS: NEARLY EVERY DAY
3. TROUBLE FALLING OR STAYING ASLEEP: NEARLY EVERY DAY
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
## 2024-08-22 ENCOUNTER — DOCUMENTATION (OUTPATIENT)
Dept: BEHAVIORAL HEALTH | Facility: HOSPITAL | Age: 79
End: 2024-08-22
Payer: MEDICARE

## 2024-08-22 NOTE — PROGRESS NOTES
[facility] Psychiatry Consult Note     Diane Montemayor is a 79 y.o., referred to Collaborative Care for symptoms of depression and anxiety. I have reviewed the patient with the behavioral health manager and reviewed the patient's electronic record.    Longstanding history of anxiety and depression. Currently looking for grief counseling for recent death of a son and resultant family issues.    Some passive thoughts of not wanting to be here due to conflict, but no active SI, intent, or plan.    Symptoms: less involved with family, sleep inconsistent,    No nightmares    Psych history: many years ago - did 30-day MH program. History of childhood trauma    Pertinent medical history: obesity, CAD, DM, SLE, hypothyroidism, HTN, pressure ulcer on toe, recent fall, h/o RALF not currently on CPAP    Current psychiatric medications:     Viibryd 40 mg daily, taking since October.  Helping with depression but not anxiety.    Past psychiatric meds:  - sertraline - helped but tremor  - fluoxetine - caused paranoia    Recommendations:   - Would consider adding abilify 2 mg daily, to augement current antidepressant. Could taper up to 5 mg if needed after 4 weeks.  - If this does not help, would stop Viibryd and start lexapro instead, by prescribing Viibryd 20 mg daily + lexapro 10 mg daily for 2 weeks, then discontinuing viibryd and increasing lexapro to 10 mg daily. Would continue abilify, however, as it will also augment the lexapro well.                        Patient Health Questionnaire-9 Score: 21 (8/20/2024  1:47 PM)  CHAKA-7 Total Score: 20 (8/20/2024  1:47 PM)      The above treatment considerations and suggestions are based on consultations with the patient's care manager and a review of information available in the electronic medical record. I have not personally examined the patient. All recommendations should be implemented with consideration of the patient's relevant prior history and current clinical status. Please feel free to  call me with any questions about the care of this patient. I can easily be reached through [employer] or at [email or web address]

## 2024-08-23 DIAGNOSIS — E11.69 TYPE 2 DIABETES MELLITUS WITH OTHER SPECIFIED COMPLICATION, UNSPECIFIED WHETHER LONG TERM INSULIN USE (MULTI): ICD-10-CM

## 2024-08-30 ENCOUNTER — DOCUMENTATION (OUTPATIENT)
Dept: PRIMARY CARE | Facility: CLINIC | Age: 79
End: 2024-08-30
Payer: MEDICARE

## 2024-08-30 DIAGNOSIS — F33.1 MODERATE EPISODE OF RECURRENT MAJOR DEPRESSIVE DISORDER (MULTI): Primary | ICD-10-CM

## 2024-08-30 DIAGNOSIS — F43.9 TRAUMA AND STRESSOR-RELATED DISORDER: ICD-10-CM

## 2024-08-30 PROCEDURE — 99492 1ST PSYC COLLAB CARE MGMT: CPT | Performed by: FAMILY MEDICINE

## 2024-08-30 PROCEDURE — 99494 1ST/SBSQ PSYC COLLAB CARE: CPT | Performed by: FAMILY MEDICINE

## 2024-09-03 ENCOUNTER — APPOINTMENT (OUTPATIENT)
Dept: PRIMARY CARE | Facility: CLINIC | Age: 79
End: 2024-09-03
Payer: MEDICARE

## 2024-09-03 ASSESSMENT — PATIENT HEALTH QUESTIONNAIRE - PHQ9
7. TROUBLE CONCENTRATING ON THINGS, SUCH AS READING THE NEWSPAPER OR WATCHING TELEVISION: SEVERAL DAYS
1. LITTLE INTEREST OR PLEASURE IN DOING THINGS: NEARLY EVERY DAY
4. FEELING TIRED OR HAVING LITTLE ENERGY: SEVERAL DAYS
3. TROUBLE FALLING OR STAYING ASLEEP: NEARLY EVERY DAY
2. FEELING DOWN, DEPRESSED OR HOPELESS: NEARLY EVERY DAY
8. MOVING OR SPEAKING SO SLOWLY THAT OTHER PEOPLE COULD HAVE NOTICED. OR THE OPPOSITE, BEING SO FIGETY OR RESTLESS THAT YOU HAVE BEEN MOVING AROUND A LOT MORE THAN USUAL: MORE THAN HALF THE DAYS
5. POOR APPETITE OR OVEREATING: MORE THAN HALF THE DAYS
SUM OF ALL RESPONSES TO PHQ QUESTIONS 1-9: 17
9. THOUGHTS THAT YOU WOULD BE BETTER OFF DEAD, OR OF HURTING YOURSELF: SEVERAL DAYS
10. IF YOU CHECKED OFF ANY PROBLEMS, HOW DIFFICULT HAVE THESE PROBLEMS MADE IT FOR YOU TO DO YOUR WORK, TAKE CARE OF THINGS AT HOME, OR GET ALONG WITH OTHER PEOPLE: EXTREMELY DIFFICULT
SUM OF ALL RESPONSES TO PHQ9 QUESTIONS 1 & 2: 6
6. FEELING BAD ABOUT YOURSELF - OR THAT YOU ARE A FAILURE OR HAVE LET YOURSELF OR YOUR FAMILY DOWN: SEVERAL DAYS

## 2024-09-03 ASSESSMENT — ANXIETY QUESTIONNAIRES
6. BECOMING EASILY ANNOYED OR IRRITABLE: NEARLY EVERY DAY
7. FEELING AFRAID AS IF SOMETHING AWFUL MIGHT HAPPEN: NEARLY EVERY DAY
GAD7 TOTAL SCORE: 19
4. TROUBLE RELAXING: MORE THAN HALF THE DAYS
1. FEELING NERVOUS, ANXIOUS, OR ON EDGE: NEARLY EVERY DAY
3. WORRYING TOO MUCH ABOUT DIFFERENT THINGS: NEARLY EVERY DAY
IF YOU CHECKED OFF ANY PROBLEMS ON THIS QUESTIONNAIRE, HOW DIFFICULT HAVE THESE PROBLEMS MADE IT FOR YOU TO DO YOUR WORK, TAKE CARE OF THINGS AT HOME, OR GET ALONG WITH OTHER PEOPLE: VERY DIFFICULT
5. BEING SO RESTLESS THAT IT IS HARD TO SIT STILL: MORE THAN HALF THE DAYS
2. NOT BEING ABLE TO STOP OR CONTROL WORRYING: NEARLY EVERY DAY

## 2024-09-03 NOTE — PROGRESS NOTES
"Collaborative Care (CoCM)  Progress Note    Type of Interaction: In Office    Start Time: 11:01 AM    End Time: 11:52 AM    Appointment: Scheduled    Reason for Visit:   Chief Complaint   Patient presents with    Grief    Review of psych consult recommendations and PHQ/ CHAKA scores.  TX planning    Interval History / Patient Symptoms:   Pt struggling with grief, poor sleep, ruminating and withdrawing from previously enjoyable hobbies.    Patient Health Questionnaire-9 Score: 17 (9/3/2024 11:04 AM)  CHAKA-7 Total Score: 19 (9/3/2024 11:13 AM)    Interventions Provided: Psychoeducation, Motivational Interviewing, Strengths Exploration, Values Exploration, Grief Work, Develop Coping Strategies, Treatment Planning, and Provided pt with psych consult recommendations.   Identified positive family interactions recently and psychoeducation on grief and impacts others differently.  MI utilized to identify past values with Jehovah's witness connections and normalized her thoughts and feelings.  Processed pt's feelings about past and current stressors.  Psychoeducation provided on \"tasks of mourning\".    Progress Made: Minimum    Response to Intervention: Pt reported she is interested in reaching out to PCP to discuss add on medication discussed by consulting psychiatrist.  Pt discussed improvements in communication with her dtr-in law and hopeful about a continued relationship with her and grandchildren.  Pt discussed how becoming Yazidi and forming relationship with Jehovah's witness members made her feel a lot of sabrina and does plan to start having home visits from Jehovah's witness ppl to start engaging again.    Plan: Pt plans to start having more contact with Jehovah's witness persons coming to visit her as this was something she once loved and helped her through hard times.    Patient Instructions   Review email with interventions    Follow Up / Next Appointment: Next appointment: 09/17/24  "

## 2024-09-05 ENCOUNTER — PATIENT OUTREACH (OUTPATIENT)
Dept: PRIMARY CARE | Facility: CLINIC | Age: 79
End: 2024-09-05
Payer: MEDICARE

## 2024-09-05 DIAGNOSIS — F33.1 MODERATE EPISODE OF RECURRENT MAJOR DEPRESSIVE DISORDER (MULTI): ICD-10-CM

## 2024-09-05 DIAGNOSIS — E11.51 TYPE 2 DIABETES MELLITUS WITH DIABETIC PERIPHERAL ANGIOPATHY WITHOUT GANGRENE, WITHOUT LONG-TERM CURRENT USE OF INSULIN (MULTI): ICD-10-CM

## 2024-09-05 NOTE — PROGRESS NOTES
Confirmation of at least 2 patient identifiers.  Monthly outreach complete.    Chart review complete.     Associated Chronic Conditions:  Type 2 diabetes mellitus with diabetic peripheral angiopathy without gangrene, without long-term current use of insulin (Multi)  Moderate episode of recurrent major depressive disorder (Multi)     Main concerns at this time:  - Diabetes Management: Patient called requesting refill of Mounjaro. Through talking patient also expressed interest in increasing dose. Says since being off of Amaryl she has noticed her glucose levels going back up and reports 130s or higher at times. Says she becomes symptomatic with increased drowsiness and less energy. Message sent to clinical pharmacist, Sadie.  - wound: Previous wound to toe. [facility] has been out and providing care, patient states recently they said toe is healed. She will FU with Dr. Mares 9/9. At this time reports having enough supplies for wound care.   - Depression: Patient has connected and is working with [facility] Senait Kelly. States she is very happy with the care she's getting from Senait and does acknowledge how difficult it is for her but reports she knows she needs it and looks forward to their sessions. Also reports she's connected with her Adventism again. A  is to come out monthly and a Adventism member weekly to provide support and discuss david. Additionally mentions Senait mentioning use of Abilify but patient does not know she wants to try it yet. Patient request to hold off at this time but [place] back to set up PCP appointment.     Denies further needs or concerns at this time. States understanding to call if any needs arise.    LOV:7/15/2024  NOV: -  HIPAA:1/2025

## 2024-09-06 DIAGNOSIS — E11.69 TYPE 2 DIABETES MELLITUS WITH OTHER SPECIFIED COMPLICATION, UNSPECIFIED WHETHER LONG TERM INSULIN USE (MULTI): ICD-10-CM

## 2024-09-06 RX ORDER — TIRZEPATIDE 7.5 MG/.5ML
7.5 INJECTION, SOLUTION SUBCUTANEOUS
Qty: 2 ML | Refills: 0 | Status: SHIPPED | OUTPATIENT
Start: 2024-09-08

## 2024-09-09 ENCOUNTER — PATIENT OUTREACH (OUTPATIENT)
Dept: PRIMARY CARE | Facility: CLINIC | Age: 79
End: 2024-09-09
Payer: MEDICARE

## 2024-09-09 DIAGNOSIS — I10 HYPERTENSION, UNSPECIFIED TYPE: ICD-10-CM

## 2024-09-09 DIAGNOSIS — E11.51 TYPE 2 DIABETES MELLITUS WITH DIABETIC PERIPHERAL ANGIOPATHY WITHOUT GANGRENE, WITHOUT LONG-TERM CURRENT USE OF INSULIN (MULTI): ICD-10-CM

## 2024-09-09 NOTE — PROGRESS NOTES
Patient called.    Says that Mounjaro ordered was 7.5 and really would like to go up in dosage. Additionally says she was to take it yesterday but [facility] is out of stock. Patient unsure what to do.     Message sent to Ld Noel to requesting assistance.     Patient called again, reports Dr. Rodrigues just called and notified her that wound cultures came back positive for bacterial infections. Dr. Rodrigues has ordered antibiotics - Doxycycline for 10 days. Does report understanding that this could be contributing to worsening glucose levels. Aware to monitor symptoms and notify if not improving or symptoms worsen.     Gonsalo did just get Mounjaro in, says she will take 7.5 this week once she picks up and will go from there.

## 2024-09-11 ENCOUNTER — TELEMEDICINE (OUTPATIENT)
Dept: PHARMACY | Facility: HOSPITAL | Age: 79
End: 2024-09-11
Payer: MEDICARE

## 2024-09-11 DIAGNOSIS — E11.69 TYPE 2 DIABETES MELLITUS WITH OTHER SPECIFIED COMPLICATION, UNSPECIFIED WHETHER LONG TERM INSULIN USE (MULTI): ICD-10-CM

## 2024-09-11 DIAGNOSIS — E11.69 TYPE 2 DIABETES MELLITUS WITH OTHER SPECIFIED COMPLICATION, WITHOUT LONG-TERM CURRENT USE OF INSULIN (MULTI): ICD-10-CM

## 2024-09-11 RX ORDER — METFORMIN HYDROCHLORIDE 500 MG/1
1000 TABLET, EXTENDED RELEASE ORAL
Qty: 360 TABLET | Refills: 3 | Status: SHIPPED | OUTPATIENT
Start: 2024-09-11

## 2024-09-11 RX ORDER — TIRZEPATIDE 10 MG/.5ML
10 INJECTION, SOLUTION SUBCUTANEOUS WEEKLY
Qty: 2 ML | Refills: 3 | Status: SHIPPED | OUTPATIENT
Start: 2024-09-11

## 2024-09-11 NOTE — ASSESSMENT & PLAN NOTE
Patient's goal A1c is < 8%.  Is pt at goal? Yes, 6.4% on 4/2/24  Patient's SMBGs are mostly at goal.   Rationale for plan: Sugars slightly increased and pt reports weight loss has plateaued. Will increase Mounjaro as this time. Can plan to continue titration as needed and tolerated.      Medication Changes:  CONTINUE:  Jardiance 25mg daily  Metformin XR 1000mg BID    INCREASE Mounjaro to 10mg weekly      Monitoring and Education:  Diabetes Education  Rule of 15: eating ~15 g of carbs when BG less than 80 (half cup juice, 3-4 glucose tabs).  Recognize symptoms of high and low blood sugar.   Eat a realistic healthy diet consisting of fruits, vegetables, fiber, protein food choices on a regular basis and be aware of portion/serving sizes. Reduce carbohydrate consumption and always consume with protein and fat. Avoid foods high in saturated/trans fat, high salt content, and sweets and beverages with added sugars.  Limit alcohol consumption; alcohol may affect your blood sugar and cause hypoglycemia.   Stay active and incorporate ~30 mins of exercise into your daily routine to manage your weight and increase the body's acceptance of insulin.    Mounjaro Education:   - Counseled patient on MOA, expectations, side effects, duration of therapy, contraindications, administration, and monitoring parameters  - Answered all patient questions and concerns  - Counseled patient on Mounjaro MOA, expectations, side effects, duration of therapy, administration, and monitoring parameters.  - Provided detailed dosing and administration counseling to ensure proper technique.   - Reviewed Mounjaro titration schedule, starting with 2.5 mg once weekly to a goal of 15 mg once weekly if tolerated  - Counseled patient on the benefits of GLP-1ra glycemic control and weight loss  - Reviewed storage requirements of Mounjaro when not in use, and when to administer the medication if a dose is missed.  - Advised patient that they may experience  improved satiety after meals and portion sizes of meals may be reduced as doses of Mounjaro increase.

## 2024-09-11 NOTE — PROGRESS NOTES
Patient ID: Diane Montemayor is a 79 y.o. female who presents for Diabetes Pt is here for Follow Up appointment.    PCP/ Referring Provider: Ramses Black DO  Last visit with PCP: 4.3.24  next visit: 7.3.24    Verbal consent to manage patient's drug therapy was obtained from patient. They were informed they may decline to participate or withdraw from participation in pharmacy services at any time. Patient is  agreeable to detailed voice messages if unable to be reached.     Patient enrolled in [facility]      Subjective   Treatment Adherence:   Patient did take medications today.   Number of missed doses in last 7 days: 0.  Why? N/A     Preferred pharmacy: [facility]  Can patient afford prescribed medications: Yes, denies cost issues at this time     Current exercise: nothing formal; walking in the hallway at home, grocery shopping, chair exercises, etc.  Current diet: cut out chips and snacks  Drinks 1 protein shake per day (30G)      HPI  DIABETES MELLITUS TYPE 2:  Diagnosed with diabetes:  >40 years. Known diabetic complications: toe amputation, recent toe infection.  Last optometry exam:   Most recent visit in Podiatry: sees at wound center; -- patient denies sores or cuts on feet today      Current diabetic medications include:  Mounjaro 7.5mg weekly on  (6th dose this week)  Jardiance 25mg daily  Metformin XR 1000mg BID      Home blood glucose records (mg/dL)  FBs usually but some higher - 152, 149    Has lost 50lb      Any episodes of hypoglycemia? No, patient denies .  Did patient treat episode of hypoglycemia appropriately? N/A  Does pt have proteinuria? Yes If appropriate, is patient on ACEi/ARB? Yes,      Secondary Prevention  Statin? Yes  ACE-I/ARB? Yes  Aspirin? Yes    Pertinent PMH Review:  PMH of Pancreatitis: No  PMH of Retinopathy: No  PMH of Urinary Tract Infections: No; + yeast infection  PMH of MTC: No        Review of Systems    Objective     There were no vitals taken for  this visit.     Labs  Lab Results   Component Value Date    BILITOT 0.4 06/29/2023    CALCIUM 9.8 04/02/2024    CO2 24 04/02/2024     04/02/2024    CREATININE 1.17 (H) 04/02/2024    GLUCOSE 129 (H) 04/02/2024    ALKPHOS 72 06/29/2023    K 4.7 04/02/2024    PROT 7.7 06/29/2023     04/02/2024    AST 18 06/29/2023    ALT 17 06/29/2023    BUN 21 04/02/2024    ANIONGAP 17 04/02/2024    MG CANCELED 07/01/2023    PHOS 3.3 03/12/2020    ALBUMIN 4.4 06/29/2023    GFRF CANCELED 07/01/2023    GFRMALE CANCELED 07/01/2023     Lab Results   Component Value Date    TRIG 146 06/21/2023    CHOL 163 06/21/2023    HDL 47.6 06/21/2023     Lab Results   Component Value Date    HGBA1C 5.4 07/15/2024       Current Outpatient Medications   Medication Instructions    albuterol 90 mcg/actuation inhaler INHALE 2 PUFFS BY MOUTH EVERY 4 HOURS AS DIRECTED AS NEEDED FOR COUGH OR WHEEZING    aspirin 81 mg, oral, Nightly    coenzyme Q10 400 mg capsule 1 capsule, oral, Nightly    cyanocobalamin (VITAMIN B-12) 1,000 mcg, oral, Daily    dilTIAZem XR (DILACOR XR) 240 mg, oral, Daily    docusate sodium (Colace) 100 mg capsule TAKE 1 CAPSULE(100 MG) BY MOUTH TWICE DAILY AS NEEDED FOR CONSTIPATION    empagliflozin (JARDIANCE) 25 mg, oral, Daily    ferrous sulfate, 325 mg ferrous sulfate, (FeroSuL) tablet 1 tablet, oral, 2 times daily    fluconazole (DIFLUCAN) 150 mg, oral, Weekly (0600)    ipratropium (Atrovent) 0.02 % nebulizer solution USE 1 VIAL VIA NEBULIZER FOUR TIMES DAILY    lactobacillus (Culturelle) 10 billion cell capsule 1 capsule, oral, Daily    levothyroxine (Synthroid, Levoxyl) 150 mcg tablet TAKE 1 TABLET BY MOUTH DAILY EXCEPT 2 TABLETS ON TUESDAY AND THURSDAY    losartan (COZAAR) 25 mg, oral, 2 times daily    magnesium oxide (MAG-OX) 400 mg, oral, Daily    metFORMIN XR (GLUCOPHAGE-XR) 1,000 mg, oral, 2 times daily (morning and late afternoon), Take with meals.    Mounjaro 10 mg, subcutaneous, Weekly    nebulizer accessories kit  USE 4 TIMES DAILY    omeprazole (PriLOSEC) 40 mg DR capsule TAKE 1 CAPSULE BY MOUTH TWICE DAILY    rosuvastatin (CRESTOR) 10 mg, oral, Nightly    spironolactone (ALDACTONE) 25 mg, oral, Daily    vilazodone (VIIBRYD) 40 mg, oral, Daily with breakfast        Assessment/Plan   Problem List Items Addressed This Visit             ICD-10-CM    Type 2 diabetes mellitus (Multi) E11.9     Patient's goal A1c is < 8%.  Is pt at goal? Yes, 6.4% on 4/2/24  Patient's SMBGs are mostly at goal.   Rationale for plan: Sugars slightly increased and pt reports weight loss has plateaued. Will increase Mounjaro as this time. Can plan to continue titration as needed and tolerated.      Medication Changes:  CONTINUE:  Jardiance 25mg daily  Metformin XR 1000mg BID    INCREASE Mounjaro to 10mg weekly      Monitoring and Education:  Diabetes Education  Rule of 15: eating ~15 g of carbs when BG less than 80 (half cup juice, 3-4 glucose tabs).  Recognize symptoms of high and low blood sugar.   Eat a realistic healthy diet consisting of fruits, vegetables, fiber, protein food choices on a regular basis and be aware of portion/serving sizes. Reduce carbohydrate consumption and always consume with protein and fat. Avoid foods high in saturated/trans fat, high salt content, and sweets and beverages with added sugars.  Limit alcohol consumption; alcohol may affect your blood sugar and cause hypoglycemia.   Stay active and incorporate ~30 mins of exercise into your daily routine to manage your weight and increase the body's acceptance of insulin.    Mounjaro Education:   - Counseled patient on MOA, expectations, side effects, duration of therapy, contraindications, administration, and monitoring parameters  - Answered all patient questions and concerns  - Counseled patient on Mounjaro MOA, expectations, side effects, duration of therapy, administration, and monitoring parameters.  - Provided detailed dosing and administration counseling to ensure  proper technique.   - Reviewed Mounjaro titration schedule, starting with 2.5 mg once weekly to a goal of 15 mg once weekly if tolerated  - Counseled patient on the benefits of GLP-1ra glycemic control and weight loss  - Reviewed storage requirements of Mounjaro when not in use, and when to administer the medication if a dose is missed.  - Advised patient that they may experience improved satiety after meals and portion sizes of meals may be reduced as doses of Mounjaro increase.         Relevant Medications    tirzepatide (Mounjaro) 10 mg/0.5 mL pen injector    metFORMIN  mg 24 hr tablet    empagliflozin (Jardiance) 25 mg    Other Relevant Orders    Follow Up In [facility] - Pharmacy    Follow Up In [facility] - Pharmacy         Labs ordered:  none     Referrals:  none     Follow-up: 10.3.24 at 0930 via telephone     Time spent with pt: Total length of time 25 (minutes) of the encounter and more than 50% was spent counseling the patient.    Sadie Montgomery, PharmD, LIS  Clinical Pharmacist  [phone]  [email or web address]    Continue all meds under the continuation of care with the referring provider and clinical pharmacy team.    Verbal consent to manage patient's drug therapy was obtained from the patient. They were informed they may decline to participate or withdraw from participation in pharmacy services at any time.

## 2024-09-17 ENCOUNTER — APPOINTMENT (OUTPATIENT)
Dept: PRIMARY CARE | Facility: CLINIC | Age: 79
End: 2024-09-17
Payer: MEDICARE

## 2024-09-17 ASSESSMENT — PATIENT HEALTH QUESTIONNAIRE - PHQ9
2. FEELING DOWN, DEPRESSED OR HOPELESS: NEARLY EVERY DAY
9. THOUGHTS THAT YOU WOULD BE BETTER OFF DEAD, OR OF HURTING YOURSELF: NEARLY EVERY DAY
5. POOR APPETITE OR OVEREATING: NEARLY EVERY DAY
6. FEELING BAD ABOUT YOURSELF - OR THAT YOU ARE A FAILURE OR HAVE LET YOURSELF OR YOUR FAMILY DOWN: SEVERAL DAYS
SUM OF ALL RESPONSES TO PHQ9 QUESTIONS 1 & 2: 5
1. LITTLE INTEREST OR PLEASURE IN DOING THINGS: MORE THAN HALF THE DAYS
SUM OF ALL RESPONSES TO PHQ QUESTIONS 1-9: 21
10. IF YOU CHECKED OFF ANY PROBLEMS, HOW DIFFICULT HAVE THESE PROBLEMS MADE IT FOR YOU TO DO YOUR WORK, TAKE CARE OF THINGS AT HOME, OR GET ALONG WITH OTHER PEOPLE: VERY DIFFICULT
7. TROUBLE CONCENTRATING ON THINGS, SUCH AS READING THE NEWSPAPER OR WATCHING TELEVISION: NEARLY EVERY DAY
4. FEELING TIRED OR HAVING LITTLE ENERGY: NEARLY EVERY DAY
3. TROUBLE FALLING OR STAYING ASLEEP: NEARLY EVERY DAY
8. MOVING OR SPEAKING SO SLOWLY THAT OTHER PEOPLE COULD HAVE NOTICED. OR THE OPPOSITE, BEING SO FIGETY OR RESTLESS THAT YOU HAVE BEEN MOVING AROUND A LOT MORE THAN USUAL: NOT AT ALL

## 2024-09-17 ASSESSMENT — ANXIETY QUESTIONNAIRES
3. WORRYING TOO MUCH ABOUT DIFFERENT THINGS: NEARLY EVERY DAY
IF YOU CHECKED OFF ANY PROBLEMS ON THIS QUESTIONNAIRE, HOW DIFFICULT HAVE THESE PROBLEMS MADE IT FOR YOU TO DO YOUR WORK, TAKE CARE OF THINGS AT HOME, OR GET ALONG WITH OTHER PEOPLE: EXTREMELY DIFFICULT
GAD7 TOTAL SCORE: 20
7. FEELING AFRAID AS IF SOMETHING AWFUL MIGHT HAPPEN: NEARLY EVERY DAY
4. TROUBLE RELAXING: NEARLY EVERY DAY
1. FEELING NERVOUS, ANXIOUS, OR ON EDGE: NEARLY EVERY DAY
6. BECOMING EASILY ANNOYED OR IRRITABLE: NEARLY EVERY DAY
2. NOT BEING ABLE TO STOP OR CONTROL WORRYING: NEARLY EVERY DAY
5. BEING SO RESTLESS THAT IT IS HARD TO SIT STILL: MORE THAN HALF THE DAYS

## 2024-09-17 NOTE — PROGRESS NOTES
"Collaborative Care (Co[facility])  Progress Note    Type of Interaction: In Office    Start Time: 2:30 PM    End Time: 3:18 PM    Appointment: Scheduled    Reason for Visit:   Chief Complaint   Patient presents with    Grief reaction    Review of psych consult recommendations.  Grief interventions.    Interval History / Patient Symptoms:   Pt reports continued high PHQ and CHAKA sx.  Pt reports she still struggles with insomnia but always has.  Pt reports drinking too much caffeine but trying to cut back.  Pt scheduled for sleep study in Oct and hoping to get a new CPAP as she gave her old one to her son that passed away.      Patient Health Questionnaire-9 Score: 21 (9/17/2024  3:23 PM)  CHAKA-7 Total Score: 20 (9/17/2024  3:24 PM)      Interventions Provided: Motivational Interviewing, Grief Work, Develop Coping Strategies, and Explored pt's feelings about medication recommendations and option to re-consult with Dr. Arce.  Identified pt improvements with reaching out to supports/initiating other activities when she notices she is dwelling on negative thoughts about her loss.  Facilitated grief specialist video to discuss stages of grief and coping.  Identified previous hobbies pt enjoyed and MI on starting again and reducing caffeine use by substituting evening caffeine with herbal tea-non caffeine.   Risk assessment completed.    Progress Made: None    Response to Intervention: Pt expressed concern about adding on another medication and would prefer to stick with one MH medication.  Pt agreeable to [facility] consulting again with Dr. Arce.  Pt reported she has been trying to keep her mind occupied but sitting outside, reaching out to family, and accomplishing a task that has been helpful.  Pt reports she has a sleep medicine follow up in Oct.  Pt stated she would consider herbal tea in the evening.  Pt reports feeling she has \"black cloud\" over her each day immediately when she wakes up.   Pt reported she has no intent for " suicide but sometimes has thoughts she would be better dead to not have to deal with her grief.  Pt reported grief interventions were helpful today.    Plan: Continue to coordinate with Dr. Arce for medication options.        Patient Instructions   Please review grief resources via email.    Follow Up / Next Appointment: Next appointment: 10/03/24

## 2024-09-19 ENCOUNTER — APPOINTMENT (OUTPATIENT)
Dept: PHARMACY | Facility: HOSPITAL | Age: 79
End: 2024-09-19
Payer: MEDICARE

## 2024-09-19 DIAGNOSIS — F32.A DEPRESSION, UNSPECIFIED DEPRESSION TYPE: ICD-10-CM

## 2024-09-19 RX ORDER — VILAZODONE HYDROCHLORIDE 40 MG/1
40 TABLET ORAL
Qty: 30 TABLET | Refills: 4 | Status: SHIPPED | OUTPATIENT
Start: 2024-09-19

## 2024-09-20 ENCOUNTER — DOCUMENTATION (OUTPATIENT)
Dept: BEHAVIORAL HEALTH | Facility: HOSPITAL | Age: 79
End: 2024-09-20
Payer: MEDICARE

## 2024-09-20 NOTE — PROGRESS NOTES
"[facility] Psychiatry Consult Note     Diane Montemayor is a 79 y.o., referred to Collaborative Care for symptoms of depression and anxiety. I have reviewed the patient with the behavioral health manager and reviewed the patient's electronic record.    Still depressed and anxious, medication working only partially still. One-year anniversary of son's death coming up soon.        Recommendations:   - Would encourage trying abilify 2 mg daily as an augmentation agent, as this is used to \"jump-start\" an existing antidepressant to help it work better.   - If patient still only wants to take one psychiatric medication, could stop Viibryd and start lexapro instead, by prescribing Viibryd 20 mg daily + lexapro 10 mg daily for 2 weeks, then discontinuing viibryd and increasing lexapro to 20 mg daily.         Patient Health Questionnaire-9 Score: 21 (9/17/2024  3:23 PM)  CHAKA-7 Total Score: 20 (9/17/2024  3:24 PM)      The above treatment considerations and suggestions are based on consultations with the patient's care manager and a review of information available in the electronic medical record. I have not personally examined the patient. All recommendations should be implemented with consideration of the patient's relevant prior history and current clinical status. Please feel free to call me with any questions about the care of this patient. I can easily be reached through [employer] or at [email or web address]    "

## 2024-09-21 DIAGNOSIS — E78.5 HYPERLIPIDEMIA, UNSPECIFIED HYPERLIPIDEMIA TYPE: ICD-10-CM

## 2024-09-21 DIAGNOSIS — I10 PRIMARY HYPERTENSION: ICD-10-CM

## 2024-09-21 DIAGNOSIS — I10 HYPERTENSION, UNSPECIFIED TYPE: ICD-10-CM

## 2024-09-23 RX ORDER — HYDROCHLOROTHIAZIDE 25 MG/1
25 TABLET ORAL DAILY
Qty: 90 TABLET | Refills: 0 | OUTPATIENT
Start: 2024-09-23

## 2024-09-23 RX ORDER — LOSARTAN POTASSIUM 25 MG/1
25 TABLET ORAL 2 TIMES DAILY
Qty: 180 TABLET | Refills: 0 | Status: SHIPPED | OUTPATIENT
Start: 2024-09-23

## 2024-09-23 RX ORDER — ROSUVASTATIN CALCIUM 10 MG/1
TABLET, COATED ORAL
Qty: 90 TABLET | Refills: 0 | Status: SHIPPED | OUTPATIENT
Start: 2024-09-23

## 2024-09-27 DIAGNOSIS — I10 HYPERTENSION, UNSPECIFIED TYPE: ICD-10-CM

## 2024-09-27 RX ORDER — SPIRONOLACTONE 25 MG/1
25 TABLET ORAL DAILY
Qty: 90 TABLET | Refills: 0 | Status: SHIPPED | OUTPATIENT
Start: 2024-09-27

## 2024-09-30 PROCEDURE — 99494 1ST/SBSQ PSYC COLLAB CARE: CPT | Performed by: FAMILY MEDICINE

## 2024-09-30 PROCEDURE — 99493 SBSQ PSYC COLLAB CARE MGMT: CPT | Performed by: FAMILY MEDICINE

## 2024-10-02 ENCOUNTER — DOCUMENTATION (OUTPATIENT)
Dept: PRIMARY CARE | Facility: CLINIC | Age: 79
End: 2024-10-02
Payer: MEDICARE

## 2024-10-02 DIAGNOSIS — F33.1 MODERATE EPISODE OF RECURRENT MAJOR DEPRESSIVE DISORDER: Primary | ICD-10-CM

## 2024-10-02 DIAGNOSIS — F43.21 GRIEF REACTION: ICD-10-CM

## 2024-10-03 ENCOUNTER — APPOINTMENT (OUTPATIENT)
Dept: PHARMACY | Facility: HOSPITAL | Age: 79
End: 2024-10-03
Payer: MEDICARE

## 2024-10-03 ENCOUNTER — APPOINTMENT (OUTPATIENT)
Dept: PRIMARY CARE | Facility: CLINIC | Age: 79
End: 2024-10-03
Payer: MEDICARE

## 2024-10-03 ENCOUNTER — PATIENT OUTREACH (OUTPATIENT)
Dept: PRIMARY CARE | Facility: CLINIC | Age: 79
End: 2024-10-03

## 2024-10-03 DIAGNOSIS — F33.1 MODERATE EPISODE OF RECURRENT MAJOR DEPRESSIVE DISORDER: ICD-10-CM

## 2024-10-03 DIAGNOSIS — E11.69 TYPE 2 DIABETES MELLITUS WITH OTHER SPECIFIED COMPLICATION, WITHOUT LONG-TERM CURRENT USE OF INSULIN: ICD-10-CM

## 2024-10-03 DIAGNOSIS — I10 HYPERTENSION, UNSPECIFIED TYPE: ICD-10-CM

## 2024-10-03 DIAGNOSIS — E11.51 TYPE 2 DIABETES MELLITUS WITH DIABETIC PERIPHERAL ANGIOPATHY WITHOUT GANGRENE, WITHOUT LONG-TERM CURRENT USE OF INSULIN (MULTI): ICD-10-CM

## 2024-10-03 DIAGNOSIS — E11.69 TYPE 2 DIABETES MELLITUS WITH OTHER SPECIFIED COMPLICATION, UNSPECIFIED WHETHER LONG TERM INSULIN USE (MULTI): ICD-10-CM

## 2024-10-03 DIAGNOSIS — F43.21 GRIEF REACTION: ICD-10-CM

## 2024-10-03 NOTE — PROGRESS NOTES
Chart review complete.     Confirmation of at least 2 patient identifiers.  Monthly outreach complete.    Associated Chronic Conditions:  Grief reaction  Moderate episode of recurrent major depressive disorder  Type 2 diabetes mellitus with diabetic peripheral angiopathy without gangrene, without long-term current use of insulin (Multi)  Hypertension, unspecified type     Patient denies any main concerns at this current time. Does report some allergies (sniffles) says she is managing well on her own with inhaler and Claritin.     She did have an appointment with Ld Noel today as well. Currently on Mounjaro 10mg weekly. Started increased dose 9/11/24. Was experiencing some nausea/ vomiting/ diarrhea when increase. Reports those symptoms have subsided but also thinks maybe she was ill rather than it being the medicine.     Remains off Hydrochlorothiazide and reports no bouts of dizziness or falls.    Her wound is now also fairly healed, says maybe a pin sized eminent of the wound is left. Will follow up with Dr. Rodrigues on Monday. Believes [facility] will sign off around then as well, currently they come out 3 times a week but mainly for wound care.     Reports gratitude for RN connecting her with [facility] services and having to go in for appointments. Says a lot of the time its the highlight of her day. Says she was to have an appointment virtually and actually cancelled since she wasn't feeling the best but plans to call Monday to reschedule.     Denies further needs or concerns at this time. States understanding to call if any needs arise.    LOV:7/15/2024  NOV: -  HIPAA:1/2025

## 2024-10-03 NOTE — PROGRESS NOTES
Patient ID: Diane Montemayor is a 79 y.o. female who presents for Diabetes Pt is here for Follow Up appointment.    PCP/ Referring Provider: Ramses Black DO  Last visit with PCP: 7.15.24  next visit: not scheduled    Verbal consent to manage patient's drug therapy was obtained from patient. They were informed they may decline to participate or withdraw from participation in pharmacy services at any time. Patient is  agreeable to detailed voice messages if unable to be reached.     Patient enrolled in [facility]      Subjective   Treatment Adherence:   Patient did take medications today.   Number of missed doses in last 7 days: 0.      Preferred pharmacy: [facility]  Can patient afford prescribed medications: Yes, denies cost issues at this time     Current exercise: nothing formal; walking in the hallway at home, grocery shopping, chair exercises, etc.  Current diet: cut out chips and snacks  Drinks 1 protein shake per day (30G)      HPI  DIABETES MELLITUS TYPE 2:  Diagnosed with diabetes:  >40 years. Known diabetic complications: toe amputation, recent toe infection.  Last optometry exam: ; will be making appt per pt  Most recent visit in Podiatry: 24     Current diabetic medications include:  Mounjaro 10mg weekly on   Jardiance 25mg daily  Metformin XR 1000mg BID      Home blood glucose records (mg/dL)  FB, 117, 107; nothing over 120    Has lost 52lb since start      Any episodes of hypoglycemia? No, patient denies .  Did patient treat episode of hypoglycemia appropriately? N/A  Does pt have proteinuria? Yes If appropriate, is patient on ACEi/ARB? Yes,      Secondary Prevention  Statin? Yes  ACE-I/ARB? Yes  Aspirin? Yes    Pertinent PMH Review:  PMH of Pancreatitis: No  PMH of Retinopathy: No  PMH of Urinary Tract Infections: No; + yeast infection  PMH of MTC: No        Review of Systems    Objective     There were no vitals taken for this visit.     Labs  Lab Results   Component Value Date     BILITOT 0.4 06/29/2023    CALCIUM 9.8 04/02/2024    CO2 24 04/02/2024     04/02/2024    CREATININE 1.17 (H) 04/02/2024    GLUCOSE 129 (H) 04/02/2024    ALKPHOS 72 06/29/2023    K 4.7 04/02/2024    PROT 7.7 06/29/2023     04/02/2024    AST 18 06/29/2023    ALT 17 06/29/2023    BUN 21 04/02/2024    ANIONGAP 17 04/02/2024    MG CANCELED 07/01/2023    PHOS 3.3 03/12/2020    ALBUMIN 4.4 06/29/2023    GFRF CANCELED 07/01/2023    GFRMALE CANCELED 07/01/2023     Lab Results   Component Value Date    TRIG 146 06/21/2023    CHOL 163 06/21/2023    HDL 47.6 06/21/2023     Lab Results   Component Value Date    HGBA1C 5.4 07/15/2024       Current Outpatient Medications   Medication Instructions    albuterol 90 mcg/actuation inhaler INHALE 2 PUFFS BY MOUTH EVERY 4 HOURS AS DIRECTED AS NEEDED FOR COUGH OR WHEEZING    aspirin 81 mg, oral, Nightly    coenzyme Q10 400 mg capsule 1 capsule, oral, Nightly    cyanocobalamin (VITAMIN B-12) 1,000 mcg, oral, Daily    dilTIAZem XR (DILACOR XR) 240 mg, oral, Daily    docusate sodium (Colace) 100 mg capsule TAKE 1 CAPSULE(100 MG) BY MOUTH TWICE DAILY AS NEEDED FOR CONSTIPATION    empagliflozin (JARDIANCE) 25 mg, oral, Daily    fluconazole (DIFLUCAN) 150 mg, oral, Weekly (0600)    lactobacillus (Culturelle) 10 billion cell capsule 1 capsule, oral, Daily    levothyroxine (Synthroid, Levoxyl) 150 mcg tablet TAKE 1 TABLET BY MOUTH DAILY EXCEPT 2 TABLETS ON TUESDAY AND THURSDAY    losartan (COZAAR) 25 mg, oral, 2 times daily    magnesium oxide (MAG-OX) 400 mg, oral, Daily    metFORMIN XR (GLUCOPHAGE-XR) 1,000 mg, oral, 2 times daily (morning and late afternoon), Take with meals.    Mounjaro 10 mg, subcutaneous, Weekly    omeprazole (PriLOSEC) 40 mg DR capsule TAKE 1 CAPSULE BY MOUTH TWICE DAILY    rosuvastatin (Crestor) 10 mg tablet TAKE 1 TABLET(10 MG) BY MOUTH DAILY AT BEDTIME    spironolactone (ALDACTONE) 25 mg, oral, Daily    vilazodone (VIIBRYD) 40 mg, oral, Daily with breakfast         Assessment/Plan   Problem List Items Addressed This Visit             ICD-10-CM    Type 2 diabetes mellitus E11.9     Patient's goal A1c is < 8%.  Is pt at goal? Yes, 5.4% on 7/15/24  Patient's SMBGs are at goal.  Rationale for plan: Sugars improved and at goal. Has lost a few more pounds and tolerating Mounjaro well. Will maintain current dose at this time.       Medication Changes:  CONTINUE:  Jardiance 25mg daily  Metformin XR 1000mg BID  Mounjaro 10mg weekly      Monitoring and Education:  Diabetes Education  Rule of 15: eating ~15 g of carbs when BG less than 80 (half cup juice, 3-4 glucose tabs).  Recognize symptoms of high and low blood sugar.   Eat a realistic healthy diet consisting of fruits, vegetables, fiber, protein food choices on a regular basis and be aware of portion/serving sizes. Reduce carbohydrate consumption and always consume with protein and fat. Avoid foods high in saturated/trans fat, high salt content, and sweets and beverages with added sugars.  Limit alcohol consumption; alcohol may affect your blood sugar and cause hypoglycemia.   Stay active and incorporate ~30 mins of exercise into your daily routine to manage your weight and increase the body's acceptance of insulin.    Mounjaro Education:   - Counseled patient on MOA, expectations, side effects, duration of therapy, contraindications, administration, and monitoring parameters  - Answered all patient questions and concerns  - Counseled patient on Mounjaro MOA, expectations, side effects, duration of therapy, administration, and monitoring parameters.  - Provided detailed dosing and administration counseling to ensure proper technique.   - Reviewed Mounjaro titration schedule, starting with 2.5 mg once weekly to a goal of 15 mg once weekly if tolerated  - Counseled patient on the benefits of GLP-1ra glycemic control and weight loss  - Reviewed storage requirements of Mounjaro when not in use, and when to administer the  medication if a dose is missed.  - Advised patient that they may experience improved satiety after meals and portion sizes of meals may be reduced as doses of Mounjaro increase.         Relevant Orders    Follow Up In Advanced [facility] - Pharmacy    Albumin-Creatinine Ratio, Urine Random    Lipid Panel     Immunizations needed: RSV, COVID, influenza    Labs ordered:  lipids, UACR     Referrals:  none     Follow-up: Dec 3 at 0930 via telephone     Time spent with pt: Total length of time 20 (minutes) of the encounter and more than 50% was spent counseling the patient.    Sadie Montgomery, PharmD, LIS  Clinical Pharmacist  [phone]  [email or web address]    Continue all meds under the continuation of care with the referring provider and clinical pharmacy team.    Verbal consent to manage patient's drug therapy was obtained from the patient. They were informed they may decline to participate or withdraw from participation in pharmacy services at any time.

## 2024-10-03 NOTE — ASSESSMENT & PLAN NOTE
Patient's goal A1c is < 8%.  Is pt at goal? Yes, 5.4% on 7/15/24  Patient's SMBGs are at goal.  Rationale for plan: Sugars improved and at goal. Has lost a few more pounds and tolerating Mounjaro well. Will maintain current dose at this time.       Medication Changes:  CONTINUE:  Jardiance 25mg daily  Metformin XR 1000mg BID  Mounjaro 10mg weekly      Monitoring and Education:  Diabetes Education  Rule of 15: eating ~15 g of carbs when BG less than 80 (half cup juice, 3-4 glucose tabs).  Recognize symptoms of high and low blood sugar.   Eat a realistic healthy diet consisting of fruits, vegetables, fiber, protein food choices on a regular basis and be aware of portion/serving sizes. Reduce carbohydrate consumption and always consume with protein and fat. Avoid foods high in saturated/trans fat, high salt content, and sweets and beverages with added sugars.  Limit alcohol consumption; alcohol may affect your blood sugar and cause hypoglycemia.   Stay active and incorporate ~30 mins of exercise into your daily routine to manage your weight and increase the body's acceptance of insulin.    Mounjaro Education:   - Counseled patient on MOA, expectations, side effects, duration of therapy, contraindications, administration, and monitoring parameters  - Answered all patient questions and concerns  - Counseled patient on Mounjaro MOA, expectations, side effects, duration of therapy, administration, and monitoring parameters.  - Provided detailed dosing and administration counseling to ensure proper technique.   - Reviewed Mounjaro titration schedule, starting with 2.5 mg once weekly to a goal of 15 mg once weekly if tolerated  - Counseled patient on the benefits of GLP-1ra glycemic control and weight loss  - Reviewed storage requirements of Mounjaro when not in use, and when to administer the medication if a dose is missed.  - Advised patient that they may experience improved satiety after meals and portion sizes of meals  may be reduced as doses of Mounjaro increase.

## 2024-10-08 ENCOUNTER — DOCUMENTATION (OUTPATIENT)
Dept: PRIMARY CARE | Facility: CLINIC | Age: 79
End: 2024-10-08
Payer: MEDICARE

## 2024-10-08 NOTE — PROGRESS NOTES
Patient called to reschedule past appointment.  Pt discussed a nightmare very upsetting to her yesterday.  Pt felt like someone came into her house and felt like she couldn't breathe.  Pt reports this as uncommon for her but to monitor this as we can discuss this more in future appointment.  Pt discussed new neighbors moving in that make her uneasy and the year anniversary coming up of her son's death may be affecting her in various ways.  [facility] provided support and encouraged her to track these nightmares if there is a pattern that we can then discuss a plan.

## 2024-10-10 ENCOUNTER — PATIENT OUTREACH (OUTPATIENT)
Dept: PRIMARY CARE | Facility: CLINIC | Age: 79
End: 2024-10-10
Payer: MEDICARE

## 2024-10-10 DIAGNOSIS — F43.21 GRIEF REACTION: ICD-10-CM

## 2024-10-10 DIAGNOSIS — I10 HYPERTENSION, UNSPECIFIED TYPE: ICD-10-CM

## 2024-10-10 NOTE — PROGRESS NOTES
Patient called, reports Sunday night at around 10 PM she fell asleep in recliner. Patient reports she doesn't know if she was asleep or a wake at this point but belives it was 3 AM she heard the door slam, says unusual as Cali (son) doesn't normally do that. Then she felt like something was holding her down and she couldn't get up, then felt cool air blow on her cheek. Was yelling for her mother 4-5 times. But couldn't get up. Finally was able/ to wake up or get up. Said after woke up had headache and heard bussing. Says headache resides but buzzing/ringing stopped. When she got up front and back doors were locked. Checked all rooms and nothing was there. Denies any slurred speech, weakness, nausea, dizziness.     Says discussed with Senait HERNANDEZ and she doesn't think sleep paralysis (more mental stress). Says she's never had this experienced this before.    Encouraged patient to have sleep journal and keep record of dreams. Reviewed s/s to monitor for and to alert nurse of. Nurse does believe patient has been processing a lot of childhood/adult trauma and grief. She's mentioned how hard this time of the year is on her and this nurse believes this has transpired in her dreams. If dreams continue or inability to sleep starts she is to reach out.

## 2024-10-14 ENCOUNTER — APPOINTMENT (OUTPATIENT)
Dept: PRIMARY CARE | Facility: CLINIC | Age: 79
End: 2024-10-14
Payer: MEDICARE

## 2024-10-14 ASSESSMENT — PATIENT HEALTH QUESTIONNAIRE - PHQ9
6. FEELING BAD ABOUT YOURSELF - OR THAT YOU ARE A FAILURE OR HAVE LET YOURSELF OR YOUR FAMILY DOWN: MORE THAN HALF THE DAYS
1. LITTLE INTEREST OR PLEASURE IN DOING THINGS: SEVERAL DAYS
2. FEELING DOWN, DEPRESSED OR HOPELESS: NEARLY EVERY DAY
7. TROUBLE CONCENTRATING ON THINGS, SUCH AS READING THE NEWSPAPER OR WATCHING TELEVISION: MORE THAN HALF THE DAYS
SUM OF ALL RESPONSES TO PHQ9 QUESTIONS 1 & 2: 4
SUM OF ALL RESPONSES TO PHQ QUESTIONS 1-9: 11
3. TROUBLE FALLING OR STAYING ASLEEP: SEVERAL DAYS
4. FEELING TIRED OR HAVING LITTLE ENERGY: SEVERAL DAYS
5. POOR APPETITE OR OVEREATING: SEVERAL DAYS
8. MOVING OR SPEAKING SO SLOWLY THAT OTHER PEOPLE COULD HAVE NOTICED. OR THE OPPOSITE, BEING SO FIGETY OR RESTLESS THAT YOU HAVE BEEN MOVING AROUND A LOT MORE THAN USUAL: NOT AT ALL
9. THOUGHTS THAT YOU WOULD BE BETTER OFF DEAD, OR OF HURTING YOURSELF: NOT AT ALL
10. IF YOU CHECKED OFF ANY PROBLEMS, HOW DIFFICULT HAVE THESE PROBLEMS MADE IT FOR YOU TO DO YOUR WORK, TAKE CARE OF THINGS AT HOME, OR GET ALONG WITH OTHER PEOPLE: VERY DIFFICULT

## 2024-10-14 ASSESSMENT — ANXIETY QUESTIONNAIRES
4. TROUBLE RELAXING: SEVERAL DAYS
IF YOU CHECKED OFF ANY PROBLEMS ON THIS QUESTIONNAIRE, HOW DIFFICULT HAVE THESE PROBLEMS MADE IT FOR YOU TO DO YOUR WORK, TAKE CARE OF THINGS AT HOME, OR GET ALONG WITH OTHER PEOPLE: VERY DIFFICULT
6. BECOMING EASILY ANNOYED OR IRRITABLE: NEARLY EVERY DAY
3. WORRYING TOO MUCH ABOUT DIFFERENT THINGS: SEVERAL DAYS
5. BEING SO RESTLESS THAT IT IS HARD TO SIT STILL: SEVERAL DAYS
GAD7 TOTAL SCORE: 13
2. NOT BEING ABLE TO STOP OR CONTROL WORRYING: NEARLY EVERY DAY
1. FEELING NERVOUS, ANXIOUS, OR ON EDGE: SEVERAL DAYS
7. FEELING AFRAID AS IF SOMETHING AWFUL MIGHT HAPPEN: NEARLY EVERY DAY

## 2024-10-14 NOTE — PROGRESS NOTES
"Collaborative Care (CoCM)  Progress Note    Type of Interaction: In Office    Start Time: 1:03 PM    End Time: 1:44 PM    Appointment: Scheduled    Reason for Visit:   Chief Complaint   Patient presents with    Depression    Anxiety    Review of PHQ/Chaka and medication at this time; progress made toward grief goals reviewed.    Interval History / Patient Symptoms:   Pt reports foot wound has healed; she has been approved for meals on wheels 4 days a week.  Pt has started to do more shopping for herself and picking up her own rxs.  Pt hopes to have family over for Thanksgiving and starting to work on preparations.      Patient Health Questionnaire-9 Score: 11 (10/14/2024  1:47 PM)  CHAKA-7 Total Score: 13 (10/14/2024  1:47 PM)    Interventions Provided: Behavioral Activation, Motivational Interviewing, Strengths Exploration, Grief Work, Review Progress/Goals Stress Management, and Applauded pt for recent communications she has had with family and her efforts trying to get family together for the holidays.  Provided psych consult recommendations and next steps pt would like to take.  Identified improvements pt has made and how she plans to honor her son for his upcoming one year anniversary of his death.     Progress Made: Moderate    Response to Intervention: Pt reports she was able to have a positive, happy moment with her son today sharing great memories of her son that passed away and her feeling good about this connection with her son before he left to go out of town.  Pt looking forward to holiday get togethers and her ability to do more things outside of the house/chair in the last several weeks.  Pt reported she is \"feeling pretty good\" and not wanting to make any changes with medications at this time d/t concerns about side effects.  Pt reported all her house plants  over the last year bc she stopped watering them but recently got all new plants and wants to take care of them.  Pt reports plans to do some " updates to her bedroom with her granddtr.  Pt reports recently enjoying a book.       Plan: Maintain social connections and doing more things outside the house.    Patient Instructions   Maintain social connections and doing some shopping for herself.    Follow Up / Next Appointment: Next appointment: 10/28/24

## 2024-10-17 DIAGNOSIS — R06.02 SOB (SHORTNESS OF BREATH): ICD-10-CM

## 2024-10-17 RX ORDER — ALBUTEROL SULFATE 90 UG/1
INHALANT RESPIRATORY (INHALATION)
Qty: 54 G | Refills: 0 | Status: SHIPPED | OUTPATIENT
Start: 2024-10-17

## 2024-10-21 DIAGNOSIS — K59.00 CONSTIPATION, UNSPECIFIED CONSTIPATION TYPE: ICD-10-CM

## 2024-10-22 RX ORDER — DOCUSATE SODIUM 100 MG/1
CAPSULE, LIQUID FILLED ORAL
Qty: 60 CAPSULE | Refills: 1 | Status: SHIPPED | OUTPATIENT
Start: 2024-10-22

## 2024-10-28 ENCOUNTER — APPOINTMENT (OUTPATIENT)
Dept: PRIMARY CARE | Facility: CLINIC | Age: 79
End: 2024-10-28
Payer: MEDICARE

## 2024-10-31 PROCEDURE — 99493 SBSQ PSYC COLLAB CARE MGMT: CPT | Performed by: FAMILY MEDICINE

## 2024-11-01 ENCOUNTER — DOCUMENTATION (OUTPATIENT)
Dept: PRIMARY CARE | Facility: CLINIC | Age: 79
End: 2024-11-01
Payer: COMMERCIAL

## 2024-11-01 DIAGNOSIS — F33.1 MODERATE EPISODE OF RECURRENT MAJOR DEPRESSIVE DISORDER: Primary | ICD-10-CM

## 2024-11-08 ENCOUNTER — DOCUMENTATION (OUTPATIENT)
Dept: PRIMARY CARE | Facility: CLINIC | Age: 79
End: 2024-11-08
Payer: COMMERCIAL

## 2024-11-08 NOTE — PROGRESS NOTES
[facility] called patient to reschedule previously patient cancelled appointment.  Pt interested in reschedule and appointment scheduled.  Patient discussed traveling to [place] to see son's monument and seeing brother who is recovering from triple bypass heart surgery.

## 2024-11-18 ENCOUNTER — APPOINTMENT (OUTPATIENT)
Dept: PRIMARY CARE | Facility: CLINIC | Age: 79
End: 2024-11-18
Payer: COMMERCIAL

## 2024-11-18 ASSESSMENT — PATIENT HEALTH QUESTIONNAIRE - PHQ9
10. IF YOU CHECKED OFF ANY PROBLEMS, HOW DIFFICULT HAVE THESE PROBLEMS MADE IT FOR YOU TO DO YOUR WORK, TAKE CARE OF THINGS AT HOME, OR GET ALONG WITH OTHER PEOPLE: SOMEWHAT DIFFICULT
4. FEELING TIRED OR HAVING LITTLE ENERGY: NOT AT ALL
9. THOUGHTS THAT YOU WOULD BE BETTER OFF DEAD, OR OF HURTING YOURSELF: NOT AT ALL
6. FEELING BAD ABOUT YOURSELF - OR THAT YOU ARE A FAILURE OR HAVE LET YOURSELF OR YOUR FAMILY DOWN: NOT AT ALL
1. LITTLE INTEREST OR PLEASURE IN DOING THINGS: SEVERAL DAYS
5. POOR APPETITE OR OVEREATING: NOT AT ALL
SUM OF ALL RESPONSES TO PHQ QUESTIONS 1-9: 2
2. FEELING DOWN, DEPRESSED OR HOPELESS: SEVERAL DAYS
8. MOVING OR SPEAKING SO SLOWLY THAT OTHER PEOPLE COULD HAVE NOTICED. OR THE OPPOSITE, BEING SO FIGETY OR RESTLESS THAT YOU HAVE BEEN MOVING AROUND A LOT MORE THAN USUAL: NOT AT ALL
7. TROUBLE CONCENTRATING ON THINGS, SUCH AS READING THE NEWSPAPER OR WATCHING TELEVISION: NOT AT ALL
SUM OF ALL RESPONSES TO PHQ9 QUESTIONS 1 & 2: 2
3. TROUBLE FALLING OR STAYING ASLEEP: NOT AT ALL

## 2024-11-18 ASSESSMENT — ANXIETY QUESTIONNAIRES
1. FEELING NERVOUS, ANXIOUS, OR ON EDGE: SEVERAL DAYS
6. BECOMING EASILY ANNOYED OR IRRITABLE: SEVERAL DAYS
7. FEELING AFRAID AS IF SOMETHING AWFUL MIGHT HAPPEN: SEVERAL DAYS
5. BEING SO RESTLESS THAT IT IS HARD TO SIT STILL: NOT AT ALL
2. NOT BEING ABLE TO STOP OR CONTROL WORRYING: SEVERAL DAYS
3. WORRYING TOO MUCH ABOUT DIFFERENT THINGS: SEVERAL DAYS
4. TROUBLE RELAXING: NOT AT ALL
IF YOU CHECKED OFF ANY PROBLEMS ON THIS QUESTIONNAIRE, HOW DIFFICULT HAVE THESE PROBLEMS MADE IT FOR YOU TO DO YOUR WORK, TAKE CARE OF THINGS AT HOME, OR GET ALONG WITH OTHER PEOPLE: VERY DIFFICULT
GAD7 TOTAL SCORE: 5

## 2024-11-18 NOTE — PROGRESS NOTES
"Collaborative Care (CoCM)  Progress Note    Type of Interaction: In Office    Start Time: 3:03 PM    End Time: 3:56 PM    Appointment: Scheduled    Reason for Visit:   Chief Complaint   Patient presents with    Grief    Identified progress made toward goals related to coping with grief.    Interval History / Patient Symptoms:   Pt reported doing \"much better\" with coping with the loss of her son in recent weeks.  Pt reports her physical health labs have been doing well also.  Pt plans to go back to [employer] very soon as she feels ready to attend [employer] functions again.     Patient Health Questionnaire-9 Score: 2 (2024  4:04 PM)  CHAKA-7 Total Score: 5 (2024  4:05 PM)    Interventions Provided: Psychoeducation, Values Exploration, Grief Work, Review Progress/Goals Stress Management, and Treatment PlanningIdentified overall progress pt has made toward COC tx goals related to coping more effectively with her grief.  Explored values that are assisting her through her grief journey.  Explored skills and relationships that are helping pt with coping.      Progress Made: Significant    Response to Intervention: Pt reported feeling in a much better place emotionally over the last month.  Pt reported she no longer has thoughts of wishing she  like her son because the grief was too much to bare.  Pt has been spending time with family, got a new dog and re-connecting with past hobbies that she is enjoying again.  Pt reports that she wouldn't have made it through without support from [facility].  Pt reports she tries to focus her attention and energy on good memories now and things that add meaning to her life as she knows her son would want her to do that.    Plan: Maintain enjoyable hobbies and social connections.    Patient Instructions   Attend [facility], maintain social connections and reading.    Follow Up / Next Appointment: Next appointment: 24  "

## 2024-11-27 ENCOUNTER — DOCUMENTATION (OUTPATIENT)
Dept: PRIMARY CARE | Facility: CLINIC | Age: 79
End: 2024-11-27
Payer: MEDICARE

## 2024-11-27 ENCOUNTER — PATIENT OUTREACH (OUTPATIENT)
Dept: PRIMARY CARE | Facility: CLINIC | Age: 79
End: 2024-11-27
Payer: MEDICAID

## 2024-11-27 DIAGNOSIS — E11.51 TYPE 2 DIABETES MELLITUS WITH DIABETIC PERIPHERAL ANGIOPATHY WITHOUT GANGRENE, WITHOUT LONG-TERM CURRENT USE OF INSULIN (MULTI): ICD-10-CM

## 2024-11-27 DIAGNOSIS — F32.A DEPRESSION, UNSPECIFIED DEPRESSION TYPE: Primary | ICD-10-CM

## 2024-11-27 DIAGNOSIS — F32.A DEPRESSION, UNSPECIFIED DEPRESSION TYPE: ICD-10-CM

## 2024-11-27 NOTE — PROGRESS NOTES
Chart review complete.     Confirmation of at least 2 patient identifiers.  Monthly outreach complete.    Associated Chronic Conditions:  Depression, unspecified depression type  Type 2 diabetes mellitus with diabetic peripheral angiopathy without gangrene, without long-term current use of insulin (Multi)     Patient is in very good spirits today. Reports BHM and patient have agreed on Monthly FUV at this time. Patient does express some nervousness over the decreased visits but also states she's looking forward to it as shell have more free time. She recently got a rescue puppy (Poodle, named Lazaro). Says he's been helping her a lot as emotional support. Did recently go [place], says it was a good experience and something she needed to do.     Reports diabeties management is well. Says sugars are 120 or less and she's down 62 lbs. Continues on Mounjaro 10mg weekly. FUV with Sadie next week. Reports been more active and has more energy.     Recently noticed wound between toes on R foot. FU with Dr. Carrasquillo and says now foots improved.     Does want FUV set up with PCP in Dec, scheduled for Dec 9th.     Denies further needs or concerns at this time. States understanding to call if any needs arise.    LOV: 7/15/2024  NOV: 12/9/2024  HIPAA: 1/2025  APC: Pharmacy and DAVID

## 2024-12-03 ENCOUNTER — TELEPHONE (OUTPATIENT)
Dept: PRIMARY CARE | Facility: CLINIC | Age: 79
End: 2024-12-03

## 2024-12-03 ENCOUNTER — APPOINTMENT (OUTPATIENT)
Dept: PHARMACY | Facility: HOSPITAL | Age: 79
End: 2024-12-03
Payer: MEDICARE

## 2024-12-03 ENCOUNTER — PATIENT OUTREACH (OUTPATIENT)
Dept: PRIMARY CARE | Facility: CLINIC | Age: 79
End: 2024-12-03

## 2024-12-03 DIAGNOSIS — G47.30 SLEEP APNEA, UNSPECIFIED TYPE: ICD-10-CM

## 2024-12-03 DIAGNOSIS — F33.1 MODERATE EPISODE OF RECURRENT MAJOR DEPRESSIVE DISORDER: ICD-10-CM

## 2024-12-03 DIAGNOSIS — E11.69 TYPE 2 DIABETES MELLITUS WITH OTHER SPECIFIED COMPLICATION, UNSPECIFIED WHETHER LONG TERM INSULIN USE (MULTI): ICD-10-CM

## 2024-12-03 DIAGNOSIS — E11.51 TYPE 2 DIABETES MELLITUS WITH DIABETIC PERIPHERAL ANGIOPATHY WITHOUT GANGRENE, WITHOUT LONG-TERM CURRENT USE OF INSULIN (MULTI): ICD-10-CM

## 2024-12-03 NOTE — PROGRESS NOTES
Patient calls, reports she needs [facility] through [facility] extended as its set to end Dec. 10th. Reports has new open sore on R foot behind 3rd toe. Also reports that she hears [facility] offer Wound/ Podiatry Nps who will come in and treat and inquire how to go about getting that set up.    Call placed to [facility]: [phone]. Representative reviewed patients case and tried to reach JOEL Novoa, but Sommer is with another patient currently. The representative says office would not need to do anything additional at this time. If patient needs extension they will sent out to insurance for authorization first then reach out to office. This nurse provided representative with direct phone number of this nurse if anything additional comes up when she connects with Caridad. Not aware of any NP services.    Additionally, when on phone with patient, Diane reports she had a similar night terror as prior but not as bad and said it passed fairly quickly. Doesn't report increased stress but holidays are hard since loss of son. Patient to follow up with GYPSY Sapp on 12/17. PCP 12/9. Patient does have hx of sleep apnea and denies use of CPAP. Says she needs one but was set to have sleep study complete last year and then her son passed, she never got rescheduled. She last saw pulmonology 6/2023 - Dr. Caraballo. Encouraged patient to FU with Dr. Caraballo, she reports she will call today.     Patient [facility] Nurse - Sommer Ramirez called back, reports they need a general order reporting that the new sore can be cleaned and padded/ protected. See telephone encounter for further.    Patient aware of plan of care. She also confirms having appointment set up with Dr. Stevens for Sec 11th at 12:30    [facility] contact info:  - Sommer Ramirez: [phone]  - Sommer Office Fax: [phone]    LOV: 7/15/2024  NOV: 12/9/2024  HIPAA: 1/2025  APC: Pharmacy and [facility]

## 2024-12-03 NOTE — PROGRESS NOTES
FYSubjective   Patient ID: Diane Montemayor is a 79 y.o. female who presents for No chief complaint on file..    HPI     Review of Systems    Objective   There were no vitals taken for this visit.    Physical Exam    Assessment/Plan        [place]. Please evaluate wound at this visit.

## 2024-12-03 NOTE — PROGRESS NOTES
Patient [facility] Nurse, Sommer, reports patient has new sore to bottom of Right foot behind 3rd toe. Request Written order stating foot can be cleaned, padded and protected. Patient has FUV set for 10/9.   
detailed exam

## 2024-12-03 NOTE — ASSESSMENT & PLAN NOTE
Is pt at goal? Yes, 5.4% on 7/15/24  Patient's SMBGs are at goal.  Rationale for plan: Sugars improved and at goal. Has lost a few more pounds and tolerating Mounjaro well. Will maintain current dose at this time.       Medication Changes:  CONTINUE:  Jardiance 25mg daily  Metformin XR 1000mg BID  Mounjaro 10mg weekly      Monitoring and Education:  Diabetes Education  Rule of 15: eating ~15 g of carbs when BG less than 80 (half cup juice, 3-4 glucose tabs).  Recognize symptoms of high and low blood sugar.   Eat a realistic healthy diet consisting of fruits, vegetables, fiber, protein food choices on a regular basis and be aware of portion/serving sizes. Reduce carbohydrate consumption and always consume with protein and fat. Avoid foods high in saturated/trans fat, high salt content, and sweets and beverages with added sugars.  Limit alcohol consumption; alcohol may affect your blood sugar and cause hypoglycemia.   Stay active and incorporate ~30 mins of exercise into your daily routine to manage your weight and increase the body's acceptance of insulin.    Mounjaro Education:   - Counseled patient on MOA, expectations, side effects, duration of therapy, contraindications, administration, and monitoring parameters  - Answered all patient questions and concerns  - Counseled patient on Mounjaro MOA, expectations, side effects, duration of therapy, administration, and monitoring parameters.  - Provided detailed dosing and administration counseling to ensure proper technique.   - Reviewed Mounjaro titration schedule, starting with 2.5 mg once weekly to a goal of 15 mg once weekly if tolerated  - Counseled patient on the benefits of GLP-1ra glycemic control and weight loss  - Reviewed storage requirements of Mounjaro when not in use, and when to administer the medication if a dose is missed.  - Advised patient that they may experience improved satiety after meals and portion sizes of meals may be reduced as doses of  Mounjaro increase.

## 2024-12-03 NOTE — PROGRESS NOTES
Patient ID: Diane Montemayor is a 79 y.o. female who presents for Diabetes   Pt is here for Follow Up appointment.    PCP/ Referring Provider: Ramses Black DO  Last visit with PCP: 7.15.24  next visit: 24    Verbal consent to manage patient's drug therapy was obtained from patient. They were informed they may decline to participate or withdraw from participation in pharmacy services at any time. Patient is  agreeable to detailed voice messages if unable to be reached.     Patient enrolled in [facility]      Subjective   Treatment Adherence:   Patient did take medications today.   Number of missed doses in last 7 days: 0.      Preferred pharmacy: [facility]  Can patient afford prescribed medications: Yes, denies cost issues at this time      HPI  DIABETES MELLITUS TYPE 2:  Diagnosed with diabetes:  >40 years.   Known diabetic complications: toe amputation, recent toe infection.  Last optometry exam: ; will be making appt per pt  Most recent visit in Podiatry: 24     Current diabetic medications include:  Mounjaro 10mg weekly on   Jardiance 25mg daily  Metformin XR 1000mg BID    Home blood glucose records (mg/dL)  FB, 90s over the weekend, nothing over 120    Any episodes of hypoglycemia? No, patient denies .    Did patient treat episode of hypoglycemia appropriately? N/A    Does pt have proteinuria? Yes   If appropriate, is patient on ACEi/ARB? Yes,      Secondary Prevention  Statin? Yes  ACE-I/ARB? Yes  Aspirin? Yes    Pertinent PMH Review:  PMH of Pancreatitis: No  PMH of Retinopathy: No  PMH of Urinary Tract Infections: No; + yeast infection  PMH of MTC: No    Lifestyle:  Current exercise: nothing formal; walking in the hallway at home, grocery shopping, chair exercises, etc.  Current diet: cut out chips and snacks  Drinks 1 protein shake per day (30G)  Has lost 60 lb since start      Review of Systems    Objective     There were no vitals taken for this visit.     Labs  Lab Results    Component Value Date    BILITOT 0.4 06/29/2023    CALCIUM 9.8 04/02/2024    CO2 24 04/02/2024     04/02/2024    CREATININE 1.17 (H) 04/02/2024    GLUCOSE 129 (H) 04/02/2024    ALKPHOS 72 06/29/2023    K 4.7 04/02/2024    PROT 7.7 06/29/2023     04/02/2024    AST 18 06/29/2023    ALT 17 06/29/2023    BUN 21 04/02/2024    ANIONGAP 17 04/02/2024    MG CANCELED 07/01/2023    PHOS 3.3 03/12/2020    ALBUMIN 4.4 06/29/2023    GFRF CANCELED 07/01/2023    GFRMALE CANCELED 07/01/2023     Lab Results   Component Value Date    TRIG 146 06/21/2023    CHOL 163 06/21/2023    HDL 47.6 06/21/2023     Lab Results   Component Value Date    HGBA1C 5.4 07/15/2024       Current Outpatient Medications   Medication Instructions    albuterol 90 mcg/actuation inhaler INHALE 2 PUFFS BY MOUTH EVERY 4 HOURS AS DIRECTED AS NEEDED FOR COUGH OR WHEEZING    aspirin 81 mg, oral, Nightly    coenzyme Q10 400 mg capsule 1 capsule, oral, Nightly    cyanocobalamin (VITAMIN B-12) 1,000 mcg, oral, Daily    dilTIAZem XR (DILACOR XR) 240 mg, oral, Daily    docusate sodium (Colace) 100 mg capsule TAKE 1 CAPSULE(100 MG) BY MOUTH TWICE DAILY AS NEEDED FOR CONSTIPATION    empagliflozin (JARDIANCE) 25 mg, oral, Daily    fluconazole (DIFLUCAN) 150 mg, oral, Weekly (0600)    lactobacillus (Culturelle) 10 billion cell capsule 1 capsule, oral, Daily    levothyroxine (Synthroid, Levoxyl) 150 mcg tablet TAKE 1 TABLET BY MOUTH DAILY EXCEPT 2 TABLETS ON TUESDAY AND THURSDAY    losartan (COZAAR) 25 mg, oral, 2 times daily    magnesium oxide (MAG-OX) 400 mg, oral, Daily    metFORMIN XR (GLUCOPHAGE-XR) 1,000 mg, oral, 2 times daily (morning and late afternoon), Take with meals.    Mounjaro 10 mg, subcutaneous, Weekly    omeprazole (PriLOSEC) 40 mg DR capsule TAKE 1 CAPSULE BY MOUTH TWICE DAILY    rosuvastatin (Crestor) 10 mg tablet TAKE 1 TABLET(10 MG) BY MOUTH DAILY AT BEDTIME    spironolactone (ALDACTONE) 25 mg, oral, Daily    vilazodone (VIIBRYD) 40 mg,  oral, Daily with breakfast        Assessment/Plan   Problem List Items Addressed This Visit             ICD-10-CM    Type 2 diabetes mellitus E11.9     Is pt at goal? Yes, 5.4% on 7/15/24  Patient's SMBGs are at goal.  Rationale for plan: Sugars improved and at goal. Has lost a few more pounds and tolerating Mounjaro well. Will maintain current dose at this time.       Medication Changes:  CONTINUE:  Jardiance 25mg daily  Metformin XR 1000mg BID  Mounjaro 10mg weekly      Monitoring and Education:  Diabetes Education  Rule of 15: eating ~15 g of carbs when BG less than 80 (half cup juice, 3-4 glucose tabs).  Recognize symptoms of high and low blood sugar.   Eat a realistic healthy diet consisting of fruits, vegetables, fiber, protein food choices on a regular basis and be aware of portion/serving sizes. Reduce carbohydrate consumption and always consume with protein and fat. Avoid foods high in saturated/trans fat, high salt content, and sweets and beverages with added sugars.  Limit alcohol consumption; alcohol may affect your blood sugar and cause hypoglycemia.   Stay active and incorporate ~30 mins of exercise into your daily routine to manage your weight and increase the body's acceptance of insulin.    Mounjaro Education:   - Counseled patient on MOA, expectations, side effects, duration of therapy, contraindications, administration, and monitoring parameters  - Answered all patient questions and concerns  - Counseled patient on Mounjaro MOA, expectations, side effects, duration of therapy, administration, and monitoring parameters.  - Provided detailed dosing and administration counseling to ensure proper technique.   - Reviewed Mounjaro titration schedule, starting with 2.5 mg once weekly to a goal of 15 mg once weekly if tolerated  - Counseled patient on the benefits of GLP-1ra glycemic control and weight loss  - Reviewed storage requirements of Mounjaro when not in use, and when to administer the medication  if a dose is missed.  - Advised patient that they may experience improved satiety after meals and portion sizes of meals may be reduced as doses of Mounjaro increase.            Immunizations needed: RSV, COVID, influenza    Labs ordered:  lipids, UACR     Referrals:  none     Follow-up: 2 months     Time spent with pt: Total length of time 10 (minutes) of the encounter and more than 50% was spent counseling the patient.    Sadie Montgomery PharmD, LIS  Clinical Pharmacist  [phone]  [email or web address]    Continue all meds under the continuation of care with the referring provider and clinical pharmacy team.    Verbal consent to manage patient's drug therapy was obtained from the patient. They were informed they may decline to participate or withdraw from participation in pharmacy services at any time.

## 2024-12-07 DIAGNOSIS — R06.02 SOB (SHORTNESS OF BREATH): ICD-10-CM

## 2024-12-09 ENCOUNTER — APPOINTMENT (OUTPATIENT)
Dept: PRIMARY CARE | Facility: CLINIC | Age: 79
End: 2024-12-09
Payer: MEDICARE

## 2024-12-09 VITALS
OXYGEN SATURATION: 98 % | HEART RATE: 79 BPM | RESPIRATION RATE: 16 BRPM | TEMPERATURE: 96.9 F | SYSTOLIC BLOOD PRESSURE: 100 MMHG | HEIGHT: 63 IN | DIASTOLIC BLOOD PRESSURE: 58 MMHG | WEIGHT: 190 LBS | BODY MASS INDEX: 33.66 KG/M2

## 2024-12-09 DIAGNOSIS — E66.811 CLASS 1 OBESITY DUE TO EXCESS CALORIES WITHOUT SERIOUS COMORBIDITY WITH BODY MASS INDEX (BMI) OF 33.0 TO 33.9 IN ADULT: ICD-10-CM

## 2024-12-09 DIAGNOSIS — I10 HYPERTENSION, UNSPECIFIED TYPE: ICD-10-CM

## 2024-12-09 DIAGNOSIS — I10 PRIMARY HYPERTENSION: ICD-10-CM

## 2024-12-09 DIAGNOSIS — E55.9 VITAMIN D DEFICIENCY: ICD-10-CM

## 2024-12-09 DIAGNOSIS — Z23 NEEDS FLU SHOT: ICD-10-CM

## 2024-12-09 DIAGNOSIS — E11.51 TYPE 2 DIABETES MELLITUS WITH DIABETIC PERIPHERAL ANGIOPATHY WITHOUT GANGRENE, WITHOUT LONG-TERM CURRENT USE OF INSULIN (MULTI): ICD-10-CM

## 2024-12-09 DIAGNOSIS — S91.301A WOUND OF RIGHT FOOT: Primary | ICD-10-CM

## 2024-12-09 DIAGNOSIS — I50.42 CHRONIC COMBINED SYSTOLIC (CONGESTIVE) AND DIASTOLIC (CONGESTIVE) HEART FAILURE: ICD-10-CM

## 2024-12-09 DIAGNOSIS — R06.02 SOB (SHORTNESS OF BREATH): ICD-10-CM

## 2024-12-09 DIAGNOSIS — E78.5 HYPERLIPIDEMIA, UNSPECIFIED HYPERLIPIDEMIA TYPE: ICD-10-CM

## 2024-12-09 DIAGNOSIS — E11.69 TYPE 2 DIABETES MELLITUS WITH OTHER SPECIFIED COMPLICATION, WITHOUT LONG-TERM CURRENT USE OF INSULIN: ICD-10-CM

## 2024-12-09 DIAGNOSIS — I47.20 VENTRICULAR TACHYCARDIA (MULTI): ICD-10-CM

## 2024-12-09 DIAGNOSIS — E66.09 CLASS 1 OBESITY DUE TO EXCESS CALORIES WITHOUT SERIOUS COMORBIDITY WITH BODY MASS INDEX (BMI) OF 33.0 TO 33.9 IN ADULT: ICD-10-CM

## 2024-12-09 DIAGNOSIS — E53.8 B12 DEFICIENCY: ICD-10-CM

## 2024-12-09 LAB — POC HEMOGLOBIN A1C: 5.6 % (ref 4.2–6.5)

## 2024-12-09 PROCEDURE — 87070 CULTURE OTHR SPECIMN AEROBIC: CPT

## 2024-12-09 PROCEDURE — 3078F DIAST BP <80 MM HG: CPT | Performed by: FAMILY MEDICINE

## 2024-12-09 PROCEDURE — 90662 IIV NO PRSV INCREASED AG IM: CPT | Performed by: FAMILY MEDICINE

## 2024-12-09 PROCEDURE — 87205 SMEAR GRAM STAIN: CPT

## 2024-12-09 PROCEDURE — 1159F MED LIST DOCD IN RCRD: CPT | Performed by: FAMILY MEDICINE

## 2024-12-09 PROCEDURE — 87077 CULTURE AEROBIC IDENTIFY: CPT

## 2024-12-09 PROCEDURE — G0008 ADMIN INFLUENZA VIRUS VAC: HCPCS | Performed by: FAMILY MEDICINE

## 2024-12-09 PROCEDURE — 1157F ADVNC CARE PLAN IN RCRD: CPT | Performed by: FAMILY MEDICINE

## 2024-12-09 PROCEDURE — 87075 CULTR BACTERIA EXCEPT BLOOD: CPT

## 2024-12-09 PROCEDURE — 1160F RVW MEDS BY RX/DR IN RCRD: CPT | Performed by: FAMILY MEDICINE

## 2024-12-09 PROCEDURE — 3074F SYST BP LT 130 MM HG: CPT | Performed by: FAMILY MEDICINE

## 2024-12-09 PROCEDURE — 83036 HEMOGLOBIN GLYCOSYLATED A1C: CPT | Performed by: FAMILY MEDICINE

## 2024-12-09 PROCEDURE — 87186 SC STD MICRODIL/AGAR DIL: CPT

## 2024-12-09 PROCEDURE — 99215 OFFICE O/P EST HI 40 MIN: CPT | Performed by: FAMILY MEDICINE

## 2024-12-09 PROCEDURE — 1036F TOBACCO NON-USER: CPT | Performed by: FAMILY MEDICINE

## 2024-12-09 RX ORDER — DOXYCYCLINE 100 MG/1
100 CAPSULE ORAL 2 TIMES DAILY
Qty: 28 CAPSULE | Refills: 0 | Status: SHIPPED | OUTPATIENT
Start: 2024-12-09 | End: 2024-12-23

## 2024-12-09 RX ORDER — ROSUVASTATIN CALCIUM 10 MG/1
TABLET, COATED ORAL
Qty: 90 TABLET | Refills: 1 | Status: SHIPPED | OUTPATIENT
Start: 2024-12-09

## 2024-12-09 RX ORDER — TIRZEPATIDE 10 MG/.5ML
10 INJECTION, SOLUTION SUBCUTANEOUS WEEKLY
Qty: 2 ML | Refills: 3 | Status: SHIPPED | OUTPATIENT
Start: 2024-12-09

## 2024-12-09 RX ORDER — ALBUTEROL SULFATE 90 UG/1
INHALANT RESPIRATORY (INHALATION)
Qty: 54 G | Refills: 0 | Status: SHIPPED | OUTPATIENT
Start: 2024-12-09 | End: 2024-12-09 | Stop reason: SDUPTHER

## 2024-12-09 RX ORDER — SPIRONOLACTONE 25 MG/1
25 TABLET ORAL DAILY
Qty: 90 TABLET | Refills: 1 | Status: SHIPPED | OUTPATIENT
Start: 2024-12-09

## 2024-12-09 RX ORDER — LOSARTAN POTASSIUM 25 MG/1
25 TABLET ORAL 2 TIMES DAILY
Qty: 180 TABLET | Refills: 1 | Status: SHIPPED | OUTPATIENT
Start: 2024-12-09

## 2024-12-09 RX ORDER — ALBUTEROL SULFATE 90 UG/1
INHALANT RESPIRATORY (INHALATION)
Qty: 18 G | Refills: 3 | Status: SHIPPED | OUTPATIENT
Start: 2024-12-09

## 2024-12-09 RX ORDER — METFORMIN HYDROCHLORIDE 500 MG/1
1000 TABLET, EXTENDED RELEASE ORAL
Qty: 360 TABLET | Refills: 3 | Status: SHIPPED | OUTPATIENT
Start: 2024-12-09

## 2024-12-09 ASSESSMENT — PATIENT HEALTH QUESTIONNAIRE - PHQ9
1. LITTLE INTEREST OR PLEASURE IN DOING THINGS: NOT AT ALL
SUM OF ALL RESPONSES TO PHQ9 QUESTIONS 1 AND 2: 0
2. FEELING DOWN, DEPRESSED OR HOPELESS: NOT AT ALL

## 2024-12-09 NOTE — PROGRESS NOTES
Subjective   Patient ID: Diane Montemayor is a 79 y.o. female who presents for Foot Wound Check, Diabetes, Hypertension, and Hyperlipidemia.  HPI  Patient presents today for a follow-up on Diabetes, Hypertension, and Hyperlipidemia.   Does follow a low sugar, low sodium, and low fat diet.  Does not drink enough water, feels dehydrated  Does check sugar and BP at home.   Stays active.   Currently on Mounjaro 10mg weekly, Jardiance 25mg daily, and Metformin XR 1000mg BID   Medications working well  Last eye appt was last year. Will be making a new appt today.  Blood work 4/2/24.    A1c on 12/9/24@ 5.6%.   Last blood glucose was 110 on 7/15/24    Pt is being seen for a new sore at the bottom of her Right foot without cellulitis, behind the 3rd toe.    Ongoing for 3 weeks  He was seen by podiatry and was prescribed gentamicin cream and recommended good glycemic control  She requests a written order stating foot can be cleaned, padded and protected.     Pt states she experiences severe constipation all the time    She is taking vitamin B12 supp    Flu 10/2/23      No other concern/question   Review of systems  ; Patient seen today for exam denies any problems with headaches or vision, denies any shortness of breath chest pain nausea or vomiting, no black stool no blood in the stool no heartburn type symptoms denies any problems with constipation or diarrhea, and no dysuria-type symptoms    The patient's allergies medications were reviewed with them today    The patient's social family and surgical history or also reviewed here today, along with her past medical history.     Objective     Alert and active in  no acute distress  HEENT TMs clear oropharynx negative nares clear no drainage noted neck supple  With no adenopathy   Heart regular rate and rhythm without murmur and no carotid bruits  Lungs- clear to auscultation bilaterally, no wheeze or rhonchi noted  Thyroid -negative masses or nodularity  Abdomen- soft times four  "quadrants , bowel sounds positive no masses or organomegaly, negative tenderness guarding or rebound  Neurological exam unremarkable- DTRs in upper and lower extremities within normal limits.   skin -no lesions noted  Ext- Right foot: 3rd toe has an ulcer underneath at the volar surface. No sign of cellulitis.  The area at that third toe was clean debrided and redressed      /58 (BP Location: Right arm, Patient Position: Sitting, BP Cuff Size: Adult)   Pulse 79   Temp 36.1 °C (96.9 °F) (Temporal)   Resp 16   Ht 1.6 m (5' 3\")   Wt 86.2 kg (190 lb)   SpO2 98%   BMI 33.66 kg/m²     Allergies   Allergen Reactions    Lisinopril Cough       Assessment/Plan   Problem List Items Addressed This Visit       Type 2 diabetes mellitus    Relevant Medications    tirzepatide (Mounjaro) 10 mg/0.5 mL pen injector    metFORMIN  mg 24 hr tablet    Other Relevant Orders    POCT glycosylated hemoglobin (Hb A1C) manually resulted (Completed)    Diabetic Shoes    B12 deficiency    Relevant Orders    Vitamin B12    Type 2 diabetes mellitus with diabetic peripheral angiopathy without gangrene, without long-term current use of insulin (Multi)    Relevant Orders    POCT glycosylated hemoglobin (Hb A1C) manually resulted (Completed)    Diabetic Shoes    Hypertension    Relevant Medications    spironolactone (Aldactone) 25 mg tablet    losartan (Cozaar) 25 mg tablet    Other Relevant Orders    Comprehensive Metabolic Panel    CBC and Auto Differential    Lipid Panel    BMI 33.0-33.9,adult    Wound of right foot - Primary    Relevant Medications    doxycycline (Vibramycin) 100 mg capsule    Other Relevant Orders    Tissue/Wound Culture/Smear    Class 1 obesity due to excess calories without serious comorbidity with body mass index (BMI) of 33.0 to 33.9 in adult    Chronic combined systolic (congestive) and diastolic (congestive) heart failure    Ventricular tachycardia (Multi)     Other Visit Diagnoses       SOB (shortness of " breath)        Relevant Medications    albuterol 90 mcg/actuation inhaler    Needs flu shot        Relevant Orders    Flu vaccine, trivalent, preservative free, HIGH-DOSE, age 65y+ (Fluzone) (Completed)    Vitamin D deficiency        Relevant Orders    Vitamin D 25-Hydroxy,Total (for eval of Vitamin D levels)          Labs have been ordered, she will have these performed and we will contact her with results.        (CBC, CMP, lipid panel, Vit-D3, Vit-B12)    A1c has improved, recommended checking A1c every 6 months     Refilled Aldactone, Mounjaro, losartan, metformin, and Albuterol inhaler.     Prescribed Doxycycline today    Wound culture ordered she has taken doxycycline in the past past with MRSA    Prescribed diabetic shoes today. Wear shoes with good arch support         Unfortunately she has another ulceration,, she is a non-insulin user and is doing 3 times      daily testing due to recurrent ulcerations and fluctuating sugars,, therefore she also needs monitoring supplies and glucose strips for the monitor,, if she needs a prescription for this we will gladly do so or have our pharmacist help us with that        Recommended Benefiber 2 table spoons daily to help with constipation.     Flu vaccine administered today    If anything worsens or changes please call us at once, or go to emergency room as she is already had amputations at this foot before with several toes being removed I would back in the office Wednesday morning if anything changes or worsens on admission let me know once      Scribe Attestation  By signing my name below, I, Nighat Krueger, Scribe   attest that this documentation has been prepared under the direction and in the presence of Ramses Black DO.

## 2024-12-12 ENCOUNTER — TELEPHONE (OUTPATIENT)
Dept: PRIMARY CARE | Facility: CLINIC | Age: 79
End: 2024-12-12
Payer: MEDICAID

## 2024-12-12 LAB
BACTERIA SPEC CULT: ABNORMAL
BACTERIA SPEC CULT: ABNORMAL
GRAM STN SPEC: ABNORMAL
GRAM STN SPEC: ABNORMAL

## 2024-12-12 NOTE — TELEPHONE ENCOUNTER
Please amend last note to include non insulin user testing 3 times per day and reason,  need for medical equipment testing supplies to be covered by medicare.

## 2024-12-17 ENCOUNTER — TELEPHONE (OUTPATIENT)
Dept: PRIMARY CARE | Facility: CLINIC | Age: 79
End: 2024-12-17

## 2024-12-17 ENCOUNTER — DOCUMENTATION (OUTPATIENT)
Dept: PRIMARY CARE | Facility: CLINIC | Age: 79
End: 2024-12-17

## 2024-12-17 ENCOUNTER — APPOINTMENT (OUTPATIENT)
Dept: PRIMARY CARE | Facility: CLINIC | Age: 79
End: 2024-12-17
Payer: COMMERCIAL

## 2024-12-17 NOTE — PROGRESS NOTES
[facility] called and spoke to patient about her wishes for COCM services following a Marah message from patient on 12/17.  Discussed recent stressors and plans for holidays.  Discussed recent Staph infection in her toe and follow ups she is having with [facility].  Pt said she would like to schedule another appointment after the holidays.

## 2024-12-17 NOTE — PROGRESS NOTES
"\"Rahul Sapp, I’m sorry for this short notice. I’ve decided to discontinue counseling. I know I’m still broken but  need to do some hard work on my own now. I truly thank you for all your help. You saved my life! Bela Laurent!\"  "

## 2024-12-18 ENCOUNTER — TELEPHONE (OUTPATIENT)
Dept: PRIMARY CARE | Facility: CLINIC | Age: 79
End: 2024-12-18
Payer: MEDICAID

## 2024-12-18 DIAGNOSIS — S91.301A WOUND OF RIGHT FOOT: Primary | ICD-10-CM

## 2024-12-18 RX ORDER — SULFAMETHOXAZOLE AND TRIMETHOPRIM 800; 160 MG/1; MG/1
1 TABLET ORAL
Qty: 20 TABLET | Refills: 0 | Status: SHIPPED | OUTPATIENT
Start: 2024-12-18 | End: 2024-12-28

## 2024-12-19 ENCOUNTER — PATIENT OUTREACH (OUTPATIENT)
Dept: PRIMARY CARE | Facility: CLINIC | Age: 79
End: 2024-12-19
Payer: MEDICAID

## 2024-12-19 DIAGNOSIS — I10 HYPERTENSION, UNSPECIFIED TYPE: ICD-10-CM

## 2024-12-19 DIAGNOSIS — S91.301A WOUND OF RIGHT FOOT: ICD-10-CM

## 2024-12-19 DIAGNOSIS — E11.69 TYPE 2 DIABETES MELLITUS WITH OTHER SPECIFIED COMPLICATION, WITHOUT LONG-TERM CURRENT USE OF INSULIN: ICD-10-CM

## 2024-12-31 ENCOUNTER — TELEPHONE (OUTPATIENT)
Dept: PHARMACY | Facility: HOSPITAL | Age: 79
End: 2024-12-31
Payer: MEDICARE

## 2024-12-31 ENCOUNTER — DOCUMENTATION (OUTPATIENT)
Dept: PRIMARY CARE | Facility: CLINIC | Age: 79
End: 2024-12-31
Payer: MEDICARE

## 2024-12-31 DIAGNOSIS — F33.1 MODERATE EPISODE OF RECURRENT MAJOR DEPRESSIVE DISORDER: ICD-10-CM

## 2024-12-31 DIAGNOSIS — F32.A DEPRESSION, UNSPECIFIED DEPRESSION TYPE: Primary | ICD-10-CM

## 2024-12-31 PROCEDURE — G2214 INIT/SUB PSYCH CARE M 1ST 30: HCPCS | Performed by: FAMILY MEDICINE

## 2024-12-31 NOTE — TELEPHONE ENCOUNTER
Patient contacted PharmD to report she had passed out twice over the weekend while she was cooking. States she did feel very hot prior to the episodes occurring..     Denies hypoglycemia. Has not checked blood pressure at home but reports she has not been drinking much lately. States that her home nurse thinks it might be d/t constipation.     Infection is still healing.       Discussed increasing fiber intake with nonstarchy vegetables and/or a fiber supplement as well as increase fluid intake. Advised to see PCP or ED if episodes continue.

## 2025-01-06 DIAGNOSIS — M86.10 OTHER ACUTE OSTEOMYELITIS, UNSPECIFIED SITE: ICD-10-CM

## 2025-01-06 RX ORDER — SULFAMETHOXAZOLE AND TRIMETHOPRIM 800; 160 MG/1; MG/1
1 TABLET ORAL 2 TIMES DAILY
Qty: 20 TABLET | Refills: 0 | Status: SHIPPED | OUTPATIENT
Start: 2025-01-06 | End: 2025-01-16

## 2025-01-06 NOTE — TELEPHONE ENCOUNTER
PATIENT STATES THAT THIS IS FOR THE DIABETIC ULCER ON HER FOOT AND HOME HEALTH THINKS ANOTHER ROUND OF ANTIBIOTICS WILL HELP. PATIENT STATES IF SHE NEEDS AN APPT SHE IS NOT GOING TO COME OUT IN THIS WEATHER. MEDICATION PENDED IF APPROVED    Rx Refill Request Telephone Encounter    Name:  Diane Montemayor  :  [id number]  sulfamethoxazole-trimethoprim (Bactrim DS) 800-160 mg tablet [[id number]]      Order Details  Dose: 1 tablet Route: oral Frequency: 2 times daily after meals   Dispense Quantity: 20 tablet Refills: 0          Sig: Take 1 tablet by mouth 2 times a day after meals for 10 days.     Specific Pharmacy location:    [facility] #[id number] [address] & [address] [address]  Phone: [phone]  Fax: [phone]  SAMMY #: [id number]     Date of last appointment:  24  Date of next appointment:  na  Best number to reach patient:  [phone]

## 2025-01-07 ENCOUNTER — PATIENT OUTREACH (OUTPATIENT)
Dept: PRIMARY CARE | Facility: CLINIC | Age: 80
End: 2025-01-07
Payer: MEDICARE

## 2025-01-07 ENCOUNTER — TELEPHONE (OUTPATIENT)
Dept: PRIMARY CARE | Facility: CLINIC | Age: 80
End: 2025-01-07

## 2025-01-07 DIAGNOSIS — S91.301A WOUND OF RIGHT FOOT: ICD-10-CM

## 2025-01-07 DIAGNOSIS — E11.69 TYPE 2 DIABETES MELLITUS WITH OTHER SPECIFIED COMPLICATION, WITHOUT LONG-TERM CURRENT USE OF INSULIN: ICD-10-CM

## 2025-01-07 DIAGNOSIS — F32.A DEPRESSION, UNSPECIFIED DEPRESSION TYPE: ICD-10-CM

## 2025-01-07 DIAGNOSIS — I10 HYPERTENSION, UNSPECIFIED TYPE: ICD-10-CM

## 2025-01-07 NOTE — TELEPHONE ENCOUNTER
Patient called again in regards to her labs from [facility] (scanned under media) She states she is anxious to know the results because she passed out twice last week. She is aware you are out of the office today  Please review her labs and call patient with results  Thanks    Patient's # [phone]

## 2025-01-07 NOTE — PROGRESS NOTES
Chart review complete.     Confirmation of at least 2 patient identifiers.  Monthly outreach complete.    Associated Chronic Conditions:  Depression, unspecified depression type  Wound of right foot  Hypertension, unspecified type  Type 2 diabetes mellitus with other specified complication, without long-term current use of insulin     Main concerns at this time:  - Fainting Episode: Twice back to back for what she thinks was 15 min each. Denies hitting her head on either occurrence. Denies injuries. Does report she has been losing weight and doesn't think she's been staying hydrated. She also reports constipation.     Patient inquires about oral rehydration packets. Discussed with patient importance of reading the nutrient label due to higher sodium content. Patient does have hx of CHF, CKD, CAD. Encouraged to monitor for signs of fluid retention (swelling, shortness of breath, weight gain etc.) she states understanding.     Labs taken by [facility]. Diane called her [facility] nurse who briefly reviewed them with her.  She does request a copy of labs and this nurse has sent via mail.     Labs show elevated creatinine and low GFR. As well as a low sodium.  -Wound: Patient just received new order for additional antibiotics. Does express she cancelled podiatry appointment Monday due to weather. However, says she is concerned she'll need to got to ER if it doesn't improve after this antibiotic. Denies fever, nausea/ vomiting.    Denies further needs or concerns at this time. States understanding to call if any needs arise.    LOV: 12/9/2024  NOV: 1/9/2025  HIPAA: 1/2025  APC: Pharmacy and [facility]

## 2025-01-08 NOTE — TELEPHONE ENCOUNTER
Ramses Black, DO  You; [id number] [id number] Clinical Support Staff1 hour ago (11:33 AM)       Reviewed her labs from the [facility] they all look very stable except her vitamin D is too low she needs 4000 units vitamin D3 every day,,, also her sodium is low again, most likely from a combination of the water pill and Viibryd,, have her take the water pill spironolactone every other day and I think it is best at this time she decreases the Viibryd to half a tablet each day, then recheck a BMP in 3 to 4 weeks

## 2025-01-09 ENCOUNTER — DOCUMENTATION (OUTPATIENT)
Dept: PRIMARY CARE | Facility: CLINIC | Age: 80
End: 2025-01-09

## 2025-01-09 ENCOUNTER — DOCUMENTATION (OUTPATIENT)
Dept: BEHAVIORAL HEALTH | Facility: HOSPITAL | Age: 80
End: 2025-01-09

## 2025-01-09 ENCOUNTER — APPOINTMENT (OUTPATIENT)
Dept: PRIMARY CARE | Facility: CLINIC | Age: 80
End: 2025-01-09
Payer: MEDICARE

## 2025-01-09 ASSESSMENT — ANXIETY QUESTIONNAIRES
GAD7 TOTAL SCORE: 13
2. NOT BEING ABLE TO STOP OR CONTROL WORRYING: NEARLY EVERY DAY
4. TROUBLE RELAXING: SEVERAL DAYS
5. BEING SO RESTLESS THAT IT IS HARD TO SIT STILL: NOT AT ALL
7. FEELING AFRAID AS IF SOMETHING AWFUL MIGHT HAPPEN: MORE THAN HALF THE DAYS
1. FEELING NERVOUS, ANXIOUS, OR ON EDGE: SEVERAL DAYS
IF YOU CHECKED OFF ANY PROBLEMS ON THIS QUESTIONNAIRE, HOW DIFFICULT HAVE THESE PROBLEMS MADE IT FOR YOU TO DO YOUR WORK, TAKE CARE OF THINGS AT HOME, OR GET ALONG WITH OTHER PEOPLE: VERY DIFFICULT
3. WORRYING TOO MUCH ABOUT DIFFERENT THINGS: NEARLY EVERY DAY
6. BECOMING EASILY ANNOYED OR IRRITABLE: NEARLY EVERY DAY

## 2025-01-09 ASSESSMENT — PATIENT HEALTH QUESTIONNAIRE - PHQ9
8. MOVING OR SPEAKING SO SLOWLY THAT OTHER PEOPLE COULD HAVE NOTICED. OR THE OPPOSITE, BEING SO FIGETY OR RESTLESS THAT YOU HAVE BEEN MOVING AROUND A LOT MORE THAN USUAL: MORE THAN HALF THE DAYS
3. TROUBLE FALLING OR STAYING ASLEEP: NOT AT ALL
4. FEELING TIRED OR HAVING LITTLE ENERGY: NEARLY EVERY DAY
10. IF YOU CHECKED OFF ANY PROBLEMS, HOW DIFFICULT HAVE THESE PROBLEMS MADE IT FOR YOU TO DO YOUR WORK, TAKE CARE OF THINGS AT HOME, OR GET ALONG WITH OTHER PEOPLE: VERY DIFFICULT
9. THOUGHTS THAT YOU WOULD BE BETTER OFF DEAD, OR OF HURTING YOURSELF: SEVERAL DAYS
SUM OF ALL RESPONSES TO PHQ9 QUESTIONS 1 & 2: 6
6. FEELING BAD ABOUT YOURSELF - OR THAT YOU ARE A FAILURE OR HAVE LET YOURSELF OR YOUR FAMILY DOWN: NOT AT ALL
1. LITTLE INTEREST OR PLEASURE IN DOING THINGS: NEARLY EVERY DAY
5. POOR APPETITE OR OVEREATING: NOT AT ALL
2. FEELING DOWN, DEPRESSED OR HOPELESS: NEARLY EVERY DAY
7. TROUBLE CONCENTRATING ON THINGS, SUCH AS READING THE NEWSPAPER OR WATCHING TELEVISION: NOT AT ALL
SUM OF ALL RESPONSES TO PHQ QUESTIONS 1-9: 12

## 2025-01-09 NOTE — PROGRESS NOTES
Collaborative Care (CoCM)  Progress Note    Type of Interaction: Phone    Start Time: 2:00 PM    End Time: 2:40 PM    Appointment: Not Scheduled    Reason for Visit:   Chief Complaint   Patient presents with    Depression    Anxiety    Review of PHQ/Chaka sx; changes since appointment in Nov.     Interval History / Patient Symptoms:   Pt reports not doing well emotionally since holidays and family dynamic issues.  Pt was doing well and considering closing COCM when she cancelled appointment in Dec.  Pt agreeable she wants to maintain COCM services during this time of challenges.  Pt having issues with family members, isolation, depressive sx and anxiety.    Patient Health Questionnaire-9 Score: 12 (1/9/2025  2:24 PM)  CHAKA-7 Total Score: 13 (1/9/2025  2:35 PM)    Interventions Provided: Motivational Interviewing, Grief Work, Develop Coping Strategies, Review Progress/Goals Stress Management, and [facility] provided psych consult recommendations.  [facility] provided grief support resources she can read.  Processed recent changes that have triggered her bitterness and irritation with family members.  Provided support and resources.      Progress Made: Mixed    Response to Intervention: Pt reports struggling the past several weeks since holidays and increased loneliness and family members not reaching out to her that is hurtful to her.  Pt expressed frustration with various family members.  Pt stated she understand the medication recommendations and would keep in mind when meeting with PCP.  Pt reported more anxiety lately since her falls and about her son with the work he does.  Pt wants to continue COCM during this challenging time.    Plan: Pt to use coping skills from past sessions.      Patient Instructions   Aware patient can reach out to PCP for psychiatrist medication recommendations.  Reach out to [facility] prior to next Thursday if needing support.      Follow Up / Next Appointment: Next appointment: 01/16/25

## 2025-01-09 NOTE — PATIENT INSTRUCTIONS
Aware patient can reach out to PCP for psychiatrist medication recommendations.  Reach out to C prior to next Thursday if needing support.

## 2025-01-09 NOTE — PROGRESS NOTES
[facility] Psychiatry Consult Note     Diane Montemayor is a 79 y.o., referred to Collaborative Care for symptoms of depression and anxiety. I have reviewed the patient with the behavioral health manager and reviewed the patient's electronic record.    Recommendations:   - Because of patient's increased depression, would recommend one of two medication changes during the trial of decreased Viibryd:       1. Could add small dose of mirtazapine (7.5 mg) at bedtime, as it is very rare for mirtazapine to cause hyponatremia. Side effects might include sedation and weight gain.        2. Could augment dose of Viibryd with very low dose of Abilify (1 mg) QAM, as it is also very rare for Abilify to cause hyponatremia.  - Would also stress the importance to patient of keeping up her fluid balance, perhaps by measuring out each morning a prescribed number of ounces of water, and paying attention to finishing this amount each day.     ____________________________________________________________    New medical issues:  Patient has had a fall, and also has hyponatremia. Her most recent labs are from [facility] (scanned in the Media tab) and her sodium was 130. She had been taking a diuretic, and not drinking much water. Therefore, her Viibryd was lowered to 20 mg daily in order to see if this will help the hyponatremia resolve.     Since then, the patient has been feeling more depressed and having more difficulties coping with the current challenges and stressors in her life.        No data recorded    The above treatment considerations and suggestions are based on consultations with the patient's care manager and a review of information available in the electronic medical record. I have not personally examined the patient. All recommendations should be implemented with consideration of the patient's relevant prior history and current clinical status. Please feel free to call me with any questions about the care of this patient. I can easily be  reached through [facility] or at [email or web address]

## 2025-01-09 NOTE — PROGRESS NOTES
"[facility] called patient 1/9/25 AM to see if patient would like to change appointment today that she cancelled to virtual or phone call.  Patient updated [facility] that she had two falls recently while cooking.  Pt concerned about going out in bad weather with her recent fall history.  Patient discussed various family dynamic issues that have left her \"feeling bitter\".  Pt having health issues and reported PCP is changing some medication and wants her to decrease Viibryd.  Pt concerned about this reduction with her recent lowering in mood.  [facility] told pt she would review these health updates with the consulting psychiatrist for feedback and recommendation for other medication to try.    "

## 2025-01-20 ENCOUNTER — TELEPHONE (OUTPATIENT)
Dept: PRIMARY CARE | Facility: CLINIC | Age: 80
End: 2025-01-20

## 2025-01-21 ENCOUNTER — PATIENT OUTREACH (OUTPATIENT)
Dept: PRIMARY CARE | Facility: CLINIC | Age: 80
End: 2025-01-21
Payer: MEDICARE

## 2025-01-21 DIAGNOSIS — I10 HYPERTENSION, UNSPECIFIED TYPE: ICD-10-CM

## 2025-01-21 DIAGNOSIS — S91.301A WOUND OF RIGHT FOOT: ICD-10-CM

## 2025-01-21 DIAGNOSIS — E11.69 TYPE 2 DIABETES MELLITUS WITH OTHER SPECIFIED COMPLICATION, WITHOUT LONG-TERM CURRENT USE OF INSULIN: ICD-10-CM

## 2025-01-21 NOTE — PROGRESS NOTES
Incoming call from patient. Reports she received letter from [facility] reporting her Mounjaro is not approved and would require PA. Reports she has enough for 3 more doses (including tomorrow). Request nurse investigate. Message to clinical pharmacist.    Diane reports her foot is also not gettign better. Says [facility] done all they can w foot. Going to wound center tomorrow.    Received word back from Sadie, clinical pharmacist, that medication is showing authorization till end of year.    Call placed back to patient to notify, suggested patient look back at letter date. She plans to call pharmacy to see if refill will be processed.     No further needs.

## 2025-01-22 ENCOUNTER — APPOINTMENT (OUTPATIENT)
Dept: WOUND CARE | Facility: CLINIC | Age: 80
End: 2025-01-22
Payer: MEDICARE

## 2025-01-22 ENCOUNTER — TELEPHONE (OUTPATIENT)
Dept: PRIMARY CARE | Facility: CLINIC | Age: 80
End: 2025-01-22
Payer: MEDICARE

## 2025-01-28 ENCOUNTER — APPOINTMENT (OUTPATIENT)
Dept: CARDIOLOGY | Facility: CLINIC | Age: 80
End: 2025-01-28
Payer: MEDICARE

## 2025-01-29 NOTE — TELEPHONE ENCOUNTER
----- Message from Ramses Black sent at 1/29/2025  8:17 AM EST -----  Bonnie give her a call see how she is feeling,, our collaborative psychiatrist had made some recommendations for her if she is still feeling a little down after decreasing her medications,, because her sodium was getting low let me know, thank you  ----- Message -----  From: Kandis Arce MD  Sent: 1/9/2025   1:54 PM EST  To: Ramses Black DO; MARCELO Calvert

## 2025-01-29 NOTE — TELEPHONE ENCOUNTER
"She states she would prefer not to change things.  She would like to stick with the Viibryd.  She understands it is hard on the sodium, but she would rather not start a new medication. If you want her to stay on the 20 mg she will stay on that dose. She states she is feeling unmotivated and \"blah\".  The dizzy spells are very minimal now. She states she still feels like she has some dehydration going on. She does feel like the Vitamin D is helping her and she states she is taking Spironolactone every other day.     She state she is going to have 1 possibly 2 toes amputated next week, the foot is not improving at all.   "

## 2025-01-30 ENCOUNTER — DOCUMENTATION (OUTPATIENT)
Dept: PRIMARY CARE | Facility: CLINIC | Age: 80
End: 2025-01-30
Payer: MEDICARE

## 2025-01-30 ENCOUNTER — HOSPITAL ENCOUNTER (OUTPATIENT)
Dept: RADIOLOGY | Facility: HOSPITAL | Age: 80
Discharge: HOME | End: 2025-01-30
Payer: MEDICARE

## 2025-01-30 DIAGNOSIS — L97.514 NON-PRESSURE CHRONIC ULCER OF OTHER PART OF RIGHT FOOT WITH NECROSIS OF BONE: ICD-10-CM

## 2025-01-30 DIAGNOSIS — F33.1 MODERATE EPISODE OF RECURRENT MAJOR DEPRESSIVE DISORDER: Primary | ICD-10-CM

## 2025-01-30 DIAGNOSIS — F32.A DEPRESSION, UNSPECIFIED DEPRESSION TYPE: ICD-10-CM

## 2025-01-30 PROCEDURE — 99494 1ST/SBSQ PSYC COLLAB CARE: CPT | Performed by: FAMILY MEDICINE

## 2025-01-30 PROCEDURE — 99493 SBSQ PSYC COLLAB CARE MGMT: CPT | Performed by: FAMILY MEDICINE

## 2025-01-30 PROCEDURE — 73630 X-RAY EXAM OF FOOT: CPT | Mod: RT

## 2025-01-30 NOTE — PROGRESS NOTES
Collaborative Care (CoCM)  Progress Note    Type of Interaction: Phone    Start Time: 1:40 PM    End Time: 2:03 PM    Appointment: Scheduled    Reason for Visit:   Chief Complaint   Patient presents with    Phone call with patient    Review of pt's MH status; tx planning    Interval History / Patient Symptoms:   Pt struggling with recent health setbacks following her falls last month.  Pt facing possible toe amputation(s).  Pt having increased isolation and loneliness with icy weather, son at work all month, and recent falls making it hard for her to get out like she was.  Pt feeling more down and having recent decrease in MH medicine d/t sodium levels.  Pt unsure about adding on another MH medications recommended by psychiatrist as she has tried others and Viibryd has been the one that worked the best but feels it isn't working as well with the dose lowered.    Interventions Provided: Solution Focused Therapy, Grief Work, Develop Coping Strategies, Review Progress/Goals Stress Management, and Discussed coping skills pt has used in the past and encouraged her to continue them.      Progress Made: Mixed    Response to Intervention: Pt wishes to maintain COCM with the recent health set backs and facing more depression as of late.  Pt stated she is loving time with her dog and has been reading but struggling with loneliness.  Pt enjoying caring for her new plants.  Pt hopeful to get better so she can return to [employer] again.    Plan: Maintain use of coping skills.    Patient Instructions   Maintain medical appointments  Maintain coping skills discussed in sessions.    Follow Up / Next Appointment: Next appointment: 02/13/25

## 2025-02-04 ENCOUNTER — APPOINTMENT (OUTPATIENT)
Dept: PHARMACY | Facility: HOSPITAL | Age: 80
End: 2025-02-04
Payer: MEDICAID

## 2025-02-04 DIAGNOSIS — E11.69 TYPE 2 DIABETES MELLITUS WITH OTHER SPECIFIED COMPLICATION, UNSPECIFIED WHETHER LONG TERM INSULIN USE (MULTI): ICD-10-CM

## 2025-02-04 NOTE — PROGRESS NOTES
Patient ID: Diane Montemayor is a 79 y.o. female who presents for Diabetes   Pt is here for Follow Up appointment.    PCP/ Referring Provider: Ramses Black DO  Last visit with PCP: 12.9.24    Verbal consent to manage patient's drug therapy was obtained from patient. They were informed they may decline to participate or withdraw from participation in pharmacy services at any time. Patient is  agreeable to detailed voice messages if unable to be reached.     Patient enrolled in [facility]      Subjective   Treatment Adherence:   Patient did take medications today.   Number of missed doses in last 7 days: 0.      Preferred pharmacy: [facility] patient afford prescribed medications: Yes, denies cost issues at this time    Interval History - Patient reports the following:   mood is worsened with decrease in Vibryd  legs swelling since tay decrease  continues to get dizzy spells/everything getting dark - had another fall      HPI  DIABETES MELLITUS TYPE 2:  Diagnosed with diabetes:  >40 years.   Known diabetic complications: toe amputation, neuropathy  Last optometry exam: 2023;  Most recent visit in Podiatry: 1.27.25     Current diabetic medications include:  Mounjaro 10mg weekly on Wednesdays  Jardiance 25mg daily  Metformin XR 1000mg BID    Home blood glucose records (mg/dL)  FBG: <120s     Any episodes of hypoglycemia? No, patient denies .    Did patient treat episode of hypoglycemia appropriately? N/A    Does pt have proteinuria? Yes   If appropriate, is patient on ACEi/ARB? Yes,      Secondary Prevention  Statin? Yes  ACE-I/ARB? Yes  Aspirin? Yes    Pertinent PMH Review:  PMH of Pancreatitis: No  PMH of Retinopathy: No  PMH of Urinary Tract Infections: No; + yeast infection  PMH of MTC: No    Lifestyle:  Current exercise: nothing formal; walking in the hallway at home, grocery shopping, chair exercises, etc.  Current diet: cut out chips and snacks  Drinks 1 protein shake per day (30G)  Has lost 60 lb since  start      Review of Systems    Objective     There were no vitals taken for this visit.     Labs  Lab Results   Component Value Date    BILITOT 0.4 06/29/2023    CALCIUM 9.8 04/02/2024    CO2 24 04/02/2024     04/02/2024    CREATININE 1.17 (H) 04/02/2024    GLUCOSE 129 (H) 04/02/2024    ALKPHOS 72 06/29/2023    K 4.7 04/02/2024    PROT 7.7 06/29/2023     04/02/2024    AST 18 06/29/2023    ALT 17 06/29/2023    BUN 21 04/02/2024    ANIONGAP 17 04/02/2024    MG CANCELED 07/01/2023    PHOS 3.3 03/12/2020    ALBUMIN 4.4 06/29/2023    GFRF CANCELED 07/01/2023    GFRMALE CANCELED 07/01/2023     Lab Results   Component Value Date    TRIG 146 06/21/2023    CHOL 163 06/21/2023    HDL 47.6 06/21/2023     Lab Results   Component Value Date    HGBA1C 5.6 12/09/2024       Current Outpatient Medications   Medication Instructions    albuterol 90 mcg/actuation inhaler INHALE 2 PUFFS BY MOUTH EVERY 4 HOURS AS DIRECTED AS NEEDED FOR COUGH OR WHEEZING    aspirin 81 mg, Nightly    coenzyme Q10 400 mg capsule 1 capsule, Nightly    cyanocobalamin (VITAMIN B-12) 1,000 mcg, Daily    dilTIAZem XR (DILACOR XR) 240 mg, oral, Daily    docusate sodium (Colace) 100 mg capsule TAKE 1 CAPSULE(100 MG) BY MOUTH TWICE DAILY AS NEEDED FOR CONSTIPATION    empagliflozin (JARDIANCE) 25 mg, oral, Daily    fluconazole (DIFLUCAN) 150 mg, oral, Weekly (0600)    lactobacillus (Culturelle) 10 billion cell capsule 1 capsule, Daily    levothyroxine (Synthroid, Levoxyl) 150 mcg tablet TAKE 1 TABLET BY MOUTH DAILY EXCEPT 2 TABLETS ON TUESDAY AND THURSDAY    losartan (COZAAR) 25 mg, oral, 2 times daily    magnesium oxide (MAG-OX) 400 mg, Daily    metFORMIN XR (GLUCOPHAGE-XR) 1,000 mg, oral, 2 times daily (morning and late afternoon), Take with meals.    Mounjaro 10 mg, subcutaneous, Weekly    omeprazole (PriLOSEC) 40 mg DR capsule TAKE 1 CAPSULE BY MOUTH TWICE DAILY    rosuvastatin (Crestor) 10 mg tablet TAKE 1 TABLET(10 MG) BY MOUTH DAILY AT BEDTIME     spironolactone (ALDACTONE) 25 mg, oral, Daily    vilazodone (VIIBRYD) 40 mg, oral, Daily with breakfast        Assessment/Plan   Problem List Items Addressed This Visit             ICD-10-CM    Type 2 diabetes mellitus E11.9     Is pt at goal? Yes, 5.4% on 7/15/24  Patient's SMBGs are at goal. No changes needed.    Medication Changes:  CONTINUE:  Jardiance 25mg daily  Metformin XR 1000mg BID  Mounjaro 10mg weekly      Monitoring and Education:  Diabetes Education  Rule of 15: eating ~15 g of carbs when BG less than 80 (half cup juice, 3-4 glucose tabs).  Recognize symptoms of high and low blood sugar.   Eat a realistic healthy diet consisting of fruits, vegetables, fiber, protein food choices on a regular basis and be aware of portion/serving sizes. Reduce carbohydrate consumption and always consume with protein and fat. Avoid foods high in saturated/trans fat, high salt content, and sweets and beverages with added sugars.  Limit alcohol consumption; alcohol may affect your blood sugar and cause hypoglycemia.   Stay active and incorporate ~30 mins of exercise into your daily routine to manage your weight and increase the body's acceptance of insulin.    Mounjaro Education:   - Counseled patient on MOA, expectations, side effects, duration of therapy, contraindications, administration, and monitoring parameters  - Answered all patient questions and concerns  - Counseled patient on Mounjaro MOA, expectations, side effects, duration of therapy, administration, and monitoring parameters.  - Provided detailed dosing and administration counseling to ensure proper technique.   - Reviewed Mounjaro titration schedule, starting with 2.5 mg once weekly to a goal of 15 mg once weekly if tolerated  - Counseled patient on the benefits of GLP-1ra glycemic control and weight loss  - Reviewed storage requirements of Mounjaro when not in use, and when to administer the medication if a dose is missed.  - Advised patient that they may  experience improved satiety after meals and portion sizes of meals may be reduced as doses of Mounjaro increase.         Relevant Orders    Referral to Clinical Pharmacy   Encouraged patient to discuss possible cardiac causes of dizzy/fainting spells at appt on 2/18. Encouraged to ask therapies/psych for change back on Viibryd.     Immunizations needed: RSV, COVID, influenza    Labs ordered:  none     Referrals:  none     Follow-up: March    Time spent with pt: Total length of time 26 (minutes) of the encounter and more than 50% was spent counseling the patient.    Sadie Montgomery, PharmD, LIS  Clinical Pharmacist  [phone]  [email or web address]    Continue all meds under the continuation of care with the referring provider and clinical pharmacy team.    Verbal consent to manage patient's drug therapy was obtained from the patient. They were informed they may decline to participate or withdraw from participation in pharmacy services at any time.

## 2025-02-04 NOTE — ASSESSMENT & PLAN NOTE
Is pt at goal? Yes, 5.4% on 7/15/24  Patient's SMBGs are at goal. No changes needed.    Medication Changes:  CONTINUE:  Jardiance 25mg daily  Metformin XR 1000mg BID  Mounjaro 10mg weekly      Monitoring and Education:  Diabetes Education  Rule of 15: eating ~15 g of carbs when BG less than 80 (half cup juice, 3-4 glucose tabs).  Recognize symptoms of high and low blood sugar.   Eat a realistic healthy diet consisting of fruits, vegetables, fiber, protein food choices on a regular basis and be aware of portion/serving sizes. Reduce carbohydrate consumption and always consume with protein and fat. Avoid foods high in saturated/trans fat, high salt content, and sweets and beverages with added sugars.  Limit alcohol consumption; alcohol may affect your blood sugar and cause hypoglycemia.   Stay active and incorporate ~30 mins of exercise into your daily routine to manage your weight and increase the body's acceptance of insulin.    Mounjaro Education:   - Counseled patient on MOA, expectations, side effects, duration of therapy, contraindications, administration, and monitoring parameters  - Answered all patient questions and concerns  - Counseled patient on Mounjaro MOA, expectations, side effects, duration of therapy, administration, and monitoring parameters.  - Provided detailed dosing and administration counseling to ensure proper technique.   - Reviewed Mounjaro titration schedule, starting with 2.5 mg once weekly to a goal of 15 mg once weekly if tolerated  - Counseled patient on the benefits of GLP-1ra glycemic control and weight loss  - Reviewed storage requirements of Mounjaro when not in use, and when to administer the medication if a dose is missed.  - Advised patient that they may experience improved satiety after meals and portion sizes of meals may be reduced as doses of Mounjaro increase.

## 2025-02-11 ENCOUNTER — PATIENT OUTREACH (OUTPATIENT)
Dept: PRIMARY CARE | Facility: CLINIC | Age: 80
End: 2025-02-11
Payer: MEDICARE

## 2025-02-11 DIAGNOSIS — I10 HYPERTENSION, UNSPECIFIED TYPE: ICD-10-CM

## 2025-02-11 DIAGNOSIS — E11.69 TYPE 2 DIABETES MELLITUS WITH OTHER SPECIFIED COMPLICATION, WITHOUT LONG-TERM CURRENT USE OF INSULIN: ICD-10-CM

## 2025-02-12 NOTE — PROGRESS NOTES
Spoke with patient, aware of Dr. Black's message. Scheduled for follow up.     Ramses Black, DO  You28 minutes ago (8:47 AM)         That is fine have her go back on both medications, spironolactone every day and then Viibryd 40 mg, then follow-up with me in 3 to 4 weeks

## 2025-02-13 ENCOUNTER — APPOINTMENT (OUTPATIENT)
Dept: PRIMARY CARE | Facility: CLINIC | Age: 80
End: 2025-02-13
Payer: MEDICARE

## 2025-02-14 ENCOUNTER — PATIENT OUTREACH (OUTPATIENT)
Dept: PRIMARY CARE | Facility: CLINIC | Age: 80
End: 2025-02-14
Payer: MEDICARE

## 2025-02-14 DIAGNOSIS — S91.301A WOUND OF RIGHT FOOT: ICD-10-CM

## 2025-02-14 DIAGNOSIS — I10 HYPERTENSION, UNSPECIFIED TYPE: ICD-10-CM

## 2025-02-14 DIAGNOSIS — E11.69 TYPE 2 DIABETES MELLITUS WITH OTHER SPECIFIED COMPLICATION, WITHOUT LONG-TERM CURRENT USE OF INSULIN: ICD-10-CM

## 2025-02-14 NOTE — PROGRESS NOTES
Diane called. Reports she did follow up with the surgeon yesterday (Dr. Antonio) in regards to her foot. She states he recommended she get admitted to hospital as theres suspected osteomyelitis - infection to 3 toes now and requires IV antibiotics.

## 2025-02-17 ENCOUNTER — APPOINTMENT (OUTPATIENT)
Dept: RADIOLOGY | Facility: HOSPITAL | Age: 80
End: 2025-02-17
Payer: MEDICARE

## 2025-02-17 ENCOUNTER — HOSPITAL ENCOUNTER (INPATIENT)
Facility: HOSPITAL | Age: 80
End: 2025-02-17
Attending: EMERGENCY MEDICINE | Admitting: HOSPITALIST
Payer: MEDICARE

## 2025-02-17 DIAGNOSIS — S91.301A WOUND OF RIGHT FOOT: ICD-10-CM

## 2025-02-17 DIAGNOSIS — Z78.9 FAILURE OF OUTPATIENT TREATMENT: ICD-10-CM

## 2025-02-17 DIAGNOSIS — M86.271 SUBACUTE OSTEOMYELITIS OF RIGHT FOOT (MULTI): Primary | ICD-10-CM

## 2025-02-17 DIAGNOSIS — I73.9 PERIPHERAL VASCULAR DISEASE, UNSPECIFIED (CMS-HCC): ICD-10-CM

## 2025-02-17 DIAGNOSIS — E11.69 TYPE 2 DIABETES MELLITUS WITH OTHER SPECIFIED COMPLICATION, UNSPECIFIED WHETHER LONG TERM INSULIN USE (MULTI): ICD-10-CM

## 2025-02-17 DIAGNOSIS — E11.51 TYPE 2 DIABETES MELLITUS WITH DIABETIC PERIPHERAL ANGIOPATHY WITHOUT GANGRENE, WITHOUT LONG-TERM CURRENT USE OF INSULIN (MULTI): ICD-10-CM

## 2025-02-17 DIAGNOSIS — I70.223 ATHEROSCLEROSIS OF NATIVE ARTERY OF BOTH LOWER EXTREMITIES WITH REST PAIN (MULTI): ICD-10-CM

## 2025-02-17 DIAGNOSIS — M24.573 EQUINUS CONTRACTURE OF ANKLE: ICD-10-CM

## 2025-02-17 DIAGNOSIS — Z89.419 HISTORY OF AMPUTATION OF HALLUX (MULTI): ICD-10-CM

## 2025-02-17 DIAGNOSIS — M86.10 OTHER ACUTE OSTEOMYELITIS, UNSPECIFIED SITE: ICD-10-CM

## 2025-02-17 LAB
ALBUMIN SERPL BCP-MCNC: 4.3 G/DL (ref 3.4–5)
ALP SERPL-CCNC: 62 U/L (ref 33–136)
ALT SERPL W P-5'-P-CCNC: 10 U/L (ref 7–45)
ANION GAP SERPL CALC-SCNC: 13 MMOL/L (ref 10–20)
AST SERPL W P-5'-P-CCNC: 13 U/L (ref 9–39)
BASOPHILS # BLD AUTO: 0.09 X10*3/UL (ref 0–0.1)
BASOPHILS NFR BLD AUTO: 1.1 %
BILIRUB SERPL-MCNC: 0.6 MG/DL (ref 0–1.2)
BUN SERPL-MCNC: 11 MG/DL (ref 6–23)
CALCIUM SERPL-MCNC: 9.6 MG/DL (ref 8.6–10.3)
CHLORIDE SERPL-SCNC: 103 MMOL/L (ref 98–107)
CO2 SERPL-SCNC: 28 MMOL/L (ref 21–32)
CREAT SERPL-MCNC: 0.64 MG/DL (ref 0.5–1.05)
CRP SERPL-MCNC: 0.19 MG/DL
EGFRCR SERPLBLD CKD-EPI 2021: 89 ML/MIN/1.73M*2
EOSINOPHIL # BLD AUTO: 0.19 X10*3/UL (ref 0–0.4)
EOSINOPHIL NFR BLD AUTO: 2.3 %
ERYTHROCYTE [DISTWIDTH] IN BLOOD BY AUTOMATED COUNT: 14.6 % (ref 11.5–14.5)
GLUCOSE BLD MANUAL STRIP-MCNC: 95 MG/DL (ref 74–99)
GLUCOSE BLD MANUAL STRIP-MCNC: 96 MG/DL (ref 74–99)
GLUCOSE SERPL-MCNC: 96 MG/DL (ref 74–99)
HCT VFR BLD AUTO: 43.3 % (ref 36–46)
HGB BLD-MCNC: 14.3 G/DL (ref 12–16)
HOLD SPECIMEN: NORMAL
IMM GRANULOCYTES # BLD AUTO: 0.02 X10*3/UL (ref 0–0.5)
IMM GRANULOCYTES NFR BLD AUTO: 0.2 % (ref 0–0.9)
LACTATE SERPL-SCNC: 0.9 MMOL/L (ref 0.4–2)
LYMPHOCYTES # BLD AUTO: 2.74 X10*3/UL (ref 0.8–3)
LYMPHOCYTES NFR BLD AUTO: 32.9 %
MCH RBC QN AUTO: 28.8 PG (ref 26–34)
MCHC RBC AUTO-ENTMCNC: 33 G/DL (ref 32–36)
MCV RBC AUTO: 87 FL (ref 80–100)
MONOCYTES # BLD AUTO: 0.46 X10*3/UL (ref 0.05–0.8)
MONOCYTES NFR BLD AUTO: 5.5 %
NEUTROPHILS # BLD AUTO: 4.84 X10*3/UL (ref 1.6–5.5)
NEUTROPHILS NFR BLD AUTO: 58 %
NRBC BLD-RTO: 0 /100 WBCS (ref 0–0)
PLATELET # BLD AUTO: 304 X10*3/UL (ref 150–450)
POTASSIUM SERPL-SCNC: 3.7 MMOL/L (ref 3.5–5.3)
PROT SERPL-MCNC: 7.5 G/DL (ref 6.4–8.2)
RBC # BLD AUTO: 4.97 X10*6/UL (ref 4–5.2)
SODIUM SERPL-SCNC: 140 MMOL/L (ref 136–145)
VANCOMYCIN SERPL-MCNC: 11.9 UG/ML (ref 5–20)
VANCOMYCIN SERPL-MCNC: 17.9 UG/ML (ref 5–20)
WBC # BLD AUTO: 8.3 X10*3/UL (ref 4.4–11.3)

## 2025-02-17 PROCEDURE — 36415 COLL VENOUS BLD VENIPUNCTURE: CPT | Performed by: NURSE PRACTITIONER

## 2025-02-17 PROCEDURE — 84075 ASSAY ALKALINE PHOSPHATASE: CPT | Performed by: NURSE PRACTITIONER

## 2025-02-17 PROCEDURE — 2500000004 HC RX 250 GENERAL PHARMACY W/ HCPCS (ALT 636 FOR OP/ED): Performed by: HOSPITALIST

## 2025-02-17 PROCEDURE — 2500000005 HC RX 250 GENERAL PHARMACY W/O HCPCS: Performed by: NURSE PRACTITIONER

## 2025-02-17 PROCEDURE — 82947 ASSAY GLUCOSE BLOOD QUANT: CPT

## 2025-02-17 PROCEDURE — 99285 EMERGENCY DEPT VISIT HI MDM: CPT | Mod: 25 | Performed by: EMERGENCY MEDICINE

## 2025-02-17 PROCEDURE — 73630 X-RAY EXAM OF FOOT: CPT | Mod: RIGHT SIDE | Performed by: RADIOLOGY

## 2025-02-17 PROCEDURE — 2500000004 HC RX 250 GENERAL PHARMACY W/ HCPCS (ALT 636 FOR OP/ED): Performed by: NURSE PRACTITIONER

## 2025-02-17 PROCEDURE — 85025 COMPLETE CBC W/AUTO DIFF WBC: CPT | Performed by: NURSE PRACTITIONER

## 2025-02-17 PROCEDURE — 86140 C-REACTIVE PROTEIN: CPT | Performed by: NURSE PRACTITIONER

## 2025-02-17 PROCEDURE — 87040 BLOOD CULTURE FOR BACTERIA: CPT | Mod: ELYLAB | Performed by: NURSE PRACTITIONER

## 2025-02-17 PROCEDURE — 99223 1ST HOSP IP/OBS HIGH 75: CPT | Performed by: HOSPITALIST

## 2025-02-17 PROCEDURE — 80202 ASSAY OF VANCOMYCIN: CPT

## 2025-02-17 PROCEDURE — 1210000001 HC SEMI-PRIVATE ROOM DAILY

## 2025-02-17 PROCEDURE — 83605 ASSAY OF LACTIC ACID: CPT | Performed by: NURSE PRACTITIONER

## 2025-02-17 PROCEDURE — 96374 THER/PROPH/DIAG INJ IV PUSH: CPT

## 2025-02-17 PROCEDURE — 73630 X-RAY EXAM OF FOOT: CPT | Mod: RT

## 2025-02-17 RX ORDER — ASPIRIN 81 MG/1
81 TABLET ORAL NIGHTLY
Status: DISCONTINUED | OUTPATIENT
Start: 2025-02-18 | End: 2025-02-26 | Stop reason: HOSPADM

## 2025-02-17 RX ORDER — POLYETHYLENE GLYCOL 3350 17 G/17G
17 POWDER, FOR SOLUTION ORAL DAILY PRN
Status: DISCONTINUED | OUTPATIENT
Start: 2025-02-17 | End: 2025-02-26 | Stop reason: HOSPADM

## 2025-02-17 RX ORDER — LANOLIN ALCOHOL/MO/W.PET/CERES
400 CREAM (GRAM) TOPICAL DAILY
Status: DISCONTINUED | OUTPATIENT
Start: 2025-02-18 | End: 2025-02-26 | Stop reason: HOSPADM

## 2025-02-17 RX ORDER — ONDANSETRON 4 MG/1
4 TABLET, FILM COATED ORAL EVERY 8 HOURS PRN
Status: DISCONTINUED | OUTPATIENT
Start: 2025-02-17 | End: 2025-02-26 | Stop reason: HOSPADM

## 2025-02-17 RX ORDER — DOCUSATE SODIUM 100 MG/1
200 CAPSULE, LIQUID FILLED ORAL NIGHTLY
Status: DISCONTINUED | OUTPATIENT
Start: 2025-02-18 | End: 2025-02-26 | Stop reason: HOSPADM

## 2025-02-17 RX ORDER — VANCOMYCIN HYDROCHLORIDE 1 G/20ML
INJECTION, POWDER, LYOPHILIZED, FOR SOLUTION INTRAVENOUS DAILY PRN
Status: DISCONTINUED | OUTPATIENT
Start: 2025-02-17 | End: 2025-02-18

## 2025-02-17 RX ORDER — ROSUVASTATIN CALCIUM 10 MG/1
10 TABLET, COATED ORAL NIGHTLY
Status: DISCONTINUED | OUTPATIENT
Start: 2025-02-18 | End: 2025-02-26 | Stop reason: HOSPADM

## 2025-02-17 RX ORDER — OXYCODONE HYDROCHLORIDE 5 MG/1
5 TABLET ORAL EVERY 6 HOURS PRN
Status: DISCONTINUED | OUTPATIENT
Start: 2025-02-17 | End: 2025-02-26 | Stop reason: HOSPADM

## 2025-02-17 RX ORDER — VANCOMYCIN HYDROCHLORIDE 750 MG/150ML
750 INJECTION, SOLUTION INTRAVENOUS EVERY 12 HOURS
Status: DISCONTINUED | OUTPATIENT
Start: 2025-02-18 | End: 2025-02-18

## 2025-02-17 RX ORDER — DILTIAZEM HYDROCHLORIDE 240 MG/1
240 CAPSULE, COATED, EXTENDED RELEASE ORAL DAILY
Status: DISCONTINUED | OUTPATIENT
Start: 2025-02-18 | End: 2025-02-26 | Stop reason: HOSPADM

## 2025-02-17 RX ORDER — CHOLECALCIFEROL (VITAMIN D3) 25 MCG
4000 TABLET ORAL DAILY
Status: DISCONTINUED | OUTPATIENT
Start: 2025-02-18 | End: 2025-02-26 | Stop reason: HOSPADM

## 2025-02-17 RX ORDER — ACETAMINOPHEN 325 MG/1
650 TABLET ORAL EVERY 4 HOURS PRN
Status: DISCONTINUED | OUTPATIENT
Start: 2025-02-17 | End: 2025-02-26 | Stop reason: HOSPADM

## 2025-02-17 RX ORDER — VILAZODONE HYDROCHLORIDE 40 MG/1
40 TABLET ORAL
Status: DISCONTINUED | OUTPATIENT
Start: 2025-02-18 | End: 2025-02-26 | Stop reason: HOSPADM

## 2025-02-17 RX ORDER — LOSARTAN POTASSIUM 25 MG/1
25 TABLET ORAL 2 TIMES DAILY
Status: DISCONTINUED | OUTPATIENT
Start: 2025-02-18 | End: 2025-02-26 | Stop reason: HOSPADM

## 2025-02-17 RX ORDER — INSULIN LISPRO 100 [IU]/ML
0-5 INJECTION, SOLUTION INTRAVENOUS; SUBCUTANEOUS
Status: DISCONTINUED | OUTPATIENT
Start: 2025-02-17 | End: 2025-02-26 | Stop reason: HOSPADM

## 2025-02-17 RX ORDER — ACETAMINOPHEN 160 MG/5ML
650 SOLUTION ORAL EVERY 4 HOURS PRN
Status: DISCONTINUED | OUTPATIENT
Start: 2025-02-17 | End: 2025-02-26 | Stop reason: HOSPADM

## 2025-02-17 RX ORDER — ONDANSETRON HYDROCHLORIDE 2 MG/ML
4 INJECTION, SOLUTION INTRAVENOUS EVERY 8 HOURS PRN
Status: DISCONTINUED | OUTPATIENT
Start: 2025-02-17 | End: 2025-02-26 | Stop reason: HOSPADM

## 2025-02-17 RX ORDER — LEVOTHYROXINE SODIUM 100 UG/1
300 TABLET ORAL
Status: DISCONTINUED | OUTPATIENT
Start: 2025-02-18 | End: 2025-02-26 | Stop reason: HOSPADM

## 2025-02-17 RX ORDER — VIT C/E/ZN/COPPR/LUTEIN/ZEAXAN 250MG-90MG
1000 CAPSULE ORAL DAILY
Status: DISCONTINUED | OUTPATIENT
Start: 2025-02-18 | End: 2025-02-26 | Stop reason: HOSPADM

## 2025-02-17 RX ORDER — ACETAMINOPHEN 650 MG/1
650 SUPPOSITORY RECTAL EVERY 4 HOURS PRN
Status: DISCONTINUED | OUTPATIENT
Start: 2025-02-17 | End: 2025-02-26 | Stop reason: HOSPADM

## 2025-02-17 RX ORDER — CHOLECALCIFEROL (VITAMIN D3) 50 MCG
2 TABLET ORAL DAILY
COMMUNITY

## 2025-02-17 RX ORDER — MEROPENEM 1 G/1
1 INJECTION, POWDER, FOR SOLUTION INTRAVENOUS EVERY 8 HOURS
Status: DISCONTINUED | OUTPATIENT
Start: 2025-02-17 | End: 2025-02-18

## 2025-02-17 RX ORDER — ENOXAPARIN SODIUM 100 MG/ML
40 INJECTION SUBCUTANEOUS EVERY 24 HOURS
Status: DISCONTINUED | OUTPATIENT
Start: 2025-02-17 | End: 2025-02-26 | Stop reason: HOSPADM

## 2025-02-17 RX ORDER — SPIRONOLACTONE 25 MG/1
25 TABLET ORAL DAILY
Status: DISCONTINUED | OUTPATIENT
Start: 2025-02-18 | End: 2025-02-26 | Stop reason: HOSPADM

## 2025-02-17 RX ORDER — LEVOTHYROXINE SODIUM 75 UG/1
150 TABLET ORAL
Status: DISCONTINUED | OUTPATIENT
Start: 2025-02-19 | End: 2025-02-26 | Stop reason: HOSPADM

## 2025-02-17 RX ORDER — PANTOPRAZOLE SODIUM 40 MG/1
40 TABLET, DELAYED RELEASE ORAL
Status: DISCONTINUED | OUTPATIENT
Start: 2025-02-18 | End: 2025-02-26 | Stop reason: HOSPADM

## 2025-02-17 RX ADMIN — MEROPENEM 1 G: 1 INJECTION, POWDER, FOR SOLUTION INTRAVENOUS at 17:53

## 2025-02-17 RX ADMIN — ENOXAPARIN SODIUM 40 MG: 40 INJECTION SUBCUTANEOUS at 17:51

## 2025-02-17 RX ADMIN — ROSUVASTATIN CALCIUM 10 MG: 10 TABLET, FILM COATED ORAL at 23:52

## 2025-02-17 RX ADMIN — MEROPENEM 1 G: 1 INJECTION, POWDER, FOR SOLUTION INTRAVENOUS at 23:56

## 2025-02-17 RX ADMIN — VANCOMYCIN HYDROCHLORIDE 2 G: 5 INJECTION, POWDER, LYOPHILIZED, FOR SOLUTION INTRAVENOUS at 14:24

## 2025-02-17 RX ADMIN — LOSARTAN POTASSIUM 25 MG: 25 TABLET, FILM COATED ORAL at 23:52

## 2025-02-17 RX ADMIN — DOCUSATE SODIUM 200 MG: 100 CAPSULE, LIQUID FILLED ORAL at 23:51

## 2025-02-17 RX ADMIN — ASPIRIN 81 MG: 81 TABLET, COATED ORAL at 23:52

## 2025-02-17 SDOH — ECONOMIC STABILITY: FOOD INSECURITY: WITHIN THE PAST 12 MONTHS, YOU WORRIED THAT YOUR FOOD WOULD RUN OUT BEFORE YOU GOT THE MONEY TO BUY MORE.: NEVER TRUE

## 2025-02-17 SDOH — HEALTH STABILITY: PHYSICAL HEALTH
HOW OFTEN DO YOU NEED TO HAVE SOMEONE HELP YOU WHEN YOU READ INSTRUCTIONS, PAMPHLETS, OR OTHER WRITTEN MATERIAL FROM YOUR DOCTOR OR PHARMACY?: NEVER

## 2025-02-17 SDOH — SOCIAL STABILITY: SOCIAL INSECURITY: ABUSE: ADULT

## 2025-02-17 SDOH — HEALTH STABILITY: MENTAL HEALTH
DO YOU FEEL STRESS - TENSE, RESTLESS, NERVOUS, OR ANXIOUS, OR UNABLE TO SLEEP AT NIGHT BECAUSE YOUR MIND IS TROUBLED ALL THE TIME - THESE DAYS?: TO SOME EXTENT

## 2025-02-17 SDOH — ECONOMIC STABILITY: FOOD INSECURITY: WITHIN THE PAST 12 MONTHS, THE FOOD YOU BOUGHT JUST DIDN'T LAST AND YOU DIDN'T HAVE MONEY TO GET MORE.: NEVER TRUE

## 2025-02-17 SDOH — SOCIAL STABILITY: SOCIAL INSECURITY: HAVE YOU HAD THOUGHTS OF HARMING ANYONE ELSE?: NO

## 2025-02-17 SDOH — SOCIAL STABILITY: SOCIAL INSECURITY
WITHIN THE LAST YEAR, HAVE YOU BEEN KICKED, HIT, SLAPPED, OR OTHERWISE PHYSICALLY HURT BY YOUR PARTNER OR EX-PARTNER?: NO

## 2025-02-17 SDOH — HEALTH STABILITY: MENTAL HEALTH: HOW OFTEN DO YOU HAVE A DRINK CONTAINING ALCOHOL?: NEVER

## 2025-02-17 SDOH — SOCIAL STABILITY: SOCIAL INSECURITY
WITHIN THE LAST YEAR, HAVE YOU BEEN RAPED OR FORCED TO HAVE ANY KIND OF SEXUAL ACTIVITY BY YOUR PARTNER OR EX-PARTNER?: NO

## 2025-02-17 SDOH — SOCIAL STABILITY: SOCIAL INSECURITY: DO YOU FEEL ANYONE HAS EXPLOITED OR TAKEN ADVANTAGE OF YOU FINANCIALLY OR OF YOUR PERSONAL PROPERTY?: NO

## 2025-02-17 SDOH — ECONOMIC STABILITY: INCOME INSECURITY: IN THE PAST 12 MONTHS HAS THE ELECTRIC, GAS, OIL, OR WATER COMPANY THREATENED TO SHUT OFF SERVICES IN YOUR HOME?: NO

## 2025-02-17 SDOH — SOCIAL STABILITY: SOCIAL INSECURITY: WITHIN THE LAST YEAR, HAVE YOU BEEN AFRAID OF YOUR PARTNER OR EX-PARTNER?: NO

## 2025-02-17 SDOH — ECONOMIC STABILITY: HOUSING INSECURITY: IN THE LAST 12 MONTHS, WAS THERE A TIME WHEN YOU WERE NOT ABLE TO PAY THE MORTGAGE OR RENT ON TIME?: NO

## 2025-02-17 SDOH — SOCIAL STABILITY: SOCIAL NETWORK: HOW OFTEN DO YOU ATTEND CHURCH OR RELIGIOUS SERVICES?: MORE THAN 4 TIMES PER YEAR

## 2025-02-17 SDOH — ECONOMIC STABILITY: HOUSING INSECURITY: AT ANY TIME IN THE PAST 12 MONTHS, WERE YOU HOMELESS OR LIVING IN A SHELTER (INCLUDING NOW)?: NO

## 2025-02-17 SDOH — ECONOMIC STABILITY: HOUSING INSECURITY: IN THE PAST 12 MONTHS, HOW MANY TIMES HAVE YOU MOVED WHERE YOU WERE LIVING?: 0

## 2025-02-17 SDOH — SOCIAL STABILITY: SOCIAL INSECURITY: ARE YOU MARRIED, WIDOWED, DIVORCED, SEPARATED, NEVER MARRIED, OR LIVING WITH A PARTNER?: WIDOWED

## 2025-02-17 SDOH — SOCIAL STABILITY: SOCIAL NETWORK
IN A TYPICAL WEEK, HOW MANY TIMES DO YOU TALK ON THE PHONE WITH FAMILY, FRIENDS, OR NEIGHBORS?: MORE THAN THREE TIMES A WEEK

## 2025-02-17 SDOH — HEALTH STABILITY: MENTAL HEALTH: HOW OFTEN DO YOU HAVE SIX OR MORE DRINKS ON ONE OCCASION?: NEVER

## 2025-02-17 SDOH — SOCIAL STABILITY: SOCIAL NETWORK: HOW OFTEN DO YOU GET TOGETHER WITH FRIENDS OR RELATIVES?: MORE THAN THREE TIMES A WEEK

## 2025-02-17 SDOH — SOCIAL STABILITY: SOCIAL INSECURITY: WERE YOU ABLE TO COMPLETE ALL THE BEHAVIORAL HEALTH SCREENINGS?: YES

## 2025-02-17 SDOH — HEALTH STABILITY: MENTAL HEALTH: HOW MANY DRINKS CONTAINING ALCOHOL DO YOU HAVE ON A TYPICAL DAY WHEN YOU ARE DRINKING?: PATIENT DOES NOT DRINK

## 2025-02-17 SDOH — SOCIAL STABILITY: SOCIAL INSECURITY: ARE YOU OR HAVE YOU BEEN THREATENED OR ABUSED PHYSICALLY, EMOTIONALLY, OR SEXUALLY BY ANYONE?: NO

## 2025-02-17 SDOH — SOCIAL STABILITY: SOCIAL INSECURITY: HAS ANYONE EVER THREATENED TO HURT YOUR FAMILY OR YOUR PETS?: NO

## 2025-02-17 SDOH — SOCIAL STABILITY: SOCIAL NETWORK: HOW OFTEN DO YOU ATTEND MEETINGS OF THE CLUBS OR ORGANIZATIONS YOU BELONG TO?: NEVER

## 2025-02-17 SDOH — SOCIAL STABILITY: SOCIAL INSECURITY: ARE THERE ANY APPARENT SIGNS OF INJURIES/BEHAVIORS THAT COULD BE RELATED TO ABUSE/NEGLECT?: NO

## 2025-02-17 SDOH — ECONOMIC STABILITY: TRANSPORTATION INSECURITY: IN THE PAST 12 MONTHS, HAS LACK OF TRANSPORTATION KEPT YOU FROM MEDICAL APPOINTMENTS OR FROM GETTING MEDICATIONS?: NO

## 2025-02-17 SDOH — HEALTH STABILITY: PHYSICAL HEALTH: ON AVERAGE, HOW MANY MINUTES DO YOU ENGAGE IN EXERCISE AT THIS LEVEL?: 20 MIN

## 2025-02-17 SDOH — HEALTH STABILITY: PHYSICAL HEALTH: ON AVERAGE, HOW MANY DAYS PER WEEK DO YOU ENGAGE IN MODERATE TO STRENUOUS EXERCISE (LIKE A BRISK WALK)?: 3 DAYS

## 2025-02-17 SDOH — SOCIAL STABILITY: SOCIAL INSECURITY: DO YOU FEEL UNSAFE GOING BACK TO THE PLACE WHERE YOU ARE LIVING?: NO

## 2025-02-17 SDOH — SOCIAL STABILITY: SOCIAL INSECURITY: HAVE YOU HAD ANY THOUGHTS OF HARMING ANYONE ELSE?: NO

## 2025-02-17 SDOH — SOCIAL STABILITY: SOCIAL NETWORK
DO YOU BELONG TO ANY CLUBS OR ORGANIZATIONS SUCH AS CHURCH GROUPS, UNIONS, FRATERNAL OR ATHLETIC GROUPS, OR SCHOOL GROUPS?: NO

## 2025-02-17 SDOH — SOCIAL STABILITY: SOCIAL INSECURITY: WITHIN THE LAST YEAR, HAVE YOU BEEN HUMILIATED OR EMOTIONALLY ABUSED IN OTHER WAYS BY YOUR PARTNER OR EX-PARTNER?: NO

## 2025-02-17 SDOH — SOCIAL STABILITY: SOCIAL INSECURITY: DOES ANYONE TRY TO KEEP YOU FROM HAVING/CONTACTING OTHER FRIENDS OR DOING THINGS OUTSIDE YOUR HOME?: NO

## 2025-02-17 SDOH — HEALTH STABILITY: MENTAL HEALTH: EXPERIENCED ANY OF THE FOLLOWING LIFE EVENTS: DEATH OF FAMILY/FRIEND

## 2025-02-17 SDOH — ECONOMIC STABILITY: FOOD INSECURITY: HOW HARD IS IT FOR YOU TO PAY FOR THE VERY BASICS LIKE FOOD, HOUSING, MEDICAL CARE, AND HEATING?: NOT HARD AT ALL

## 2025-02-17 ASSESSMENT — PAIN SCALES - WONG BAKER: WONGBAKER_NUMERICALRESPONSE: NO HURT

## 2025-02-17 ASSESSMENT — COLUMBIA-SUICIDE SEVERITY RATING SCALE - C-SSRS
2. HAVE YOU ACTUALLY HAD ANY THOUGHTS OF KILLING YOURSELF?: NO
6. HAVE YOU EVER DONE ANYTHING, STARTED TO DO ANYTHING, OR PREPARED TO DO ANYTHING TO END YOUR LIFE?: NO
1. IN THE PAST MONTH, HAVE YOU WISHED YOU WERE DEAD OR WISHED YOU COULD GO TO SLEEP AND NOT WAKE UP?: NO
1. IN THE PAST MONTH, HAVE YOU WISHED YOU WERE DEAD OR WISHED YOU COULD GO TO SLEEP AND NOT WAKE UP?: NO
6. HAVE YOU EVER DONE ANYTHING, STARTED TO DO ANYTHING, OR PREPARED TO DO ANYTHING TO END YOUR LIFE?: NO
2. HAVE YOU ACTUALLY HAD ANY THOUGHTS OF KILLING YOURSELF?: NO

## 2025-02-17 ASSESSMENT — COGNITIVE AND FUNCTIONAL STATUS - GENERAL
DAILY ACTIVITIY SCORE: 24
PATIENT BASELINE BEDBOUND: NO
MOBILITY SCORE: 23
CLIMB 3 TO 5 STEPS WITH RAILING: A LITTLE

## 2025-02-17 ASSESSMENT — ACTIVITIES OF DAILY LIVING (ADL)
GROOMING: INDEPENDENT
JUDGMENT_ADEQUATE_SAFELY_COMPLETE_DAILY_ACTIVITIES: YES
WALKS IN HOME: INDEPENDENT
ASSISTIVE_DEVICE: DENTURES LOWER;DENTURES UPPER;EYEGLASSES
LACK_OF_TRANSPORTATION: NO
BATHING: INDEPENDENT
HEARING - RIGHT EAR: DIFFICULTY WITH NOISE
ADEQUATE_TO_COMPLETE_ADL: YES
LACK_OF_TRANSPORTATION: NO
DRESSING YOURSELF: INDEPENDENT
FEEDING YOURSELF: INDEPENDENT
PATIENT'S MEMORY ADEQUATE TO SAFELY COMPLETE DAILY ACTIVITIES?: YES
TOILETING: INDEPENDENT
HEARING - LEFT EAR: DIFFICULTY WITH NOISE

## 2025-02-17 ASSESSMENT — LIFESTYLE VARIABLES
EVER HAD A DRINK FIRST THING IN THE MORNING TO STEADY YOUR NERVES TO GET RID OF A HANGOVER: NO
HAVE PEOPLE ANNOYED YOU BY CRITICIZING YOUR DRINKING: NO
SUBSTANCE_ABUSE_PAST_12_MONTHS: NO
PRESCIPTION_ABUSE_PAST_12_MONTHS: NO
HOW MANY STANDARD DRINKS CONTAINING ALCOHOL DO YOU HAVE ON A TYPICAL DAY: PATIENT DOES NOT DRINK
AUDIT-C TOTAL SCORE: 0
SKIP TO QUESTIONS 9-10: 1
TOTAL SCORE: 0
SKIP TO QUESTIONS 9-10: 1
AUDIT-C TOTAL SCORE: 0
HAVE YOU EVER FELT YOU SHOULD CUT DOWN ON YOUR DRINKING: NO
AUDIT-C TOTAL SCORE: 0
HOW OFTEN DO YOU HAVE A DRINK CONTAINING ALCOHOL: NEVER
EVER FELT BAD OR GUILTY ABOUT YOUR DRINKING: NO
HOW OFTEN DO YOU HAVE 6 OR MORE DRINKS ON ONE OCCASION: NEVER

## 2025-02-17 ASSESSMENT — PATIENT HEALTH QUESTIONNAIRE - PHQ9
2. FEELING DOWN, DEPRESSED OR HOPELESS: SEVERAL DAYS
SUM OF ALL RESPONSES TO PHQ9 QUESTIONS 1 & 2: 2
1. LITTLE INTEREST OR PLEASURE IN DOING THINGS: SEVERAL DAYS

## 2025-02-17 ASSESSMENT — PAIN SCALES - GENERAL
PAINLEVEL_OUTOF10: 0 - NO PAIN

## 2025-02-17 ASSESSMENT — PAIN - FUNCTIONAL ASSESSMENT: PAIN_FUNCTIONAL_ASSESSMENT: 0-10

## 2025-02-17 NOTE — Clinical Note
Vessel(s): right SFA, right popliteal artery and right PTA. Injected with hand injections. Multiple views taken.

## 2025-02-17 NOTE — H&P
History Of Present Illness  Daine Montmeayor is a 79 y.o. female presenting with history of history of DMII, diabetic foot wound, PAD, CAD s/p CABG presented to the ED from podiatry clinic with right foot wound that has been getting worse over the past few weeks. No fevers or chills. No drainage noted. No pain but she does have a history of neuropathy. She was seen by her podiatrist and he was concerned there was osteo in her 2nd and 3rd right foot digit. In the ED xrays done concerning for osteo. She was given IV abx and admitted for further evaluation and treatment.      Past Medical History  She has a past medical history of Body mass index (BMI) 39.0-39.9, adult (02/23/2022), Body mass index (BMI) 39.0-39.9, adult (06/30/2021), Morbid (severe) obesity due to excess calories (Multi) (02/23/2022), Morbid (severe) obesity due to excess calories (Multi) (06/30/2021), Other acute sinusitis (12/31/2019), Personal history of other diseases of the musculoskeletal system and connective tissue, Personal history of other diseases of the respiratory system, Personal history of other drug therapy (11/14/2019), Personal history of other endocrine, nutritional and metabolic disease, Personal history of other endocrine, nutritional and metabolic disease, Personal history of other mental and behavioral disorders, Personal history of other specified conditions (09/04/2019), Systemic lupus erythematosus, unspecified, and Type 2 diabetes mellitus with other diabetic neurological complication.    Surgical History  She has a past surgical history that includes Foot surgery (11/03/2017); Other surgical history (12/13/2021); Other surgical history (12/13/2021); Other surgical history (12/13/2021); Other surgical history (12/13/2021); Other surgical history (12/13/2021); Other surgical history (12/13/2021); CT angio aorta and bilateral iliofemoral runoff including without contrast if performed (03/17/2021); and Toe amputation.     Social  History  She reports that she has never smoked. She has never used smokeless tobacco. She reports that she does not currently use alcohol. She reports that she does not use drugs.    Family History  Family History   Problem Relation Name Age of Onset    Heart failure Mother      Heart attack Mother      Coronary artery disease Mother      Coronary artery disease Father      Heart failure Father      Kidney failure Father          10 point review of systems negative except per HPI      Last Recorded Vitals  /67   Pulse 83   Temp 36.5 °C (97.7 °F)   Resp 18   Wt 80.7 kg (178 lb)   SpO2 98%     Physical Exam   Gen: NAD  HEENT: EOM, MMM  CV: RRR, no murmurs rubs or gallops  Resp: Clear to auscultation bilaterally  Abdomen: soft, NT,+BS  LE: No edema, right second and third digit of foot swollen, open wound noted on plantar surface, no drainage.       Relevant Results  Labs Reviewed   CBC WITH AUTO DIFFERENTIAL - Abnormal       Result Value    WBC 8.3      nRBC 0.0      RBC 4.97      Hemoglobin 14.3      Hematocrit 43.3      MCV 87      MCH 28.8      MCHC 33.0      RDW 14.6 (*)     Platelets 304      Neutrophils % 58.0      Immature Granulocytes %, Automated 0.2      Lymphocytes % 32.9      Monocytes % 5.5      Eosinophils % 2.3      Basophils % 1.1      Neutrophils Absolute 4.84      Immature Granulocytes Absolute, Automated 0.02      Lymphocytes Absolute 2.74      Monocytes Absolute 0.46      Eosinophils Absolute 0.19      Basophils Absolute 0.09     COMPREHENSIVE METABOLIC PANEL - Normal    Glucose 96      Sodium 140      Potassium 3.7      Chloride 103      Bicarbonate 28      Anion Gap 13      Urea Nitrogen 11      Creatinine 0.64      eGFR 89      Calcium 9.6      Albumin 4.3      Alkaline Phosphatase 62      Total Protein 7.5      AST 13      Bilirubin, Total 0.6      ALT 10     LACTATE - Normal    Lactate 0.9      Narrative:     Venipuncture immediately after or during the administration of Metamizole  may lead to falsely low results. Testing should be performed immediately prior to Metamizole dosing.   C-REACTIVE PROTEIN - Normal    C-Reactive Protein 0.19     BLOOD CULTURE   BLOOD CULTURE   GREEN TOP     XR foot right 3+ views   Final Result   1. Erosions involving the distal 3rd metatarsal and proximal 3rd   proximal phalanx, concerning for osteomyelitis.   2. No acute fracture or dislocation identified.   3. Postoperative and degenerative changes, as described above.        MACRO:   None.        Signed by: Cruzito Fung 2/17/2025 2:21 PM   Dictation workstation:   [id number]        Assessment/Plan  79 year old female admitted with diabetic right foot wound of 2nd and third digits with xray concerning for osteo    -will consult podiatry and ID  -continue IV abx   -follow blood cultures     Hx of CAD/PAD: good pulses, contineu aspirin and statin     DMII: continue SSI here, hold metformin and jardiance for now, on mounjaro at home    DVT ppx: lovenox         Paul Segal MD

## 2025-02-17 NOTE — ED PROVIDER NOTES
"HPI   Chief Complaint   Patient presents with    Foot Injury     Sent in by doctor pt states to \"be admitted for IV antibiotics \"    Wound Check       79-year-old female presents emergency department, states chronic wounds on her right foot, 3 of the digits, states she had the great toe amputated in 2023.  Patient states she has been on Bactrim and doxycycline without any improvement of her infection, states the osteomyelitis is spreading so she was encouraged to present to the emergency department for admission, amputation.    Patient notes that during her emergency room visit in 2023 she received Zosyn and had an episode that was thought to either be allergic reaction versus TIA.  Notes that she usually gets vancomycin for these infections      History provided by:  Patient   used: No            Patient History   Past Medical History:   Diagnosis Date    Body mass index (BMI) 39.0-39.9, adult 02/23/2022    BMI 39.0-39.9,adult    Body mass index (BMI) 39.0-39.9, adult 06/30/2021    BMI 39.0-39.9,adult    Morbid (severe) obesity due to excess calories (Multi) 02/23/2022    Class 2 severe obesity due to excess calories with serious comorbidity and body mass index (BMI) of 39.0 to 39.9 in adult    Morbid (severe) obesity due to excess calories (Multi) 06/30/2021    Class 2 severe obesity due to excess calories with serious comorbidity and body mass index (BMI) of 37.0 to 37.9 in adult    Other acute sinusitis 12/31/2019    Other acute sinusitis, recurrence not specified    Personal history of other diseases of the musculoskeletal system and connective tissue     History of osteomyelitis    Personal history of other diseases of the respiratory system     History of chronic obstructive lung disease    Personal history of other drug therapy 11/14/2019    History of influenza vaccination    Personal history of other endocrine, nutritional and metabolic disease     History of hyperlipidemia    Personal " history of other endocrine, nutritional and metabolic disease     History of hypothyroidism    Personal history of other mental and behavioral disorders     History of major depression    Personal history of other specified conditions 09/04/2019    History of wheezing    Systemic lupus erythematosus, unspecified     Lupus    Type 2 diabetes mellitus with other diabetic neurological complication     Type 2 diabetes mellitus with other neurologic complication, with long-term current use of insulin     Past Surgical History:   Procedure Laterality Date    CT ANGIO AORTA AND BILATERAL ILIOFEMORAL RUN OFF INCLUDING WITHOUT CONTRAST IF PERFORMED  03/17/2021    CT AORTA AND BILATERAL ILIOFEMORAL RUNOFF ANGIOGRAM W AND/OR WO IV CONTRAST 3/17/2021 [facility] CL[facility]ICAL LEGACY    FOOT SURGERY  11/03/2017    Foot Repair    OTHER SURGICAL HISTORY  12/13/2021    Ankle surgery    OTHER SURGICAL HISTORY  12/13/2021    Coronary artery bypass graft    OTHER SURGICAL HISTORY  12/13/2021    Cataract surgery    OTHER SURGICAL HISTORY  12/13/2021    Angioplasty    OTHER SURGICAL HISTORY  12/13/2021    Complete colonoscopy    OTHER SURGICAL HISTORY  12/13/2021    Tubal ligation    TOE AMPUTATION       Family History   Problem Relation Name Age of Onset    Heart failure Mother      Heart attack Mother      Coronary artery disease Mother      Coronary artery disease Father      Heart failure Father      Kidney failure Father       Social History     Tobacco Use    Smoking status: Never    Smokeless tobacco: Never   Vaping Use    Vaping status: Never Used   Substance Use Topics    Alcohol use: Not Currently    Drug use: Never       Physical Exam   ED Triage Vitals [02/17/25 1323]   Temperature Heart Rate Respirations BP   36.5 °C (97.7 °F) 83 18 139/67      Pulse Ox Temp src Heart Rate Source Patient Position   98 % -- -- --      BP Location FiO2 (%)     -- --       Physical Exam  Constitutional: Vitals noted, no distress. Afebrile.    Cardiovascular: Regular, rate, rhythm, no murmur.   Pulmonary: Lungs clear bilaterally with good aeration. No adventitious breath sounds.   Gastrointestinal: Soft, nonsurgical. Nontender. No peritoneal signs. Normoactive bowel sounds.   Musculoskeletal: Right foot malodorous, open wounds presents between the second and third, third and fourth digits, with drainage noted.  Skin: No rash.   Neuro: No focal neurologic deficits, NIH score of 0.      ED Course & MDM   Diagnoses as of 02/17/25 1432   Subacute osteomyelitis of right foot (Multi)   Failure of outpatient treatment     Labs Reviewed   CBC WITH AUTO DIFFERENTIAL - Abnormal       Result Value    WBC 8.3      nRBC 0.0      RBC 4.97      Hemoglobin 14.3      Hematocrit 43.3      MCV 87      MCH 28.8      MCHC 33.0      RDW 14.6 (*)     Platelets 304      Neutrophils % 58.0      Immature Granulocytes %, Automated 0.2      Lymphocytes % 32.9      Monocytes % 5.5      Eosinophils % 2.3      Basophils % 1.1      Neutrophils Absolute 4.84      Immature Granulocytes Absolute, Automated 0.02      Lymphocytes Absolute 2.74      Monocytes Absolute 0.46      Eosinophils Absolute 0.19      Basophils Absolute 0.09     COMPREHENSIVE METABOLIC PANEL - Normal    Glucose 96      Sodium 140      Potassium 3.7      Chloride 103      Bicarbonate 28      Anion Gap 13      Urea Nitrogen 11      Creatinine 0.64      eGFR 89      Calcium 9.6      Albumin 4.3      Alkaline Phosphatase 62      Total Protein 7.5      AST 13      Bilirubin, Total 0.6      ALT 10     LACTATE - Normal    Lactate 0.9      Narrative:     Venipuncture immediately after or during the administration of Metamizole may lead to falsely low results. Testing should be performed immediately prior to Metamizole dosing.   C-REACTIVE PROTEIN - Normal    C-Reactive Protein 0.19     BLOOD CULTURE   BLOOD CULTURE   GREEN TOP        XR foot right 3+ views   Final Result   1. Erosions involving the distal 3rd metatarsal and  proximal 3rd   proximal phalanx, concerning for osteomyelitis.   2. No acute fracture or dislocation identified.   3. Postoperative and degenerative changes, as described above.        MACRO:   None.        Signed by: Cruzito Fung 2/17/2025 2:21 PM   Dictation workstation:   [id number]                    No data recorded     Brock Coma Scale Score: 15 (02/17/25 1324 : Kristal Bolden RN)                     Medical Decision Making    Patient presents with worsening foot infection despite taking antibiotics.  Was encouraged to present to the ER by her podiatrist.    Laboratory workup initiated to evaluate infection.  X-ray imaging to evaluate the extensiveness of her osteomyelitis.    Blood cultures x 2 obtained, given the patient's reaction to Zosyn previously I ordered vancomycin only, ID can call insult inpatient.    CBC with a white count of 8.3 and hemoglobin of 14.3, lactate 0.9.  Metabolic panel unremarkable, normal electrolytes including a sodium of 140, potassium of 3.7, normal kidney function with creatinine of 0.64.    X-ray imaging of the right foot does demonstrate elements of osteomyelitis To digits 2 through 4.  Patient will require hospitalization for IV antibiotics and likely amputation of the digits as well.  Spoke with Dr. Segal, hospitalist who accepts to his service.    Shared ANU Attestation:    I personally saw the patient and made/approved the management plan and take responsibility for the patient management.     History: 79-year-old female presents for worsening wounds on her right foot.    Exam: Regular rate and rhythm cardiac exam with clear breath sounds bilaterally.  Abdomen is soft and nontender.  Neurological exam is grossly intact.  The patient does have wounds to her right foot and toes.    MDM: Cellulitis, osteomyelitis    Labs Reviewed   CBC WITH AUTO DIFFERENTIAL - Abnormal       Result Value    WBC 8.3      nRBC 0.0      RBC 4.97      Hemoglobin 14.3      Hematocrit 43.3       MCV 87      MCH 28.8      MCHC 33.0      RDW 14.6 (*)     Platelets 304      Neutrophils % 58.0      Immature Granulocytes %, Automated 0.2      Lymphocytes % 32.9      Monocytes % 5.5      Eosinophils % 2.3      Basophils % 1.1      Neutrophils Absolute 4.84      Immature Granulocytes Absolute, Automated 0.02      Lymphocytes Absolute 2.74      Monocytes Absolute 0.46      Eosinophils Absolute 0.19      Basophils Absolute 0.09     COMPREHENSIVE METABOLIC PANEL - Normal    Glucose 96      Sodium 140      Potassium 3.7      Chloride 103      Bicarbonate 28      Anion Gap 13      Urea Nitrogen 11      Creatinine 0.64      eGFR 89      Calcium 9.6      Albumin 4.3      Alkaline Phosphatase 62      Total Protein 7.5      AST 13      Bilirubin, Total 0.6      ALT 10     LACTATE - Normal    Lactate 0.9      Narrative:     Venipuncture immediately after or during the administration of Metamizole may lead to falsely low results. Testing should be performed immediately prior to Metamizole dosing.   C-REACTIVE PROTEIN - Normal    C-Reactive Protein 0.19     BLOOD CULTURE   BLOOD CULTURE   GREEN TOP       XR foot right 3+ views   Final Result   1. Erosions involving the distal 3rd metatarsal and proximal 3rd   proximal phalanx, concerning for osteomyelitis.   2. No acute fracture or dislocation identified.   3. Postoperative and degenerative changes, as described above.        MACRO:   None.        Signed by: Cruzito Fung 2/17/2025 2:21 PM   Dictation workstation:   [id number]        My interpretation of right foot x-ray is concern for possible osteomyelitis.      Parmjit Martinez MD      Procedure  Procedures     Terri Mcbride, APRN-CNP  02/17/25 [id number]

## 2025-02-17 NOTE — Clinical Note
Vessel(s): right tibio-peroneal trunk and right dorsalis pedis artery. Injected with hand injections.

## 2025-02-17 NOTE — Clinical Note
Sheath exchanged in the left femoral artery with SHEATH, ORALIA, W/O GUIDEWIRE, 10 CM,  5FR INTRODUCER, 5FR BRADLEY, 2.5 CM DIALATOR.

## 2025-02-17 NOTE — CONSULTS
Vancomycin Dosing by Pharmacy- INITIAL    Diane Montemayor is a 79 y.o. year old female who Pharmacy has been consulted for vancomycin dosing for diabetic foot. Based on the patient's indication and renal status this patient will be dosed based on a goal AUC of 400-600.     Renal function is currently scr=0.64 cl=75.1 ml/min.    Visit Vitals  /70 (BP Location: Left arm, Patient Position: Lying)   Pulse 67   Temp 36.5 °C (97.7 °F)   Resp 18        Lab Results   Component Value Date    CREATININE 0.64 2025    CREATININE 1.17 (H) 2024    CREATININE CANCELED 2023    CREATININE 1.21 (H) 2023    CREATININE CANCELED 2023    CREATININE 1.09 (H) 2023        Patient weight is as follows:   Vitals:    25 1323   Weight: 80.7 kg (178 lb)       Cultures:  No results found for the encounter in last 14 days.        No intake/output data recorded.  I/O during current shift:  I/O this shift:  In: 540 [IV Piggyback:540]  Out: -     Temp (24hrs), Av.5 °C (97.7 °F), Min:36.5 °C (97.7 °F), Max:36.5 °C (97.7 °F)         Assessment/Plan     Patient has already been given a loading dose of 2000 mg.  Will initiate vancomycin maintenance,  750 mg every 12 hours.    This dosing regimen is predicted by [employer] to result in the following pharmacokinetic parameters:    Regimen: 750 mg IV every 12 hours.  Start time: 02:24 on 2025  Exposure target: AUC24 (range)400-600 mg/L.hr   [id number]: 409 mg/L.hr  AUC24,ss: 424 mg/L.hr  Probability of AUC24 > 400: 56 %  Ctrough,ss: 13.7 mg/L  Probability of Ctrough,ss > 20: 20 %    Follow-up level will be ordered on  1900 and   at 2300 unless clinically indicated sooner.  Will continue to monitor renal function daily while on vancomycin and order serum creatinine at least every 48 hours if not already ordered.  Follow for continued vancomycin needs, clinical response, and signs/symptoms of toxicity.       Ginger Wen, PharmD

## 2025-02-17 NOTE — CARE PLAN
The patient's goals for the shift include Comfort    The clinical goals for the shift include Safety      Problem: Safety - Adult  Goal: Free from fall injury  Outcome: Progressing     Problem: Pain - Adult  Goal: Verbalizes/displays adequate comfort level or baseline comfort level  Outcome: Progressing     Over the shift, the patient did make progress toward the following goals. Barriers to progression include none. Recommendations to address these barriers include continue with current plan of care.

## 2025-02-18 ENCOUNTER — APPOINTMENT (OUTPATIENT)
Dept: RADIOLOGY | Facility: HOSPITAL | Age: 80
DRG: 240 | End: 2025-02-18
Payer: MEDICARE

## 2025-02-18 ENCOUNTER — TELEPHONE (OUTPATIENT)
Dept: PRIMARY CARE | Facility: CLINIC | Age: 80
End: 2025-02-18

## 2025-02-18 ENCOUNTER — APPOINTMENT (OUTPATIENT)
Dept: CARDIOLOGY | Facility: CLINIC | Age: 80
End: 2025-02-18
Payer: MEDICARE

## 2025-02-18 LAB
ANION GAP SERPL CALC-SCNC: 10 MMOL/L (ref 10–20)
BUN SERPL-MCNC: 11 MG/DL (ref 6–23)
CALCIUM SERPL-MCNC: 9.4 MG/DL (ref 8.6–10.3)
CHLORIDE SERPL-SCNC: 103 MMOL/L (ref 98–107)
CO2 SERPL-SCNC: 29 MMOL/L (ref 21–32)
CREAT SERPL-MCNC: 0.61 MG/DL (ref 0.5–1.05)
EGFRCR SERPLBLD CKD-EPI 2021: >90 ML/MIN/1.73M*2
ERYTHROCYTE [DISTWIDTH] IN BLOOD BY AUTOMATED COUNT: 14.9 % (ref 11.5–14.5)
GLUCOSE BLD MANUAL STRIP-MCNC: 135 MG/DL (ref 74–99)
GLUCOSE BLD MANUAL STRIP-MCNC: 139 MG/DL (ref 74–99)
GLUCOSE BLD MANUAL STRIP-MCNC: 140 MG/DL (ref 74–99)
GLUCOSE BLD MANUAL STRIP-MCNC: 92 MG/DL (ref 74–99)
GLUCOSE SERPL-MCNC: 95 MG/DL (ref 74–99)
HCT VFR BLD AUTO: 43.8 % (ref 36–46)
HGB BLD-MCNC: 14 G/DL (ref 12–16)
MAGNESIUM SERPL-MCNC: 1.76 MG/DL (ref 1.6–2.4)
MCH RBC QN AUTO: 28.7 PG (ref 26–34)
MCHC RBC AUTO-ENTMCNC: 32 G/DL (ref 32–36)
MCV RBC AUTO: 90 FL (ref 80–100)
NRBC BLD-RTO: 0 /100 WBCS (ref 0–0)
PLATELET # BLD AUTO: 301 X10*3/UL (ref 150–450)
POTASSIUM SERPL-SCNC: 4.1 MMOL/L (ref 3.5–5.3)
RBC # BLD AUTO: 4.88 X10*6/UL (ref 4–5.2)
SODIUM SERPL-SCNC: 138 MMOL/L (ref 136–145)
WBC # BLD AUTO: 7 X10*3/UL (ref 4.4–11.3)

## 2025-02-18 PROCEDURE — 93923 UPR/LXTR ART STDY 3+ LVLS: CPT

## 2025-02-18 PROCEDURE — 93923 UPR/LXTR ART STDY 3+ LVLS: CPT | Performed by: INTERNAL MEDICINE

## 2025-02-18 PROCEDURE — 99232 SBSQ HOSP IP/OBS MODERATE 35: CPT | Performed by: HOSPITALIST

## 2025-02-18 PROCEDURE — 2500000001 HC RX 250 WO HCPCS SELF ADMINISTERED DRUGS (ALT 637 FOR MEDICARE OP): Performed by: INTERNAL MEDICINE

## 2025-02-18 PROCEDURE — 99222 1ST HOSP IP/OBS MODERATE 55: CPT | Performed by: INTERNAL MEDICINE

## 2025-02-18 PROCEDURE — 1210000001 HC SEMI-PRIVATE ROOM DAILY

## 2025-02-18 PROCEDURE — 2500000004 HC RX 250 GENERAL PHARMACY W/ HCPCS (ALT 636 FOR OP/ED): Performed by: HOSPITALIST

## 2025-02-18 PROCEDURE — 36415 COLL VENOUS BLD VENIPUNCTURE: CPT | Performed by: HOSPITALIST

## 2025-02-18 PROCEDURE — 82947 ASSAY GLUCOSE BLOOD QUANT: CPT

## 2025-02-18 PROCEDURE — 85027 COMPLETE CBC AUTOMATED: CPT | Performed by: HOSPITALIST

## 2025-02-18 PROCEDURE — 2500000002 HC RX 250 W HCPCS SELF ADMINISTERED DRUGS (ALT 637 FOR MEDICARE OP, ALT 636 FOR OP/ED): Performed by: INTERNAL MEDICINE

## 2025-02-18 PROCEDURE — 80048 BASIC METABOLIC PNL TOTAL CA: CPT | Performed by: HOSPITALIST

## 2025-02-18 PROCEDURE — 83735 ASSAY OF MAGNESIUM: CPT | Performed by: HOSPITALIST

## 2025-02-18 PROCEDURE — 2500000004 HC RX 250 GENERAL PHARMACY W/ HCPCS (ALT 636 FOR OP/ED)

## 2025-02-18 PROCEDURE — 2500000004 HC RX 250 GENERAL PHARMACY W/ HCPCS (ALT 636 FOR OP/ED): Performed by: INTERNAL MEDICINE

## 2025-02-18 RX ORDER — MEROPENEM 1 G/1
1 INJECTION, POWDER, FOR SOLUTION INTRAVENOUS EVERY 8 HOURS
Status: DISCONTINUED | OUTPATIENT
Start: 2025-02-18 | End: 2025-02-25

## 2025-02-18 RX ORDER — VANCOMYCIN HYDROCHLORIDE 1 G/200ML
1000 INJECTION, SOLUTION INTRAVENOUS EVERY 12 HOURS
Status: DISCONTINUED | OUTPATIENT
Start: 2025-02-18 | End: 2025-02-18

## 2025-02-18 RX ORDER — CEFAZOLIN SODIUM 2 G/100ML
2 INJECTION, SOLUTION INTRAVENOUS EVERY 6 HOURS
Status: DISCONTINUED | OUTPATIENT
Start: 2025-02-18 | End: 2025-02-18

## 2025-02-18 RX ADMIN — SPIRONOLACTONE 25 MG: 25 TABLET ORAL at 12:24

## 2025-02-18 RX ADMIN — DOCUSATE SODIUM 200 MG: 100 CAPSULE, LIQUID FILLED ORAL at 20:55

## 2025-02-18 RX ADMIN — MAGNESIUM OXIDE 400 MG (241.3 MG MAGNESIUM) TABLET 400 MG: TABLET at 12:26

## 2025-02-18 RX ADMIN — MEROPENEM 1 G: 1 INJECTION, POWDER, FOR SOLUTION INTRAVENOUS at 19:52

## 2025-02-18 RX ADMIN — Medication 4000 UNITS: at 12:06

## 2025-02-18 RX ADMIN — ROSUVASTATIN CALCIUM 10 MG: 10 TABLET, FILM COATED ORAL at 20:55

## 2025-02-18 RX ADMIN — VANCOMYCIN HYDROCHLORIDE 750 MG: 750 INJECTION, SOLUTION INTRAVENOUS at 00:39

## 2025-02-18 RX ADMIN — PANTOPRAZOLE SODIUM 40 MG: 40 TABLET, DELAYED RELEASE ORAL at 06:18

## 2025-02-18 RX ADMIN — EMPAGLIFLOZIN 25 MG: 25 TABLET, FILM COATED ORAL at 12:06

## 2025-02-18 RX ADMIN — LEVOTHYROXINE SODIUM 300 MCG: 0.1 TABLET ORAL at 06:18

## 2025-02-18 RX ADMIN — VANCOMYCIN HYDROCHLORIDE 1000 MG: 1 INJECTION, SOLUTION INTRAVENOUS at 14:27

## 2025-02-18 RX ADMIN — DILTIAZEM HYDROCHLORIDE 240 MG: 240 CAPSULE, EXTENDED RELEASE ORAL at 12:06

## 2025-02-18 RX ADMIN — Medication 1000 MCG: at 12:07

## 2025-02-18 RX ADMIN — ENOXAPARIN SODIUM 40 MG: 40 INJECTION SUBCUTANEOUS at 18:26

## 2025-02-18 RX ADMIN — VILAZODONE HYDROCHLORIDE 40 MG: 40 TABLET ORAL at 18:18

## 2025-02-18 RX ADMIN — LOSARTAN POTASSIUM 25 MG: 25 TABLET, FILM COATED ORAL at 20:55

## 2025-02-18 RX ADMIN — PANTOPRAZOLE SODIUM 40 MG: 40 TABLET, DELAYED RELEASE ORAL at 18:27

## 2025-02-18 RX ADMIN — LOSARTAN POTASSIUM 25 MG: 25 TABLET, FILM COATED ORAL at 12:06

## 2025-02-18 RX ADMIN — ASPIRIN 81 MG: 81 TABLET, COATED ORAL at 20:55

## 2025-02-18 ASSESSMENT — COGNITIVE AND FUNCTIONAL STATUS - GENERAL
CLIMB 3 TO 5 STEPS WITH RAILING: A LITTLE
DAILY ACTIVITIY SCORE: 23
TOILETING: A LITTLE
MOBILITY SCORE: 23

## 2025-02-18 ASSESSMENT — PAIN SCALES - GENERAL
PAINLEVEL_OUTOF10: 0 - NO PAIN
PAINLEVEL_OUTOF10: 0 - NO PAIN

## 2025-02-18 NOTE — CONSULTS
Consults      ID Consult:       HPI  79-year-old female with diabetes mellitus type 2 and peripheral arterial disease presented from podiatry clinic with right foot wound.  Concern for osteomyelitis second and third right foot digit.  MRI 2/18/2025 pending results  X-ray 2/17/2025 with erosions involving the distal third metatarsal and proximal third phalanx concerning for osteomyelitis  PVR pending final results but prelim is showing evidence of mild arterial occlusive disease right lower extremity and mild arterial occlusive disease in the left lower extremity.  Blood cultures x 2 remain normal drawn 2/17/2025.    Wound images reviewed third toe base right foot.  Patient is missing great toe on the right foot      Of note, patient takes Mounjaro at home  All 14 ROS discussed with the patient and negative other than as stated as above       has a past medical history of Body mass index (BMI) 39.0-39.9, adult (02/23/2022), Body mass index (BMI) 39.0-39.9, adult (06/30/2021), Morbid (severe) obesity due to excess calories (Multi) (02/23/2022), Morbid (severe) obesity due to excess calories (Multi) (06/30/2021), Other acute sinusitis (12/31/2019), Personal history of other diseases of the musculoskeletal system and connective tissue, Personal history of other diseases of the respiratory system, Personal history of other drug therapy (11/14/2019), Personal history of other endocrine, nutritional and metabolic disease, Personal history of other endocrine, nutritional and metabolic disease, Personal history of other mental and behavioral disorders, Personal history of other specified conditions (09/04/2019), Systemic lupus erythematosus, unspecified, and Type 2 diabetes mellitus with other diabetic neurological complication.     has a past surgical history that includes Foot surgery (11/03/2017); Other surgical history (12/13/2021); Other surgical history (12/13/2021); Other surgical history (12/13/2021); Other surgical  history (12/13/2021); Other surgical history (12/13/2021); Other surgical history (12/13/2021); CT angio aorta and bilateral iliofemoral runoff including without contrast if performed (03/17/2021); and Toe amputation.     reports that she has never smoked. She has never used smokeless tobacco. She reports that she does not currently use alcohol. She reports that she does not use drugs.    Family History   Problem Relation Name Age of Onset    Heart failure Mother      Heart attack Mother      Coronary artery disease Mother      Coronary artery disease Father      Heart failure Father      Kidney failure Father           Physical exam  Vitals:    02/18/25 1206   BP: 146/67   Pulse: 71   Resp:    Temp:    SpO2:        Patient is awake and alert, no conversational dyspnea.   NAD  Neck supple  Heart S1S2  Chest: Equal expansion, bilaterally clear to auscultation  Abdomen: soft, ND, NTTP, no guarding  Extrem: no pain to palpation, foot in dressing, photos reviewed.   Skin: no rashes, no diaphoresis  Neuro: CNS intact  Affect appropriate and patient is interactive    Admission on 02/17/2025   Component Date Value Ref Range Status    WBC 02/17/2025 8.3  4.4 - 11.3 x10*3/uL Final    nRBC 02/17/2025 0.0  0.0 - 0.0 /100 WBCs Final    RBC 02/17/2025 4.97  4.00 - 5.20 x10*6/uL Final    Hemoglobin 02/17/2025 14.3  12.0 - 16.0 g/dL Final    Hematocrit 02/17/2025 43.3  36.0 - 46.0 % Final    MCV 02/17/2025 87  80 - 100 fL Final    MCH 02/17/2025 28.8  26.0 - 34.0 pg Final    MCHC 02/17/2025 33.0  32.0 - 36.0 g/dL Final    RDW 02/17/2025 14.6 (H)  11.5 - 14.5 % Final    Platelets 02/17/2025 304  150 - 450 x10*3/uL Final    Neutrophils % 02/17/2025 58.0  40.0 - 80.0 % Final    Immature Granulocytes %, Automated 02/17/2025 0.2  0.0 - 0.9 % Final    Immature Granulocyte Count (IG) includes promyelocytes, myelocytes and metamyelocytes but does not include bands. Percent differential counts (%) should be interpreted in the context of  the absolute cell counts (cells/UL).    Lymphocytes % 02/17/2025 32.9  13.0 - 44.0 % Final    Monocytes % 02/17/2025 5.5  2.0 - 10.0 % Final    Eosinophils % 02/17/2025 2.3  0.0 - 6.0 % Final    Basophils % 02/17/2025 1.1  0.0 - 2.0 % Final    Neutrophils Absolute 02/17/2025 4.84  1.60 - 5.50 x10*3/uL Final    Percent differential counts (%) should be interpreted in the context of the absolute cell counts (cells/uL).    Immature Granulocytes Absolute, Au* 02/17/2025 0.02  0.00 - 0.50 x10*3/uL Final    Lymphocytes Absolute 02/17/2025 2.74  0.80 - 3.00 x10*3/uL Final    Monocytes Absolute 02/17/2025 0.46  0.05 - 0.80 x10*3/uL Final    Eosinophils Absolute 02/17/2025 0.19  0.00 - 0.40 x10*3/uL Final    Basophils Absolute 02/17/2025 0.09  0.00 - 0.10 x10*3/uL Final    Glucose 02/17/2025 96  74 - 99 mg/dL Final    Sodium 02/17/2025 140  136 - 145 mmol/L Final    Potassium 02/17/2025 3.7  3.5 - 5.3 mmol/L Final    Chloride 02/17/2025 103  98 - 107 mmol/L Final    Bicarbonate 02/17/2025 28  21 - 32 mmol/L Final    Anion Gap 02/17/2025 13  10 - 20 mmol/L Final    Urea Nitrogen 02/17/2025 11  6 - 23 mg/dL Final    Creatinine 02/17/2025 0.64  0.50 - 1.05 mg/dL Final    eGFR 02/17/2025 89  >60 mL/min/1.73m*2 Final    Calculations of estimated GFR are performed using the 2021 CKD-EPI Study Refit equation without the race variable for the IDMS-Traceable creatinine methods.  https://jasn.asnjournals.org/content/early/2021/09/22/ASN.3526074693    Calcium 02/17/2025 9.6  8.6 - 10.3 mg/dL Final    Albumin 02/17/2025 4.3  3.4 - 5.0 g/dL Final    Alkaline Phosphatase 02/17/2025 62  33 - 136 U/L Final    Total Protein 02/17/2025 7.5  6.4 - 8.2 g/dL Final    AST 02/17/2025 13  9 - 39 U/L Final    Bilirubin, Total 02/17/2025 0.6  0.0 - 1.2 mg/dL Final    ALT 02/17/2025 10  7 - 45 U/L Final    Patients treated with Sulfasalazine may generate falsely decreased results for ALT.    Lactate 02/17/2025 0.9  0.4 - 2.0 mmol/L Final    Blood  Culture 02/17/2025 Loaded on Instrument - Culture in progress   Preliminary    Blood Culture 02/17/2025 Loaded on Instrument - Culture in progress   Preliminary    Extra Tube 02/17/2025 Hold for add-ons.   Final    Auto resulted.    Extra Tube 02/17/2025 Hold for add-ons.   Final    Auto resulted.    C-Reactive Protein 02/17/2025 0.19  <1.00 mg/dL Final    Extra Tube 02/17/2025 Hold for add-ons.   Final    Auto resulted.    Vancomycin 02/17/2025 17.9  5.0 - 20.0 ug/mL Final    POCT Glucose 02/17/2025 95  74 - 99 mg/dL Final    Glucose 02/18/2025 95  74 - 99 mg/dL Final    Sodium 02/18/2025 138  136 - 145 mmol/L Final    Potassium 02/18/2025 4.1  3.5 - 5.3 mmol/L Final    Chloride 02/18/2025 103  98 - 107 mmol/L Final    Bicarbonate 02/18/2025 29  21 - 32 mmol/L Final    Anion Gap 02/18/2025 10  10 - 20 mmol/L Final    Urea Nitrogen 02/18/2025 11  6 - 23 mg/dL Final    Creatinine 02/18/2025 0.61  0.50 - 1.05 mg/dL Final    eGFR 02/18/2025 >90  >60 mL/min/1.73m*2 Final    Calculations of estimated GFR are performed using the 2021 CKD-EPI Study Refit equation without the race variable for the IDMS-Traceable creatinine methods.  https://jasn.asnjournals.org/content/early/2021/09/22/ASN.6302489272    Calcium 02/18/2025 9.4  8.6 - 10.3 mg/dL Final    WBC 02/18/2025 7.0  4.4 - 11.3 x10*3/uL Final    nRBC 02/18/2025 0.0  0.0 - 0.0 /100 WBCs Final    RBC 02/18/2025 4.88  4.00 - 5.20 x10*6/uL Final    Hemoglobin 02/18/2025 14.0  12.0 - 16.0 g/dL Final    Hematocrit 02/18/2025 43.8  36.0 - 46.0 % Final    MCV 02/18/2025 90  80 - 100 fL Final    MCH 02/18/2025 28.7  26.0 - 34.0 pg Final    MCHC 02/18/2025 32.0  32.0 - 36.0 g/dL Final    RDW 02/18/2025 14.9 (H)  11.5 - 14.5 % Final    Platelets 02/18/2025 301  150 - 450 x10*3/uL Final    Magnesium 02/18/2025 1.76  1.60 - 2.40 mg/dL Final    Vancomycin 02/17/2025 11.9  5.0 - 20.0 ug/mL Final    POCT Glucose 02/17/2025 96  74 - 99 mg/dL Final    POCT Glucose 02/18/2025 92  74 - 99  mg/dL Final    BSA 02/18/2025 1.94  m2 Preliminary    POCT Glucose 02/18/2025 140 (H)  74 - 99 mg/dL Final       Assessment:   Concern for osteomyelitis right foot  Peripheral arterial disease  Diabetes mellitus type 2    Plan:   Follow-up MRI for extent of osteomyelitis.  Unclear whether there is surgical intervention pending at this time.  Vascular workup in progress  Optimal glycemic control    On review of cultures from past, patient has history of MSSA, no recent MRSA.  Patient started empirically on vancomycin and meropenem.  Intent was to de-escalate to cefazolin but, due to prior reaction to zosyn, she is refusing cefazolin. Would like to stay on Meropenem.     Would appreciate cultures from surgical margin to help direct therapy.    Of note, she has retained screws/plate and scars bilateral LE  proximal to the foot.       All communication directed to consulting provider.   Thank you very much for this consultation.     Time spent before, during, and after this consult reviewed data and coordinating care on the date of this consultation, including face to face visit with the patient > 60 min

## 2025-02-18 NOTE — CONSULTS
PODIATRIC MEDICINE AND SURGERY   CONSULT HISTORY AND PHYSICAL     HPI:  Patient is a 79 y.o. female with pmh consistent for DM, previous amputation secondary to OM, Hypothyroidism, RA, PAD, PE, CKD, HTN, HF. Patient was admitted for concern of OM. Podiatric consultation was requested for previous concern. Patient is a known patient from my private practice. Has followed with partener Dr. Peng before being sent to me for further eval last Thursday. Given clinical appearance on Friday recommended admittance to hospital for failed outpatient treatment and expedited infection and vascular workup.  Denies nausea, vomiting, fever, chills, chest pain, shortness of breath, and diarrhea.    ASSESSMENT:    Patient is a 79 y.o. female with pmh consistent for DM, previous amputation secondary to OM, Hypothyroidism, RA, PAD, PE, CKD, HTN, HF. Patient was admitted for concern of OM. Podiatric consultation was requested for previous concern. Patient is a known patient from my private practice. OM on xray to third and possible second met. Will get MRI and further vascular workup. Will need podiatry surgical intervention during visit.     PLAN AND RECOMMENDATIONS:  - Patient seen and evaluated   - Recommended updated vascular studies (PVR w/ TBI)  - Recommend MRI of her right foot   - Recommend ID consult   - Will need podiatric surgery during admission  - R foot dressed with betadine wet to dry dressing. Dressing should remain clean, dry, and intact.   - WB to heel of the RLE in post op shoe.   - Elevate RLE at or above the level of the heart.  - Antibiotics per primary team/ID recommendations.   - Pain management per primary team.  - Podiatry to continue to follow       Past Medical History:   Diagnosis Date    Body mass index (BMI) 39.0-39.9, adult 02/23/2022    BMI 39.0-39.9,adult    Body mass index (BMI) 39.0-39.9, adult 06/30/2021    BMI 39.0-39.9,adult    Morbid (severe) obesity due to excess calories (Multi)  02/23/2022    Class 2 severe obesity due to excess calories with serious comorbidity and body mass index (BMI) of 39.0 to 39.9 in adult    Morbid (severe) obesity due to excess calories (Multi) 06/30/2021    Class 2 severe obesity due to excess calories with serious comorbidity and body mass index (BMI) of 37.0 to 37.9 in adult    Other acute sinusitis 12/31/2019    Other acute sinusitis, recurrence not specified    Personal history of other diseases of the musculoskeletal system and connective tissue     History of osteomyelitis    Personal history of other diseases of the respiratory system     History of chronic obstructive lung disease    Personal history of other drug therapy 11/14/2019    History of influenza vaccination    Personal history of other endocrine, nutritional and metabolic disease     History of hyperlipidemia    Personal history of other endocrine, nutritional and metabolic disease     History of hypothyroidism    Personal history of other mental and behavioral disorders     History of major depression    Personal history of other specified conditions 09/04/2019    History of wheezing    Systemic lupus erythematosus, unspecified     Lupus    Type 2 diabetes mellitus with other diabetic neurological complication     Type 2 diabetes mellitus with other neurologic complication, with long-term current use of insulin     Past Surgical History:   Procedure Laterality Date    CT ANGIO AORTA AND BILATERAL ILIOFEMORAL RUN OFF INCLUDING WITHOUT CONTRAST IF PERFORMED  03/17/2021    CT AORTA AND BILATERAL ILIOFEMORAL RUNOFF ANGIOGRAM W AND/OR WO IV CONTRAST 3/17/2021 [facility]ICAL [facility]GAC[facility]    FOOT SURGERY  11/03/2017    Foot Repair    OTHER SURGICAL HISTORY  12/13/2021    Ankle surgery    OTHER SURGICAL HISTORY  12/13/2021    Coronary artery bypass graft    OTHER SURGICAL HISTORY  12/13/2021    Cataract surgery    OTHER SURGICAL HISTORY  12/13/2021    Angioplasty    OTHER SURGICAL HISTORY  12/13/2021     Complete colonoscopy    OTHER SURGICAL HISTORY  12/13/2021    Tubal ligation    TOE AMPUTATION       Allergies   Allergen Reactions    Lisinopril Cough     Family History   Problem Relation Name Age of Onset    Heart failure Mother      Heart attack Mother      Coronary artery disease Mother      Coronary artery disease Father      Heart failure Father      Kidney failure Father       Social History     Socioeconomic History    Marital status:      Spouse name: Not on file    Number of children: Not on file    Years of education: Not on file    Highest education level: Not on file   Occupational History    Not on file   Tobacco Use    Smoking status: Never    Smokeless tobacco: Never   Vaping Use    Vaping status: Never Used   Substance and Sexual Activity    Alcohol use: Not Currently    Drug use: Never    Sexual activity: Not on file   Other Topics Concern    Not on file   Social History Narrative    Not on file     Social Drivers of Health     Financial Resource Strain: Low Risk  (2/17/2025)    Overall Financial Resource Strain (CARDIA)     Difficulty of Paying Living Expenses: Not hard at all   Food Insecurity: No Food Insecurity (2/17/2025)    Hunger Vital Sign     Worried About Running Out of Food in the Last Year: Never true     Ran Out of Food in the Last Year: Never true   Transportation Needs: No Transportation Needs (2/17/2025)    PRAPARE - Transportation     Lack of Transportation (Medical): No     Lack of Transportation (Non-Medical): No   Physical Activity: Insufficiently Active (2/17/2025)    Exercise Vital Sign     Days of Exercise per Week: 3 days     Minutes of Exercise per Session: 20 min   Stress: Stress Concern Present (2/17/2025)    [employer] - Occupational Stress Questionnaire     Feeling of Stress : To some extent   Social Connections: Moderately Isolated (2/17/2025)    Social Connection and Isolation Panel [NHANES]     Frequency of Communication with  Friends and Family: More than three times a week     Frequency of Social Gatherings with Friends and Family: More than three times a week     Attends Anabaptism Services: More than 4 times per year     Active Member of Clubs or Organizations: No     Attends Club or Organization Meetings: Never     Marital Status:    Intimate Partner Violence: Not At Risk (2/17/2025)    Humiliation, Afraid, Rape, and Kick questionnaire     Fear of Current or Ex-Partner: No     Emotionally Abused: No     Physically Abused: No     Sexually Abused: No   Housing Stability: Low Risk  (2/17/2025)    Housing Stability Vital Sign     Unable to Pay for Housing in the Last Year: No     Number of Times Moved in the Last Year: 0     Homeless in the Last Year: No       Current Facility-Administered Medications:     acetaminophen (Tylenol) tablet 650 mg, 650 mg, oral, q4h PRN **OR** acetaminophen (Tylenol) oral liquid 650 mg, 650 mg, nasogastric tube, q4h PRN **OR** acetaminophen (Tylenol) suppository 650 mg, 650 mg, rectal, q4h PRN, Paul Segal MD    acetaminophen (Tylenol) tablet 650 mg, 650 mg, oral, q4h PRN **OR** acetaminophen (Tylenol) oral liquid 650 mg, 650 mg, oral, q4h PRN **OR** acetaminophen (Tylenol) suppository 650 mg, 650 mg, rectal, q4h PRN, Paul Segal MD    aspirin EC tablet 81 mg, 81 mg, oral, Nightly, Gavin De La Cruz MD, 81 mg at 02/17/25 2352    cholecalciferol (Vitamin D-3) tablet 4,000 Units, 4,000 Units, oral, Daily, Gavin De La Cruz MD    cyanocobalamin (Vitamin B-12) tablet 1,000 mcg, 1,000 mcg, oral, Daily, Gavin De La Cruz MD    dilTIAZem CD (Cardizem CD) 24 hr capsule 240 mg, 240 mg, oral, Daily, Gavin De La Cruz MD    docusate sodium (Colace) capsule 200 mg, 200 mg, oral, Nightly, Gavin De La Cruz MD, 200 mg at 02/17/25 2351    empagliflozin (Jardiance) tablet 25 mg, 25 mg, oral, Daily, Gavin De La Cruz MD    enoxaparin (Lovenox) syringe 40 mg, 40 mg, subcutaneous, q24h, Paul Segal MD, 40 mg at 02/17/25  1751    insulin lispro injection 0-5 Units, 0-5 Units, subcutaneous, TID AC, Paul Segal MD    [START ON 2/19/2025] levothyroxine (Synthroid, Levoxyl) tablet 150 mcg, 150 mcg, oral, Once per day on Sunday Monday Wednesday Friday Saturday, Gavin De La Cruz MD    levothyroxine (Synthroid, Levoxyl) tablet 300 mcg, 300 mcg, oral, Once per day on Tuesday Thursday, Gavin De La Cruz MD, 300 mcg at 02/18/25 0618    losartan (Cozaar) tablet 25 mg, 25 mg, oral, BID, Gavin De La Cruz MD, 25 mg at 02/17/25 2352    magnesium oxide (Mag-Ox) tablet 400 mg, 400 mg, oral, Daily, Gavin De La Cruz MD    meropenem (Merrem) 1 g in sodium chloride 0.9%  mL, 1 g, intravenous, q8h, Paul Segal MD, Stopped at 02/18/25 0006    ondansetron (Zofran) tablet 4 mg, 4 mg, oral, q8h PRN **OR** ondansetron (Zofran) injection 4 mg, 4 mg, intravenous, q8h PRN, Paul Segal MD    oxyCODONE (Roxicodone) immediate release tablet 5 mg, 5 mg, oral, q6h PRN, Paul Segal MD    pantoprazole (ProtoNix) EC tablet 40 mg, 40 mg, oral, BID , Gavin De La Cruz MD, 40 mg at 02/18/25 0618    polyethylene glycol (Glycolax, Miralax) packet 17 g, 17 g, oral, Daily PRN, Paul Segal MD    rosuvastatin (Crestor) tablet 10 mg, 10 mg, oral, Nightly, Gavin De La Cruz MD, 10 mg at 02/17/25 2352    spironolactone (Aldactone) tablet 25 mg, 25 mg, oral, Daily, Gavin De La Cruz MD    vancomycin (Vancocin) 1,000 mg in dextrose 5%  mL, 1,000 mg, intravenous, q12h, Ryan Hernandez, AnnamarieD    vancomycin (Vancocin) pharmacy to dose - pharmacy monitoring, , miscellaneous, Daily PRN, Paul Segal MD    vilazodone (Viibryd) tablet 40 mg, 40 mg, oral, Daily with breakfast, Gavin De La Cruz MD     REVIEW OF SYSTEMS:  General: no fever, chills or acute changes in weight in the last 6 months  Skin: wound  Eyes: no blurred or double vision or eye pain  Cardiac: denies chest pain, heart palpitations or orthopnea  Pulmonary: denies wheezing, productive cough or exertional  "dyspnea  GI: denies nausea, vomiting, diarrhea or constipation  Neuro: Endorses numbnes/tingling feet  Musc: denies history of upper or lower extremity weakness  Endocrine: denies polyuria, polydipsia, nocturia, blurry vision or excessive fatigue  Hematology: Negative for anemia, easy bleeding and bruising.    OBJECTIVE:  /65   Pulse 73   Temp 36.6 °C (97.9 °F)   Resp 18   Ht 1.676 m (5' 6\")   Wt 80.7 kg (178 lb)   SpO2 95%   BMI 28.73 kg/m²     GENERAL:  - Alert and oriented to person, place, and time  - No acute distress    VASCULAR:  - Dorsalis pedis and posterior tibial pulses are palpable bilaterally  - Skin temperature is warm to war, from the tibial tuberosity to the toes bilaterally  - mild edema R    NEUROLOGIC:  - Gross sensation intact to touch at the foot bilaterally  - Protective sensation diminished    MUSCULOSKELETAL:  - Structural Deformities: previous hallux amps   - 5/5 muscle strength for dorsiflexion, plantarflexion, abduction, and adduction bilaterally    INTEGUMENTARY:  Wound location: R third digit   Size: 0.3 cm x 0.3 cm x bone cm = 0.09 Total Sq cm  Type: Diabetic and Neuropathic  Depth: Ulceration extends to level of bone   Base: Fibrotic and Necrotic  SOI: Clinical SOI include erythema and warmth improved from prior exam  Probe: Ulcer does probe to bone.  Drainage: None    LABS:   WBC- 8.3    ESR- not performed    CRP- 0.19    MICROBIOLOGY:  2/18/25 wound culture: pending   2/17/25 Blood cultures: pending    IMAGING:  MRI pending    2/17/25 Xr R foot IMPRESSION:  1. Erosions involving the distal 3rd metatarsal and proximal 3rd  proximal phalanx, concerning for osteomyelitis.  2. No acute fracture or dislocation identified.  3. Postoperative and degenerative changes, as described above.      VASCULAR STUDIES:  PVR pending    _______________________________________  Haresh Antonio DPM  Work Cell: [phone]  Office phone: [phone]  February 18, 2025      "

## 2025-02-18 NOTE — CONSULTS
"Nutrition Initial Assessment:   Nutrition Assessment    Reason for Assessment: Admission nursing screening    Patient is a 79 y.o. female presenting with subacute osteomyelitis of right foot. Consulted by MST for wound. Noting R diabetic foot wound. Adding on janene BID for wounds. Pt with wt trending down -68 lbs in 1 year. (-27%) significant wt loss noted. Ensure plus HP 2x daily providing an additional 350 kcals and 20g of protein each.     Nutrition History:  Energy Intake: Good > 75 %, Fair 50-75 %  Food and Nutrient History: Per nursing pt ate 75% of her breakfast today.    Anthropometrics:  Height: 167.6 cm (5' 6\")   Weight: 80.7 kg (178 lb)   BMI (Calculated): 28.74    Weight Change %:  Weight History / % Weight Change: Wt trending down (-27% in 13 months)  Significant Weight Loss: Yes  Interpretation of Weight Loss: >20% in 1 year    Nutrition Focused Physical Exam Findings:  Subcutaneous Fat Loss:   Orbital Fat Pads: Mild-Moderate (slight dark circles and slight hollowing)  Buccal Fat Pads: Mild-Moderate (flat cheeks, minimal bounce)  Muscle Wasting:  Temporalis: Mild-Moderate (slight depression)  Pectoralis (Clavicular Region): Mild-Moderate (some protrusion of clavicle)  Edema:  Edema: +2 mild  Edema Location: BLE  Physical Findings:  Hair: Negative  Eyes: Negative  Nails: Negative  Skin: Positive (Diabetic wound to R foot)    Nutrition Significant Labs:  CBC Trend:   Results from last 7 days   Lab Units 02/18/25  0648 02/17/25  1339   WBC AUTO x10*3/uL 7.0 8.3   RBC AUTO x10*6/uL 4.88 4.97   HEMOGLOBIN g/dL 14.0 14.3   HEMATOCRIT % 43.8 43.3   MCV fL 90 87   PLATELETS AUTO x10*3/uL 301 304    , BMP Trend:   Results from last 7 days   Lab Units 02/18/25  0648 02/17/25  1339   GLUCOSE mg/dL 95 96   CALCIUM mg/dL 9.4 9.6   SODIUM mmol/L 138 140   POTASSIUM mmol/L 4.1 3.7   CO2 mmol/L 29 28   CHLORIDE mmol/L 103 103   BUN mg/dL 11 11   CREATININE mg/dL 0.61 0.64    , A1C:  Lab Results   Component Value Date "    HGBA1C 5.6 12/09/2024     Nutrition Specific Medications:  aspirin, 81 mg, oral, Nightly  cholecalciferol, 4,000 Units, oral, Daily  cyanocobalamin, 1,000 mcg, oral, Daily  dilTIAZem CD, 240 mg, oral, Daily  docusate sodium, 200 mg, oral, Nightly  empagliflozin, 25 mg, oral, Daily  enoxaparin, 40 mg, subcutaneous, q24h  insulin lispro, 0-5 Units, subcutaneous, TID AC  [START ON 2/19/2025] levothyroxine, 150 mcg, oral, Once per day on Sunday Monday Wednesday Friday Saturday  levothyroxine, 300 mcg, oral, Once per day on Tuesday Thursday  losartan, 25 mg, oral, BID  magnesium oxide, 400 mg, oral, Daily  meropenem, 1 g, intravenous, q8h  pantoprazole, 40 mg, oral, BID AC  rosuvastatin, 10 mg, oral, Nightly  spironolactone, 25 mg, oral, Daily  vancomycin, 1,000 mg, intravenous, q12h  vilazodone, 40 mg, oral, Daily with breakfast       Dietary Orders (From admission, onward)       Start     Ordered    02/18/25 1008  Oral nutritional supplements  Until discontinued        Question Answer Comment   Deliver with Breakfast    Deliver with Dinner    Select supplement: Ensure Plus High Protein        02/18/25 1007    02/18/25 1008  Oral nutritional supplements  Until discontinued        Question Answer Comment   Deliver with Lunch    Deliver with Dinner    Select supplement: Luis        02/18/25 1007    02/17/25 1649  May Participate in Room Service  ( ROOM SERVICE MAY PARTICIPATE)  Once        Question:  .  Answer:  Yes    02/17/25 1648    02/17/25 1639  Adult diet Cardiac, Consistent Carb; CCD 75 gm/meal; 70 gm fat; 2 - 3 grams Sodium  Diet effective now        Question Answer Comment   Diet type Cardiac    Diet type Consistent Carb    Carb diet selection: CCD 75 gm/meal    Fat restriction: 70 gm fat    Sodium restriction: 2 - 3 grams Sodium        02/17/25 1638                   Estimated Needs:   Total Energy Estimated Needs in 24 hours (kCal): 2025 kCal  Method for Estimating Needs: 25 kcals/kg BW  Total Protein  Estimated Needs in 24 Hours (g): 81 g  Method for Estimating 24 Hour Protein Needs: 1g/kg BW  Total Fluid Estimated Needs in 24 Hours (mL): 2025 mL  Method for Estimating 24 Hour Fluid Needs: 1 ml/kcal or per MD        Nutrition Diagnosis   Malnutrition Diagnosis  Patient has Malnutrition Diagnosis: Yes  Diagnosis Status: New  Malnutrition Diagnosis: Moderate malnutrition related to chronic disease or condition  As Evidenced by: mild-moderate loss of muscle mass and subcutaneous fat, wt loss of >20% in 1 year    Nutrition Interventions/Recommendations        Nutrition Recommendations:  Individualized Nutrition Prescription Provided for : Individualized nutrition prescription of 2025 kcals and 81g of protein to be provided with diet order. Continue with Cons CHO and Cardiac diet. Luis BID for wounds and ensure plus HP 2x daily providing 350 kcals and20g of protein each.    Nutrition Interventions/Goals:   Interventions: Meals and snacks, Medical food supplement  Meals and Snacks: Fat-modified diet, Mineral-modified diet, Carbohydrate-modified diet  Goal: Consume 3 meals daily  Medical Food Supplement: Commercial beverage medical food supplement therapy  Goal: Luis BID, ensure plus HP 2x daily.      Education Documentation  No documentation found.       Nutrition Monitoring and Evaluation   Food/Nutrient Related History Monitoring  Monitoring and Evaluation Plan: Intake / amount of food, Estimated Energy Intake  Estimated Energy Intake: Energy intake 50 -75% of estimated energy needs  Intake / Amount of food: Consumes at least 50% or more of meals/snacks/supplements  Additional Plans: Ensure plus HP 2x daily, luis 2x for wounds    Anthropometric Measurements  Monitoring and Evaluation Plan: Body weight  Body Weight: Body weight - Maintain stable weight    Biochemical Data, Medical Tests and Procedures  Monitoring and Evaluation Plan: Electrolyte/renal panel, Glucose/endocrine profile  Electrolyte and Renal Panel:  Electrolytes within normal limits  Glucose/Endocrine Profile: Glucose within normal limits ( mg/dL)    Time Spent (min): 45 minutes

## 2025-02-18 NOTE — PROGRESS NOTES
"Diane Montemayor is a 79 y.o. female on day 1 of admission presenting with concern for osteomyelitis     Subjective   No complaints today.     Objective     Last Recorded Vitals  /65   Pulse 73   Temp 36.6 °C (97.9 °F)   Resp 18   Ht 1.676 m (5' 6\")   Wt 80.7 kg (178 lb)   SpO2 95%   BMI 28.73 kg/m²      Intake/Output last 3 Shifts:    Intake/Output Summary (Last 24 hours) at 2/18/2025 1224  Last data filed at 2/17/2025 1927  Gross per 24 hour   Intake 640 ml   Output --   Net 640 ml       Scheduled medications  aspirin, 81 mg, oral, Nightly  cholecalciferol, 4,000 Units, oral, Daily  cyanocobalamin, 1,000 mcg, oral, Daily  dilTIAZem CD, 240 mg, oral, Daily  docusate sodium, 200 mg, oral, Nightly  empagliflozin, 25 mg, oral, Daily  enoxaparin, 40 mg, subcutaneous, q24h  insulin lispro, 0-5 Units, subcutaneous, TID AC  [START ON 2/19/2025] levothyroxine, 150 mcg, oral, Once per day on Sunday Monday Wednesday Friday Saturday  levothyroxine, 300 mcg, oral, Once per day on Tuesday Thursday  losartan, 25 mg, oral, BID  magnesium oxide, 400 mg, oral, Daily  meropenem, 1 g, intravenous, q8h  pantoprazole, 40 mg, oral, BID AC  rosuvastatin, 10 mg, oral, Nightly  spironolactone, 25 mg, oral, Daily  vancomycin, 1,000 mg, intravenous, q12h  vilazodone, 40 mg, oral, Daily with breakfast      Continuous medications     PRN medications  PRN medications: acetaminophen **OR** acetaminophen **OR** acetaminophen, acetaminophen **OR** acetaminophen **OR** acetaminophen, ondansetron **OR** ondansetron, oxyCODONE, polyethylene glycol, vancomycin    Physical Exam   Gen: NAD  HEENT: EOM, MMM  CV: RRR, no murmurs rubs or gallops  Resp: coarse rhonchi b/l   Abdomen: soft, NT,+BS  LE: No edema    Relevant Results  Lab Results   Component Value Date    WBC 7.0 02/18/2025    HGB 14.0 02/18/2025    HCT 43.8 02/18/2025    MCV 90 02/18/2025     02/18/2025     Lab Results   Component Value Date    GLUCOSE 95 02/18/2025    CALCIUM " 9.4 02/18/2025     02/18/2025    K 4.1 02/18/2025    CO2 29 02/18/2025     02/18/2025    BUN 11 02/18/2025    CREATININE 0.61 02/18/2025         Assessment/Plan 79 year old female admitted with diabetic right foot wound of 2nd and third digits with xray concerning for osteo     -will consult podiatry and ID  -MRI and PVR ordered by podiatry   -continue IV abx   -follow blood cultures      Hx of CAD/PAD: good pulses, continue aspirin and statin   -PVR as above      DMII: continue SSI here, hold metformin and jardiance for now, on mounjaro at home     DVT ppx: lovenox          Malnutrition Diagnosis Status: New  Malnutrition Diagnosis: Moderate malnutrition related to chronic disease or condition     As Evidenced by: mild-moderate loss of muscle mass and subcutaneous fat, wt loss of >20% in 1 year  I agree with the dietitian's malnutrition diagnosis.      Paul Segal MD

## 2025-02-18 NOTE — CARE PLAN
The patient's goals for the shift include Comfort    The clinical goals for the shift include safety    Over the shift, the patient did not make progress toward the following goals. Barriers to progression include . Recommendations to address these barriers include .

## 2025-02-18 NOTE — PROGRESS NOTES
02/18/25 1319   Discharge Planning   Living Arrangements Children   Support Systems Children   Assistance Needed PTA - lives in 1 level home with her son, 5 TYRELL. Son works during the day. Patient is independent at baseline.   Type of Residence Private residence   Number of Stairs to Enter Residence 5   Number of Stairs Within Residence 0   Do you have animals or pets at home? Yes     Sent to ER from Podiatry appointment d/t worsening right foot wound, failed outpatient POT Doxy/Bactrim. Concern for Osteomyelitis. Podiatry consulted and ordered for PVR and MRI. Dressings at this time are for daily Betadine wet to dry with surgi shoe. WBAT only when surgi shoe on. TBD if IV abx will be needed at dc vs possible amputation.

## 2025-02-18 NOTE — CARE PLAN
PODIATRIC MEDICINE AND SURGERY   PLAN OF CARE:    INTERVAL HISTORY:  Reviewed PVR's consistent with diminished flow to the digits.     Right Lower PVR: Evidence of mild arterial occlusive disease in the right lower extremity at rest. Decreased digital perfusion noted. Monophasic flow is noted in the right common femoral artery, right posterior tibial artery and right dorsalis pedis artery.  Left Lower PVR: Evidence of mild arterial occlusive disease in the left lower extremity at rest. Decreased digital perfusion noted. Monophasic flow is noted in the left common femoral artery, left posterior tibial artery and left dorsalis pedis artery.     Imaging & Doppler Findings:     RIGHT Lower PVR                Pressures Ratios  Right Posterior Tibial (Ankle) 160 mmHg  1.14  Right Dorsalis Pedis (Ankle)   164 mmHg  1.17  Right Digit (Great Toe)        69 mmHg   0.49     LEFT Lower PVR                Pressures Ratios  Left Posterior Tibial (Ankle) 170 mmHg  1.21  Left Dorsalis Pedis (Ankle)   154 mmHg  1.10  Left Digit (Great Toe)        68 mmHg   0.49    PLAN AND RECOMMENDATIONS:  - Vascular consult placed for further workup of her PAD in the setting of tissue loss and upcoming surgery     Haresh Antonio DPM  Work Cell: [phone]  Office phone: [phone]

## 2025-02-18 NOTE — PROGRESS NOTES
Vancomycin Dosing by Pharmacy- FOLLOW UP    Diane Montemayor is a 79 y.o. year old female who Pharmacy has been consulted for vancomycin dosing for cellulitis, skin and soft tissue. Based on the patient's indication and renal status this patient is being dosed based on a goal AUC of 400-600.     Renal function is currently stable.    Current vancomycin dose: 750 mg given every 12 hours    Estimated vancomycin AUC on current dose: 396 mg/L.hr     Visit Vitals  /59 (BP Location: Right arm, Patient Position: Lying)   Pulse 70   Temp 36.5 °C (97.7 °F) (Temporal)   Resp 18        Lab Results   Component Value Date    CREATININE 0.64 2025    CREATININE 1.17 (H) 2024    CREATININE CANCELED 2023    CREATININE 1.21 (H) 2023    CREATININE CANCELED 2023    CREATININE 1.09 (H) 2023        Patient weight is as follows:   Vitals:    25 1323   Weight: 80.7 kg (178 lb)       Cultures:  No results found for the encounter in last 14 days.       I/O last 3 completed shifts:  In: 540 (6.7 mL/kg) [IV Piggyback:540]  Out: - (0 mL/kg)   Weight: 80.7 kg   I/O during current shift:  I/O this shift:  In: 100 [IV Piggyback:100]  Out: -     Temp (24hrs), Av.4 °C (97.5 °F), Min:36.2 °C (97.2 °F), Max:36.5 °C (97.7 °F)      Assessment/Plan    Below goal AUC. Orders placed for new vancomcyin regimen of 1000 every 12 hours to begin at 25 @1300.     This dosing regimen is predicted by [employer] to result in the following pharmacokinetic parameters:  Regimen: 1000 mg IV every 12 hours.  Start time: 12:39 on 2025  Exposure target: AUC24 (range)400-600 mg/L.hr   [id number]: 477 mg/L.hr  AUC24,ss: 520 mg/L.hr  Probability of AUC24 > 400: 86 %  Ctrough,ss: 17.4 mg/L  Probability of Ctrough,ss > 20: 32 %    The next level will be obtained on 25 at 0500. May be obtained sooner if clinically indicated.   Will continue to monitor renal function daily while on vancomycin and order serum creatinine  at least every 48 hours if not already ordered.  Follow for continued vancomycin needs, clinical response, and signs/symptoms of toxicity.       JEREMY ELLSWORTH, PharmD

## 2025-02-19 ENCOUNTER — APPOINTMENT (OUTPATIENT)
Dept: RADIOLOGY | Facility: HOSPITAL | Age: 80
End: 2025-02-19
Payer: MEDICARE

## 2025-02-19 PROBLEM — I73.9 PERIPHERAL VASCULAR DISEASE, UNSPECIFIED (CMS-HCC): Status: ACTIVE | Noted: 2025-02-17

## 2025-02-19 LAB
ANION GAP SERPL CALC-SCNC: 12 MMOL/L (ref 10–20)
BUN SERPL-MCNC: 12 MG/DL (ref 6–23)
CALCIUM SERPL-MCNC: 9.4 MG/DL (ref 8.6–10.3)
CHLORIDE SERPL-SCNC: 105 MMOL/L (ref 98–107)
CO2 SERPL-SCNC: 28 MMOL/L (ref 21–32)
CREAT SERPL-MCNC: 0.76 MG/DL (ref 0.5–1.05)
EGFRCR SERPLBLD CKD-EPI 2021: 79 ML/MIN/1.73M*2
ERYTHROCYTE [DISTWIDTH] IN BLOOD BY AUTOMATED COUNT: 14.7 % (ref 11.5–14.5)
GLUCOSE BLD MANUAL STRIP-MCNC: 122 MG/DL (ref 74–99)
GLUCOSE BLD MANUAL STRIP-MCNC: 132 MG/DL (ref 74–99)
GLUCOSE BLD MANUAL STRIP-MCNC: 140 MG/DL (ref 74–99)
GLUCOSE BLD MANUAL STRIP-MCNC: 150 MG/DL (ref 74–99)
GLUCOSE SERPL-MCNC: 118 MG/DL (ref 74–99)
HCT VFR BLD AUTO: 41.4 % (ref 36–46)
HGB BLD-MCNC: 13.6 G/DL (ref 12–16)
MAGNESIUM SERPL-MCNC: 1.8 MG/DL (ref 1.6–2.4)
MCH RBC QN AUTO: 29 PG (ref 26–34)
MCHC RBC AUTO-ENTMCNC: 32.9 G/DL (ref 32–36)
MCV RBC AUTO: 88 FL (ref 80–100)
NRBC BLD-RTO: 0 /100 WBCS (ref 0–0)
PLATELET # BLD AUTO: 267 X10*3/UL (ref 150–450)
POTASSIUM SERPL-SCNC: 4 MMOL/L (ref 3.5–5.3)
RBC # BLD AUTO: 4.69 X10*6/UL (ref 4–5.2)
SODIUM SERPL-SCNC: 141 MMOL/L (ref 136–145)
WBC # BLD AUTO: 7 X10*3/UL (ref 4.4–11.3)

## 2025-02-19 PROCEDURE — 36415 COLL VENOUS BLD VENIPUNCTURE: CPT | Performed by: HOSPITALIST

## 2025-02-19 PROCEDURE — 73718 MRI LOWER EXTREMITY W/O DYE: CPT | Mod: RT

## 2025-02-19 PROCEDURE — 2550000001 HC RX 255 CONTRASTS: Performed by: HOSPITALIST

## 2025-02-19 PROCEDURE — 82374 ASSAY BLOOD CARBON DIOXIDE: CPT | Performed by: HOSPITALIST

## 2025-02-19 PROCEDURE — 1210000001 HC SEMI-PRIVATE ROOM DAILY

## 2025-02-19 PROCEDURE — 75635 CT ANGIO ABDOMINAL ARTERIES: CPT | Performed by: RADIOLOGY

## 2025-02-19 PROCEDURE — 2500000001 HC RX 250 WO HCPCS SELF ADMINISTERED DRUGS (ALT 637 FOR MEDICARE OP): Performed by: INTERNAL MEDICINE

## 2025-02-19 PROCEDURE — 73718 MRI LOWER EXTREMITY W/O DYE: CPT | Mod: RIGHT SIDE | Performed by: RADIOLOGY

## 2025-02-19 PROCEDURE — 2500000002 HC RX 250 W HCPCS SELF ADMINISTERED DRUGS (ALT 637 FOR MEDICARE OP, ALT 636 FOR OP/ED): Performed by: INTERNAL MEDICINE

## 2025-02-19 PROCEDURE — 85027 COMPLETE CBC AUTOMATED: CPT | Performed by: HOSPITALIST

## 2025-02-19 PROCEDURE — 2500000004 HC RX 250 GENERAL PHARMACY W/ HCPCS (ALT 636 FOR OP/ED): Performed by: INTERNAL MEDICINE

## 2025-02-19 PROCEDURE — 99221 1ST HOSP IP/OBS SF/LOW 40: CPT | Performed by: NURSE PRACTITIONER

## 2025-02-19 PROCEDURE — 75635 CT ANGIO ABDOMINAL ARTERIES: CPT

## 2025-02-19 PROCEDURE — 82947 ASSAY GLUCOSE BLOOD QUANT: CPT

## 2025-02-19 PROCEDURE — 83735 ASSAY OF MAGNESIUM: CPT | Performed by: HOSPITALIST

## 2025-02-19 PROCEDURE — 99232 SBSQ HOSP IP/OBS MODERATE 35: CPT | Performed by: HOSPITALIST

## 2025-02-19 PROCEDURE — 2500000004 HC RX 250 GENERAL PHARMACY W/ HCPCS (ALT 636 FOR OP/ED): Performed by: HOSPITALIST

## 2025-02-19 PROCEDURE — 99232 SBSQ HOSP IP/OBS MODERATE 35: CPT | Performed by: INTERNAL MEDICINE

## 2025-02-19 RX ORDER — ALPRAZOLAM 0.25 MG/1
0.25 TABLET ORAL 2 TIMES DAILY PRN
Status: DISCONTINUED | OUTPATIENT
Start: 2025-02-19 | End: 2025-02-26 | Stop reason: HOSPADM

## 2025-02-19 RX ORDER — SODIUM CHLORIDE, SODIUM LACTATE, POTASSIUM CHLORIDE, CALCIUM CHLORIDE 600; 310; 30; 20 MG/100ML; MG/100ML; MG/100ML; MG/100ML
50 INJECTION, SOLUTION INTRAVENOUS CONTINUOUS
Status: ACTIVE | OUTPATIENT
Start: 2025-02-20 | End: 2025-02-20

## 2025-02-19 RX ADMIN — SPIRONOLACTONE 25 MG: 25 TABLET ORAL at 10:30

## 2025-02-19 RX ADMIN — VILAZODONE HYDROCHLORIDE 40 MG: 40 TABLET ORAL at 10:29

## 2025-02-19 RX ADMIN — LOSARTAN POTASSIUM 25 MG: 25 TABLET, FILM COATED ORAL at 10:36

## 2025-02-19 RX ADMIN — LEVOTHYROXINE SODIUM 150 MCG: 75 TABLET ORAL at 06:29

## 2025-02-19 RX ADMIN — PANTOPRAZOLE SODIUM 40 MG: 40 TABLET, DELAYED RELEASE ORAL at 06:30

## 2025-02-19 RX ADMIN — MEROPENEM 1 G: 1 INJECTION, POWDER, FOR SOLUTION INTRAVENOUS at 12:30

## 2025-02-19 RX ADMIN — MAGNESIUM OXIDE 400 MG (241.3 MG MAGNESIUM) TABLET 400 MG: TABLET at 10:30

## 2025-02-19 RX ADMIN — Medication 1000 MCG: at 10:29

## 2025-02-19 RX ADMIN — DILTIAZEM HYDROCHLORIDE 240 MG: 240 CAPSULE, EXTENDED RELEASE ORAL at 10:36

## 2025-02-19 RX ADMIN — EMPAGLIFLOZIN 25 MG: 25 TABLET, FILM COATED ORAL at 10:36

## 2025-02-19 RX ADMIN — MEROPENEM 1 G: 1 INJECTION, POWDER, FOR SOLUTION INTRAVENOUS at 20:07

## 2025-02-19 RX ADMIN — ROSUVASTATIN CALCIUM 10 MG: 10 TABLET, FILM COATED ORAL at 20:08

## 2025-02-19 RX ADMIN — LOSARTAN POTASSIUM 25 MG: 25 TABLET, FILM COATED ORAL at 21:10

## 2025-02-19 RX ADMIN — IOHEXOL 100 ML: 350 INJECTION, SOLUTION INTRAVENOUS at 10:57

## 2025-02-19 RX ADMIN — PANTOPRAZOLE SODIUM 40 MG: 40 TABLET, DELAYED RELEASE ORAL at 15:57

## 2025-02-19 RX ADMIN — ASPIRIN 81 MG: 81 TABLET, COATED ORAL at 20:08

## 2025-02-19 RX ADMIN — Medication 4000 UNITS: at 10:30

## 2025-02-19 RX ADMIN — MEROPENEM 1 G: 1 INJECTION, POWDER, FOR SOLUTION INTRAVENOUS at 04:14

## 2025-02-19 RX ADMIN — DOCUSATE SODIUM 200 MG: 100 CAPSULE, LIQUID FILLED ORAL at 20:08

## 2025-02-19 ASSESSMENT — ENCOUNTER SYMPTOMS
CARDIOVASCULAR NEGATIVE: 1
GASTROINTESTINAL NEGATIVE: 1
MUSCULOSKELETAL NEGATIVE: 1
COLOR CHANGE: 0
WOUND: 1
RESPIRATORY NEGATIVE: 1
CONSTITUTIONAL NEGATIVE: 1

## 2025-02-19 ASSESSMENT — PAIN - FUNCTIONAL ASSESSMENT
PAIN_FUNCTIONAL_ASSESSMENT: 0-10
PAIN_FUNCTIONAL_ASSESSMENT: 0-10

## 2025-02-19 ASSESSMENT — PAIN SCALES - GENERAL
PAINLEVEL_OUTOF10: 0 - NO PAIN

## 2025-02-19 NOTE — CARE PLAN
The patient's goals for the shift include comfort and safety    The clinical goals for the shift include safety

## 2025-02-19 NOTE — CARE PLAN
The patient's goals for the shift include comfort and safety    The clinical goals for the shift include safety   Problem: Pain - Adult  Goal: Verbalizes/displays adequate comfort level or baseline comfort level  Outcome: Progressing     Problem: Safety - Adult  Goal: Free from fall injury  Outcome: Progressing     Problem: Discharge Planning  Goal: Discharge to home or other facility with appropriate resources  Outcome: Progressing     Problem: Chronic Conditions and Co-morbidities  Goal: Patient's chronic conditions and co-morbidity symptoms are monitored and maintained or improved  Outcome: Progressing     Problem: Nutrition  Goal: Nutrient intake appropriate for maintaining nutritional needs  Outcome: Progressing     Problem: Fall/Injury  Goal: Not fall by end of shift  Outcome: Progressing  Goal: Be free from injury by end of the shift  Outcome: Progressing  Goal: Verbalize understanding of personal risk factors for fall in the hospital  Outcome: Progressing  Goal: Verbalize understanding of risk factor reduction measures to prevent injury from fall in the home  Outcome: Progressing  Goal: Use assistive devices by end of the shift  Outcome: Progressing  Goal: Pace activities to prevent fatigue by end of the shift  Outcome: Progressing     Problem: Diabetes  Goal: Achieve decreasing blood glucose levels by end of shift  Outcome: Progressing  Goal: Increase stability of blood glucose readings by end of shift  Outcome: Progressing  Goal: Maintain electrolyte levels within acceptable range throughout shift  Outcome: Progressing  Goal: Maintain glucose levels >70mg/dl to <250mg/dl throughout shift  Outcome: Progressing  Goal: No changes in neurological exam by end of shift  Outcome: Progressing  Goal: Learn about and adhere to nutrition recommendations by end of shift  Outcome: Progressing  Goal: Vital signs within normal range for age by end of shift  Outcome: Progressing  Goal: Increase self care and/or family  involovement by end of shift  Outcome: Progressing  Goal: Receive DSME education by end of shift  Outcome: Progressing

## 2025-02-19 NOTE — PROGRESS NOTES
PODIATRIC MEDICINE AND SURGERY   CONSULT PROGRESS NOTE    Interval HPI:  - Patient resting comfortably in bed  - vital signs stable per recorded values  - Labs stable  - PVR performed which show diminished perfusion to the level of the digits with TBI of 0.49 to the Left and Right side. Consult placed to vascular surgery for assessment   - Mri pending       ASSESSMENT:    Patient is a 79 y.o. female with pmh consistent for DM, previous amputation secondary to OM, Hypothyroidism, RA, PAD, PE, CKD, HTN, HF. Patient was admitted for concern of OM. Podiatric consultation was requested for previous concern. Patient is a known patient from my private practice. OM on xray to third and possible second met. Will get MRI and further vascular evaluation. Likely planning R TMA + gastroc recession pending vascular and MRI     PLAN AND RECOMMENDATIONS:  - Patient seen and evaluated   - Reviewed vascular studies which show diminished flow.   - Vascular Surgery consulted for further eval. Appreciate input and recommendations   - MRI of her right foot pending  - ID on board. Appreciate input. Antibiotics per their recommendations.   - Will need podiatric surgery during admission. Pending MRI/ Vascular eval tentatively planning TMA + gastroc recession. Timeline TBD  - R foot dressed with betadine wet to dry dressing. Dressing should remain clean, dry, and intact.   - WB to heel of the RLE in post op shoe.   - Elevate RLE at or above the level of the heart.  - Pain management per primary team.  - Podiatry to continue to follow       Past Medical History:   Diagnosis Date    Body mass index (BMI) 39.0-39.9, adult 02/23/2022    BMI 39.0-39.9,adult    Body mass index (BMI) 39.0-39.9, adult 06/30/2021    BMI 39.0-39.9,adult    Morbid (severe) obesity due to excess calories (Multi) 02/23/2022    Class 2 severe obesity due to excess calories with serious comorbidity and body mass index (BMI) of 39.0 to 39.9 in adult    Morbid (severe)  obesity due to excess calories (Multi) 06/30/2021    Class 2 severe obesity due to excess calories with serious comorbidity and body mass index (BMI) of 37.0 to 37.9 in adult    Other acute sinusitis 12/31/2019    Other acute sinusitis, recurrence not specified    Personal history of other diseases of the musculoskeletal system and connective tissue     History of osteomyelitis    Personal history of other diseases of the respiratory system     History of chronic obstructive lung disease    Personal history of other drug therapy 11/14/2019    History of influenza vaccination    Personal history of other endocrine, nutritional and metabolic disease     History of hyperlipidemia    Personal history of other endocrine, nutritional and metabolic disease     History of hypothyroidism    Personal history of other mental and behavioral disorders     History of major depression    Personal history of other specified conditions 09/04/2019    History of wheezing    Systemic lupus erythematosus, unspecified     Lupus    Type 2 diabetes mellitus with other diabetic neurological complication     Type 2 diabetes mellitus with other neurologic complication, with long-term current use of insulin     Past Surgical History:   Procedure Laterality Date    CT ANGIO AORTA AND BILATERAL ILIOFEMORAL RUN OFF INCLUDING WITHOUT CONTRAST IF PERFORMED  03/17/2021    CT AORTA AND BILATERAL ILIOFEMORAL RUNOFF ANGIOGRAM W AND/OR WO IV CONTRAST 3/17/2021 [facility] CLINICAL LEGAC[facility]    FOOT SURGERY  11/03/2017    Foot Repair    OTHER SURGICAL HISTORY  12/13/2021    Ankle surgery    OTHER SURGICAL HISTORY  12/13/2021    Coronary artery bypass graft    OTHER SURGICAL HISTORY  12/13/2021    Cataract surgery    OTHER SURGICAL HISTORY  12/13/2021    Angioplasty    OTHER SURGICAL HISTORY  12/13/2021    Complete colonoscopy    OTHER SURGICAL HISTORY  12/13/2021    Tubal ligation    TOE AMPUTATION       Allergies   Allergen Reactions    Lisinopril Cough      Family History   Problem Relation Name Age of Onset    Heart failure Mother      Heart attack Mother      Coronary artery disease Mother      Coronary artery disease Father      Heart failure Father      Kidney failure Father       Social History     Socioeconomic History    Marital status:      Spouse name: Not on file    Number of children: Not on file    Years of education: Not on file    Highest education level: Not on file   Occupational History    Not on file   Tobacco Use    Smoking status: Never    Smokeless tobacco: Never   Vaping Use    Vaping status: Never Used   Substance and Sexual Activity    Alcohol use: Not Currently    Drug use: Never    Sexual activity: Not on file   Other Topics Concern    Not on file   Social History Narrative    Not on file     Social Drivers of Health     Financial Resource Strain: Low Risk  (2/17/2025)    Overall Financial Resource Strain (CARDIA)     Difficulty of Paying Living Expenses: Not hard at all   Food Insecurity: No Food Insecurity (2/17/2025)    Hunger Vital Sign     Worried About Running Out of Food in the Last Year: Never true     Ran Out of Food in the Last Year: Never true   Transportation Needs: No Transportation Needs (2/17/2025)    PRAPARE - Transportation     Lack of Transportation (Medical): No     Lack of Transportation (Non-Medical): No   Physical Activity: Insufficiently Active (2/17/2025)    Exercise Vital Sign     Days of Exercise per Week: 3 days     Minutes of Exercise per Session: 20 min   Stress: Stress Concern Present (2/17/2025)    [place] [employer] - Occupational Stress Questionnaire     Feeling of Stress : To some extent   Social Connections: Moderately Isolated (2/17/2025)    Social Connection and Isolation Panel [NHANES]     Frequency of Communication with Friends and Family: More than three times a week     Frequency of Social Gatherings with Friends and Family: More than three times a week     Attends  [employer] Services: More than 4 times per year     Active Member of Clubs or Organizations: No     Attends Club or Organization Meetings: Never     Marital Status:    Intimate Partner Violence: Not At Risk (2/17/2025)    Humiliation, Afraid, Rape, and Kick questionnaire     Fear of Current or Ex-Partner: No     Emotionally Abused: No     Physically Abused: No     Sexually Abused: No   Housing Stability: Low Risk  (2/17/2025)    Housing Stability Vital Sign     Unable to Pay for Housing in the Last Year: No     Number of Times Moved in the Last Year: 0     Homeless in the Last Year: No       Current Facility-Administered Medications:     acetaminophen (Tylenol) tablet 650 mg, 650 mg, oral, q4h PRN **OR** acetaminophen (Tylenol) oral liquid 650 mg, 650 mg, nasogastric tube, q4h PRN **OR** acetaminophen (Tylenol) suppository 650 mg, 650 mg, rectal, q4h PRN, Paul Segal MD    acetaminophen (Tylenol) tablet 650 mg, 650 mg, oral, q4h PRN **OR** acetaminophen (Tylenol) oral liquid 650 mg, 650 mg, oral, q4h PRN **OR** acetaminophen (Tylenol) suppository 650 mg, 650 mg, rectal, q4h PRN, Paul Segal MD    aspirin EC tablet 81 mg, 81 mg, oral, Nightly, Gavin De La Cruz MD, 81 mg at 02/18/25 2055    cholecalciferol (Vitamin D-3) tablet 4,000 Units, 4,000 Units, oral, Daily, Gavin De La Cruz MD, 4,000 Units at 02/18/25 1206    cyanocobalamin (Vitamin B-12) tablet 1,000 mcg, 1,000 mcg, oral, Daily, Gavin De La Cruz MD, 1,000 mcg at 02/18/25 1207    dilTIAZem CD (Cardizem CD) 24 hr capsule 240 mg, 240 mg, oral, Daily, Gavin De La Cruz MD, 240 mg at 02/18/25 1206    docusate sodium (Colace) capsule 200 mg, 200 mg, oral, Nightly, Gavin De La Cruz MD, 200 mg at 02/18/25 2055    empagliflozin (Jardiance) tablet 25 mg, 25 mg, oral, Daily, Gavin De La Cruz MD, 25 mg at 02/18/25 1206    enoxaparin (Lovenox) syringe 40 mg, 40 mg, subcutaneous, q24h, Paul Segal MD, 40 mg at 02/18/25 1826    insulin lispro injection 0-5 Units,  "0-5 Units, subcutaneous, TID AC, Paul Segal MD    [START ON 2/19/2025] levothyroxine (Synthroid, Levoxyl) tablet 150 mcg, 150 mcg, oral, Once per day on Sunday Monday Wednesday Friday Saturday, Gavin De La Cruz MD    levothyroxine (Synthroid, Levoxyl) tablet 300 mcg, 300 mcg, oral, Once per day on Tuesday Thursday, Gavin De La Cruz MD, 300 mcg at 02/18/25 0618    losartan (Cozaar) tablet 25 mg, 25 mg, oral, BID, Gavin De La Cruz MD, 25 mg at 02/18/25 2055    magnesium oxide (Mag-Ox) tablet 400 mg, 400 mg, oral, Daily, Gavin De La Cruz MD, 400 mg at 02/18/25 1226    meropenem (Merrem) 1 g in sodium chloride 0.9%  mL, 1 g, intravenous, q8h, Paul Segal MD, Stopped at 02/18/25 2028    ondansetron (Zofran) tablet 4 mg, 4 mg, oral, q8h PRN **OR** ondansetron (Zofran) injection 4 mg, 4 mg, intravenous, q8h PRN, Paul Segal MD    oxyCODONE (Roxicodone) immediate release tablet 5 mg, 5 mg, oral, q6h PRN, Paul Segal MD    pantoprazole (ProtoNix) EC tablet 40 mg, 40 mg, oral, BID AC, Gavin De La Cruz MD, 40 mg at 02/18/25 1827    polyethylene glycol (Glycolax, Miralax) packet 17 g, 17 g, oral, Daily PRN, Paul Segal MD    rosuvastatin (Crestor) tablet 10 mg, 10 mg, oral, Nightly, Gavin De La Cruz MD, 10 mg at 02/18/25 2055    spironolactone (Aldactone) tablet 25 mg, 25 mg, oral, Daily, Gavin De La Cruz MD, 25 mg at 02/18/25 1224    vilazodone (Viibryd) tablet 40 mg, 40 mg, oral, Daily with breakfast, Gavin De La Cruz MD, 40 mg at 02/18/25 5995     REVIEW OF SYSTEMS:  Negative unless deemed otherwise in interval HPI    OBJECTIVE:  /67   Pulse 71   Temp 36.6 °C (97.9 °F)   Resp 18   Ht 1.676 m (5' 6\")   Wt 80.7 kg (178 lb)   SpO2 95%   BMI 28.73 kg/m²     GENERAL:  - Alert and oriented to person, place, and time  - No acute distress    VASCULAR:  - Dorsalis pedis and posterior tibial pulses are palpable bilaterally  - Skin temperature is warm to war, from the tibial tuberosity to the toes " bilaterally  - mild edema R    NEUROLOGIC:  - Gross sensation intact to touch at the foot bilaterally  - Protective sensation diminished    MUSCULOSKELETAL:  - Structural Deformities: previous hallux amps   - 5/5 muscle strength for dorsiflexion, plantarflexion, abduction, and adduction bilaterally    INTEGUMENTARY:  Wound location: R third digit   Size: 0.3 cm x 0.3 cm x bone cm = 0.09 Total Sq cm  Type: Diabetic and Neuropathic  Depth: Ulceration extends to level of bone   Base: Fibrotic and Necrotic  SOI: Clinical SOI include erythema and warmth improved from prior exam  Probe: Ulcer does probe to bone.  Drainage: None    LABS:   ESR- not performed    CRP- 0.19    MICROBIOLOGY:  2/18/25 wound culture: MSSA  2/17/25 Blood cultures: NGTD    IMAGING:  MRI pending    2/17/25 Xr R foot IMPRESSION:  1. Erosions involving the distal 3rd metatarsal and proximal 3rd  proximal phalanx, concerning for osteomyelitis.  2. No acute fracture or dislocation identified.  3. Postoperative and degenerative changes, as described above.      VASCULAR STUDIES:  PVR 2/18/25 Impression:    Right Lower PVR: Evidence of mild arterial occlusive disease in the right lower extremity at rest. Decreased digital perfusion noted. Monophasic flow is noted in the right common femoral artery, right posterior tibial artery and right dorsalis pedis artery.    Left Lower PVR: Evidence of mild arterial occlusive disease in the left lower extremity at rest. Decreased digital perfusion noted. Monophasic flow is noted in the left common femoral artery, left posterior tibial artery and left dorsalis pedis artery.     Imaging & Doppler Findings:     RIGHT Lower PVR                Pressures Ratios  Right Posterior Tibial (Ankle) 160 mmHg  1.14  Right Dorsalis Pedis (Ankle)   164 mmHg  1.17  Right Digit (Great Toe)        69 mmHg   0.49     LEFT Lower PVR                Pressures Ratios  Left Posterior Tibial (Ankle) 170 mmHg  1.21  Left Dorsalis Pedis  (Ankle)   154 mmHg  1.10  Left Digit (Great Toe)        68 mmHg   0.49    _______________________________________  Haresh Antonio DPM  Work Cell: [phone]  Office phone: [phone]  February 18, 2025

## 2025-02-19 NOTE — CONSULTS
Inpatient consult to Vascular Surgery  Consult performed by: KATTY Gonzalez-CNP  Consult ordered by: Haresh Antonio DPM  Reason for consult: PAD demonstrated on PVR within the setting of tissue loss and upcoming podiatry procedure          Reason For Consult  PAD demonstrated on PVR within the setting of tissue loss and upcoming podiatry procedure    History Of Present Illness  Diane Montemayor is a 79 y.o. female presenting with PMHx of HTN, HLD, HFpEF, DM2 with neuropathy, Hx of OM s/p toe amputation, renal insufficiency, hypothyroidism, RA, and PAD who was referred to ED from wound center due to right foot wound and failure of outpatient treatment. Hemodynamically stable on arrival. CBC, CMP, Lactate, and CRP were unremarkable. XR for foot c/f osteomyelitis of 3rd metatarsal. Pt started on broad spectrum antibiotics and admitted to [facility] for further management. PVR obtained revealing mild arterial occlusive disease bilaterally, monophasic waveforms to B/L CF, PT, and DP arteries; R CATIA 1.17 and TBI 0.49. Vascular surgery asked to evaluated PAD in setting of planned podiatric intervention.     Pt is afebrile and HDS. Maintaining sinus rhythm. Adequate SpO2 on RA. Pain well controlled. Dressing to R foot is CDI. Pt denies pain 2/2 neuropathy. MRI is pending. Podiatry tentatively planning to do R TMA and gastroc recession. Findings reviewed with Dr. Arreola who is recommending peripheral angiogram prior to any surgical intervention. Pt hesitant due to adverse event with previous angiogram but is willing to proceed. Much time was spent explaining rationale for procedure and procedure itself. Pt verbalized understanding and agreement to proceed. Dr. Arreola to perform RLE angiogram tomorrow morning; case request has been submitted. NPO after MN. Maintenance IVF as ordered given hx of renal insufficiency.      Past Medical History  She has a past medical history of Body mass index (BMI) 39.0-39.9, adult (02/23/2022),  Body mass index (BMI) 39.0-39.9, adult (06/30/2021), Morbid (severe) obesity due to excess calories (Multi) (02/23/2022), Morbid (severe) obesity due to excess calories (Multi) (06/30/2021), Other acute sinusitis (12/31/2019), Personal history of other diseases of the musculoskeletal system and connective tissue, Personal history of other diseases of the respiratory system, Personal history of other drug therapy (11/14/2019), Personal history of other endocrine, nutritional and metabolic disease, Personal history of other endocrine, nutritional and metabolic disease, Personal history of other mental and behavioral disorders, Personal history of other specified conditions (09/04/2019), Systemic lupus erythematosus, unspecified, and Type 2 diabetes mellitus with other diabetic neurological complication.    Surgical History  She has a past surgical history that includes Foot surgery (11/03/2017); Other surgical history (12/13/2021); Other surgical history (12/13/2021); Other surgical history (12/13/2021); Other surgical history (12/13/2021); Other surgical history (12/13/2021); Other surgical history (12/13/2021); CT angio aorta and bilateral iliofemoral runoff including without contrast if performed (03/17/2021); and Toe amputation.     Social History  She reports that she has never smoked. She has never used smokeless tobacco. She reports that she does not currently use alcohol. She reports that she does not use drugs.    Family History  Family History   Problem Relation Name Age of Onset    Heart failure Mother      Heart attack Mother      Coronary artery disease Mother      Coronary artery disease Father      Heart failure Father      Kidney failure Father          Allergies  Lisinopril    Review of Systems   Constitutional: Negative.    Respiratory: Negative.     Cardiovascular: Negative.    Gastrointestinal: Negative.    Musculoskeletal: Negative.    Skin:  Positive for pallor and wound. Negative for color  "change and rash.   All other systems reviewed and are negative.       Physical Exam  Vitals and nursing note reviewed.   Constitutional:       General: She is not in acute distress.     Appearance: Normal appearance. She is normal weight.   HENT:      Head: Normocephalic and atraumatic.      Right Ear: External ear normal.      Left Ear: External ear normal.      Nose: Nose normal.      Mouth/Throat:      Mouth: Mucous membranes are moist.      Pharynx: Oropharynx is clear.   Eyes:      Extraocular Movements: Extraocular movements intact.      Pupils: Pupils are equal, round, and reactive to light.   Cardiovascular:      Rate and Rhythm: Normal rate and regular rhythm.      Heart sounds: Normal heart sounds.   Pulmonary:      Effort: Pulmonary effort is normal.      Breath sounds: Normal breath sounds.   Abdominal:      General: Abdomen is flat. Bowel sounds are normal.      Palpations: Abdomen is soft.   Musculoskeletal:         General: Normal range of motion.      Cervical back: Normal range of motion.      Right lower leg: No edema.      Left lower leg: No edema.   Skin:     Comments: Dressing to R foot wound CDI    Neurological:      General: No focal deficit present.      Mental Status: She is alert and oriented to person, place, and time.   Psychiatric:         Mood and Affect: Mood normal.         Behavior: Behavior normal.          Last Recorded Vitals  Blood pressure 134/82, pulse 69, temperature 36.6 °C (97.9 °F), temperature source Temporal, resp. rate 18, height 1.676 m (5' 6\"), weight 80.7 kg (178 lb), SpO2 96%.    Relevant Results  Scheduled medications  aspirin, 81 mg, oral, Nightly  cholecalciferol, 4,000 Units, oral, Daily  cyanocobalamin, 1,000 mcg, oral, Daily  dilTIAZem CD, 240 mg, oral, Daily  docusate sodium, 200 mg, oral, Nightly  empagliflozin, 25 mg, oral, Daily  [Held by provider] enoxaparin, 40 mg, subcutaneous, q24h  insulin lispro, 0-5 Units, subcutaneous, TID AC  levothyroxine, 150 " mcg, oral, Once per day on Sunday Monday Wednesday Friday Saturday  levothyroxine, 300 mcg, oral, Once per day on Tuesday Thursday  losartan, 25 mg, oral, BID  magnesium oxide, 400 mg, oral, Daily  meropenem, 1 g, intravenous, q8h  pantoprazole, 40 mg, oral, BID AC  rosuvastatin, 10 mg, oral, Nightly  spironolactone, 25 mg, oral, Daily  vilazodone, 40 mg, oral, Daily with breakfast      Continuous medications  [START ON 2/20/2025] lactated Ringer's, 50 mL/hr      PRN medications  PRN medications: acetaminophen **OR** acetaminophen **OR** acetaminophen, acetaminophen **OR** acetaminophen **OR** acetaminophen, ondansetron **OR** ondansetron, oxyCODONE, polyethylene glycol    Results for orders placed or performed during the hospital encounter of 02/17/25 (from the past 24 hours)   POCT GLUCOSE   Result Value Ref Range    POCT Glucose 139 (H) 74 - 99 mg/dL   POCT GLUCOSE   Result Value Ref Range    POCT Glucose 135 (H) 74 - 99 mg/dL   POCT GLUCOSE   Result Value Ref Range    POCT Glucose 132 (H) 74 - 99 mg/dL   Basic Metabolic Panel   Result Value Ref Range    Glucose 118 (H) 74 - 99 mg/dL    Sodium 141 136 - 145 mmol/L    Potassium 4.0 3.5 - 5.3 mmol/L    Chloride 105 98 - 107 mmol/L    Bicarbonate 28 21 - 32 mmol/L    Anion Gap 12 10 - 20 mmol/L    Urea Nitrogen 12 6 - 23 mg/dL    Creatinine 0.76 0.50 - 1.05 mg/dL    eGFR 79 >60 mL/min/1.73m*2    Calcium 9.4 8.6 - 10.3 mg/dL   CBC   Result Value Ref Range    WBC 7.0 4.4 - 11.3 x10*3/uL    nRBC 0.0 0.0 - 0.0 /100 WBCs    RBC 4.69 4.00 - 5.20 x10*6/uL    Hemoglobin 13.6 12.0 - 16.0 g/dL    Hematocrit 41.4 36.0 - 46.0 %    MCV 88 80 - 100 fL    MCH 29.0 26.0 - 34.0 pg    MCHC 32.9 32.0 - 36.0 g/dL    RDW 14.7 (H) 11.5 - 14.5 %    Platelets 267 150 - 450 x10*3/uL   Magnesium   Result Value Ref Range    Magnesium 1.80 1.60 - 2.40 mg/dL   POCT GLUCOSE   Result Value Ref Range    POCT Glucose 150 (H) 74 - 99 mg/dL     *Note: Due to a large number of results and/or  encounters for the requested time period, some results have not been displayed. A complete set of results can be found in Results Review.     Vascular US PVR without exercise    Result Date: 2/18/2025  Preliminary Cardiology Report             [place] [address]     Tel [phone] Fax [phone]  Preliminary Vascular Lab Report   VASC US PVR WITHOUT EXERCISE  Patient Name:     FIDE Anguiano Physician:  [id number] Maria L Hinson MD Study Date:       2/18/2025    Ordering Provider:  [id number] KRISS BRENNAN MRN/PID:          [id number]     Fellow: Accession#:       [id number] Technologist:       EARLENE CUEVA RDMS, RVT YOB: 1945    Technologist 2: Gender:           F            Encounter#:         [id number] Admission Status: Inpatient    Location Performed: [facility]  Diagnosis/ICD: Peripheral vascular disease, unspecified-I73.9 CPT Codes:     08113 Peripheral artery PVR (multi segmental pressure  PRELIMINARY CONCLUSIONS:  Right Lower PVR: Evidence of mild arterial occlusive disease in the right lower extremity at rest. Decreased digital perfusion noted. Monophasic flow is noted in the right common femoral artery, right posterior tibial artery and right dorsalis pedis artery. Left Lower PVR: Evidence of mild arterial occlusive disease in the left lower extremity at rest. Decreased digital perfusion noted. Monophasic flow is noted in the left common femoral artery, left posterior tibial artery and left dorsalis pedis artery.  Imaging & Doppler Findings:  RIGHT Lower PVR                Pressures Ratios Right Posterior Tibial (Ankle) 160 mmHg  1.14 Right Dorsalis Pedis (Ankle)   164 mmHg  1.17 Right Digit (Great Toe)        69 mmHg   0.49   LEFT Lower PVR                Pressures Ratios Left Posterior Tibial (Ankle) 170 mmHg  1.21 Left Dorsalis Pedis (Ankle)   154 mmHg  1.10 Left Digit (Great Toe)        68 mmHg   0.49                      Right      Left Brachial Pressure 133 mmHg 140 mmHg   VASCULAR PRELIMINARY REPORT completed by Kaylyn Camacho RDMS on 2/18/2025 at 1:21:53 PM  ** Final **         Assessment/Plan     PAD; Osteomyelitis  Long standing hx of R foot wound. Evaluated by podiatry; tentatively planning for R TMA   PVR revealed evidence of mild arterial disease with monophasic waveforms to bilateral CF, PT, and DP arteries; R CATIA 1.17 and TBI 0.49.  -Discussed with Dr. Arreola  -RLE angiogram tomorrow morning; case request has been submitted  -NPO after MN  -maintenance fluids as ordered given hx of renal insufficiency  -antibiotics per ID  -wound care per podiatry  -Continue ASA and Statin  -Hold prophylactic Lovenox  -thank you for this consult; further recommendations pending results of angiogram    DM2; Neuropathy  Hgb A1C 6.4% (4/2024)  -Jardiance daily  -SSI TID-AC and HS  -strict glycemic control to promote wound healing  -management per primary    Renal insufficiency  2/19: Cr 0.76 with GFR 79  -avoid nephrotoxic agents  -monitor I&O  -follow BMP  -maintenance fluids as ordered       I spent 60 minutes in the professional and overall care of this patient.          Attending Note:  I saw and evaluated the patient. She has long standing diabetes, now with right toe wounds that are nonhealing. She requires a right TMA. I reviewed CATIA study which shows blunted pedal waveforms despite normal CATIA. I reviewed her CTA that shows good inflow, but possible tibial disease. I will perform an angiogram to ensure adequate blood flow for TMA.    Alvino Arreola MD

## 2025-02-20 PROBLEM — M24.573 EQUINUS CONTRACTURE OF ANKLE: Status: ACTIVE | Noted: 2025-02-17

## 2025-02-20 PROBLEM — Z89.419 HISTORY OF AMPUTATION OF HALLUX (MULTI): Status: ACTIVE | Noted: 2025-02-17

## 2025-02-20 LAB
ABO GROUP (TYPE) IN BLOOD: NORMAL
ANION GAP SERPL CALC-SCNC: 10 MMOL/L (ref 10–20)
ANTIBODY SCREEN: NORMAL
BUN SERPL-MCNC: 12 MG/DL (ref 6–23)
CALCIUM SERPL-MCNC: 9.1 MG/DL (ref 8.6–10.3)
CHLORIDE SERPL-SCNC: 104 MMOL/L (ref 98–107)
CO2 SERPL-SCNC: 29 MMOL/L (ref 21–32)
CREAT SERPL-MCNC: 0.75 MG/DL (ref 0.5–1.05)
EGFRCR SERPLBLD CKD-EPI 2021: 81 ML/MIN/1.73M*2
ERYTHROCYTE [DISTWIDTH] IN BLOOD BY AUTOMATED COUNT: 14.7 % (ref 11.5–14.5)
GLUCOSE BLD MANUAL STRIP-MCNC: 123 MG/DL (ref 74–99)
GLUCOSE BLD MANUAL STRIP-MCNC: 140 MG/DL (ref 74–99)
GLUCOSE BLD MANUAL STRIP-MCNC: 142 MG/DL (ref 74–99)
GLUCOSE BLD MANUAL STRIP-MCNC: 150 MG/DL (ref 74–99)
GLUCOSE SERPL-MCNC: 118 MG/DL (ref 74–99)
HCT VFR BLD AUTO: 39.9 % (ref 36–46)
HGB BLD-MCNC: 13.1 G/DL (ref 12–16)
HOLD SPECIMEN: NORMAL
MAGNESIUM SERPL-MCNC: 1.86 MG/DL (ref 1.6–2.4)
MCH RBC QN AUTO: 29 PG (ref 26–34)
MCHC RBC AUTO-ENTMCNC: 32.8 G/DL (ref 32–36)
MCV RBC AUTO: 88 FL (ref 80–100)
NRBC BLD-RTO: 0 /100 WBCS (ref 0–0)
PLATELET # BLD AUTO: 271 X10*3/UL (ref 150–450)
POTASSIUM SERPL-SCNC: 4 MMOL/L (ref 3.5–5.3)
RBC # BLD AUTO: 4.52 X10*6/UL (ref 4–5.2)
RH FACTOR (ANTIGEN D): NORMAL
SODIUM SERPL-SCNC: 139 MMOL/L (ref 136–145)
WBC # BLD AUTO: 6.1 X10*3/UL (ref 4.4–11.3)

## 2025-02-20 PROCEDURE — 2780000003 HC OR 278 NO HCPCS: Performed by: SURGERY

## 2025-02-20 PROCEDURE — 85027 COMPLETE CBC AUTOMATED: CPT | Performed by: HOSPITALIST

## 2025-02-20 PROCEDURE — 7100000002 HC RECOVERY ROOM TIME - EACH INCREMENTAL 1 MINUTE: Performed by: SURGERY

## 2025-02-20 PROCEDURE — 36247 INS CATH ABD/L-EXT ART 3RD: CPT | Mod: RT | Performed by: SURGERY

## 2025-02-20 PROCEDURE — 7100000009 HC PHASE TWO TIME - INITIAL BASE CHARGE: Performed by: SURGERY

## 2025-02-20 PROCEDURE — 36247 INS CATH ABD/L-EXT ART 3RD: CPT | Performed by: SURGERY

## 2025-02-20 PROCEDURE — 2500000001 HC RX 250 WO HCPCS SELF ADMINISTERED DRUGS (ALT 637 FOR MEDICARE OP): Performed by: INTERNAL MEDICINE

## 2025-02-20 PROCEDURE — 99153 MOD SED SAME PHYS/QHP EA: CPT | Performed by: SURGERY

## 2025-02-20 PROCEDURE — C1769 GUIDE WIRE: HCPCS | Performed by: SURGERY

## 2025-02-20 PROCEDURE — B41FYZZ FLUOROSCOPY OF RIGHT LOWER EXTREMITY ARTERIES USING OTHER CONTRAST: ICD-10-PCS | Performed by: SURGERY

## 2025-02-20 PROCEDURE — C1887 CATHETER, GUIDING: HCPCS | Performed by: SURGERY

## 2025-02-20 PROCEDURE — 82947 ASSAY GLUCOSE BLOOD QUANT: CPT

## 2025-02-20 PROCEDURE — G0269 OCCLUSIVE DEVICE IN VEIN ART: HCPCS | Performed by: SURGERY

## 2025-02-20 PROCEDURE — C1760 CLOSURE DEV, VASC: HCPCS | Performed by: SURGERY

## 2025-02-20 PROCEDURE — 75710 ARTERY X-RAYS ARM/LEG: CPT | Performed by: SURGERY

## 2025-02-20 PROCEDURE — 80048 BASIC METABOLIC PNL TOTAL CA: CPT | Performed by: HOSPITALIST

## 2025-02-20 PROCEDURE — 76937 US GUIDE VASCULAR ACCESS: CPT | Performed by: SURGERY

## 2025-02-20 PROCEDURE — C1894 INTRO/SHEATH, NON-LASER: HCPCS | Performed by: SURGERY

## 2025-02-20 PROCEDURE — 1210000001 HC SEMI-PRIVATE ROOM DAILY

## 2025-02-20 PROCEDURE — 99152 MOD SED SAME PHYS/QHP 5/>YRS: CPT | Performed by: SURGERY

## 2025-02-20 PROCEDURE — 86901 BLOOD TYPING SEROLOGIC RH(D): CPT

## 2025-02-20 PROCEDURE — 7100000001 HC RECOVERY ROOM TIME - INITIAL BASE CHARGE: Performed by: SURGERY

## 2025-02-20 PROCEDURE — 99232 SBSQ HOSP IP/OBS MODERATE 35: CPT | Performed by: INTERNAL MEDICINE

## 2025-02-20 PROCEDURE — 99232 SBSQ HOSP IP/OBS MODERATE 35: CPT | Performed by: HOSPITALIST

## 2025-02-20 PROCEDURE — 2720000007 HC OR 272 NO HCPCS: Performed by: SURGERY

## 2025-02-20 PROCEDURE — 2550000001 HC RX 255 CONTRASTS: Performed by: SURGERY

## 2025-02-20 PROCEDURE — 2500000004 HC RX 250 GENERAL PHARMACY W/ HCPCS (ALT 636 FOR OP/ED): Performed by: SURGERY

## 2025-02-20 PROCEDURE — 36415 COLL VENOUS BLD VENIPUNCTURE: CPT

## 2025-02-20 PROCEDURE — 2500000004 HC RX 250 GENERAL PHARMACY W/ HCPCS (ALT 636 FOR OP/ED): Performed by: HOSPITALIST

## 2025-02-20 PROCEDURE — 2500000004 HC RX 250 GENERAL PHARMACY W/ HCPCS (ALT 636 FOR OP/ED): Performed by: INTERNAL MEDICINE

## 2025-02-20 PROCEDURE — 2500000004 HC RX 250 GENERAL PHARMACY W/ HCPCS (ALT 636 FOR OP/ED): Performed by: NURSE PRACTITIONER

## 2025-02-20 PROCEDURE — 7100000010 HC PHASE TWO TIME - EACH INCREMENTAL 1 MINUTE: Performed by: SURGERY

## 2025-02-20 PROCEDURE — 2500000002 HC RX 250 W HCPCS SELF ADMINISTERED DRUGS (ALT 637 FOR MEDICARE OP, ALT 636 FOR OP/ED): Performed by: INTERNAL MEDICINE

## 2025-02-20 PROCEDURE — 83735 ASSAY OF MAGNESIUM: CPT | Performed by: HOSPITALIST

## 2025-02-20 PROCEDURE — 36415 COLL VENOUS BLD VENIPUNCTURE: CPT | Performed by: HOSPITALIST

## 2025-02-20 RX ORDER — FENTANYL CITRATE 50 UG/ML
INJECTION, SOLUTION INTRAMUSCULAR; INTRAVENOUS AS NEEDED
Status: DISCONTINUED | OUTPATIENT
Start: 2025-02-20 | End: 2025-02-20 | Stop reason: HOSPADM

## 2025-02-20 RX ORDER — IODIXANOL 320 MG/ML
INJECTION, SOLUTION INTRAVASCULAR AS NEEDED
Status: DISCONTINUED | OUTPATIENT
Start: 2025-02-20 | End: 2025-02-20 | Stop reason: HOSPADM

## 2025-02-20 RX ORDER — LIDOCAINE HYDROCHLORIDE 20 MG/ML
INJECTION, SOLUTION INFILTRATION; PERINEURAL AS NEEDED
Status: DISCONTINUED | OUTPATIENT
Start: 2025-02-20 | End: 2025-02-20 | Stop reason: HOSPADM

## 2025-02-20 RX ORDER — MIDAZOLAM HYDROCHLORIDE 1 MG/ML
INJECTION INTRAMUSCULAR; INTRAVENOUS AS NEEDED
Status: DISCONTINUED | OUTPATIENT
Start: 2025-02-20 | End: 2025-02-20 | Stop reason: HOSPADM

## 2025-02-20 RX ADMIN — SPIRONOLACTONE 25 MG: 25 TABLET ORAL at 10:55

## 2025-02-20 RX ADMIN — MAGNESIUM OXIDE 400 MG (241.3 MG MAGNESIUM) TABLET 400 MG: TABLET at 10:57

## 2025-02-20 RX ADMIN — LOSARTAN POTASSIUM 25 MG: 25 TABLET, FILM COATED ORAL at 10:55

## 2025-02-20 RX ADMIN — VILAZODONE HYDROCHLORIDE 40 MG: 40 TABLET ORAL at 10:57

## 2025-02-20 RX ADMIN — Medication 4000 UNITS: at 10:55

## 2025-02-20 RX ADMIN — DILTIAZEM HYDROCHLORIDE 240 MG: 240 CAPSULE, EXTENDED RELEASE ORAL at 10:57

## 2025-02-20 RX ADMIN — LEVOTHYROXINE SODIUM 300 MCG: 0.1 TABLET ORAL at 06:04

## 2025-02-20 RX ADMIN — EMPAGLIFLOZIN 25 MG: 25 TABLET, FILM COATED ORAL at 10:55

## 2025-02-20 RX ADMIN — MEROPENEM 1 G: 1 INJECTION, POWDER, FOR SOLUTION INTRAVENOUS at 18:55

## 2025-02-20 RX ADMIN — SODIUM CHLORIDE, POTASSIUM CHLORIDE, SODIUM LACTATE AND CALCIUM CHLORIDE 50 ML/HR: 600; 310; 30; 20 INJECTION, SOLUTION INTRAVENOUS at 05:02

## 2025-02-20 RX ADMIN — MEROPENEM 1 G: 1 INJECTION, POWDER, FOR SOLUTION INTRAVENOUS at 04:28

## 2025-02-20 RX ADMIN — ASPIRIN 81 MG: 81 TABLET, COATED ORAL at 21:01

## 2025-02-20 RX ADMIN — ROSUVASTATIN CALCIUM 10 MG: 10 TABLET, FILM COATED ORAL at 21:01

## 2025-02-20 RX ADMIN — MEROPENEM 1 G: 1 INJECTION, POWDER, FOR SOLUTION INTRAVENOUS at 10:58

## 2025-02-20 RX ADMIN — PANTOPRAZOLE SODIUM 40 MG: 40 TABLET, DELAYED RELEASE ORAL at 06:04

## 2025-02-20 RX ADMIN — PANTOPRAZOLE SODIUM 40 MG: 40 TABLET, DELAYED RELEASE ORAL at 16:13

## 2025-02-20 RX ADMIN — Medication 1000 MCG: at 10:55

## 2025-02-20 RX ADMIN — ENOXAPARIN SODIUM 40 MG: 40 INJECTION SUBCUTANEOUS at 16:13

## 2025-02-20 RX ADMIN — DOCUSATE SODIUM 200 MG: 100 CAPSULE, LIQUID FILLED ORAL at 21:01

## 2025-02-20 RX ADMIN — LOSARTAN POTASSIUM 25 MG: 25 TABLET, FILM COATED ORAL at 21:01

## 2025-02-20 ASSESSMENT — COGNITIVE AND FUNCTIONAL STATUS - GENERAL
MOBILITY SCORE: 23
MOBILITY SCORE: 23
CLIMB 3 TO 5 STEPS WITH RAILING: A LITTLE
CLIMB 3 TO 5 STEPS WITH RAILING: A LITTLE
DAILY ACTIVITIY SCORE: 24

## 2025-02-20 ASSESSMENT — PAIN - FUNCTIONAL ASSESSMENT
PAIN_FUNCTIONAL_ASSESSMENT: 0-10

## 2025-02-20 ASSESSMENT — PAIN SCALES - GENERAL
PAINLEVEL_OUTOF10: 0 - NO PAIN

## 2025-02-20 NOTE — PROGRESS NOTES
Infectious Disease Progress Note       2/19/2025     79-year-old female with diabetes mellitus type 2 and peripheral arterial disease presented from podiatry clinic with right foot wound.  Concern for osteomyelitis second and third right foot digit.  MRI 2/18/2025 pending results  X-ray 2/17/2025 with erosions involving the distal third metatarsal and proximal third phalanx concerning for osteomyelitis  PVR pending final results but prelim is showing evidence of mild arterial occlusive disease right lower extremity and mild arterial occlusive disease in the left lower extremity.  Blood cultures x 2 remain normal drawn 2/17/2025.     Wound images reviewed third toe base right foot.  Patient is missing great toe on the right foot        Lab Results   Component Value Date    WBC 7.0 02/19/2025    HGB 13.6 02/19/2025    HCT 41.4 02/19/2025    MCV 88 02/19/2025     02/19/2025     Lab Results   Component Value Date    GLUCOSE 118 (H) 02/19/2025    CALCIUM 9.4 02/19/2025     02/19/2025    K 4.0 02/19/2025    CO2 28 02/19/2025     02/19/2025    BUN 12 02/19/2025    CREATININE 0.76 02/19/2025       WBC trends are being monitored. Antibiotic doses are being adjusted per most recent renal labs.     Vitals:    02/19/25 1100   BP: 134/82   Pulse: 69   Resp:    Temp: 36.6 °C (97.9 °F)   SpO2: 96%     Patient is awake and alert, sitting in a chair  NAD  Neck supple  Heart S1S2  Chest: Equal expansion, bilaterally clear to auscultation  Abdomen: soft, ND, NTTP, no guarding  Extrem: Foot is wrapped in dressing.  Photos reviewed  Skin: no rashes, no diaphoresis  Neuro: CNS intact  Affect appropriate and patient is interactive        Patient Active Problem List   Diagnosis    Constipation    Class 3 severe obesity due to excess calories with serious comorbidity and body mass index (BMI) of 40.0 to 44.9 in adult    Hypothyroidism    BMI 40.0-44.9, adult (Multi)    Type 2 diabetes mellitus    Morbidly obese (Multi)     Depression, unspecified    B12 deficiency    Dizziness    Fatigue    Hypercholesteremia    Hyperlipidemia    Low back pain    Malaise and fatigue    Medicare annual wellness visit, subsequent    Yeast infection    Rheumatoid arthritis involving both shoulders, unspecified whether rheumatoid factor present (Multi)    Acquired absence of left great toe (Multi)    Atherosclerosis of native artery of both lower extremities with rest pain (Multi)    Other acute osteomyelitis, unspecified site    Benign carcinoid tumors of other sites    Pulmonary emphysema, unspecified emphysema type (Multi)    Moderate episode of recurrent major depressive disorder    Type 2 diabetes mellitus with diabetic peripheral angiopathy without gangrene, without long-term current use of insulin (Multi)    Stage 3a chronic kidney disease (Multi)    Coronary artery disease involving native coronary artery of native heart without angina pectoris    Hypertension    BMI 33.0-33.9,adult    Never smoked cigarettes    Wound of right foot    Class 1 obesity due to excess calories without serious comorbidity with body mass index (BMI) of 33.0 to 33.9 in adult    Chronic combined systolic (congestive) and diastolic (congestive) heart failure    Ventricular tachycardia (Multi)    Subacute osteomyelitis of right foot (Multi)    Peripheral vascular disease, unspecified (CMS-HCC)       Assessment:   Concern for osteomyelitis right foot  Peripheral arterial disease demonstrated on PVR, angio planned with possible right TMA   Diabetes mellitus type 2     Plan:   Vascular workup in progress  Optimal glycemic control  MRI pending results  Seems to be tolerating meropenem.  If no evidence of Pseudomonas, may consider Invanz on discharge if patient still needs antibiotics.  Extent of infection to be determined by surgical intervention.  Would appreciate cultures from surgical margin to help direct therapy.     Of note, she has retained screws/plate and scars  bilateral LE  proximal to the foot.       Imaging and labs were reviewed per medical records and any ID pertinent labs were also addressed  Time spent before, during and after care today, including coordination of care >40 min      Olive Nicole DO

## 2025-02-20 NOTE — PROGRESS NOTES
"Diane Montemayor is a 79 y.o. female on day 3 of admission presenting with concern for osteomyelitis     Subjective   No complaints today.     Objective     Last Recorded Vitals  /70   Pulse 68   Temp 36.7 °C (98.1 °F)   Resp 18   Ht 1.676 m (5' 6\")   Wt 80.7 kg (178 lb)   SpO2 95%   BMI 28.73 kg/m²      Intake/Output last 3 Shifts:    Intake/Output Summary (Last 24 hours) at 2/20/2025 1152  Last data filed at 2/20/2025 0841  Gross per 24 hour   Intake 100 ml   Output 0 ml   Net 100 ml       Scheduled medications  aspirin, 81 mg, oral, Nightly  cholecalciferol, 4,000 Units, oral, Daily  cyanocobalamin, 1,000 mcg, oral, Daily  dilTIAZem CD, 240 mg, oral, Daily  docusate sodium, 200 mg, oral, Nightly  empagliflozin, 25 mg, oral, Daily  [Held by provider] enoxaparin, 40 mg, subcutaneous, q24h  insulin lispro, 0-5 Units, subcutaneous, TID AC  levothyroxine, 150 mcg, oral, Once per day on Sunday Monday Wednesday Friday Saturday  levothyroxine, 300 mcg, oral, Once per day on Tuesday Thursday  losartan, 25 mg, oral, BID  magnesium oxide, 400 mg, oral, Daily  meropenem, 1 g, intravenous, q8h  pantoprazole, 40 mg, oral, BID AC  rosuvastatin, 10 mg, oral, Nightly  spironolactone, 25 mg, oral, Daily  vilazodone, 40 mg, oral, Daily with breakfast      Continuous medications  lactated Ringer's, 50 mL/hr, Last Rate: 50 mL/hr (02/20/25 0502)      PRN medications  PRN medications: acetaminophen **OR** acetaminophen **OR** acetaminophen, acetaminophen **OR** acetaminophen **OR** acetaminophen, ALPRAZolam, ondansetron **OR** ondansetron, oxyCODONE, polyethylene glycol    Physical Exam   Gen: NAD  HEENT: EOM, MMM  CV: RRR, no murmurs rubs or gallops  Resp: coarse rhonchi b/l   Abdomen: soft, NT,+BS  LE: No edema    Relevant Results  Lab Results   Component Value Date    WBC 6.1 02/20/2025    HGB 13.1 02/20/2025    HCT 39.9 02/20/2025    MCV 88 02/20/2025     02/20/2025     Lab Results   Component Value Date    GLUCOSE " 118 (H) 02/20/2025    CALCIUM 9.1 02/20/2025     02/20/2025    K 4.0 02/20/2025    CO2 29 02/20/2025     02/20/2025    BUN 12 02/20/2025    CREATININE 0.75 02/20/2025         Assessment/Plan 79 year old female admitted with diabetic right foot wound of 2nd and third digits with xray concerning for osteo     -will consult podiatry and ID  -MRI consistent with osteo   -continue IV abx   -follow blood cultures   -OR planning per podiatry      Hx of CAD/PAD: good pulses, continue aspirin and statin   -PVRs and angio done and showed patent blood flow  -seen by vascular     DMII: continue SSI here, hold metformin and jardiance for now, on mounjaro at home     DVT ppx: reed Segal MD

## 2025-02-20 NOTE — PROGRESS NOTES
PODIATRIC MEDICINE AND SURGERY   CONSULT PROGRESS NOTE    Interval HPI:  - Patient resting comfortably in bed  - vital signs stable per recorded values  - Labs stable  - angio performed which shows one vessel flow through peroneal. Vascular noted enough flow to heal TMA   - MRI shows OM to the mets 2 + 3. Third met shows presence the length of metatarsal. Surrounding fluid collection to the third mpj area       ASSESSMENT:    Patient is a 79 y.o. female with pmh consistent for DM, previous amputation secondary to OM, Hypothyroidism, RA, PAD, PE, CKD, HTN, HF. Patient was admitted for concern of OM. Podiatric consultation was requested for previous concern. Patient is a known patient from my private practice. MRI positive to 2/3 mets. One vessel flow but vascular feels should be enough to heal. Surgery tomorrow.     PLAN AND RECOMMENDATIONS:  - Patient seen and evaluated   - Plan for TMA + GR + use of impregnated abx cement for right foot. Risks, benefits, and potential complications of surgery discussed with patient. Consent to be obtained. Case request in. Case tentatively for around 5:00 pm. Please optimize patient for surgery. NPO order in.   - ID on board. Appreciate input. Antibiotics per their recommendations.   - R foot dressed with betadine wet to dry dressing. Dressing should remain clean, dry, and intact.   - WB to heel of the RLE in post op shoe.   - Elevate RLE at or above the level of the heart.  - Pain management per primary team.  - Podiatry to continue to follow       Past Medical History:   Diagnosis Date    Body mass index (BMI) 39.0-39.9, adult 02/23/2022    BMI 39.0-39.9,adult    Body mass index (BMI) 39.0-39.9, adult 06/30/2021    BMI 39.0-39.9,adult    Morbid (severe) obesity due to excess calories (Multi) 02/23/2022    Class 2 severe obesity due to excess calories with serious comorbidity and body mass index (BMI) of 39.0 to 39.9 in adult    Morbid (severe) obesity due to excess calories  (Multi) 06/30/2021    Class 2 severe obesity due to excess calories with serious comorbidity and body mass index (BMI) of 37.0 to 37.9 in adult    Other acute sinusitis 12/31/2019    Other acute sinusitis, recurrence not specified    Personal history of other diseases of the musculoskeletal system and connective tissue     History of osteomyelitis    Personal history of other diseases of the respiratory system     History of chronic obstructive lung disease    Personal history of other drug therapy 11/14/2019    History of influenza vaccination    Personal history of other endocrine, nutritional and metabolic disease     History of hyperlipidemia    Personal history of other endocrine, nutritional and metabolic disease     History of hypothyroidism    Personal history of other mental and behavioral disorders     History of major depression    Personal history of other specified conditions 09/04/2019    History of wheezing    Systemic lupus erythematosus, unspecified     Lupus    Type 2 diabetes mellitus with other diabetic neurological complication     Type 2 diabetes mellitus with other neurologic complication, with long-term current use of insulin     Past Surgical History:   Procedure Laterality Date    CT ANGIO AORTA AND BILATERAL ILIOFEMORAL RUN OFF INCLUDING WITHOUT CONTRAST IF PERFORMED  03/17/2021    CT AORTA AND BILATERAL ILIOFEMORAL RUNOFF ANGIOGRAM W AND/OR WO IV CONTRAST 3/17/2021 [facility] CLINICAL LEGACY    FOOT SURGERY  11/03/2017    Foot Repair    OTHER SURGICAL HISTORY  12/13/2021    Ankle surgery    OTHER SURGICAL HISTORY  12/13/2021    Coronary artery bypass graft    OTHER SURGICAL HISTORY  12/13/2021    Cataract surgery    OTHER SURGICAL HISTORY  12/13/2021    Angioplasty    OTHER SURGICAL HISTORY  12/13/2021    Complete colonoscopy    OTHER SURGICAL HISTORY  12/13/2021    Tubal ligation    TOE AMPUTATION       Allergies   Allergen Reactions    Lisinopril Cough     Family History   Problem Relation  Name Age of Onset    Heart failure Mother      Heart attack Mother      Coronary artery disease Mother      Coronary artery disease Father      Heart failure Father      Kidney failure Father       Social History     Socioeconomic History    Marital status:      Spouse name: Not on file    Number of children: Not on file    Years of education: Not on file    Highest education level: Not on file   Occupational History    Not on file   Tobacco Use    Smoking status: Never    Smokeless tobacco: Never   Vaping Use    Vaping status: Never Used   Substance and Sexual Activity    Alcohol use: Not Currently    Drug use: Never    Sexual activity: Not on file   Other Topics Concern    Not on file   Social History Narrative    Not on file     Social Drivers of Health     Financial Resource Strain: Low Risk  (2/17/2025)    Overall Financial Resource Strain (CARDIA)     Difficulty of Paying Living Expenses: Not hard at all   Food Insecurity: No Food Insecurity (2/17/2025)    Hunger Vital Sign     Worried About Running Out of Food in the Last Year: Never true     Ran Out of Food in the Last Year: Never true   Transportation Needs: No Transportation Needs (2/17/2025)    PRAPARE - Transportation     Lack of Transportation (Medical): No     Lack of Transportation (Non-Medical): No   Physical Activity: Insufficiently Active (2/17/2025)    Exercise Vital Sign     Days of Exercise per Week: 3 days     Minutes of Exercise per Session: 20 min   Stress: Stress Concern Present (2/17/2025)    [employer] [employer] - Occupational Stress Questionnaire     Feeling of Stress : To some extent   Social Connections: Moderately Isolated (2/17/2025)    Social Connection and Isolation Panel [NHANES]     Frequency of Communication with Friends and Family: More than three times a week     Frequency of Social Gatherings with Friends and Family: More than three times a week     Attends Hindu Services: More than 4 times per  year     Active Member of Clubs or Organizations: No     Attends Club or Organization Meetings: Never     Marital Status:    Intimate Partner Violence: Not At Risk (2/17/2025)    Humiliation, Afraid, Rape, and Kick questionnaire     Fear of Current or Ex-Partner: No     Emotionally Abused: No     Physically Abused: No     Sexually Abused: No   Housing Stability: Low Risk  (2/17/2025)    Housing Stability Vital Sign     Unable to Pay for Housing in the Last Year: No     Number of Times Moved in the Last Year: 0     Homeless in the Last Year: No       Current Facility-Administered Medications:     acetaminophen (Tylenol) tablet 650 mg, 650 mg, oral, q4h PRN **OR** acetaminophen (Tylenol) oral liquid 650 mg, 650 mg, nasogastric tube, q4h PRN **OR** acetaminophen (Tylenol) suppository 650 mg, 650 mg, rectal, q4h PRN, Paul Segal MD    acetaminophen (Tylenol) tablet 650 mg, 650 mg, oral, q4h PRN **OR** acetaminophen (Tylenol) oral liquid 650 mg, 650 mg, oral, q4h PRN **OR** acetaminophen (Tylenol) suppository 650 mg, 650 mg, rectal, q4h PRN, Paul Segal MD    ALPRAZolam (Xanax) tablet 0.25 mg, 0.25 mg, oral, BID PRN, Pratima Walton, APRN-CNP    aspirin EC tablet 81 mg, 81 mg, oral, Nightly, Gavin De La Cruz MD, 81 mg at 02/19/25 2008    cholecalciferol (Vitamin D-3) tablet 4,000 Units, 4,000 Units, oral, Daily, Gavin De La Cruz MD, 4,000 Units at 02/20/25 1055    cyanocobalamin (Vitamin B-12) tablet 1,000 mcg, 1,000 mcg, oral, Daily, Gavin De La Cruz MD, 1,000 mcg at 02/20/25 1055    dilTIAZem CD (Cardizem CD) 24 hr capsule 240 mg, 240 mg, oral, Daily, Gavin De La Cruz MD, 240 mg at 02/20/25 1057    docusate sodium (Colace) capsule 200 mg, 200 mg, oral, Nightly, Gavin De La Cruz MD, 200 mg at 02/19/25 2008    empagliflozin (Jardiance) tablet 25 mg, 25 mg, oral, Daily, Gavin De La Cruz MD, 25 mg at 02/20/25 1055    enoxaparin (Lovenox) syringe 40 mg, 40 mg, subcutaneous, q24h, Paul Segal MD, 40 mg at  "02/20/25 1613    insulin lispro injection 0-5 Units, 0-5 Units, subcutaneous, TID , Paul Segal MD    levothyroxine (Synthroid, Levoxyl) tablet 150 mcg, 150 mcg, oral, Once per day on Sunday Monday Wednesday Friday Saturday, Gavin De La Cruz MD, 150 mcg at 02/19/25 0629    levothyroxine (Synthroid, Levoxyl) tablet 300 mcg, 300 mcg, oral, Once per day on Tuesday Thursday, Gavin De La Cruz MD, 300 mcg at 02/20/25 0604    losartan (Cozaar) tablet 25 mg, 25 mg, oral, BID, Gavin De La Cruz MD, 25 mg at 02/20/25 1055    magnesium oxide (Mag-Ox) tablet 400 mg, 400 mg, oral, Daily, Gavin De La Cruz MD, 400 mg at 02/20/25 1057    meropenem (Merrem) 1 g in sodium chloride 0.9%  mL, 1 g, intravenous, q8h, Paul Segal MD, Stopped at 02/20/25 1159    ondansetron (Zofran) tablet 4 mg, 4 mg, oral, q8h PRN **OR** ondansetron (Zofran) injection 4 mg, 4 mg, intravenous, q8h PRN, Paul Segal MD    oxyCODONE (Roxicodone) immediate release tablet 5 mg, 5 mg, oral, q6h PRN, Paul Segal MD    pantoprazole (ProtoNix) EC tablet 40 mg, 40 mg, oral, BID AC, Gavin De La Cruz MD, 40 mg at 02/20/25 1613    polyethylene glycol (Glycolax, Miralax) packet 17 g, 17 g, oral, Daily PRN, Paul Segal MD    rosuvastatin (Crestor) tablet 10 mg, 10 mg, oral, Nightly, Gavin De La Cruz MD, 10 mg at 02/19/25 2008    spironolactone (Aldactone) tablet 25 mg, 25 mg, oral, Daily, Gavin De La Cruz MD, 25 mg at 02/20/25 1055    vilazodone (Viibryd) tablet 40 mg, 40 mg, oral, Daily with breakfast, Gavin De La Cruz MD, 40 mg at 02/20/25 0918     REVIEW OF SYSTEMS:  Negative unless deemed otherwise in interval HPI    OBJECTIVE:  /61 (Patient Position: Sitting)   Pulse 72   Temp 36.9 °C (98.4 °F)   Resp 18   Ht 1.676 m (5' 6\")   Wt 80.7 kg (178 lb)   SpO2 95%   BMI 28.73 kg/m²     GENERAL:  - Alert and oriented to person, place, and time  - No acute distress    VASCULAR:  - Dorsalis pedis and posterior tibial pulses are palpable " bilaterally  - Skin temperature is warm to war, from the tibial tuberosity to the toes bilaterally  - mild edema R    NEUROLOGIC:  - Gross sensation intact to touch at the foot bilaterally  - Protective sensation diminished    MUSCULOSKELETAL:  - Structural Deformities: previous hallux amps   - 5/5 muscle strength for dorsiflexion, plantarflexion, abduction, and adduction bilaterally    INTEGUMENTARY:  Wound location: R third digit   Size: 0.3 cm x 0.3 cm x bone cm = 0.09 Total Sq cm  Type: Diabetic and Neuropathic  Depth: Ulceration extends to level of bone   Base: Fibrotic and Necrotic  SOI: Clinical SOI include erythema and warmth improved from prior exam  Probe: Ulcer does probe to bone.  Drainage: None    LABS:   ESR- not performed    CRP- 0.19    MICROBIOLOGY:  25 wound culture: MSSA  25 Blood cultures: NGTD    IMAGIN25 MRI IMPRESSION:  1. Extensive osteomyelitis in the 3rd ray involving the entire 3rd  metatarsal, the 3rd proximal phalanx and the 3rd middle phalanx. This  includes osseous destructive changes and abnormal marrow signal  2. Septic arthritis of the 3rd MTP joint with complex fluid and  dislocation of the joint.  3. Associated cellulitis and phlegmonous changes in the 3rd ray. No  discrete abscess seen.  4. Deformity and collapse of the 2nd metatarsal head suggestive of  underlying avascular necrosis.  5. Postsurgical changes in the forefoot.      25 Xr R foot IMPRESSION:  1. Erosions involving the distal 3rd metatarsal and proximal 3rd  proximal phalanx, concerning for osteomyelitis.  2. No acute fracture or dislocation identified.  3. Postoperative and degenerative changes, as described above.      VASCULAR STUDIES:  PVR 25 Impression:    Right Lower PVR: Evidence of mild arterial occlusive disease in the right lower extremity at rest. Decreased digital perfusion noted. Monophasic flow is noted in the right common femoral artery, right posterior tibial artery and  right dorsalis pedis artery.    Left Lower PVR: Evidence of mild arterial occlusive disease in the left lower extremity at rest. Decreased digital perfusion noted. Monophasic flow is noted in the left common femoral artery, left posterior tibial artery and left dorsalis pedis artery.     Imaging & Doppler Findings:     RIGHT Lower PVR                Pressures Ratios  Right Posterior Tibial (Ankle) 160 mmHg  1.14  Right Dorsalis Pedis (Ankle)   164 mmHg  1.17  Right Digit (Great Toe)        69 mmHg   0.49     LEFT Lower PVR                Pressures Ratios  Left Posterior Tibial (Ankle) 170 mmHg  1.21  Left Dorsalis Pedis (Ankle)   154 mmHg  1.10  Left Digit (Great Toe)        68 mmHg   0.49    _______________________________________  Haresh Antonio DPM  Work Cell: [phone]  Office phone: [phone]  February 20, 2025

## 2025-02-20 NOTE — NURSING NOTE
Patient transferred back to [facility] via bed with transport.  Left groin soft, stable, good color and warmth to extremity, bilateral pedals +1.

## 2025-02-20 NOTE — CARE PLAN
The patient's goals for the shift include comfort and safety    The clinical goals for the shift include patient will remain safe and free from fall/injury through out shift      Problem: Safety - Adult  Goal: Free from fall injury  Outcome: Progressing     Problem: Fall/Injury  Goal: Not fall by end of shift  Outcome: Progressing     Problem: Fall/Injury  Goal: Be free from injury by end of the shift  Outcome: Progressing     Problem: Fall/Injury  Goal: Verbalize understanding of personal risk factors for fall in the hospital  Outcome: Progressing     Problem: Fall/Injury  Goal: Verbalize understanding of risk factor reduction measures to prevent injury from fall in the home  Outcome: Progressing     Over the shift, the patient did not make progress toward the following goals. Barriers to progression include ***. Recommendations to address these barriers include ***.

## 2025-02-20 NOTE — CARE PLAN
The patient's goals for the shift include comfort and safety    The clinical goals for the shift include patient will remain safe and free from fall/injury through out shift      Problem: Safety - Adult  Goal: Free from fall injury  2/20/2025 1844 by Criss Dwyer LPN  Outcome: Progressing  2/20/2025 1245 by Criss Dwyer LPN  Outcome: Progressing     Problem: Fall/Injury  Goal: Not fall by end of shift  2/20/2025 1844 by Criss Dwyer LPN  Outcome: Progressing  2/20/2025 1245 by Criss Dwyer LPN  Outcome: Progressing     Problem: Fall/Injury  Goal: Be free from injury by end of the shift  2/20/2025 1844 by Criss Dwyer LPN  Outcome: Progressing  2/20/2025 1245 by Criss Dwyer LPN  Outcome: Progressing     Problem: Fall/Injury  Goal: Verbalize understanding of personal risk factors for fall in the hospital  2/20/2025 1844 by Criss Dwyer LPN  Outcome: Progressing  2/20/2025 1245 by Criss Dwyer LPN  Outcome: Progressing     Problem: Fall/Injury  Goal: Verbalize understanding of risk factor reduction measures to prevent injury from fall in the home  2/20/2025 1844 by Criss Dwyer LPN  Outcome: Progressing  2/20/2025 1245 by Criss Dwyer LPN  Outcome: Progressing     Problem: Fall/Injury  Goal: Use assistive devices by end of the shift  2/20/2025 1844 by Criss Dwyer LPN  Outcome: Progressing  2/20/2025 1245 by rCiss Dwyer LPN  Outcome: Progressing     Problem: Fall/Injury  Goal: Pace activities to prevent fatigue by end of the shift  2/20/2025 1844 by Criss Dwyer LPN  Outcome: Progressing  2/20/2025 1245 by Criss Dwyer LPN  Outcome: Progressing       Over the shift, the patient did make progress toward the following goals. Barriers to progression include none. Recommendations to address these barriers include continue with current plan.

## 2025-02-20 NOTE — PROGRESS NOTES
Nutrition Progress Note    RD notified by kitchen that lactose allergy is listed in [employer], kitchen's ordering system. No lactose allergy in EPIC. Met with pt and granddaughter at bedside. Pt denied lactose intolerance or lactose allergy, wishing to have it removed. Dislikes fruit punch Luis, changed to orange flavor. Says usually drinks Premier Protein daily at home. Granddaughter aware can bring in if pt desires. Pt wishing to modify Ensure from BID to daily. Flavor preference updated to strawberry.

## 2025-02-20 NOTE — NURSING NOTE
Patient requested a call to son Cali regarding procedure completion.  Patient recovered post CISCO with left sided stable aquacel dressing.  +1 pedal pule bilaterally.  Report called earlier to Angle MALDONADO.

## 2025-02-20 NOTE — OP NOTE
Operative Note     Date: 25  Name: Diane Montemayor   : 1945  MRN: [id number]     Diagnosis  RLE CLTI with toe wounds     Procedures  Ultrasound guided access left CFA  3rd order selective catheterization right SFA  RLE angiogram with radiologic S&I  15 min supervision of moderate concsious sedation      Surgeon      * Alvino Arreola - Primary    Resident/Fellow/Other Assistant:  None    Procedure Summary  Anesthesia: sedation/local   Estimated Blood Loss: minimal    Specimen: No specimens collected       Findings: patent SFA and popliteal arteries. Runoff is single vessel via peroneal which fills the foot briskly via collaterals. AT and PT are chronically occluded.    Indications: Diane Montemayor is a 79 y.o. female who presents with right toe wounds and requires TMA. Evidence of PAD on PVR study.    Procedure Details:  The patient was brought to the cath lab and placed supine on the table. The administration of fentanyl and versed for moderate conscious sedation was supervised by an independent trained observer. The groins were prepped and draped. I used ultrasound to puncture the left CFA using micropuncture seldinger technique. I placed a catheter in the aorta and I then advanced the catheter over a wire into the contralateral SFA and performed a RLE angiogram. This demonstrated patent SFA and popliteal arteries. The runoff was single vessel via peroneal which filled the foot briskly via collaterals. AT and PT were chronically occluded along their entire course.  Devices were removed and vascade used for closure.        Attending Attestation: I was present and scrubbed for the entire procedure.    Alvino Arreola

## 2025-02-20 NOTE — DISCHARGE INSTRUCTIONS
PERIPHERAL ANGIOGRAPHY DISCHARGE INSTRUCTIONS    FOR SUDDEN AND SEVERE CHEST PAIN, SHORTNESS OF BREATH, EXCESSIVE BLEEDING, SIGNS OF STROKE, OR CHANGES IN MENTAL STATUS YOU SHOULD CALL 911 IMMEDIATELY.     If your provider has prescribed aspirin and/or clopidogrel (Plavix), or prasugrel (Effient), or ticagrelor (Brilinta), DO NOT STOP THESE MEDICATIONS for any reason without talking to your cardiologist first. If any of these were prescribed, you must take them every day without missing a single dose. If you are getting low on these medications, contact your provider immediately for a refill.     FOR NEXT 24 HOURS  - Upon discharge, you should return home and rest for the remainder of the day and evening. You do not have to stay on bed rest but should not be very active.  It is recommended a responsible adult be with you for the first 24 hours after the procedure.    - No driving for 24 hours after procedure. Please arrange for someone to drive you home from the hospital today.     - Do not drive, operate machinery, or use power tools for 24 hours after your procedure.     - Do not make any legal decisions for 24 hours after your procedure.     - Do not drink alcoholic beverages for 24 hours after your procedure.    WOUND CARE   *FOR FEMORAL (LEG) ACCESS*  ·      Avoid heavy lifting (over 10 pounds) for 7 days, squatting or excessive bending for 2 days, and strenuous exercise for 7 days.  ·      No submerged bathing, swimming, or hot tubs for the next 7 days, or until fully healed.  ·      Avoid sexual activity for 3-4 days until any groin discomfort has ceased.       - The transparent dressing should be removed from the site 24 hours after the procedure.  Wash the site gently with soap and water. Rinse well and pat dry. Keep the area clean and dry. You may apply a Band-Aid to the site. Avoid lotions, ointments, or powders until fully healed.     - You may shower the day after your procedure.      - It  is normal to notice a small bruise around the puncture site and/or a small grape sized or smaller lump. Any large bruising or large lump warrants a call to the office.     - If bleeding should occur, lay down and apply pressure to the affected area for 10 minutes.  If the bleeding stops notify your physician.  If there is a large amount of bleeding or spurting of blood CALL 911 immediately.  DO NOT drive yourself to the hospital.    - You may experience some tenderness, bruising or minimal inflammation.  If you have any concerns, you may contact the Cath Lab or if any of these symptoms become excessive, contact your cardiologist or go to the emergency room.     OTHER INSTRUCTIONS  - You may take acetaminophen (Tylenol) as directed for discomfort.  If pain is not relieved with acetaminophen (Tylenol), contact your doctor.    - It can be normal to have slight swelling in the leg the procedure was performed on (not the puncture site leg) post-procedure. Elevate the leg as much as possible above the level of your heart. Any excessive swelling, warmth or redness to the leg warrants a call to the office.    - If you notice or experience any of the following, you should notify your doctor or seek medical attention  Chest pain or discomfort  Change in mental status or weakness in extremities.  Dizziness, light headedness, or feeling faint.  Change in the site where the procedure was performed, such as bleeding or an increased area of bruising or swelling.  Tingling, numbness, pain, or coolness in the leg/arm beyond the site where the procedure was performed.  Signs of infection (i.e. shaking chills, temperature > 100 degrees Fahrenheit, warmth, redness) in the leg/arm area where the procedure was performed.  Changes in urination   Bloody or black stools  Vomiting blood  Severe nose bleeds

## 2025-02-21 ENCOUNTER — ANESTHESIA EVENT (OUTPATIENT)
Dept: OPERATING ROOM | Facility: HOSPITAL | Age: 80
End: 2025-02-21
Payer: MEDICARE

## 2025-02-21 ENCOUNTER — APPOINTMENT (OUTPATIENT)
Dept: CARDIOLOGY | Facility: HOSPITAL | Age: 80
End: 2025-02-21
Payer: MEDICARE

## 2025-02-21 ENCOUNTER — ANESTHESIA (OUTPATIENT)
Dept: OPERATING ROOM | Facility: HOSPITAL | Age: 80
End: 2025-02-21
Payer: MEDICARE

## 2025-02-21 LAB
ANION GAP SERPL CALC-SCNC: 10 MMOL/L (ref 10–20)
BACTERIA BLD CULT: NORMAL
BACTERIA BLD CULT: NORMAL
BUN SERPL-MCNC: 13 MG/DL (ref 6–23)
CALCIUM SERPL-MCNC: 9.2 MG/DL (ref 8.6–10.3)
CHLORIDE SERPL-SCNC: 103 MMOL/L (ref 98–107)
CO2 SERPL-SCNC: 31 MMOL/L (ref 21–32)
CREAT SERPL-MCNC: 0.82 MG/DL (ref 0.5–1.05)
EGFRCR SERPLBLD CKD-EPI 2021: 72 ML/MIN/1.73M*2
ERYTHROCYTE [DISTWIDTH] IN BLOOD BY AUTOMATED COUNT: 14.7 % (ref 11.5–14.5)
GLUCOSE BLD MANUAL STRIP-MCNC: 111 MG/DL (ref 74–99)
GLUCOSE BLD MANUAL STRIP-MCNC: 113 MG/DL (ref 74–99)
GLUCOSE BLD MANUAL STRIP-MCNC: 224 MG/DL (ref 74–99)
GLUCOSE BLD MANUAL STRIP-MCNC: 84 MG/DL (ref 74–99)
GLUCOSE SERPL-MCNC: 113 MG/DL (ref 74–99)
HCT VFR BLD AUTO: 40 % (ref 36–46)
HGB BLD-MCNC: 12.9 G/DL (ref 12–16)
MAGNESIUM SERPL-MCNC: 1.87 MG/DL (ref 1.6–2.4)
MCH RBC QN AUTO: 28.6 PG (ref 26–34)
MCHC RBC AUTO-ENTMCNC: 32.3 G/DL (ref 32–36)
MCV RBC AUTO: 89 FL (ref 80–100)
NRBC BLD-RTO: 0 /100 WBCS (ref 0–0)
PLATELET # BLD AUTO: 261 X10*3/UL (ref 150–450)
POTASSIUM SERPL-SCNC: 4 MMOL/L (ref 3.5–5.3)
RBC # BLD AUTO: 4.51 X10*6/UL (ref 4–5.2)
SODIUM SERPL-SCNC: 140 MMOL/L (ref 136–145)
WBC # BLD AUTO: 7.1 X10*3/UL (ref 4.4–11.3)

## 2025-02-21 PROCEDURE — 1210000001 HC SEMI-PRIVATE ROOM DAILY

## 2025-02-21 PROCEDURE — 80048 BASIC METABOLIC PNL TOTAL CA: CPT | Performed by: HOSPITALIST

## 2025-02-21 PROCEDURE — 93005 ELECTROCARDIOGRAM TRACING: CPT

## 2025-02-21 PROCEDURE — 2500000001 HC RX 250 WO HCPCS SELF ADMINISTERED DRUGS (ALT 637 FOR MEDICARE OP): Performed by: INTERNAL MEDICINE

## 2025-02-21 PROCEDURE — 2500000002 HC RX 250 W HCPCS SELF ADMINISTERED DRUGS (ALT 637 FOR MEDICARE OP, ALT 636 FOR OP/ED): Performed by: INTERNAL MEDICINE

## 2025-02-21 PROCEDURE — 85027 COMPLETE CBC AUTOMATED: CPT | Performed by: HOSPITALIST

## 2025-02-21 PROCEDURE — 2500000001 HC RX 250 WO HCPCS SELF ADMINISTERED DRUGS (ALT 637 FOR MEDICARE OP)

## 2025-02-21 PROCEDURE — 2500000004 HC RX 250 GENERAL PHARMACY W/ HCPCS (ALT 636 FOR OP/ED)

## 2025-02-21 PROCEDURE — 2500000002 HC RX 250 W HCPCS SELF ADMINISTERED DRUGS (ALT 637 FOR MEDICARE OP, ALT 636 FOR OP/ED)

## 2025-02-21 PROCEDURE — 99232 SBSQ HOSP IP/OBS MODERATE 35: CPT | Performed by: HOSPITALIST

## 2025-02-21 PROCEDURE — 36415 COLL VENOUS BLD VENIPUNCTURE: CPT | Performed by: HOSPITALIST

## 2025-02-21 PROCEDURE — 83735 ASSAY OF MAGNESIUM: CPT | Performed by: HOSPITALIST

## 2025-02-21 PROCEDURE — 99232 SBSQ HOSP IP/OBS MODERATE 35: CPT | Performed by: INTERNAL MEDICINE

## 2025-02-21 PROCEDURE — 2500000004 HC RX 250 GENERAL PHARMACY W/ HCPCS (ALT 636 FOR OP/ED): Performed by: INTERNAL MEDICINE

## 2025-02-21 PROCEDURE — 82947 ASSAY GLUCOSE BLOOD QUANT: CPT

## 2025-02-21 PROCEDURE — 93010 ELECTROCARDIOGRAM REPORT: CPT | Performed by: INTERNAL MEDICINE

## 2025-02-21 RX ADMIN — MAGNESIUM OXIDE 400 MG (241.3 MG MAGNESIUM) TABLET 400 MG: TABLET at 10:53

## 2025-02-21 RX ADMIN — EMPAGLIFLOZIN 25 MG: 25 TABLET, FILM COATED ORAL at 11:02

## 2025-02-21 RX ADMIN — DILTIAZEM HYDROCHLORIDE 240 MG: 240 CAPSULE, EXTENDED RELEASE ORAL at 11:01

## 2025-02-21 RX ADMIN — LOSARTAN POTASSIUM 25 MG: 25 TABLET, FILM COATED ORAL at 23:11

## 2025-02-21 RX ADMIN — SPIRONOLACTONE 25 MG: 25 TABLET ORAL at 11:01

## 2025-02-21 RX ADMIN — ROSUVASTATIN CALCIUM 10 MG: 10 TABLET, FILM COATED ORAL at 23:11

## 2025-02-21 RX ADMIN — LOSARTAN POTASSIUM 25 MG: 25 TABLET, FILM COATED ORAL at 11:02

## 2025-02-21 RX ADMIN — Medication 4000 UNITS: at 11:00

## 2025-02-21 RX ADMIN — Medication 1000 MCG: at 11:00

## 2025-02-21 RX ADMIN — PANTOPRAZOLE SODIUM 40 MG: 40 TABLET, DELAYED RELEASE ORAL at 06:30

## 2025-02-21 RX ADMIN — ENOXAPARIN SODIUM 40 MG: 40 INJECTION SUBCUTANEOUS at 19:22

## 2025-02-21 RX ADMIN — DOCUSATE SODIUM 200 MG: 100 CAPSULE, LIQUID FILLED ORAL at 23:11

## 2025-02-21 RX ADMIN — VILAZODONE HYDROCHLORIDE 40 MG: 40 TABLET ORAL at 11:01

## 2025-02-21 RX ADMIN — LEVOTHYROXINE SODIUM 150 MCG: 75 TABLET ORAL at 06:29

## 2025-02-21 RX ADMIN — ASPIRIN 81 MG: 81 TABLET, COATED ORAL at 23:11

## 2025-02-21 ASSESSMENT — COGNITIVE AND FUNCTIONAL STATUS - GENERAL
DAILY ACTIVITIY SCORE: 24
MOBILITY SCORE: 24

## 2025-02-21 ASSESSMENT — PAIN SCALES - WONG BAKER: WONGBAKER_NUMERICALRESPONSE: NO HURT

## 2025-02-21 ASSESSMENT — PAIN SCALES - GENERAL
PAINLEVEL_OUTOF10: 0 - NO PAIN
PAINLEVEL_OUTOF10: 0 - NO PAIN

## 2025-02-21 NOTE — PROGRESS NOTES
"Diane Montemayor is a 79 y.o. female on day 4 of admission presenting with concern for osteomyelitis     Subjective   No complaints today.     Objective     Last Recorded Vitals  /63   Pulse 76   Temp 36.8 °C (98.2 °F)   Resp 18   Ht 1.676 m (5' 6\")   Wt 80.7 kg (178 lb)   SpO2 96%   BMI 28.73 kg/m²      Intake/Output last 3 Shifts:    Intake/Output Summary (Last 24 hours) at 2/21/2025 1236  Last data filed at 2/20/2025 2052  Gross per 24 hour   Intake 891.67 ml   Output --   Net 891.67 ml       Scheduled medications  aspirin, 81 mg, oral, Nightly  cholecalciferol, 4,000 Units, oral, Daily  cyanocobalamin, 1,000 mcg, oral, Daily  dilTIAZem CD, 240 mg, oral, Daily  docusate sodium, 200 mg, oral, Nightly  empagliflozin, 25 mg, oral, Daily  enoxaparin, 40 mg, subcutaneous, q24h  insulin lispro, 0-5 Units, subcutaneous, TID AC  levothyroxine, 150 mcg, oral, Once per day on Sunday Monday Wednesday Friday Saturday  levothyroxine, 300 mcg, oral, Once per day on Tuesday Thursday  losartan, 25 mg, oral, BID  magnesium oxide, 400 mg, oral, Daily  [Held by provider] meropenem, 1 g, intravenous, q8h  pantoprazole, 40 mg, oral, BID AC  rosuvastatin, 10 mg, oral, Nightly  spironolactone, 25 mg, oral, Daily  vilazodone, 40 mg, oral, Daily with breakfast      Continuous medications       PRN medications  PRN medications: acetaminophen **OR** acetaminophen **OR** acetaminophen, acetaminophen **OR** acetaminophen **OR** acetaminophen, ALPRAZolam, ondansetron **OR** ondansetron, oxyCODONE, polyethylene glycol    Physical Exam   Gen: NAD  HEENT: EOM, MMM  CV: RRR, no murmurs rubs or gallops  Resp: coarse rhonchi b/l   Abdomen: soft, NT,+BS  LE: No edema    Relevant Results  Lab Results   Component Value Date    WBC 7.1 02/21/2025    HGB 12.9 02/21/2025    HCT 40.0 02/21/2025    MCV 89 02/21/2025     02/21/2025     Lab Results   Component Value Date    GLUCOSE 113 (H) 02/21/2025    CALCIUM 9.2 02/21/2025     " 02/21/2025    K 4.0 02/21/2025    CO2 31 02/21/2025     02/21/2025    BUN 13 02/21/2025    CREATININE 0.82 02/21/2025         Assessment/Plan 79 year old female admitted with diabetic right foot wound of 2nd and third digits with xray concerning for osteo     -will consult podiatry and ID  -MRI consistent with osteo   -continue IV abx, held prior to procedure for good tissue sample per ID   -follow blood cultures   -plan for OR today for amputation  -PT/OT after surgery may need snf      Hx of CAD/PAD: good pulses, continue aspirin and statin   -PVRs and angio done and showed patent blood flow  -seen by vascular     DMII: continue SSI here, hold metformin and jardiance for now, on mounjaro at home     DVT ppx: kaydenx        Paul Segal MD

## 2025-02-21 NOTE — PROGRESS NOTES
Update; Angio completed. Pt scheduled for surgery today with ortho for  right TMA. ID following pr notes; If no evidence of Pseudomonas, may consider Invanz on discharge.  Length of therapy will depend on surgical samples from the margin.  Anticipate at least 2 weeks of IV Invanz to facilitate wound healing postop.  If pathology from the margin shows evidence of osteomyelitis or positive culture then patient will need antibiotic therapy extended to 8 weeks. Care transitions team to follow post op for case progression & dc needs.

## 2025-02-21 NOTE — CARE PLAN
The patient's goals for the shift include comfort and safety    The clinical goals for the shift include patient will remain safe and free from fall/injury through out shift    Over the shift, the patient did not make progress toward the following goals. Barriers to progression include . Recommendations to address these barriers include   Problem: Pain - Adult  Goal: Verbalizes/displays adequate comfort level or baseline comfort level  Outcome: Progressing     Problem: Safety - Adult  Goal: Free from fall injury  Outcome: Progressing     Problem: Discharge Planning  Goal: Discharge to home or other facility with appropriate resources  Outcome: Progressing     Problem: Chronic Conditions and Co-morbidities  Goal: Patient's chronic conditions and co-morbidity symptoms are monitored and maintained or improved  Outcome: Progressing     Problem: Nutrition  Goal: Nutrient intake appropriate for maintaining nutritional needs  Outcome: Progressing     Problem: Fall/Injury  Goal: Not fall by end of shift  Outcome: Progressing  Goal: Be free from injury by end of the shift  Outcome: Progressing  Goal: Verbalize understanding of personal risk factors for fall in the hospital  Outcome: Progressing  Goal: Verbalize understanding of risk factor reduction measures to prevent injury from fall in the home  Outcome: Progressing  Goal: Use assistive devices by end of the shift  Outcome: Progressing  Goal: Pace activities to prevent fatigue by end of the shift  Outcome: Progressing     Problem: Diabetes  Goal: Achieve decreasing blood glucose levels by end of shift  Outcome: Progressing  Goal: Increase stability of blood glucose readings by end of shift  Outcome: Progressing  Goal: Maintain electrolyte levels within acceptable range throughout shift  Outcome: Progressing  Goal: Maintain glucose levels >70mg/dl to <250mg/dl throughout shift  Outcome: Progressing  Goal: No changes in neurological exam by end of shift  Outcome:  Progressing  Goal: Learn about and adhere to nutrition recommendations by end of shift  Outcome: Progressing  Goal: Vital signs within normal range for age by end of shift  Outcome: Progressing  Goal: Increase self care and/or family involovement by end of shift  Outcome: Progressing  Goal: Receive DSME education by end of shift  Outcome: Progressing   .

## 2025-02-21 NOTE — PROGRESS NOTES
Infectious Disease Progress Note       2/20/2025     79-year-old female with diabetes mellitus type 2 and peripheral arterial disease presented from podiatry clinic with right foot wound.  Concern for osteomyelitis second and third right foot digit.  MRI 2/18/2025 pending results  X-ray 2/17/2025 with erosions involving the distal third metatarsal and proximal third phalanx concerning for osteomyelitis  PVR pending final results but prelim is showing evidence of mild arterial occlusive disease right lower extremity and mild arterial occlusive disease in the left lower extremity.  Blood cultures x 2 remain normal drawn 2/17/2025.     Patient underwent angio today.  Feels okay but nervous about receiving general anesthetic for procedure tomorrow    Lab Results   Component Value Date    WBC 6.1 02/20/2025    HGB 13.1 02/20/2025    HCT 39.9 02/20/2025    MCV 88 02/20/2025     02/20/2025     Lab Results   Component Value Date    GLUCOSE 118 (H) 02/20/2025    CALCIUM 9.1 02/20/2025     02/20/2025    K 4.0 02/20/2025    CO2 29 02/20/2025     02/20/2025    BUN 12 02/20/2025    CREATININE 0.75 02/20/2025       WBC trends are being monitored. Antibiotic doses are being adjusted per most recent renal labs.     Vitals:    02/20/25 2054   BP: 108/58   Pulse: 69   Resp:    Temp:    SpO2:      Patient is awake and alert, sitting in a chair  NAD  Neck supple  Heart S1S2  Chest: Equal expansion, bilaterally clear to auscultation  Abdomen: soft, ND, NTTP, no guarding  Extrem: Foot is wrapped in dressing.  Photos reviewed  Skin: no rashes, no diaphoresis  Neuro: CNS intact  Affect appropriate and patient is interactive        Patient Active Problem List   Diagnosis    Constipation    Class 3 severe obesity due to excess calories with serious comorbidity and body mass index (BMI) of 40.0 to 44.9 in adult    Hypothyroidism    BMI 40.0-44.9, adult (Multi)    Type 2 diabetes mellitus    Morbidly obese (Multi)     Depression, unspecified    B12 deficiency    Dizziness    Fatigue    Hypercholesteremia    Hyperlipidemia    Low back pain    Malaise and fatigue    Medicare annual wellness visit, subsequent    Yeast infection    Rheumatoid arthritis involving both shoulders, unspecified whether rheumatoid factor present (Multi)    Acquired absence of left great toe (Multi)    Atherosclerosis of native artery of both lower extremities with rest pain (Multi)    Other acute osteomyelitis, unspecified site    Benign carcinoid tumors of other sites    Pulmonary emphysema, unspecified emphysema type (Multi)    Moderate episode of recurrent major depressive disorder    Type 2 diabetes mellitus with diabetic peripheral angiopathy without gangrene, without long-term current use of insulin (Multi)    Stage 3a chronic kidney disease (Multi)    Coronary artery disease involving native coronary artery of native heart without angina pectoris    Hypertension    BMI 33.0-33.9,adult    Never smoked cigarettes    Wound of right foot    Class 1 obesity due to excess calories without serious comorbidity with body mass index (BMI) of 33.0 to 33.9 in adult    Chronic combined systolic (congestive) and diastolic (congestive) heart failure    Ventricular tachycardia (Multi)    Subacute osteomyelitis of right foot (Multi)    Peripheral vascular disease, unspecified (CMS-[facility])    Equinus contracture of ankle    History of amputation of hallux (Multi)       Assessment:   Right foot osteomyelitis second and third metatarsals  Peripheral arterial disease demonstrated on PVR, angio planned tomorrow right TMA   Diabetes mellitus type 2     Plan:   Will hold antibiotics until after surgical procedure tomorrow.  After her surgical procedure restart meropenem.  Vascular workup in progress  -angio performed which shows one-vessel flow through peroneal which would be enough to heal TMA, per podiatry.  So, TMA planned for tomorrow with antibiotic impregnated cement  right foot.  Case scheduled for tomorrow late afternoon.  Discussed with her daughter at bedside as well as the patient.  Optimal glycemic control  MRI with extensive osteomyelitis in the third ray involving the entire third metatarsal, the third proximal phalanx and the third middle phalanx.  This includes osseous destruction.  Septic arthritis of the third metatarsal phalangeal joint with complex fluid and dislocation of the joint.  Patient also has deformity and collapse of the second metatarsal head suggestive of underlying avascular necrosis.    Seems to be tolerating meropenem.  If no evidence of Pseudomonas, may consider Invanz on discharge.  Length of therapy will depend on surgical samples from the margin.  Anticipate at least 2 weeks of IV Invanz to facilitate wound healing postop.  If pathology from the margin shows evidence of osteomyelitis or positive culture then patient will need antibiotic therapy extended to 8 weeks.      Would appreciate cultures and pathology both from surgical margin to help direct therapy.     Of note, she has retained screws/plate and scars bilateral LE  proximal to the foot.       Imaging and labs were reviewed per medical records and any ID pertinent labs were also addressed  Time spent before, during and after care today, including coordination of care >40 min      Olive Nicole, DO

## 2025-02-21 NOTE — PROGRESS NOTES
"Nutrition Follow Up Assessment:   Nutrition Assessment         Patient is a 79 y.o. female presenting with subacute osteomyelitis of right foot.       Nutrition History:  Energy Intake: Good > 75 %  Pain affecting nutrition status: N/A  Food and Nutrient History: Pt reports eating well, has even been eating breakfast (does not usually eat breakfast). NPO today for surgery. Drinking Ensure supplement. Does not care for the Luis but will continue to try to drink it. Does not have any nutrition concerns at this time.       Anthropometrics:  Height: 167.6 cm (5' 6\")   Weight: 80.7 kg (178 lb)   BMI (Calculated): 28.74             Weight History:     Weight Change %:  Weight History / % Weight Change: No new wt to assess    Nutrition Focused Physical Exam Findings:    Subcutaneous Fat Loss:   Defer Subcutaneous Fat Loss Assessment: Defer all  Defer All Reason: completed at intial assessment  Muscle Wasting:  Defer Muscle Wasting Assessment: Defer all  Defer All Reason: completed at intial assessment  Edema:  Edema Location: non-pitting BLE per nursing assessment  Physical Findings:  Skin: Positive (right heel wound, left leg wound per nursing assessment)    Nutrition Significant Labs:    Reviewed   Nutrition Specific Medications:  Reviewed     I/O:   Last BM Date: 02/19/25;      Dietary Orders (From admission, onward)       Start     Ordered    02/21/25 0001  NPO Diet Except: Sips with meds; Effective midnight  Diet effective midnight        Question:  Except:  Answer:  Sips with meds    02/20/25 2159    02/20/25 1343  Oral nutritional supplements  Until discontinued        Comments: strawberry   Question Answer Comment   Deliver with Dinner    Select supplement: Ensure Plus High Protein        02/20/25 1343    02/18/25 1008  Oral nutritional supplements  Until discontinued        Question Answer Comment   Deliver with Lunch    Deliver with Dinner    Select supplement: Luis        02/18/25 1007    02/17/25 1649  May " Participate in Room Service  ( ROOM SERVICE MAY PARTICIPATE)  Once        Question:  .  Answer:  Yes    02/17/25 1648                     Estimated Needs:   Total Energy Estimated Needs in 24 hours (kCal): 2025 kCal  Method for Estimating Needs: 25 kcals/kg BW  Total Protein Estimated Needs in 24 Hours (g): 81 g  Method for Estimating 24 Hour Protein Needs: 1g/kg BW  Total Fluid Estimated Needs in 24 Hours (mL): 2025 mL  Method for Estimating 24 Hour Fluid Needs: 1 ml/kcal or per MD        Nutrition Diagnosis   Malnutrition Diagnosis  Patient has Malnutrition Diagnosis: Yes  Diagnosis Status: Active  Malnutrition Diagnosis: Moderate malnutrition related to chronic disease or condition  As Evidenced by: mild-moderate loss of muscle mass and subcutaneous fat, wt loss of >20% in 1 year            Nutrition Interventions/Recommendations   Nutrition prescription for oral nutrition    Nutrition Recommendations:  Individualized Nutrition Prescription Provided for : Resume cardiac, 75 g CHO/meal consistent carbohdyrate diet with ONS when medically appropriate    Nutrition Interventions/Goals:   Interventions: Meals and snacks, Medical food supplement  Meals and Snacks: Carbohydrate-modified diet, Fat-modified diet, Mineral-modified diet  Goal: Consumes 3 meals per day  Medical Food Supplement: Commercial beverage medical food supplement therapy  Goal: Ensure Plus High Protein daily (provides 350 kcal, 20 g protein per serving). Luis BID (provides 90 kcal and 2.5 g protein per packet).      Education Documentation  No documentation found.     Patient with no diet related questions at this time.         Nutrition Monitoring and Evaluation   Food/Nutrient Related History Monitoring  Monitoring and Evaluation Plan: Intake / amount of food, Estimated Energy Intake  Estimated Energy Intake: Energy intake greater or equal to 75% of estimated energy needs  Intake / Amount of food: Consumes at least 75% or more of  meals/snacks/supplements, Meets > 75% estimated energy needs    Anthropometric Measurements  Monitoring and Evaluation Plan: Body weight  Body Weight: Body weight - Maintain stable weight    Biochemical Data, Medical Tests and Procedures  Monitoring and Evaluation Plan: Electrolyte/renal panel, Glucose/endocrine profile  Electrolyte and Renal Panel: Electrolytes within normal limits  Glucose/Endocrine Profile: Glucose within normal limits ( mg/dL)    Physical Exam Findings  Monitoring and Evaluation Plan: Skin  Skin Finding: Impaired wound healing - Improved wound healing    Goal Status: Some progress toward goal(s)    Time Spent (min): 30 minutes

## 2025-02-21 NOTE — CARE PLAN
Case bumped secondary to OR availably.     PLAN AND RECOMMENDATIONS:  - Patient seen and evaluated   - Plan for TMA + GR + use of impregnated abx cement for right foot. Risks, benefits, and potential complications of surgery discussed with patient. Consent to be obtained. Case request in. Case tentatively for around 10:30 am. Please optimize patient for surgery. NPO order in.   - ID on board. Appreciate input. Antibiotics per their recommendations.   - R foot dressed with betadine wet to dry dressing. Dressing should remain clean, dry, and intact.   - WB to heel of the RLE in post op shoe.   - Elevate RLE at or above the level of the heart.  - Pain management per primary team.  - Podiatry to continue to follow     Haresh Antonio DPM  Work Cell: [phone]  Office phone: [phone]

## 2025-02-22 ENCOUNTER — ANESTHESIA (OUTPATIENT)
Dept: OPERATING ROOM | Facility: HOSPITAL | Age: 80
End: 2025-02-22
Payer: MEDICARE

## 2025-02-22 ENCOUNTER — APPOINTMENT (OUTPATIENT)
Dept: RADIOLOGY | Facility: HOSPITAL | Age: 80
End: 2025-02-22
Payer: MEDICARE

## 2025-02-22 LAB
ANION GAP SERPL CALC-SCNC: 8 MMOL/L (ref 10–20)
BUN SERPL-MCNC: 15 MG/DL (ref 6–23)
CALCIUM SERPL-MCNC: 9.3 MG/DL (ref 8.6–10.3)
CHLORIDE SERPL-SCNC: 102 MMOL/L (ref 98–107)
CO2 SERPL-SCNC: 33 MMOL/L (ref 21–32)
CREAT SERPL-MCNC: 0.87 MG/DL (ref 0.5–1.05)
EGFRCR SERPLBLD CKD-EPI 2021: 67 ML/MIN/1.73M*2
ERYTHROCYTE [DISTWIDTH] IN BLOOD BY AUTOMATED COUNT: 14.6 % (ref 11.5–14.5)
GLUCOSE BLD MANUAL STRIP-MCNC: 104 MG/DL (ref 74–99)
GLUCOSE BLD MANUAL STRIP-MCNC: 112 MG/DL (ref 74–99)
GLUCOSE SERPL-MCNC: 118 MG/DL (ref 74–99)
HCT VFR BLD AUTO: 38.6 % (ref 36–46)
HGB BLD-MCNC: 12.6 G/DL (ref 12–16)
HOLD SPECIMEN: NORMAL
MAGNESIUM SERPL-MCNC: 2.06 MG/DL (ref 1.6–2.4)
MCH RBC QN AUTO: 29 PG (ref 26–34)
MCHC RBC AUTO-ENTMCNC: 32.6 G/DL (ref 32–36)
MCV RBC AUTO: 89 FL (ref 80–100)
NRBC BLD-RTO: 0 /100 WBCS (ref 0–0)
PLATELET # BLD AUTO: 260 X10*3/UL (ref 150–450)
POTASSIUM SERPL-SCNC: 4.2 MMOL/L (ref 3.5–5.3)
RBC # BLD AUTO: 4.34 X10*6/UL (ref 4–5.2)
SODIUM SERPL-SCNC: 139 MMOL/L (ref 136–145)
WBC # BLD AUTO: 7.3 X10*3/UL (ref 4.4–11.3)

## 2025-02-22 PROCEDURE — 85027 COMPLETE CBC AUTOMATED: CPT

## 2025-02-22 PROCEDURE — 0Y6M0ZC DETACHMENT AT RIGHT FOOT, PARTIAL 3RD RAY, OPEN APPROACH: ICD-10-PCS

## 2025-02-22 PROCEDURE — 99232 SBSQ HOSP IP/OBS MODERATE 35: CPT | Performed by: STUDENT IN AN ORGANIZED HEALTH CARE EDUCATION/TRAINING PROGRAM

## 2025-02-22 PROCEDURE — 2500000004 HC RX 250 GENERAL PHARMACY W/ HCPCS (ALT 636 FOR OP/ED)

## 2025-02-22 PROCEDURE — 7100000001 HC RECOVERY ROOM TIME - INITIAL BASE CHARGE

## 2025-02-22 PROCEDURE — 82947 ASSAY GLUCOSE BLOOD QUANT: CPT

## 2025-02-22 PROCEDURE — 3700000002 HC GENERAL ANESTHESIA TIME - EACH INCREMENTAL 1 MINUTE

## 2025-02-22 PROCEDURE — 36415 COLL VENOUS BLD VENIPUNCTURE: CPT

## 2025-02-22 PROCEDURE — 1210000001 HC SEMI-PRIVATE ROOM DAILY

## 2025-02-22 PROCEDURE — 7100000002 HC RECOVERY ROOM TIME - EACH INCREMENTAL 1 MINUTE

## 2025-02-22 PROCEDURE — 0Y6M0ZB DETACHMENT AT RIGHT FOOT, PARTIAL 2ND RAY, OPEN APPROACH: ICD-10-PCS

## 2025-02-22 PROCEDURE — 3700000001 HC GENERAL ANESTHESIA TIME - INITIAL BASE CHARGE

## 2025-02-22 PROCEDURE — 2500000004 HC RX 250 GENERAL PHARMACY W/ HCPCS (ALT 636 FOR OP/ED): Performed by: STUDENT IN AN ORGANIZED HEALTH CARE EDUCATION/TRAINING PROGRAM

## 2025-02-22 PROCEDURE — 2720000007 HC OR 272 NO HCPCS

## 2025-02-22 PROCEDURE — 2500000002 HC RX 250 W HCPCS SELF ADMINISTERED DRUGS (ALT 637 FOR MEDICARE OP, ALT 636 FOR OP/ED)

## 2025-02-22 PROCEDURE — 3600000008 HC OR TIME - EACH INCREMENTAL 1 MINUTE - PROCEDURE LEVEL THREE

## 2025-02-22 PROCEDURE — 2500000001 HC RX 250 WO HCPCS SELF ADMINISTERED DRUGS (ALT 637 FOR MEDICARE OP)

## 2025-02-22 PROCEDURE — 83735 ASSAY OF MAGNESIUM: CPT

## 2025-02-22 PROCEDURE — 2500000005 HC RX 250 GENERAL PHARMACY W/O HCPCS

## 2025-02-22 PROCEDURE — 87205 SMEAR GRAM STAIN: CPT | Mod: ELYLAB

## 2025-02-22 PROCEDURE — 3600000003 HC OR TIME - INITIAL BASE CHARGE - PROCEDURE LEVEL THREE

## 2025-02-22 PROCEDURE — 0L8N0ZZ DIVISION OF RIGHT LOWER LEG TENDON, OPEN APPROACH: ICD-10-PCS

## 2025-02-22 PROCEDURE — 87070 CULTURE OTHR SPECIMN AEROBIC: CPT | Mod: ELYLAB

## 2025-02-22 PROCEDURE — 80048 BASIC METABOLIC PNL TOTAL CA: CPT

## 2025-02-22 RX ORDER — ROCURONIUM BROMIDE 10 MG/ML
INJECTION, SOLUTION INTRAVENOUS AS NEEDED
Status: DISCONTINUED | OUTPATIENT
Start: 2025-02-22 | End: 2025-02-22

## 2025-02-22 RX ORDER — LABETALOL HYDROCHLORIDE 5 MG/ML
5 INJECTION, SOLUTION INTRAVENOUS ONCE AS NEEDED
Status: DISCONTINUED | OUTPATIENT
Start: 2025-02-22 | End: 2025-02-22 | Stop reason: HOSPADM

## 2025-02-22 RX ORDER — ALBUTEROL SULFATE 0.83 MG/ML
2.5 SOLUTION RESPIRATORY (INHALATION) ONCE AS NEEDED
Status: DISCONTINUED | OUTPATIENT
Start: 2025-02-22 | End: 2025-02-22 | Stop reason: HOSPADM

## 2025-02-22 RX ORDER — FENTANYL CITRATE 50 UG/ML
25 INJECTION, SOLUTION INTRAMUSCULAR; INTRAVENOUS EVERY 5 MIN PRN
Status: DISCONTINUED | OUTPATIENT
Start: 2025-02-22 | End: 2025-02-22 | Stop reason: HOSPADM

## 2025-02-22 RX ORDER — ONDANSETRON HYDROCHLORIDE 2 MG/ML
4 INJECTION, SOLUTION INTRAVENOUS ONCE AS NEEDED
Status: DISCONTINUED | OUTPATIENT
Start: 2025-02-22 | End: 2025-02-22 | Stop reason: HOSPADM

## 2025-02-22 RX ORDER — SODIUM CHLORIDE, SODIUM LACTATE, POTASSIUM CHLORIDE, CALCIUM CHLORIDE 600; 310; 30; 20 MG/100ML; MG/100ML; MG/100ML; MG/100ML
INJECTION, SOLUTION INTRAVENOUS CONTINUOUS PRN
Status: DISCONTINUED | OUTPATIENT
Start: 2025-02-22 | End: 2025-02-22

## 2025-02-22 RX ORDER — OXYCODONE HYDROCHLORIDE 5 MG/1
10 TABLET ORAL EVERY 4 HOURS PRN
Status: DISCONTINUED | OUTPATIENT
Start: 2025-02-22 | End: 2025-02-22 | Stop reason: HOSPADM

## 2025-02-22 RX ORDER — BUPIVACAINE HYDROCHLORIDE 5 MG/ML
INJECTION, SOLUTION PERINEURAL AS NEEDED
Status: DISCONTINUED | OUTPATIENT
Start: 2025-02-22 | End: 2025-02-22 | Stop reason: HOSPADM

## 2025-02-22 RX ORDER — OXYCODONE HYDROCHLORIDE 5 MG/1
5 TABLET ORAL EVERY 4 HOURS PRN
Status: DISCONTINUED | OUTPATIENT
Start: 2025-02-22 | End: 2025-02-22 | Stop reason: HOSPADM

## 2025-02-22 RX ORDER — HYDRALAZINE HYDROCHLORIDE 20 MG/ML
5 INJECTION INTRAMUSCULAR; INTRAVENOUS EVERY 30 MIN PRN
Status: DISCONTINUED | OUTPATIENT
Start: 2025-02-22 | End: 2025-02-22 | Stop reason: HOSPADM

## 2025-02-22 RX ORDER — FENTANYL CITRATE 50 UG/ML
INJECTION, SOLUTION INTRAMUSCULAR; INTRAVENOUS AS NEEDED
Status: DISCONTINUED | OUTPATIENT
Start: 2025-02-22 | End: 2025-02-22

## 2025-02-22 RX ORDER — SODIUM CHLORIDE, SODIUM LACTATE, POTASSIUM CHLORIDE, CALCIUM CHLORIDE 600; 310; 30; 20 MG/100ML; MG/100ML; MG/100ML; MG/100ML
100 INJECTION, SOLUTION INTRAVENOUS CONTINUOUS
Status: DISCONTINUED | OUTPATIENT
Start: 2025-02-22 | End: 2025-02-22 | Stop reason: HOSPADM

## 2025-02-22 RX ORDER — PHENYLEPHRINE HCL IN 0.9% NACL 0.4MG/10ML
SYRINGE (ML) INTRAVENOUS AS NEEDED
Status: DISCONTINUED | OUTPATIENT
Start: 2025-02-22 | End: 2025-02-22

## 2025-02-22 RX ORDER — ACETAMINOPHEN 325 MG/1
650 TABLET ORAL EVERY 4 HOURS PRN
Status: DISCONTINUED | OUTPATIENT
Start: 2025-02-22 | End: 2025-02-22 | Stop reason: HOSPADM

## 2025-02-22 RX ORDER — CEFAZOLIN SODIUM 2 G/50ML
2 SOLUTION INTRAVENOUS ONCE
Status: COMPLETED | OUTPATIENT
Start: 2025-02-22 | End: 2025-02-22

## 2025-02-22 RX ORDER — VANCOMYCIN HYDROCHLORIDE 1 G/20ML
INJECTION, POWDER, LYOPHILIZED, FOR SOLUTION INTRAVENOUS AS NEEDED
Status: DISCONTINUED | OUTPATIENT
Start: 2025-02-22 | End: 2025-02-22 | Stop reason: HOSPADM

## 2025-02-22 RX ORDER — FENTANYL CITRATE 50 UG/ML
12.5 INJECTION, SOLUTION INTRAMUSCULAR; INTRAVENOUS EVERY 5 MIN PRN
Status: DISCONTINUED | OUTPATIENT
Start: 2025-02-22 | End: 2025-02-22 | Stop reason: HOSPADM

## 2025-02-22 RX ORDER — LIDOCAINE HYDROCHLORIDE 20 MG/ML
INJECTION, SOLUTION INFILTRATION; PERINEURAL AS NEEDED
Status: DISCONTINUED | OUTPATIENT
Start: 2025-02-22 | End: 2025-02-22

## 2025-02-22 RX ORDER — SODIUM CHLORIDE 0.9 G/100ML
INJECTION, SOLUTION IRRIGATION AS NEEDED
Status: DISCONTINUED | OUTPATIENT
Start: 2025-02-22 | End: 2025-02-22 | Stop reason: HOSPADM

## 2025-02-22 RX ORDER — TOBRAMYCIN 1.2 G/30ML
INJECTION, POWDER, LYOPHILIZED, FOR SOLUTION INTRAVENOUS AS NEEDED
Status: DISCONTINUED | OUTPATIENT
Start: 2025-02-22 | End: 2025-02-22 | Stop reason: HOSPADM

## 2025-02-22 RX ORDER — LIDOCAINE HYDROCHLORIDE 10 MG/ML
0.1 INJECTION, SOLUTION EPIDURAL; INFILTRATION; INTRACAUDAL; PERINEURAL ONCE
Status: DISCONTINUED | OUTPATIENT
Start: 2025-02-22 | End: 2025-02-22 | Stop reason: HOSPADM

## 2025-02-22 RX ORDER — METOCLOPRAMIDE HYDROCHLORIDE 5 MG/ML
10 INJECTION INTRAMUSCULAR; INTRAVENOUS ONCE AS NEEDED
Status: DISCONTINUED | OUTPATIENT
Start: 2025-02-22 | End: 2025-02-22 | Stop reason: HOSPADM

## 2025-02-22 RX ORDER — PROPOFOL 10 MG/ML
INJECTION, EMULSION INTRAVENOUS AS NEEDED
Status: DISCONTINUED | OUTPATIENT
Start: 2025-02-22 | End: 2025-02-22

## 2025-02-22 RX ADMIN — PANTOPRAZOLE SODIUM 40 MG: 40 TABLET, DELAYED RELEASE ORAL at 06:39

## 2025-02-22 RX ADMIN — MAGNESIUM OXIDE 400 MG (241.3 MG MAGNESIUM) TABLET 400 MG: TABLET at 08:41

## 2025-02-22 RX ADMIN — ONDANSETRON 4 MG: 2 INJECTION, SOLUTION INTRAMUSCULAR; INTRAVENOUS at 13:36

## 2025-02-22 RX ADMIN — CEFAZOLIN SODIUM 2 G: 2 SOLUTION INTRAVENOUS at 13:05

## 2025-02-22 RX ADMIN — FENTANYL CITRATE 50 MCG: 50 INJECTION, SOLUTION INTRAMUSCULAR; INTRAVENOUS at 13:05

## 2025-02-22 RX ADMIN — FENTANYL CITRATE 50 MCG: 50 INJECTION, SOLUTION INTRAMUSCULAR; INTRAVENOUS at 12:59

## 2025-02-22 RX ADMIN — VILAZODONE HYDROCHLORIDE 40 MG: 40 TABLET ORAL at 08:41

## 2025-02-22 RX ADMIN — DILTIAZEM HYDROCHLORIDE 240 MG: 240 CAPSULE, EXTENDED RELEASE ORAL at 08:41

## 2025-02-22 RX ADMIN — SODIUM CHLORIDE, POTASSIUM CHLORIDE, SODIUM LACTATE AND CALCIUM CHLORIDE: 600; 310; 30; 20 INJECTION, SOLUTION INTRAVENOUS at 12:54

## 2025-02-22 RX ADMIN — PANTOPRAZOLE SODIUM 40 MG: 40 TABLET, DELAYED RELEASE ORAL at 17:27

## 2025-02-22 RX ADMIN — PROPOFOL 130 MG: 10 INJECTION, EMULSION INTRAVENOUS at 12:59

## 2025-02-22 RX ADMIN — DEXAMETHASONE SODIUM PHOSPHATE 4 MG: 4 INJECTION, SOLUTION INTRAMUSCULAR; INTRAVENOUS at 13:37

## 2025-02-22 RX ADMIN — LEVOTHYROXINE SODIUM 150 MCG: 75 TABLET ORAL at 06:39

## 2025-02-22 RX ADMIN — ROSUVASTATIN CALCIUM 10 MG: 10 TABLET, FILM COATED ORAL at 20:05

## 2025-02-22 RX ADMIN — ROCURONIUM BROMIDE 50 MG: 10 SOLUTION INTRAVENOUS at 12:59

## 2025-02-22 RX ADMIN — LOSARTAN POTASSIUM 25 MG: 25 TABLET, FILM COATED ORAL at 08:50

## 2025-02-22 RX ADMIN — Medication 100 MCG: at 13:31

## 2025-02-22 RX ADMIN — Medication 200 MCG: at 13:47

## 2025-02-22 RX ADMIN — ENOXAPARIN SODIUM 40 MG: 40 INJECTION SUBCUTANEOUS at 17:26

## 2025-02-22 RX ADMIN — SPIRONOLACTONE 25 MG: 25 TABLET ORAL at 08:41

## 2025-02-22 RX ADMIN — DOCUSATE SODIUM 200 MG: 100 CAPSULE, LIQUID FILLED ORAL at 20:04

## 2025-02-22 RX ADMIN — LIDOCAINE HYDROCHLORIDE 60 MG: 20 INJECTION, SOLUTION INFILTRATION; PERINEURAL at 12:59

## 2025-02-22 RX ADMIN — EMPAGLIFLOZIN 25 MG: 25 TABLET, FILM COATED ORAL at 08:41

## 2025-02-22 RX ADMIN — LOSARTAN POTASSIUM 25 MG: 25 TABLET, FILM COATED ORAL at 20:04

## 2025-02-22 RX ADMIN — ASPIRIN 81 MG: 81 TABLET, COATED ORAL at 20:04

## 2025-02-22 SDOH — HEALTH STABILITY: MENTAL HEALTH: CURRENT SMOKER: 0

## 2025-02-22 ASSESSMENT — PAIN - FUNCTIONAL ASSESSMENT
PAIN_FUNCTIONAL_ASSESSMENT: 0-10

## 2025-02-22 ASSESSMENT — PAIN SCALES - GENERAL
PAINLEVEL_OUTOF10: 0 - NO PAIN
PAIN_LEVEL: 0
PAINLEVEL_OUTOF10: 0 - NO PAIN

## 2025-02-22 ASSESSMENT — COGNITIVE AND FUNCTIONAL STATUS - GENERAL
MOBILITY SCORE: 24
DAILY ACTIVITIY SCORE: 24

## 2025-02-22 ASSESSMENT — PAIN SCALES - WONG BAKER: WONGBAKER_NUMERICALRESPONSE: NO HURT

## 2025-02-22 NOTE — OP NOTE
OPERATIVE REPORT    NAME: Diane Montemayor   MRN: [id number]    DATE: 02/22/25  AGE: 79 y.o.    SURGEON:   Haresh Antonio DPM    ASSISTANTS:  1. KATY Reyes    Diagnosis  Pre-op Diagnosis      * Subacute osteomyelitis of right foot (Multi) [M86.271]     * Atherosclerosis of native artery of both lower extremities with rest pain (Multi) [I70.223]     * Type 2 diabetes mellitus with diabetic peripheral angiopathy without gangrene, without long-term current use of insulin (Multi) [E11.51]     * Peripheral vascular disease, unspecified (CMS-[facility]) [I73.9]     * Wound of right foot [S91.301A]     * Type 2 diabetes mellitus with other specified complication, unspecified whether long term insulin use (Multi) [E11.69]     * Equinus contracture of ankle [M24.573] Post-op Diagnosis     * Subacute osteomyelitis of right foot (Multi) [M86.271]     * Atherosclerosis of native artery of both lower extremities with rest pain (Multi) [I70.223]     * Type 2 diabetes mellitus with diabetic peripheral angiopathy without gangrene, without long-term current use of insulin (Multi) [E11.51]     * Peripheral vascular disease, unspecified (CMS-[facility]) [I73.9]     * Wound of right foot [S91.301A]     * Type 2 diabetes mellitus with other specified complication, unspecified whether long term insulin use (Multi) [E11.69]     * Equinus contracture of ankle [M24.573]       OPERATION:   1. Transmetatarsal amputation of the right foot-CPT 79193  2. Gastrocnemius Recession of the right foot-CPT 32558  3.  Application of antibiotic impregnated cement-CPT 09593  4. Application of posterior splint-CPT 68490    ANESTHESIA: General + local field block    OPERATIVE INDICATIONS: This is a 79year old with pmh as previously documented in the chart.  Patient sees my partner Dr. Mares as an outpatient.  Was referred to me for surgical evaluation given wound surrounding cellulitis and concern for bone infection I saw patient my outpatient office reviewed her x-rays and  recommended admission to the hospital for further workup of bone infection.  Further workup demonstrated bone infection present to second and third metatarsals.  Third metatarsal bone infection on MRI appeared to go back to the level of the TMT J.  Discussed surgical and non-surgical methods of treatment with the patient. The natural history and course were discussed in relation to the options. Surgical risks, benefits, alternatives post-operative course, and all possible outcomes were discussed in great detail. Answered all of the patient's questions to the patient's satisfaction. No guarantees were given. The patient understands this. Patient elects to proceed with surgery.     OPERATIVE FINDINGS: Distal shaft and head of the second and third metatarsals were noted to be soft consistent with diagnosis of osteomyelitis.  Bone at margins was noted to be hard.  Good fill of antibiotic impregnated cement to the second and third metatarsals.  Post gastrocnemius recession much improved ankle joint dorsiflexion. good closure of flaps noted    OPERATIVE PROCEDURE: Patient was transferred from the preoperative holding area to the operative suite and placed in a supine position. All monitors were applied. General anesthesia was administered. Prophylaxis was obtained with continued 2 g Ancef. Tourniquet was applied to the right thigh, but not yet inflated. The  right foot, ankle, and leg were then prepped and draped in the normal sterile fashion. Time out was performed.     Attention was then directed to the right foot. Incision was planned fully circumscribing the previous ulceration and leaving as much healthy skin as possible for closure. Incision was then made with a 15 blade extending full thickness to bone to create full thickness flaps. The dorsal and plantar flaps were then further reflected sharply off the bone. Care again was taken to make sure that all flaps were made full thickness. All neurovascular structures  were identified and retracted. Any bleeders were addressed with ties, electrocautery or compression as deemed appropriate. The bone cuts were then planned and executed with a sagittal saw. Care was taken to try and maintain an appropriate parabola while also balancing resection needed to facilitate closure. The Forefoot was then sharply disarticulated at the level of the cuts with a 15 blade. The forefoot was then passed to the back table and sent to pathology for examination.  A sample of the second metatarsal was sent to micro for culture. The wounds were then flushed with copious amounts of sterile saline. All bleeders were addressed with ties, electrocautery or compression as deemed appropriate. The site was then further irrigated.  Fluoroscopy was then utilized to ensure that adequate parabola was demonstrated.  Clean margin samples were then obtained with the use of the sagittal saw to the second and third metatarsals.  These were again split with 1 sent to micro and 1 sent to path for further exam.    Then utilizing manufacture technique Bowie Hydrocet was utilized and mixed with Vanco and tobramycin powder.  This was mixed via manufactures guidelines.  The medullary canal of the second and third metatarsals was then cleaned with the use of curette.  The antibiotic impregnated cement was then injected into the medullary canals of the second and third metatarsal this was again checked on x-ray and no residual amount was noted outside of the medullary canal.  Good fill of the medullary canal was noted with antibiotic impregnated cement.      Attention was then directed to the posterior compartment of the right leg. Approximately a 5 cm incision was drawn out and performed at the level of the aponeurosis of the gastrocnemius. Incision was then made through the skin to the level of subcutaneus tissue with a #15 blade. Dissection was carried down to level of the paratenon with the use of a #15 blade and  Metzenbaum scissors. Care was taken to avoid any neurovascular structures. Any superficial bleeders were addressed with the use of compression or via electrocautery to achieve hemostasis. A linear incision was then made through the paratenon. The gastrocnemius aponeurosis was then visualized. A transverse incision was then carried out through the aponeurosis to the level of the gastrocnemius muscle with the use of a #15 blade. Care was then taken to assess to make sure all fibers were handled. Once dealt with and the aponeurosis was completely excised through revealing muscle belly appreciable gains in ankle joint dorsiflexion were achieved. With the use of 4-0 Monocryl the paratenon was closed in a running fashion.  4-0 Monocryl was then used to perform subcutaneus closure with knots tied outside the skin. Skin was then re-approximated with a 4 oh nylon using a simple interrupted technique.    The soft tissue flaps were then remodeled and debrided of non-viable tissue. Appropriate care was taken to evaluate for closure and the flaps were then sharply remodeled to facilitate this. The skin was then closed with 2-0 Prolene utilizing simple interrupted suture technique. adequate hemostasis was noted.  Half percent Marcaine was given via local field block around the site.  Approximately 20 cc was utilized.      Wounds were further dressed with Xeroform 4 x 4's ABDs and a posterior splint applied with the foot in a neutral position with Walton compression technique. Patient tolerated the anesthesia and procedure very well, was transferred to the PACU with all vital signs stable and brisk capillary refill time noted to the foot.     Patient's intraoperative and postoperative disposition was discussed with the family postoperatively. Patient will remain non-weightbearing. Patient will return to the floor when deemed appropriate.     I was present and scrubbed for the entire case. Elements of the case which included but  were not limited to incision, dissection, preparation of site, implant, and closure were performed by KATY Reyes under my direct supervision. All critical elements of this case were performed by me.     POSITION: Supine    ESTIMATED BLOOD LOSS: 25 cc     IMPLANTS: none    DRAINS: None    Specimen:   ID Type Source Tests Collected by Time   1 : RIGHT FORE FOOT TRANSMETATARSAL AMPUTATION Tissue FOOT AMPUTATION RIGHT SURGICAL PATHOLOGY EXAM Haresh Antonio DPM 2/22/2025 1328   2 : 2ND METATARSAL CLEAN MARGIN Tissue BONE RESECTION SURGICAL PATHOLOGY EXAM aHresh Antonio DPM 2/22/2025 1354   3 : 3RD METATARSAL CLEAN MARGIN Tissue BONE RESECTION SURGICAL PATHOLOGY EXAM Haresh Antonio DPM 2/22/2025 1356   A : 2ND METATARSAL BONE FOR C&S Tissue BONE RESECTION TISSUE/WOUND CULTURE/SMEAR Haresh Antonio DPM 2/22/2025 1340   B : 2ND METATARSAL CLEAN MARGIN FOR C&S Tissue BONE RESECTION TISSUE/WOUND CULTURE/SMEAR Haresh Antonio DPM 2/22/2025 1355   C : 3RD METATARSAL CLEAN MARGIN FOR C&S Tissue BONE RESECTION TISSUE/WOUND CULTURE/SMEAR Haresh Antonio DPM 2/22/2025 1357       COMPLICATIONS: None    CONDITION: Satisfactory    DISPOSITION: Back to the [facility] when able.     Haresh Antonio DPM  Work Cell: [phone]  Office phone: [phone]

## 2025-02-22 NOTE — ANESTHESIA POSTPROCEDURE EVALUATION
Patient: Diane Montemayor    Procedure Summary       Date: 02/22/25 Room / Location: [facility] OR 12 / [facility]    Anesthesia Start: 1254 Anesthesia Stop: 1440    Procedures:       AMPUTATION, FOOT, TRANSMETATARSAL (Right: Foot)      RECESSION, MUSCLE, GASTROCNEMIUS (Right: Leg Lower)      INSERTION, DRUG DELIVERY IMPLANT, NON-BIODEGRADABLE (Right: Foot)      APPLICATION, SPLINT, LOWER EXTREMITY (Right: Foot) Diagnosis:       Subacute osteomyelitis of right foot (Multi)      Atherosclerosis of native artery of both lower extremities with rest pain (Multi)      Type 2 diabetes mellitus with diabetic peripheral angiopathy without gangrene, without long-term current use of insulin (Multi)      Peripheral vascular disease, unspecified (CMS-[facility])      Wound of right foot      Type 2 diabetes mellitus with other specified complication, unspecified whether long term insulin use (Multi)      Equinus contracture of ankle      (Subacute osteomyelitis of right foot (Multi) [M86.271])      (Atherosclerosis of native artery of both lower extremities with rest pain (Multi) [I70.223])      (Type 2 diabetes mellitus with diabetic peripheral angiopathy without gangrene, without long-term current use of insulin (Multi) [E11.51])      (Peripheral vascular disease, unspecified (CMS-[facility]) [I73.9])      (Wound of right foot [S91.301A])      (Type 2 diabetes mellitus with other specified complication, unspecified whether long term insulin use (Multi) [E11.69])      (Equinus contracture of ankle [M24.573])    Surgeons: Haresh Antonio DPM Responsible Provider: Lenora Hutton MD    Anesthesia Type: general ASA Status: 3 - Emergent            Anesthesia Type: general    Vitals Value Taken Time   /75 02/22/25 1447   Temp 36.0 02/22/25 1447   Pulse 81 02/22/25 1447   Resp 14 02/22/25 1447   SpO2 99 02/22/25 1447       Anesthesia Post Evaluation    Patient location during evaluation: bedside  Patient participation: complete - patient  participated  Level of consciousness: awake  Pain score: 0  Pain management: adequate  Multimodal analgesia pain management approach  Airway patency: patent  Two or more strategies used to mitigate risk of obstructive sleep apnea  Cardiovascular status: acceptable and blood pressure returned to baseline  Respiratory status: acceptable  Hydration status: acceptable  Postoperative Nausea and Vomiting: none        No notable events documented.

## 2025-02-22 NOTE — PROGRESS NOTES
Infectious Disease Progress Note       2/21/2025     79-year-old female with diabetes mellitus type 2 and peripheral arterial disease presented from podiatry clinic with right foot wound.  Concern for osteomyelitis second and third right foot digit.  MRI 2/18/2025 pending results  X-ray 2/17/2025 with erosions involving the distal third metatarsal and proximal third phalanx concerning for osteomyelitis  PVR pending final results but prelim is showing evidence of mild arterial occlusive disease right lower extremity and mild arterial occlusive disease in the left lower extremity.  Blood cultures x 2 remain normal drawn 2/17/2025.     Patient went down for a procedure but never had the procedure done.  Delayed until tomorrow after 10:00 AM    Lab Results   Component Value Date    WBC 7.1 02/21/2025    HGB 12.9 02/21/2025    HCT 40.0 02/21/2025    MCV 89 02/21/2025     02/21/2025     Lab Results   Component Value Date    GLUCOSE 113 (H) 02/21/2025    CALCIUM 9.2 02/21/2025     02/21/2025    K 4.0 02/21/2025    CO2 31 02/21/2025     02/21/2025    BUN 13 02/21/2025    CREATININE 0.82 02/21/2025       WBC trends are being monitored. Antibiotic doses are being adjusted per most recent renal labs.     Vitals:    02/21/25 2250   BP: 128/61   Pulse: 81   Resp: 17   Temp: 36.6 °C (97.9 °F)   SpO2: 95%     Patient is awake and alert, sitting in a chair, daughter at bedside.  Patient is in a fairly good mood.  NAD  Neck supple  Heart S1S2  Chest: Equal expansion, bilaterally clear to auscultation  Abdomen: soft, ND, NTTP, no guarding  Extrem: Foot is wrapped in dressing.  Photos reviewed  Skin: no rashes, no diaphoresis  Neuro: CNS intact  Affect appropriate and patient is interactive        Patient Active Problem List   Diagnosis    Constipation    Class 3 severe obesity due to excess calories with serious comorbidity and body mass index (BMI) of 40.0 to 44.9 in adult    Hypothyroidism    BMI 40.0-44.9, adult  (Multi)    Type 2 diabetes mellitus    Morbidly obese (Multi)    Depression, unspecified    B12 deficiency    Dizziness    Fatigue    Hypercholesteremia    Hyperlipidemia    Low back pain    Malaise and fatigue    Medicare annual wellness visit, subsequent    Yeast infection    Rheumatoid arthritis involving both shoulders, unspecified whether rheumatoid factor present (Multi)    Acquired absence of left great toe (Multi)    Atherosclerosis of native artery of both lower extremities with rest pain (Multi)    Other acute osteomyelitis, unspecified site    Benign carcinoid tumors of other sites    Pulmonary emphysema, unspecified emphysema type (Multi)    Moderate episode of recurrent major depressive disorder    Type 2 diabetes mellitus with diabetic peripheral angiopathy without gangrene, without long-term current use of insulin (Multi)    Stage 3a chronic kidney disease (Multi)    Coronary artery disease involving native coronary artery of native heart without angina pectoris    Hypertension    BMI 33.0-33.9,adult    Never smoked cigarettes    Wound of right foot    Class 1 obesity due to excess calories without serious comorbidity with body mass index (BMI) of 33.0 to 33.9 in adult    Chronic combined systolic (congestive) and diastolic (congestive) heart failure    Ventricular tachycardia (Multi)    Subacute osteomyelitis of right foot (Multi)    Peripheral vascular disease, unspecified (CMS-[facility])    Equinus contracture of ankle    History of amputation of hallux (Multi)       Assessment:   Right foot osteomyelitis second and third metatarsals  Peripheral arterial disease demonstrated on PVR, angio planned tomorrow right TMA   Diabetes mellitus type 2     Plan:   Will hold antibiotics until after surgical procedure tomorrow.  After her surgical procedure restart meropenem.  Vascular workup in progress  -angio performed which shows one-vessel flow through peroneal which would be enough to heal TMA, per podiatry.   So, TMA planned for tomorrow with antibiotic impregnated cement right foot.  Case scheduled for tomorrow late afternoon.  Discussed with her daughter at bedside as well as the patient.  Optimal glycemic control  MRI with extensive osteomyelitis in the third ray involving the entire third metatarsal, the third proximal phalanx and the third middle phalanx.  This includes osseous destruction.  Septic arthritis of the third metatarsal phalangeal joint with complex fluid and dislocation of the joint.  Patient also has deformity and collapse of the second metatarsal head suggestive of underlying avascular necrosis.    Seems to be tolerating meropenem.  If no evidence of Pseudomonas, may consider Invanz on discharge.  Length of therapy will depend on surgical samples from the margin.  Anticipate at least 2 weeks of IV Invanz to facilitate wound healing postop.  If pathology from the margin shows evidence of osteomyelitis or positive culture then patient will need antibiotic therapy extended to 8 weeks.      Would appreciate cultures and pathology both from surgical margin to help direct therapy.     Of note, she has retained screws/plate and scars bilateral LE  proximal to the foot.       Imaging and labs were reviewed per medical records and any ID pertinent labs were also addressed  Time spent before, during and after care today, including coordination of care >40 min      Olive Nicole DO

## 2025-02-22 NOTE — CARE PLAN
The patient's goals for the shift include comfort and safety    The clinical goals for the shift include patient will not complain of increase in s/sx of pain during shift    Over the shift, the patient did make progress toward the following goals.

## 2025-02-22 NOTE — PROGRESS NOTES
"Diane Montemayor is a 79 y.o. female on day 5 of admission presenting with concern for osteomyelitis     Subjective   Patient seen and examined.  She came back from the OR postop day 0.  Denies any sore throat, shortness of breath no nausea, no vomiting, pain is under control.    Objective     Last Recorded Vitals  /53 (BP Location: Left arm, Patient Position: Lying)   Pulse 62   Temp 36.4 °C (97.5 °F) (Temporal)   Resp 16   Ht 1.676 m (5' 6\")   Wt 80.7 kg (178 lb)   SpO2 92%   BMI 28.73 kg/m²      Intake/Output last 3 Shifts:    Intake/Output Summary (Last 24 hours) at 2/22/2025 1610  Last data filed at 2/22/2025 1305  Gross per 24 hour   Intake 50 ml   Output --   Net 50 ml       Scheduled medications  aspirin, 81 mg, oral, Nightly  cholecalciferol, 4,000 Units, oral, Daily  cyanocobalamin, 1,000 mcg, oral, Daily  dilTIAZem CD, 240 mg, oral, Daily  docusate sodium, 200 mg, oral, Nightly  empagliflozin, 25 mg, oral, Daily  enoxaparin, 40 mg, subcutaneous, q24h  insulin lispro, 0-5 Units, subcutaneous, TID AC  levothyroxine, 150 mcg, oral, Once per day on Sunday Monday Wednesday Friday Saturday  levothyroxine, 300 mcg, oral, Once per day on Tuesday Thursday  losartan, 25 mg, oral, BID  magnesium oxide, 400 mg, oral, Daily  [Held by provider] meropenem, 1 g, intravenous, q8h  pantoprazole, 40 mg, oral, BID AC  rosuvastatin, 10 mg, oral, Nightly  spironolactone, 25 mg, oral, Daily  vilazodone, 40 mg, oral, Daily with breakfast      Continuous medications       PRN medications  PRN medications: acetaminophen **OR** acetaminophen **OR** acetaminophen, acetaminophen **OR** acetaminophen **OR** acetaminophen, ALPRAZolam, ondansetron **OR** ondansetron, oxyCODONE, polyethylene glycol    Physical Exam   Gen: NAD  HEENT: EOM, MMM  CV: RRR, no murmurs rubs or gallops  Resp: coarse rhonchi b/l   Abdomen: soft, NT,+BS  LE: No edema right foot is completely covered in bandage    Relevant Results  Lab Results   Component " Value Date    WBC 7.3 02/22/2025    HGB 12.6 02/22/2025    HCT 38.6 02/22/2025    MCV 89 02/22/2025     02/22/2025     Lab Results   Component Value Date    GLUCOSE 118 (H) 02/22/2025    CALCIUM 9.3 02/22/2025     02/22/2025    K 4.2 02/22/2025    CO2 33 (H) 02/22/2025     02/22/2025    BUN 15 02/22/2025    CREATININE 0.87 02/22/2025         Assessment/Plan 79 year old female admitted with diabetic right foot wound of 2nd and third digits with xray concerning for osteo        She is status post transmetatarsal amputation of the right foot with gastrocnemius recession and application of antibiotic impregnated cement  Postop day 0  ID is following  Cultures were sent from tissue margins, assume if  they come back clear will possibly not need long-term antibiotics  Blood cultures have been negative  PT OT  Nonweightbearing to right lower extremity  Continue aspirin, statin for coronary artery disease  Angio and PVRs showed patent blood flow, seen by vascular  Continue insulin sliding scale  DVT prophylaxis  Continue Jardiance, levothyroxine, losartan, Cardizem, statin, spironolactone  Pain control, supportive care    Michel Field MD

## 2025-02-22 NOTE — CARE PLAN
The patient's goals for the shift include comfort and safety    The clinical goals for the shift include Maintain safety & free from injuries r/t falls and no new skin injuries    Over the shift, the patient did not make progress toward the following goals. Barriers to progression include . Recommendations to address these barriers include   Problem: Pain - Adult  Goal: Verbalizes/displays adequate comfort level or baseline comfort level  Outcome: Progressing     Problem: Safety - Adult  Goal: Free from fall injury  Outcome: Progressing     Problem: Discharge Planning  Goal: Discharge to home or other facility with appropriate resources  Outcome: Progressing     Problem: Chronic Conditions and Co-morbidities  Goal: Patient's chronic conditions and co-morbidity symptoms are monitored and maintained or improved  Outcome: Progressing     Problem: Nutrition  Goal: Nutrient intake appropriate for maintaining nutritional needs  Outcome: Progressing     Problem: Fall/Injury  Goal: Not fall by end of shift  Outcome: Progressing  Goal: Be free from injury by end of the shift  Outcome: Progressing  Goal: Verbalize understanding of personal risk factors for fall in the hospital  Outcome: Progressing  Goal: Verbalize understanding of risk factor reduction measures to prevent injury from fall in the home  Outcome: Progressing  Goal: Use assistive devices by end of the shift  Outcome: Progressing  Goal: Pace activities to prevent fatigue by end of the shift  Outcome: Progressing     Problem: Diabetes  Goal: Achieve decreasing blood glucose levels by end of shift  Outcome: Progressing  Goal: Increase stability of blood glucose readings by end of shift  Outcome: Progressing  Goal: Maintain electrolyte levels within acceptable range throughout shift  Outcome: Progressing  Goal: Maintain glucose levels >70mg/dl to <250mg/dl throughout shift  Outcome: Progressing  Goal: No changes in neurological exam by end of shift  Outcome:  Progressing  Goal: Learn about and adhere to nutrition recommendations by end of shift  Outcome: Progressing  Goal: Vital signs within normal range for age by end of shift  Outcome: Progressing  Goal: Increase self care and/or family involovement by end of shift  Outcome: Progressing  Goal: Receive DSME education by end of shift  Outcome: Progressing     Problem: Skin  Goal: Decreased wound size/increased tissue granulation at next dressing change  Outcome: Progressing  Goal: Participates in plan/prevention/treatment measures  Outcome: Progressing  Goal: Prevent/manage excess moisture  Outcome: Progressing  Goal: Prevent/minimize sheer/friction injuries  Outcome: Progressing  Goal: Promote/optimize nutrition  Outcome: Progressing  Goal: Promote skin healing  Outcome: Progressing   .

## 2025-02-22 NOTE — CARE PLAN
Problem: Pain - Adult  Goal: Verbalizes/displays adequate comfort level or baseline comfort level  Outcome: Progressing     Problem: Safety - Adult  Goal: Free from fall injury  Outcome: Progressing     Problem: Discharge Planning  Goal: Discharge to home or other facility with appropriate resources  Outcome: Progressing     Problem: Chronic Conditions and Co-morbidities  Goal: Patient's chronic conditions and co-morbidity symptoms are monitored and maintained or improved  Outcome: Progressing     Problem: Nutrition  Goal: Nutrient intake appropriate for maintaining nutritional needs  Outcome: Progressing     Problem: Fall/Injury  Goal: Not fall by end of shift  Outcome: Progressing  Goal: Be free from injury by end of the shift  Outcome: Progressing  Goal: Verbalize understanding of personal risk factors for fall in the hospital  Outcome: Progressing  Goal: Verbalize understanding of risk factor reduction measures to prevent injury from fall in the home  Outcome: Progressing  Goal: Use assistive devices by end of the shift  Outcome: Progressing  Goal: Pace activities to prevent fatigue by end of the shift  Outcome: Progressing     Problem: Diabetes  Goal: Achieve decreasing blood glucose levels by end of shift  Outcome: Progressing  Goal: Increase stability of blood glucose readings by end of shift  Outcome: Progressing  Goal: Maintain electrolyte levels within acceptable range throughout shift  Outcome: Progressing  Goal: Maintain glucose levels >70mg/dl to <250mg/dl throughout shift  Outcome: Progressing  Goal: No changes in neurological exam by end of shift  Outcome: Progressing  Goal: Learn about and adhere to nutrition recommendations by end of shift  Outcome: Progressing  Goal: Vital signs within normal range for age by end of shift  Outcome: Progressing  Goal: Increase self care and/or family involovement by end of shift  Outcome: Progressing  Goal: Receive DSME education by end of shift  Outcome:  Progressing     Problem: Skin  Goal: Decreased wound size/increased tissue granulation at next dressing change  Outcome: Progressing  Goal: Participates in plan/prevention/treatment measures  Outcome: Progressing  Goal: Prevent/manage excess moisture  Outcome: Progressing  Goal: Prevent/minimize sheer/friction injuries  Outcome: Progressing   The patient's goals for the shift include comfort and safety    The clinical goals for the shift include patient will not complain of increase in s/sx of pain during shift    Over the shift, the patient did make progress toward the following goals.

## 2025-02-22 NOTE — ANESTHESIA PREPROCEDURE EVALUATION
Patient: Diane Montemayor    Procedure Information       Date/Time: 02/22/25 1200    Procedures:       AMPUTATION, FOOT, TRANSMETATARSAL (Right: Foot)      RECESSION, MUSCLE, GASTROCNEMIUS (Right: Leg Lower)      INSERTION, DRUG DELIVERY IMPLANT, NON-BIODEGRADABLE (Right: Foot)      APPLICATION, SPLINT, LOWER EXTREMITY (Right: Foot)    Location: [facility] / [facility]    Surgeons: Haresh Antonio DPM            Relevant Problems   Cardiac   (+) Atherosclerosis of native artery of both lower extremities with rest pain (Multi)   (+) Coronary artery disease involving native coronary artery of native heart without angina pectoris   (+) Hypercholesteremia   (+) Hyperlipidemia   (+) Hypertension   (+) Peripheral vascular disease, unspecified (CMS-HCC)   (+) Type 2 diabetes mellitus with diabetic peripheral angiopathy without gangrene, without long-term current use of insulin (Multi)   (+) Ventricular tachycardia (Multi)      Pulmonary   (+) Pulmonary emphysema, unspecified emphysema type (Multi)      Neuro   (+) Depression, unspecified   (+) Moderate episode of recurrent major depressive disorder      Endocrine   (+) Class 1 obesity due to excess calories without serious comorbidity with body mass index (BMI) of 33.0 to 33.9 in adult   (+) Hypothyroidism   (+) Morbidly obese (Multi)   (+) Type 2 diabetes mellitus   (+) Type 2 diabetes mellitus with diabetic peripheral angiopathy without gangrene, without long-term current use of insulin (Multi)      Musculoskeletal   (+) Rheumatoid arthritis involving both shoulders, unspecified whether rheumatoid factor present (Multi)   (+) Wound of right foot      ID   (+) Other acute osteomyelitis, unspecified site   (+) Subacute osteomyelitis of right foot (Multi)   (+) Yeast infection      Genitourinary   (+) Stage 3a chronic kidney disease (Multi)       Clinical information reviewed:   Tobacco  Allergies  Meds   Med Hx  Surg Hx  OB Status  Fam Hx  Soc   Hx        NPO  Detail:  No data recorded     Physical Exam    Airway  Mallampati: II  TM distance: >3 FB  Neck ROM: full     Cardiovascular   Rhythm: regular  Rate: normal     Dental   (+) upper dentures, lower dentures     Pulmonary - normal exam     Abdominal - normal exam             Anesthesia Plan    History of general anesthesia?: yes  History of complications of general anesthesia?: no    ASA 3 - emergent     general   (GETA)  The patient is not a current smoker.  Education provided regarding risk of obstructive sleep apnea.  intravenous induction   Postoperative administration of opioids is intended.  Anesthetic plan and risks discussed with patient.  Use of blood products discussed with patient who consented to blood products.

## 2025-02-22 NOTE — H&P
PODIATRIC HISTORY AND PHYSICAL UPDATE                                            EVALUATION DATE: 2/22/2025   EVALUATION TIME: 11:18 AM    The documented History and Physical (completed in the past 30 days) has been reviewed and the patient had a confirmatory musculoskeletal exam performed. The contents of the pre-operative assesment accurately reflect the patient's condition with the following additions or revisions since the H&P was completed:  no change    This H&P can be found in the record dated 2/17/25.    SIGNATURE: Haresh Antonio DPM PATIENT NAME: Diane Montemayor   DATE: February 22, 2025 MRN: [id number]     Haresh Antonio DPM  Work Cell: [phone]  Office phone: [phone]]

## 2025-02-22 NOTE — CARE PLAN
PODIATRIC MEDICINE AND SURGERY   Plan of Care    POD #0 s/p TMA +GR + abx impregnated cement + application of splint. Operative site was closed.     Please do not change the Right foot dressings.  Podiatry will change the first postop dressing. If strike through occurs, elevate the leg on pillows.  Nursing then can reinforce dressing with ABD, Kerlix, and ACE over top of existing dressing.     NWB to RLE    PT/OT eval.     Would recommend facility placement     Elevate RLE at or above the level of the heart. Prevalon boots on when in bed.    Pain control per primary team.     Will follow OR cultures     Antibiotics per ID    Podiatry will continue to follow     Haresh Antonio DPM  Work Cell: [phone]  Office phone: [phone]

## 2025-02-22 NOTE — ANESTHESIA PROCEDURE NOTES
Airway  Date/Time: 2/22/2025 1:00 PM  Urgency: elective      Staffing  Performed: attending   Authorized by: Lenora Hutton MD    Performed by: Lenora Hutton MD  Patient location during procedure: OR    Indications and Patient Condition  Indications for airway management: anesthesia and airway protection  Spontaneous ventilation: present  Sedation level: deep  Preoxygenated: yes  Patient position: sniffing  Mask difficulty assessment: 1 - vent by mask    Final Airway Details  Final airway type: endotracheal airway      Successful airway: ETT  Cuffed: yes   Successful intubation technique: direct laryngoscopy  Facilitating devices/methods: intubating stylet  Endotracheal tube insertion site: oral  Blade: Dmitri  Blade size: #3  ETT size (mm): 7.0  Cormack-Lehane Classification: grade IIa - partial view of glottis  Placement verified by: chest auscultation and capnometry   Measured from: gums  ETT to gums (cm): 20  Number of attempts at approach: 1

## 2025-02-22 NOTE — BRIEF OP NOTE
Date: 2025 - 2025  OR Location: [facility]    Name: Diane Montemayor, : 1945, Age: 79 y.o., MRN: [id number], Sex: female    Diagnosis  Pre-op Diagnosis      * Subacute osteomyelitis of right foot (Multi) [M86.271]     * Atherosclerosis of native artery of both lower extremities with rest pain (Multi) [I70.223]     * Type 2 diabetes mellitus with diabetic peripheral angiopathy without gangrene, without long-term current use of insulin (Multi) [E11.51]     * Peripheral vascular disease, unspecified (CMS-[facility]) [I73.9]     * Wound of right foot [S91.301A]     * Type 2 diabetes mellitus with other specified complication, unspecified whether long term insulin use (Multi) [E11.69]     * Equinus contracture of ankle [M24.573] Post-op Diagnosis     * Subacute osteomyelitis of right foot (Multi) [M86.271]     * Atherosclerosis of native artery of both lower extremities with rest pain (Multi) [I70.223]     * Type 2 diabetes mellitus with diabetic peripheral angiopathy without gangrene, without long-term current use of insulin (Multi) [E11.51]     * Peripheral vascular disease, unspecified (CMS-[facility]) [I73.9]     * Wound of right foot [S91.301A]     * Type 2 diabetes mellitus with other specified complication, unspecified whether long term insulin use (Multi) [E11.69]     * Equinus contracture of ankle [M24.573]     Procedures  AMPUTATION, FOOT, TRANSMETATARSAL  79472 - TX AMPUTATION FOOT TRANSMETARSAL    RECESSION, MUSCLE, GASTROCNEMIUS  57283 - TX GASTROCNEMIUS RECESSION    INSERTION, DRUG DELIVERY IMPLANT, NON-BIODEGRADABLE  87158 - TX INSERTION DRUG DELIVERY IMPLANT    APPLICATION, SPLINT, LOWER EXTREMITY  66617 - [place] APPLICATION SHORT LEG SPLINT CALF FOOT      Surgeons      * Haresh Antonio - Primary    Resident/Fellow/Other Assistant:  Surgeons and Role:  * No surgeons found with a matching role *    Staff:   Surgical Assistant: Eric Benjamin Person: Anna Marie  Circulator: Joseluis    Anesthesia Staff: Anesthesiologist:  Lenora Hutton MD    Procedure Summary  Anesthesia: General  ASA: III  Estimated Blood Loss: 25 mL  Intra-op Medications:   Administrations occurring from 1200 to 1630 on 02/22/25:   Medication Name Total Dose   sodium chloride 0.9 % irrigation solution 1,000 mL   vancomycin (Vancocin) vial for injection 1 g   BUPivacaine HCl (Marcaine) 0.5 % (5 mg/mL) injection 20 mL   tobramycin (Nebcin) injection 1.2 mg   ALPRAZolam (Xanax) tablet 0.25 mg Cannot be calculated   aspirin EC tablet 81 mg Cannot be calculated   cholecalciferol (Vitamin D-3) tablet 4,000 Units Cannot be calculated   cyanocobalamin (Vitamin B-12) tablet 1,000 mcg Cannot be calculated   dilTIAZem CD (Cardizem CD) 24 hr capsule 240 mg Cannot be calculated   docusate sodium (Colace) capsule 200 mg Cannot be calculated   empagliflozin (Jardiance) tablet 25 mg Cannot be calculated   enoxaparin (Lovenox) syringe 40 mg Cannot be calculated   insulin lispro injection 0-5 Units Cannot be calculated   levothyroxine (Synthroid, Levoxyl) tablet 150 mcg Cannot be calculated   levothyroxine (Synthroid, Levoxyl) tablet 300 mcg Cannot be calculated   losartan (Cozaar) tablet 25 mg Cannot be calculated   magnesium oxide (Mag-Ox) tablet 400 mg Cannot be calculated   oxyCODONE (Roxicodone) immediate release tablet 5 mg Cannot be calculated   pantoprazole (ProtoNix) EC tablet 40 mg Cannot be calculated   polyethylene glycol (Glycolax, Miralax) packet 17 g Cannot be calculated   rosuvastatin (Crestor) tablet 10 mg Cannot be calculated   spironolactone (Aldactone) tablet 25 mg Cannot be calculated   vilazodone (Viibryd) tablet 40 mg Cannot be calculated   ceFAZolin (Ancef) 2 g in dextrose (iso) IV 50 mL 2 g   dexAMETHasone (Decadron) 4 mg/mL 4 mg   fentaNYL (Sublimaze) injection 50 mcg/mL 100 mcg   LR infusion Cannot be calculated   lidocaine (Xylocaine) injection 2 % 60 mg   phenylephrine 40 mcg/mL syringe 10 mL 300 mcg   propofol (Diprivan) injection 10 mg/mL 130 mg    rocuronium (ZeMuron) 50 mg/5 mL injection 50 mg              Anesthesia Record               Intraprocedure I/O Totals          Intake    ceFAZolin (Ancef) 2 g in dextrose (iso) IV 50 mL 50.00 mL    Total Intake 50 mL          Specimen:   ID Type Source Tests Collected by Time   1 : RIGHT FORE FOOT TRANSMETATARSAL AMPUTATION Tissue FOOT AMPUTATION RIGHT SURGICAL PATHOLOGY EXAM Haresh Antonio DPM 2/22/2025 1328   2 : 2ND METATARSAL CLEAN MARGIN Tissue BONE RESECTION SURGICAL PATHOLOGY EXAM Haresh Antonio DPM 2/22/2025 1354   3 : 3RD METATARSAL CLEAN MARGIN Tissue BONE RESECTION SURGICAL PATHOLOGY EXAM Haresh Antonio DPM 2/22/2025 1356   A : 2ND METATARSAL BONE FOR C&S Tissue BONE RESECTION TISSUE/WOUND CULTURE/SMEAR Haresh Antonio DPM 2/22/2025 1340   B : 2ND METATARSAL CLEAN MARGIN FOR C&S Tissue BONE RESECTION TISSUE/WOUND CULTURE/SMEAR Haresh Antonio DPM 2/22/2025 1355   C : 3RD METATARSAL CLEAN MARGIN FOR C&S Tissue BONE RESECTION TISSUE/WOUND CULTURE/SMEAR Haresh Antonio DPM 2/22/2025 1357        Findings: see op note    Complications:  None; patient tolerated the procedure well.     Disposition:  To ICU stable recovery   Condition: stable  Specimens Collected:   ID Type Source Tests Collected by Time   1 : RIGHT FORE FOOT TRANSMETATARSAL AMPUTATION Tissue FOOT AMPUTATION RIGHT SURGICAL PATHOLOGY EXAM Haresh Antonio DPM 2/22/2025 1328   2 : 2ND METATARSAL CLEAN MARGIN Tissue BONE RESECTION SURGICAL PATHOLOGY EXAM Haresh Antonio DPM 2/22/2025 1354   3 : 3RD METATARSAL CLEAN MARGIN Tissue BONE RESECTION SURGICAL PATHOLOGY EXAM Haresh Antonio DPM 2/22/2025 1356   A : 2ND METATARSAL BONE FOR C&S Tissue BONE RESECTION TISSUE/WOUND CULTURE/SMEAR Haresh Antonio DPM 2/22/2025 1340   B : 2ND METATARSAL CLEAN MARGIN FOR C&S Tissue BONE RESECTION TISSUE/WOUND CULTURE/SMEAR Haresh Antonio DPM 2/22/2025 1355   C : 3RD METATARSAL CLEAN MARGIN FOR C&S Tissue BONE RESECTION TISSUE/WOUND CULTURE/SMEAR Haresh Antonio DPM  2/22/2025 2672     Attending Attestation: I was present and scrubbed for the entire procedure.    Haresh Antonio  Phone Number: [phone]

## 2025-02-23 VITALS
BODY MASS INDEX: 28.61 KG/M2 | RESPIRATION RATE: 16 BRPM | TEMPERATURE: 100.8 F | HEIGHT: 66 IN | OXYGEN SATURATION: 93 % | WEIGHT: 178 LBS | SYSTOLIC BLOOD PRESSURE: 119 MMHG | DIASTOLIC BLOOD PRESSURE: 57 MMHG | HEART RATE: 69 BPM

## 2025-02-23 LAB
ANION GAP SERPL CALC-SCNC: 10 MMOL/L (ref 10–20)
ATRIAL RATE: 75 BPM
BACTERIA SPEC CULT: NORMAL
BUN SERPL-MCNC: 17 MG/DL (ref 6–23)
CALCIUM SERPL-MCNC: 8.9 MG/DL (ref 8.6–10.3)
CHLORIDE SERPL-SCNC: 103 MMOL/L (ref 98–107)
CO2 SERPL-SCNC: 29 MMOL/L (ref 21–32)
CREAT SERPL-MCNC: 0.8 MG/DL (ref 0.5–1.05)
EGFRCR SERPLBLD CKD-EPI 2021: 75 ML/MIN/1.73M*2
ERYTHROCYTE [DISTWIDTH] IN BLOOD BY AUTOMATED COUNT: 14.6 % (ref 11.5–14.5)
GLUCOSE BLD MANUAL STRIP-MCNC: 152 MG/DL (ref 74–99)
GLUCOSE BLD MANUAL STRIP-MCNC: 167 MG/DL (ref 74–99)
GLUCOSE BLD MANUAL STRIP-MCNC: 216 MG/DL (ref 74–99)
GLUCOSE BLD MANUAL STRIP-MCNC: 236 MG/DL (ref 74–99)
GLUCOSE SERPL-MCNC: 242 MG/DL (ref 74–99)
GRAM STN SPEC: NORMAL
HCT VFR BLD AUTO: 35.9 % (ref 36–46)
HGB BLD-MCNC: 11.4 G/DL (ref 12–16)
MCH RBC QN AUTO: 28.8 PG (ref 26–34)
MCHC RBC AUTO-ENTMCNC: 31.8 G/DL (ref 32–36)
MCV RBC AUTO: 91 FL (ref 80–100)
NRBC BLD-RTO: 0 /100 WBCS (ref 0–0)
P AXIS: 22 DEGREES
P OFFSET: 148 MS
P ONSET: 93 MS
PLATELET # BLD AUTO: 250 X10*3/UL (ref 150–450)
POTASSIUM SERPL-SCNC: 4 MMOL/L (ref 3.5–5.3)
PR INTERVAL: 254 MS
Q ONSET: 220 MS
QRS COUNT: 12 BEATS
QRS DURATION: 140 MS
QT INTERVAL: 434 MS
QTC CALCULATION(BAZETT): 484 MS
QTC FREDERICIA: 467 MS
R AXIS: 54 DEGREES
RBC # BLD AUTO: 3.96 X10*6/UL (ref 4–5.2)
SODIUM SERPL-SCNC: 138 MMOL/L (ref 136–145)
T AXIS: 33 DEGREES
T OFFSET: 437 MS
VENTRICULAR RATE: 75 BPM
WBC # BLD AUTO: 12.7 X10*3/UL (ref 4.4–11.3)

## 2025-02-23 PROCEDURE — 99232 SBSQ HOSP IP/OBS MODERATE 35: CPT | Performed by: INTERNAL MEDICINE

## 2025-02-23 PROCEDURE — 80048 BASIC METABOLIC PNL TOTAL CA: CPT | Performed by: STUDENT IN AN ORGANIZED HEALTH CARE EDUCATION/TRAINING PROGRAM

## 2025-02-23 PROCEDURE — 82947 ASSAY GLUCOSE BLOOD QUANT: CPT

## 2025-02-23 PROCEDURE — 85027 COMPLETE CBC AUTOMATED: CPT | Performed by: STUDENT IN AN ORGANIZED HEALTH CARE EDUCATION/TRAINING PROGRAM

## 2025-02-23 PROCEDURE — 2500000004 HC RX 250 GENERAL PHARMACY W/ HCPCS (ALT 636 FOR OP/ED): Performed by: INTERNAL MEDICINE

## 2025-02-23 PROCEDURE — 2500000002 HC RX 250 W HCPCS SELF ADMINISTERED DRUGS (ALT 637 FOR MEDICARE OP, ALT 636 FOR OP/ED)

## 2025-02-23 PROCEDURE — 2500000004 HC RX 250 GENERAL PHARMACY W/ HCPCS (ALT 636 FOR OP/ED)

## 2025-02-23 PROCEDURE — 99232 SBSQ HOSP IP/OBS MODERATE 35: CPT | Performed by: STUDENT IN AN ORGANIZED HEALTH CARE EDUCATION/TRAINING PROGRAM

## 2025-02-23 PROCEDURE — 1210000001 HC SEMI-PRIVATE ROOM DAILY

## 2025-02-23 PROCEDURE — 2500000001 HC RX 250 WO HCPCS SELF ADMINISTERED DRUGS (ALT 637 FOR MEDICARE OP)

## 2025-02-23 PROCEDURE — 97161 PT EVAL LOW COMPLEX 20 MIN: CPT | Mod: GP

## 2025-02-23 PROCEDURE — 97165 OT EVAL LOW COMPLEX 30 MIN: CPT | Mod: GO

## 2025-02-23 PROCEDURE — 36415 COLL VENOUS BLD VENIPUNCTURE: CPT | Performed by: STUDENT IN AN ORGANIZED HEALTH CARE EDUCATION/TRAINING PROGRAM

## 2025-02-23 RX ADMIN — DOCUSATE SODIUM 200 MG: 100 CAPSULE, LIQUID FILLED ORAL at 21:50

## 2025-02-23 RX ADMIN — ROSUVASTATIN CALCIUM 10 MG: 10 TABLET, FILM COATED ORAL at 21:51

## 2025-02-23 RX ADMIN — INSULIN LISPRO 1 UNITS: 100 INJECTION, SOLUTION INTRAVENOUS; SUBCUTANEOUS at 06:29

## 2025-02-23 RX ADMIN — ENOXAPARIN SODIUM 40 MG: 40 INJECTION SUBCUTANEOUS at 17:18

## 2025-02-23 RX ADMIN — LEVOTHYROXINE SODIUM 150 MCG: 75 TABLET ORAL at 06:31

## 2025-02-23 RX ADMIN — LOSARTAN POTASSIUM 25 MG: 25 TABLET, FILM COATED ORAL at 21:50

## 2025-02-23 RX ADMIN — EMPAGLIFLOZIN 25 MG: 25 TABLET, FILM COATED ORAL at 08:25

## 2025-02-23 RX ADMIN — Medication 1000 MCG: at 08:25

## 2025-02-23 RX ADMIN — ASPIRIN 81 MG: 81 TABLET, COATED ORAL at 21:50

## 2025-02-23 RX ADMIN — INSULIN LISPRO 1 UNITS: 100 INJECTION, SOLUTION INTRAVENOUS; SUBCUTANEOUS at 17:18

## 2025-02-23 RX ADMIN — PANTOPRAZOLE SODIUM 40 MG: 40 TABLET, DELAYED RELEASE ORAL at 17:18

## 2025-02-23 RX ADMIN — MEROPENEM 1 G: 1 INJECTION, POWDER, FOR SOLUTION INTRAVENOUS at 19:47

## 2025-02-23 RX ADMIN — MAGNESIUM OXIDE 400 MG (241.3 MG MAGNESIUM) TABLET 400 MG: TABLET at 08:25

## 2025-02-23 RX ADMIN — ACETAMINOPHEN 650 MG: 325 TABLET ORAL at 21:50

## 2025-02-23 RX ADMIN — Medication 4000 UNITS: at 08:24

## 2025-02-23 RX ADMIN — SPIRONOLACTONE 25 MG: 25 TABLET ORAL at 08:25

## 2025-02-23 RX ADMIN — VILAZODONE HYDROCHLORIDE 40 MG: 40 TABLET ORAL at 08:24

## 2025-02-23 RX ADMIN — DILTIAZEM HYDROCHLORIDE 240 MG: 240 CAPSULE, EXTENDED RELEASE ORAL at 08:25

## 2025-02-23 RX ADMIN — PANTOPRAZOLE SODIUM 40 MG: 40 TABLET, DELAYED RELEASE ORAL at 06:30

## 2025-02-23 RX ADMIN — INSULIN LISPRO 2 UNITS: 100 INJECTION, SOLUTION INTRAVENOUS; SUBCUTANEOUS at 11:34

## 2025-02-23 RX ADMIN — LOSARTAN POTASSIUM 25 MG: 25 TABLET, FILM COATED ORAL at 08:25

## 2025-02-23 ASSESSMENT — COGNITIVE AND FUNCTIONAL STATUS - GENERAL
DAILY ACTIVITIY SCORE: 15
MOVING TO AND FROM BED TO CHAIR: A LOT
DRESSING REGULAR UPPER BODY CLOTHING: A LITTLE
TURNING FROM BACK TO SIDE WHILE IN FLAT BAD: A LITTLE
DRESSING REGULAR UPPER BODY CLOTHING: A LITTLE
MOBILITY SCORE: 13
MOVING FROM LYING ON BACK TO SITTING ON SIDE OF FLAT BED WITH BEDRAILS: A LITTLE
TOILETING: A LOT
DRESSING REGULAR LOWER BODY CLOTHING: A LOT
MOBILITY SCORE: 14
WALKING IN HOSPITAL ROOM: A LOT
TURNING FROM BACK TO SIDE WHILE IN FLAT BAD: A LITTLE
DAILY ACTIVITIY SCORE: 18
STANDING UP FROM CHAIR USING ARMS: A LOT
PERSONAL GROOMING: A LITTLE
WALKING IN HOSPITAL ROOM: A LOT
CLIMB 3 TO 5 STEPS WITH RAILING: TOTAL
HELP NEEDED FOR BATHING: A LOT
HELP NEEDED FOR BATHING: A LOT
EATING MEALS: A LITTLE
MOVING TO AND FROM BED TO CHAIR: A LOT
DRESSING REGULAR LOWER BODY CLOTHING: A LITTLE
DAILY ACTIVITIY SCORE: 21
TURNING FROM BACK TO SIDE WHILE IN FLAT BAD: A LITTLE
TOILETING: A LOT
STANDING UP FROM CHAIR USING ARMS: A LOT
CLIMB 3 TO 5 STEPS WITH RAILING: TOTAL
MOVING TO AND FROM BED TO CHAIR: A LOT
TOILETING: A LOT
DRESSING REGULAR LOWER BODY CLOTHING: A LITTLE
STANDING UP FROM CHAIR USING ARMS: A LOT

## 2025-02-23 ASSESSMENT — PAIN SCALES - WONG BAKER: WONGBAKER_NUMERICALRESPONSE: NO HURT

## 2025-02-23 ASSESSMENT — PAIN SCALES - GENERAL
PAINLEVEL_OUTOF10: 0 - NO PAIN

## 2025-02-23 ASSESSMENT — PAIN - FUNCTIONAL ASSESSMENT
PAIN_FUNCTIONAL_ASSESSMENT: 0-10

## 2025-02-23 ASSESSMENT — ACTIVITIES OF DAILY LIVING (ADL): BATHING_ASSISTANCE: MODERATE

## 2025-02-23 NOTE — PROGRESS NOTES
"Infectious Disease Progress Note       2/23/2025     79-year-old female with diabetes mellitus type 2 and peripheral arterial disease presented from podiatry clinic with right foot wound.  Concern for osteomyelitis second and third right foot digit.  MRI 2/18/2025 pending results  X-ray 2/17/2025 with erosions involving the distal third metatarsal and proximal third phalanx concerning for osteomyelitis  PVR pending final results but prelim is showing evidence of mild arterial occlusive disease right lower extremity and mild arterial occlusive disease in the left lower extremity.  Blood cultures x 2 remain normal drawn 2/17/2025.     Postop status post right foot TMA plus GR plus antibiotic impregnated cement and application of splint per podiatry note.  Operative site was closed.  Operative note reviewed  \"Distal shaft and head of the second and third metatarsals were noted to be soft consistent with diagnosis of osteomyelitis.  Bone at margins was noted to be hard.  Good fill of antibiotic impregnated cement to the second and third metatarsals.  Post gastrocnemius recession much improved ankle joint dorsiflexion. good closure of flaps noted\"    Cultures from bone resection x 3 obtained 2/22/2025 all negative to date  Pathology pending      Lab Results   Component Value Date    WBC 7.3 02/22/2025    HGB 12.6 02/22/2025    HCT 38.6 02/22/2025    MCV 89 02/22/2025     02/22/2025     Lab Results   Component Value Date    GLUCOSE 118 (H) 02/22/2025    CALCIUM 9.3 02/22/2025     02/22/2025    K 4.2 02/22/2025    CO2 33 (H) 02/22/2025     02/22/2025    BUN 15 02/22/2025    CREATININE 0.87 02/22/2025       WBC trends are being monitored. Antibiotic doses are being adjusted per most recent renal labs.     Vitals:    02/23/25 0821   BP: 128/60   Pulse: 67   Resp:    Temp: 37.1 °C (98.8 °F)   SpO2: 94%     Patient is awake and alert, sitting in a chair, daughter at bedside.  Patient is in a fairly good " mood.  NAD  Neck supple  Heart S1S2  Chest: Equal expansion, bilaterally clear to auscultation  Abdomen: soft, ND, NTTP, no guarding  Extrem: Foot is wrapped in dressing.  Photos reviewed  Skin: no rashes, no diaphoresis  Neuro: CNS intact  Affect appropriate and patient is interactive        Patient Active Problem List   Diagnosis    Constipation    Class 3 severe obesity due to excess calories with serious comorbidity and body mass index (BMI) of 40.0 to 44.9 in adult    Hypothyroidism    BMI 40.0-44.9, adult (Multi)    Type 2 diabetes mellitus    Morbidly obese (Multi)    Depression, unspecified    B12 deficiency    Dizziness    Fatigue    Hypercholesteremia    Hyperlipidemia    Low back pain    Malaise and fatigue    Medicare annual wellness visit, subsequent    Yeast infection    Rheumatoid arthritis involving both shoulders, unspecified whether rheumatoid factor present (Multi)    Acquired absence of left great toe (Multi)    Atherosclerosis of native artery of both lower extremities with rest pain (Multi)    Other acute osteomyelitis, unspecified site    Benign carcinoid tumors of other sites    Pulmonary emphysema, unspecified emphysema type (Multi)    Moderate episode of recurrent major depressive disorder    Type 2 diabetes mellitus with diabetic peripheral angiopathy without gangrene, without long-term current use of insulin (Multi)    Stage 3a chronic kidney disease (Multi)    Coronary artery disease involving native coronary artery of native heart without angina pectoris    Hypertension    BMI 33.0-33.9,adult    Never smoked cigarettes    Wound of right foot    Class 1 obesity due to excess calories without serious comorbidity with body mass index (BMI) of 33.0 to 33.9 in adult    Chronic combined systolic (congestive) and diastolic (congestive) heart failure    Ventricular tachycardia (Multi)    Subacute osteomyelitis of right foot (Multi)    Peripheral vascular disease, unspecified (CMS-[facility])     Equinus contracture of ankle    History of amputation of hallux (Multi)       Assessment:   Right foot osteomyelitis second and third metatarsals  Peripheral arterial disease demonstrated on PVR, angio planned tomorrow right TMA   Diabetes mellitus type 2     Plan:   Meropenem was held preop.  Patient received postop antibiotics.  Restarting meropenem today.     Optimal glycemic control    If no evidence of Pseudomonas, changed to Invanz on discharge. Length of therapy will depend on surgical samples from the margin.  Anticipate at least 2 weeks of IV Invanz to facilitate wound healing postop.  If pathology from the margin shows evidence of osteomyelitis or positive culture then patient will need antibiotic therapy extended to 8 weeks.    Weekly CBC BMP CRP while on antibiotics.    Please ensure that patient's labs are faxed to [phone].   In case of any questions, please call the [facility] outpatient infusion center infectious disease office at [phone] Monday through Friday from 9 AM to 4 PM.  There is no answering service after hours or on weekends.  An epic chat message must be sent after hours, please direct that message to ALL Dr. Olive Nicole, Dr Jair Giron, and Lynnette Cannon  Thank you for your cooperation in this matter.  We will not be able to follow labs without this step.    Of note, she has retained screws/plate and scars bilateral LE  proximal to the foot.       Imaging and labs were reviewed per medical records and any ID pertinent labs were also addressed  Time spent before, during and after care today, including coordination of care >40 min      Olive Nicole,

## 2025-02-23 NOTE — PROGRESS NOTES
PODIATRIC MEDICINE AND SURGERY   CONSULT PROGRESS NOTE    Interval HPI:  - Patient resting comfortably in chair  - vital signs stable per recorded values  - Labs stable  - pain well controlled   - s/p TMA +GR + abx impregnated cement + application of splint (2/22/25)    ASSESSMENT:    Patient is a 79 y.o. female with pmh consistent for DM, previous amputation secondary to OM, Hypothyroidism, RA, PAD, PE, CKD, HTN, HF. Patient was admitted for concern of OM. Podiatric consultation was requested for previous concern. Patient is a known patient from my private practice. MRI positive to 2/3 mets. One vessel flow but vascular feels should be enough to heal. Now s/p TMA +GR + abx impregnated cement + application of splint (2/22/25)    PLAN AND RECOMMENDATIONS:  - Patient seen and evaluated   - Please do not change the Right foot dressings.  Podiatry will change the first postop dressing. If strike through occurs, elevate the leg on pillows.  Nursing then can reinforce dressing with ABD, Kerlix, and ACE over top of existing dressing.   - First Dressing change will be Tuesday. Pending dressing change will then be cleared for discharge from podiatry standpoint   - ID on board. Appreciate input. Antibiotics per their recommendations.   - Will follow OR cultures   - R foot dressed with betadine wet to dry dressing. Dressing should remain clean, dry, and intact.   - NWB to RLE  - PT/OT eval.  - Would recommend facility placement   - Elevate RLE at or above the level of the heart.  - Pain management per primary team.  - Podiatry to continue to follow       Past Medical History:   Diagnosis Date    Body mass index (BMI) 39.0-39.9, adult 02/23/2022    BMI 39.0-39.9,adult    Body mass index (BMI) 39.0-39.9, adult 06/30/2021    BMI 39.0-39.9,adult    Morbid (severe) obesity due to excess calories (Multi) 02/23/2022    Class 2 severe obesity due to excess calories with serious comorbidity and body mass index (BMI) of 39.0 to 39.9  in adult    Morbid (severe) obesity due to excess calories (Multi) 06/30/2021    Class 2 severe obesity due to excess calories with serious comorbidity and body mass index (BMI) of 37.0 to 37.9 in adult    Other acute sinusitis 12/31/2019    Other acute sinusitis, recurrence not specified    Personal history of other diseases of the musculoskeletal system and connective tissue     History of osteomyelitis    Personal history of other diseases of the respiratory system     History of chronic obstructive lung disease    Personal history of other drug therapy 11/14/2019    History of influenza vaccination    Personal history of other endocrine, nutritional and metabolic disease     History of hyperlipidemia    Personal history of other endocrine, nutritional and metabolic disease     History of hypothyroidism    Personal history of other mental and behavioral disorders     History of major depression    Personal history of other specified conditions 09/04/2019    History of wheezing    Systemic lupus erythematosus, unspecified     Lupus    Type 2 diabetes mellitus with other diabetic neurological complication     Type 2 diabetes mellitus with other neurologic complication, with long-term current use of insulin     Past Surgical History:   Procedure Laterality Date    AMPUTATION      CT ANGIO AORTA AND BILATERAL ILIOFEMORAL RUN OFF INCLUDING WITHOUT CONTRAST IF PERFORMED  03/17/2021    CT AORTA AND BILATERAL ILIOFEMORAL RUNOFF ANGIOGRAM W AND/OR WO IV CONTRAST 3/17/2021 [facility]ICAL [facility]    FOOT SURGERY  11/03/2017    Foot Repair    INVASIVE VASCULAR PROCEDURE Right 02/20/2025    Procedure: Lower Extremity Angiogram;  Surgeon: Alvino Arreola MD;  Location: [facility];  Service: Vascular Surgery;  Laterality: Right;  artery puncture at 7:45 AM    OTHER SURGICAL HISTORY  12/13/2021    Ankle surgery    OTHER SURGICAL HISTORY  12/13/2021    Coronary artery bypass graft    OTHER SURGICAL HISTORY  12/13/2021     Cataract surgery    OTHER SURGICAL HISTORY  12/13/2021    Angioplasty    OTHER SURGICAL HISTORY  12/13/2021    Complete colonoscopy    OTHER SURGICAL HISTORY  12/13/2021    Tubal ligation    TOE AMPUTATION       Allergies   Allergen Reactions    Lisinopril Cough     Family History   Problem Relation Name Age of Onset    Heart failure Mother      Heart attack Mother      Coronary artery disease Mother      Coronary artery disease Father      Heart failure Father      Kidney failure Father       Social History     Socioeconomic History    Marital status:      Spouse name: Not on file    Number of children: Not on file    Years of education: Not on file    Highest education level: Not on file   Occupational History    Not on file   Tobacco Use    Smoking status: Never    Smokeless tobacco: Never   Vaping Use    Vaping status: Never Used   Substance and Sexual Activity    Alcohol use: Not Currently    Drug use: Never    Sexual activity: Not on file   Other Topics Concern    Not on file   Social History Narrative    Not on file     Social Drivers of Health     Financial Resource Strain: Low Risk  (2/17/2025)    Overall Financial Resource Strain (CARDIA)     Difficulty of Paying Living Expenses: Not hard at all   Food Insecurity: No Food Insecurity (2/17/2025)    Hunger Vital Sign     Worried About Running Out of Food in the Last Year: Never true     Ran Out of Food in the Last Year: Never true   Transportation Needs: No Transportation Needs (2/17/2025)    PRAPARE - Transportation     Lack of Transportation (Medical): No     Lack of Transportation (Non-Medical): No   Physical Activity: Insufficiently Active (2/17/2025)    Exercise Vital Sign     Days of Exercise per Week: 3 days     Minutes of Exercise per Session: 20 min   Stress: Stress Concern Present (2/17/2025)    [employer] - Occupational Stress Questionnaire     Feeling of Stress : To some extent   Social Connections:  Moderately Isolated (2/17/2025)    Social Connection and Isolation Panel [NHANES]     Frequency of Communication with Friends and Family: More than three times a week     Frequency of Social Gatherings with Friends and Family: More than three times a week     Attends Alevism Services: More than 4 times per year     Active Member of Clubs or Organizations: No     Attends Club or Organization Meetings: Never     Marital Status:    Intimate Partner Violence: Not At Risk (2/17/2025)    Humiliation, Afraid, Rape, and Kick questionnaire     Fear of Current or Ex-Partner: No     Emotionally Abused: No     Physically Abused: No     Sexually Abused: No   Housing Stability: Low Risk  (2/17/2025)    Housing Stability Vital Sign     Unable to Pay for Housing in the Last Year: No     Number of Times Moved in the Last Year: 0     Homeless in the Last Year: No       Current Facility-Administered Medications:     acetaminophen (Tylenol) tablet 650 mg, 650 mg, oral, q4h PRN **OR** acetaminophen (Tylenol) oral liquid 650 mg, 650 mg, nasogastric tube, q4h PRN **OR** acetaminophen (Tylenol) suppository 650 mg, 650 mg, rectal, q4h PRN, Haresh Antonio, DPM    acetaminophen (Tylenol) tablet 650 mg, 650 mg, oral, q4h PRN **OR** acetaminophen (Tylenol) oral liquid 650 mg, 650 mg, oral, q4h PRN **OR** acetaminophen (Tylenol) suppository 650 mg, 650 mg, rectal, q4h PRN, Haresh Antonio, DPM    ALPRAZolam (Xanax) tablet 0.25 mg, 0.25 mg, oral, BID PRN, Haresh Antonio, DPM    aspirin EC tablet 81 mg, 81 mg, oral, Nightly, Haresh Antonio, DPM, 81 mg at 02/22/25 2004    cholecalciferol (Vitamin D-3) tablet 4,000 Units, 4,000 Units, oral, Daily, Haresh Antonio, DPM, 4,000 Units at 02/23/25 0824    cyanocobalamin (Vitamin B-12) tablet 1,000 mcg, 1,000 mcg, oral, Daily, Haresh Antonio, DPM, 1,000 mcg at 02/23/25 0825    dilTIAZem CD (Cardizem CD) 24 hr capsule 240 mg, 240 mg, oral, Daily, Haresh Antonio, DPM, 240 mg at 02/23/25 0825    docusate  sodium (Colace) capsule 200 mg, 200 mg, oral, Nightly, Haresh Antonio, DPM, 200 mg at 02/22/25 2004    empagliflozin (Jardiance) tablet 25 mg, 25 mg, oral, Daily, Haresh Antonio, DPM, 25 mg at 02/23/25 0825    enoxaparin (Lovenox) syringe 40 mg, 40 mg, subcutaneous, q24h, Haresh Antonio, DPM, 40 mg at 02/22/25 1726    insulin lispro injection 0-5 Units, 0-5 Units, subcutaneous, TID AC, Haresh Antonio, DPM, 2 Units at 02/23/25 1134    levothyroxine (Synthroid, Levoxyl) tablet 150 mcg, 150 mcg, oral, Once per day on Sunday Monday Wednesday Friday Saturday, Haresh Antonio, DPM, 150 mcg at 02/23/25 0631    levothyroxine (Synthroid, Levoxyl) tablet 300 mcg, 300 mcg, oral, Once per day on Tuesday Thursday, Haresh Antonio, DPM, 300 mcg at 02/20/25 0604    losartan (Cozaar) tablet 25 mg, 25 mg, oral, BID, Haresh Antonio, DPM, 25 mg at 02/23/25 0825    magnesium oxide (Mag-Ox) tablet 400 mg, 400 mg, oral, Daily, Haresh Antonio, DPM, 400 mg at 02/23/25 0825    meropenem (Merrem) 1 g in sodium chloride 0.9%  mL, 1 g, intravenous, q8h, Olive Nicole DO, Stopped at 02/20/25 1933    ondansetron (Zofran) tablet 4 mg, 4 mg, oral, q8h PRN **OR** ondansetron (Zofran) injection 4 mg, 4 mg, intravenous, q8h PRN, Haresh Antonio, DPM, 4 mg at 02/22/25 1336    oxyCODONE (Roxicodone) immediate release tablet 5 mg, 5 mg, oral, q6h PRN, Haresh Antonio, DPM    pantoprazole (ProtoNix) EC tablet 40 mg, 40 mg, oral, BID AC, Haresh Antonio, DPM, 40 mg at 02/23/25 0630    polyethylene glycol (Glycolax, Miralax) packet 17 g, 17 g, oral, Daily PRN, Haresh Antonio, DPM    rosuvastatin (Crestor) tablet 10 mg, 10 mg, oral, Nightly, Haresh Antonio, DPM, 10 mg at 02/22/25 2005    spironolactone (Aldactone) tablet 25 mg, 25 mg, oral, Daily, Haresh Antonio, DPM, 25 mg at 02/23/25 0825    vilazodone (Viibryd) tablet 40 mg, 40 mg, oral, Daily with breakfast, Haresh Antonio, DPM, 40 mg at 02/23/25 0824     REVIEW OF SYSTEMS:  Negative unless deemed otherwise in  "interval HPI    OBJECTIVE:  /60   Pulse 67   Temp 37.1 °C (98.8 °F)   Resp 16   Ht 1.676 m (5' 6\")   Wt 80.7 kg (178 lb)   SpO2 94%   BMI 28.73 kg/m²     Alert and oriented to person, place, and time. No acute distress.    Pt with dressing clean dry and intact. Dressing left intact. No strike through noted to the dressing. No lymphangitis or cellulitis extending past the dressing site.     Calf supple, non-tender. Negative Sudarshan/Fisher test. No palpable cords. No acute s/s of DVT.      LABS:   Sedimentation Rate   Date/Time Value Ref Range Status   2023 05:27 AM 46 (H) 0 - 30 mm/h Final     Comment:     Please note new reference ranges as of 2022.     C-Reactive Protein   Date/Time Value Ref Range Status   2025 01:39 PM 0.19 <1.00 mg/dL Final        MICROBIOLOGY:  25 OR Forefoot Path: pending   25 OR second met clean margin path: pending   25 OR third met clean margin path: pending   25 OR second met micro: NGTD  25 OR second met clean margin micro: NGTD  25 OR third met clean margin micro: NGTD      25 wound culture: MSSA  25 Blood cultures: NGTD    IMAGIN25 MRI IMPRESSION:  1. Extensive osteomyelitis in the 3rd ray involving the entire 3rd  metatarsal, the 3rd proximal phalanx and the 3rd middle phalanx. This  includes osseous destructive changes and abnormal marrow signal  2. Septic arthritis of the 3rd MTP joint with complex fluid and  dislocation of the joint.  3. Associated cellulitis and phlegmonous changes in the 3rd ray. No  discrete abscess seen.  4. Deformity and collapse of the 2nd metatarsal head suggestive of  underlying avascular necrosis.  5. Postsurgical changes in the forefoot.      25 Xr R foot IMPRESSION:  1. Erosions involving the distal 3rd metatarsal and proximal 3rd  proximal phalanx, concerning for osteomyelitis.  2. No acute fracture or dislocation identified.  3. Postoperative and degenerative changes, as " described above.      VASCULAR STUDIES:  PVR 2/18/25 Impression:    Right Lower PVR: Evidence of mild arterial occlusive disease in the right lower extremity at rest. Decreased digital perfusion noted. Monophasic flow is noted in the right common femoral artery, right posterior tibial artery and right dorsalis pedis artery.    Left Lower PVR: Evidence of mild arterial occlusive disease in the left lower extremity at rest. Decreased digital perfusion noted. Monophasic flow is noted in the left common femoral artery, left posterior tibial artery and left dorsalis pedis artery.     Imaging & Doppler Findings:     RIGHT Lower PVR                Pressures Ratios  Right Posterior Tibial (Ankle) 160 mmHg  1.14  Right Dorsalis Pedis (Ankle)   164 mmHg  1.17  Right Digit (Great Toe)        69 mmHg   0.49     LEFT Lower PVR                Pressures Ratios  Left Posterior Tibial (Ankle) 170 mmHg  1.21  Left Dorsalis Pedis (Ankle)   154 mmHg  1.10  Left Digit (Great Toe)        68 mmHg   0.49    _______________________________________  Haresh Antonio DPM  Work Cell: [phone]  Office phone: [phone]  February 23, 2025

## 2025-02-23 NOTE — CARE PLAN
The patient's goals for the shift include comfort and safety    The clinical goals for the shift include remain free from falls    Over the shift, the patient did   Problem: Pain - Adult  Goal: Verbalizes/displays adequate comfort level or baseline comfort level  Outcome: Progressing     Problem: Safety - Adult  Goal: Free from fall injury  Outcome: Progressing     Problem: Discharge Planning  Goal: Discharge to home or other facility with appropriate resources  Outcome: Progressing     Problem: Chronic Conditions and Co-morbidities  Goal: Patient's chronic conditions and co-morbidity symptoms are monitored and maintained or improved  Outcome: Progressing     Problem: Nutrition  Goal: Nutrient intake appropriate for maintaining nutritional needs  Outcome: Progressing     Problem: Fall/Injury  Goal: Not fall by end of shift  Outcome: Progressing  Goal: Be free from injury by end of the shift  Outcome: Progressing  Goal: Verbalize understanding of personal risk factors for fall in the hospital  Outcome: Progressing  Goal: Verbalize understanding of risk factor reduction measures to prevent injury from fall in the home  Outcome: Progressing  Goal: Use assistive devices by end of the shift  Outcome: Progressing  Goal: Pace activities to prevent fatigue by end of the shift  Outcome: Progressing     Problem: Diabetes  Goal: Achieve decreasing blood glucose levels by end of shift  Outcome: Progressing  Goal: Increase stability of blood glucose readings by end of shift  Outcome: Progressing  Goal: Maintain electrolyte levels within acceptable range throughout shift  Outcome: Progressing  Goal: Maintain glucose levels >70mg/dl to <250mg/dl throughout shift  Outcome: Progressing  Goal: No changes in neurological exam by end of shift  Outcome: Progressing  Goal: Learn about and adhere to nutrition recommendations by end of shift  Outcome: Progressing  Goal: Vital signs within normal range for age by end of shift  Outcome:  Progressing  Goal: Increase self care and/or family involovement by end of shift  Outcome: Progressing  Goal: Receive DSME education by end of shift  Outcome: Progressing     Problem: Skin  Goal: Decreased wound size/increased tissue granulation at next dressing change  Outcome: Progressing  Goal: Participates in plan/prevention/treatment measures  Outcome: Progressing  Goal: Prevent/manage excess moisture  Outcome: Progressing  Flowsheets (Taken 2/23/2025 1006)  Prevent/manage excess moisture:   Cleanse incontinence/protect with barrier cream   Monitor for/manage infection if present  Goal: Prevent/minimize sheer/friction injuries  Outcome: Progressing  Goal: Promote/optimize nutrition  Outcome: Progressing  Goal: Promote skin healing  Outcome: Progressing   make progress toward the following goals.

## 2025-02-23 NOTE — PROGRESS NOTES
"Diane Montemayor is a 79 y.o. female on day 6 of admission presenting with concern for osteomyelitis     Subjective   Patient seen and examined.   Denies any sore throat, shortness of breath no nausea, no vomiting, pain is under control.No fevers or chills overnight    Objective     Last Recorded Vitals  /60   Pulse 67   Temp 37.1 °C (98.8 °F)   Resp 16   Ht 1.676 m (5' 6\")   Wt 80.7 kg (178 lb)   SpO2 94%   BMI 28.73 kg/m²      Intake/Output last 3 Shifts:    Intake/Output Summary (Last 24 hours) at 2/23/2025 1258  Last data filed at 2/22/2025 1305  Gross per 24 hour   Intake 50 ml   Output --   Net 50 ml       Scheduled medications  aspirin, 81 mg, oral, Nightly  cholecalciferol, 4,000 Units, oral, Daily  cyanocobalamin, 1,000 mcg, oral, Daily  dilTIAZem CD, 240 mg, oral, Daily  docusate sodium, 200 mg, oral, Nightly  empagliflozin, 25 mg, oral, Daily  enoxaparin, 40 mg, subcutaneous, q24h  insulin lispro, 0-5 Units, subcutaneous, TID AC  levothyroxine, 150 mcg, oral, Once per day on Sunday Monday Wednesday Friday Saturday  levothyroxine, 300 mcg, oral, Once per day on Tuesday Thursday  losartan, 25 mg, oral, BID  magnesium oxide, 400 mg, oral, Daily  meropenem, 1 g, intravenous, q8h  pantoprazole, 40 mg, oral, BID AC  rosuvastatin, 10 mg, oral, Nightly  spironolactone, 25 mg, oral, Daily  vilazodone, 40 mg, oral, Daily with breakfast      Continuous medications       PRN medications  PRN medications: acetaminophen **OR** acetaminophen **OR** acetaminophen, acetaminophen **OR** acetaminophen **OR** acetaminophen, ALPRAZolam, ondansetron **OR** ondansetron, oxyCODONE, polyethylene glycol    Physical Exam   Gen: NAD  HEENT: EOM, MMM  CV: RRR, no murmurs rubs or gallops  Resp: coarse rhonchi b/l   Abdomen: soft, NT,+BS  LE: No edema right foot is completely covered in bandage    Relevant Results  Lab Results   Component Value Date    WBC 7.3 02/22/2025    HGB 12.6 02/22/2025    HCT 38.6 02/22/2025    MCV 89 " 02/22/2025     02/22/2025     Lab Results   Component Value Date    GLUCOSE 118 (H) 02/22/2025    CALCIUM 9.3 02/22/2025     02/22/2025    K 4.2 02/22/2025    CO2 33 (H) 02/22/2025     02/22/2025    BUN 15 02/22/2025    CREATININE 0.87 02/22/2025         Assessment/Plan 79 year old female admitted with diabetic right foot wound of 2nd and third digits with xray concerning for osteo        She is status post transmetatarsal amputation of the right foot with gastrocnemius recession and application of antibiotic impregnated cement  Postop day 1  ID is following  Cultures were sent from tissue margins, assume if they come back clear will possibly not need long-term antibiotics,, prelim cultures are Negative todate  She is on meropenem  Blood cultures have been negative  PT OT  Nonweightbearing to right lower extremity  Continue aspirin, statin for coronary artery disease  Angio and PVRs showed patent blood flow, seen by vascular  Continue insulin sliding scale  DVT prophylaxis  Continue Jardiance, levothyroxine, losartan, Cardizem, statin, spironolactone  Pain control, supportive care    Michel Field MD

## 2025-02-23 NOTE — PROGRESS NOTES
Occupational Therapy    Evaluation    Patient Name: Diane Montemayor  MRN: [id number]  Today's Date: 2/23/2025  Time Calculation  Start Time: 1021  Stop Time: 1046  Time Calculation (min): 25 min  1123/1123-A    Assessment  IP OT Assessment  OT Assessment: Pt presents with decreased strength, endurance, balance, NWB RLE px following surgery which impact her ADL and functional transfer status. Pt would benefit from skilled OT to address deficits.  Barriers to Discharge Home: Caregiver assistance, Physical needs  Caregiver Assistance: Caregiver assistance needed per identified barriers - however, level of patient's required assistance exceeds assistance available at home  Physical Needs: Stair navigation into home limited by function/safety, Stair navigation to access bed limited by function/safety, 24hr mobility assistance needed, Intermittent ADL assistance needed, High falls risk due to function or environment, Weight bearing precautions unable to be safely maintained  Medical Staff Made Aware: Yes  End of Session Communication: Bedside nurse  End of Session Patient Position: Up in chair, Alarm on (BLE elevated)    Plan:  Treatment Interventions: ADL retraining, Functional transfer training, UE strengthening/ROM, Endurance training, Patient/family training  OT Frequency: 2 times per week  OT Discharge Recommendations: Moderate intensity level of continued care  OT Recommended Transfer Status: Assist of 2  OT - OK to Discharge: Yes (ok to d/c to next level of care once medically cleared)    Subjective     Current Problem:   1. Subacute osteomyelitis of right foot (Multi)        General:  General  Reason for Referral: ADL impairment  Referred By: PT/OT 2/21 Philipp  Past Medical History Relevant to Rehab: Dyslipidemia, HTN, CKD, DM, hypothyroidism, SLE, PE, CABG, depression  Co-Treatment: PT  Co-Treatment Reason: to maximize pt function and safety.  Patient Position Received: Bed, 3 rail up, Alarm on  General Comment: Pt is an  78 y/o F who presented to ED 2/17 with chronic wounds R foot, not responding to antibiotics with concern for osteomyelitis. Underwent R TMA on 2/22/25. Pt is NWB RLE. MRI R foot (2/19): extensive OM in 3rd ray, septic arthritis 3rd MTP, deformity and collapse 2nd metatarsal head suggestive of avascular necrosis. Dx: subacute osteomyeltiis of R foot.    Precautions:  LE Weight Bearing Status: Right Non-Weight Bearing (NWB RLE)  Splinting: Post-op splint/dressing in place    Pain:  Pain Assessment  Pain Assessment: 0-10  0-10 (Numeric) Pain Score: 0 - No pain    Objective     Cognition:  Overall Cognitive Status: Within Functional Limits  Orientation Level: Oriented X4             Home Living:  Home Living Comments: Pt reports living with her son in a tri-level home with 6 TYRELL with MARGARITA HR. Pt's son works 4 10 hour shifts per week and pt is alone while her son is at work. Pt's bedroom/bathroom are located on the 2nd floor. Has a tub shower with seat and grab bars.     Prior Function:  Prior Function Comments: PTA, pt reports completing all ADLs/iADLs IND and ambulating IND. Reports 1 fall in the past 3 months, does not drive.    ADL:  Eating Assistance:  (set-up)  Grooming Assistance:  (set-up)  Bathing Assistance: Moderate  UE Dressing Assistance: Minimal  LE Dressing Assistance: Maximal  Toileting Assistance with Device: Maximal (Pt required MAX A to complete clothing management in stance at FWW, able to complete steven care seated on BSC.)    Activity Tolerance:  Endurance: Decreased tolerance for upright activites    Bed Mobility/Transfers:   Bed Mobility  Bed Mobility: Yes (Transition from supine to sit EOB with CGA and HOB elevated.)  Transfers  Transfer: Yes (SPTs from bed > BSC > recliner chair using FWW with MOD Ax2. Increased time to complete pivot and cues to maintain NWB RLE with fair carryover.)    Ambulation/Gait Training:  Functional Mobility  Functional Mobility Performed: No (SPTs completed only on  eval)    Sitting Balance:  Static Sitting Balance  Static Sitting-Comment/Number of Minutes: fair+  Dynamic Sitting Balance  Dynamic Sitting-Comments: fair+    Standing Balance:  Static Standing Balance  Static Standing-Comment/Number of Minutes: poor+  Dynamic Standing Balance  Dynamic Standing-Comments: poor    Strength:  Strength Comments: BUE strength grossly 4/5    Coordination:  Movements are Fluid and Coordinated: Yes     Hand Function:  Hand Function  Gross Grasp: Functional    Extremities: RUE   RUE : Within Functional Limits (AROM) and LUE   LUE: Within Functional Limits (AROM)    Outcome Measures: [facility] Daily Activity  Putting on and taking off regular lower body clothing: A lot  Bathing (including washing, rinsing, drying): A lot  Putting on and taking off regular upper body clothing: A little  Toileting, which includes using toilet, bedpan or urinal: A lot  Taking care of personal grooming such as brushing teeth: A little  Eating Meals: A little  Daily Activity - Total Score: 15                       EDUCATION:  Education  Individual(s) Educated: Patient  Education Provided: Fall precautons, Risk and benefits of OT discussed with patient or other, POC discussed and agreed upon  Education Comment: CL RLE precautions  Education Documentation  Body Mechanics, taught by Elly Perez OT at 2/23/2025 12:24 PM.  Learner: Patient  Readiness: Acceptance  Method: Explanation  Response: Verbalizes Understanding, Demonstrated Understanding, Needs Reinforcement    Precautions, taught by Elly Perez OT at 2/23/2025 12:24 PM.  Learner: Patient  Readiness: Acceptance  Method: Explanation  Response: Verbalizes Understanding, Demonstrated Understanding, Needs Reinforcement    Goals:   Encounter Problems       Encounter Problems (Active)       OT Goals       Pt will complete LB dressing with MIN A using AE as needed. (Progressing)       Start:  02/23/25    Expected End:  03/09/25            Pt will tolerate >3  minutes of standing while maintaing NWB RLE px. (Progressing)       Start:  02/23/25    Expected End:  03/09/25            Pt will complete all functional transfers with MIN A while maintaining NWB RLE px. (Progressing)       Start:  02/23/25    Expected End:  03/09/25            Pt will complete all toileting tasks with MIN A. (Progressing)       Start:  02/23/25    Expected End:  03/09/25            Pt will improve static/dynamic standing balance to fair during functional tasks. (Progressing)       Start:  02/23/25    Expected End:  03/09/25

## 2025-02-23 NOTE — PROGRESS NOTES
Physical Therapy    Physical Therapy Evaluation    Patient Name: Diane Montemayor  MRN: [id number]  Department: [facility]  Room: [facility]1123-A  Today's Date: 2/23/2025   Time Calculation  Start Time: 1020  Stop Time: 1046  Time Calculation (min): 26 min    Assessment/Plan   PT Assessment  PT Assessment Results: Decreased strength, Decreased range of motion, Decreased endurance, Impaired balance, Decreased mobility, Pain, Orthopedic restrictions  Rehab Prognosis: Good  Evaluation/Treatment Tolerance: Patient limited by fatigue  End of Session Communication: Bedside nurse  Assessment Comment: Pt would benefit from continued therapy after discharge to improve strength, ROM, and functional mobility  End of Session Patient Position: Up in chair, Alarm on (Call button within reach)  IP OR SWING BED PT PLAN  Inpatient or Swing Bed: Inpatient  PT Plan  Treatment/Interventions: Bed mobility, Transfer training, Gait training, Stair training, Strengthening, Therapeutic exercise, Home exercise program  PT Plan: Ongoing PT  PT Frequency: 4 times per week  PT Discharge Recommendations: Moderate intensity level of continued care  PT Recommended Transfer Status: Assist x2, Assistive device  PT - OK to Discharge: Once medically appropriate    Subjective   General Visit Information:  General  Reason for Referral: Impaired mobility, gait training, impaired cognition  Referred By: PT/OT 2/21 Philipp  Past Medical History Relevant to Rehab: Dyslipidemia, HTN, CKD, DM, hypothyroidism, SLE, PE, CABG, depression  Co-Treatment: OT  Co-Treatment Reason: to maximize pt function and safety.  Patient Position Received: Bed, 3 rail up, Alarm on  General Comment: Pt to ED 2/17 with chronic wounds R foot, not responding to antibiotics with concern for osteomyelitis. Underwent R TMA on 2/22/25. Pt is NWB RLE. MRI R foot (2/19): extensive OM in 3rd ray, septic arthritis 3rd MTP, deformity and collapse 2nd metatarsal head suggestive of avascular necrosis. Dx:  subacute osteomyeltiis of R foot.  Home Living:  Home Living  Home Living Comments: Pt reports living with her son in a tri-level home with 6 TYRELL with B close HR. Pt's son works 4, 10 hour shifts per week and pt is alone while her son is at work. Pt's bed/bath are located on the 2nd floor. Has a tub shower with seat and grab bars.  Prior Level of Function:  Prior Function Per Pt/Caregiver Report  Prior Function Comments: Per pt, IND with all  ADLs/iADLs and ambulating IND w/ no AD. Reports 1 fall in the past 3 months, does not drive.  Precautions:  Precautions  LE Weight Bearing Status: Right Non-Weight Bearing  Splinting: Post op splint/dressing     Objective   Pain:  Pain Assessment  Pain Assessment: 0-10  0-10 (Numeric) Pain Score: 0 - No pain  Cognition:  Cognition  Overall Cognitive Status: Within Functional Limits  Orientation Level: Oriented X4    General Assessments:  General Observation  General Observation: Pt supine in bed prior to session with R LE splinted and wrapped in elastic bandages. Pt wishes to get out of bed and to chair at this time. Consents to PT/OT evaluation. Pt was educated on weight bearing status, verbalizes and demonstrated understanding     Activity Tolerance  Endurance: Decreased tolerance for upright activites    Sensation  Sensation Comment: Reports neuropathy in B feet    Static Sitting Balance  Static Sitting-Comment/Number of Minutes: Sat EOB with B UE/LE support and CGA  Dynamic Sitting Balance  Dynamic Sitting-Comments: CGA w/ B UE/LE support during strength assessment    Static Standing Balance  Static Standing-Comment/Number of Minutes: Stood EOB w/ Mod A x 2 and WW.  Dynamic Standing Balance  Dynamic Standing-Comments: Mod x 2 + WW during ambulation, WB status able to be maintained  Functional Assessments:  Bed Mobility  Bed Mobility:  (Supine > sit EOB: CGA + HOB elevated to 30 deg, increased time and effort needed)    Transfers  Transfer:  (Sit <> stand x2: Mod A x 2 + WW.  Able to initiate movement but needs assist with gaining momentum and anteriorly weight shifting. Weightbearing status able to be maintained)    Ambulation/Gait Training  Ambulation/Gait Training Performed:  (Bed > BSC> Recliner: Mod A x 2 + WW to maintain balance and progress WW. Pt used B UE and WW for hop-to technique.)     Extremity/Trunk Assessments:  ROM  RUE :  Shoulder flexion/ER AROM: WFL, Elbow/wrist/hand AROM: WFL  LUE:  Shoulder flexion/ER AROM: WFL, Elbow/wrist/hand AROM: WFL  RLE :  Hip flexion/extension:WFL, Knee flexion/extension: WFL, DF/PF not observed secondary to splint  LLE :  Hip flexion/extension:WFL, Knee flexion/extension: WFL, DF/PF:WFL    Strength  Strength Comments: B shoulder flexion strength:  WFL, B hip flexion strength: 4+/5, B knee extensor strenth: 4+/5, L DF strength: 4/5    Outcome Measures:  [facility] Basic Mobility  Turning from your back to your side while in a flat bed without using bedrails: A little  Moving from lying on your back to sitting on the side of a flat bed without using bedrails: A little  Moving to and from bed to chair (including a wheelchair): A lot  Standing up from a chair using your arms (e.g. wheelchair or bedside chair): A lot  To walk in hospital room: A lot  Climbing 3-5 steps with railing: Total  Basic Mobility - Total Score: 13    Encounter Problems       Encounter Problems (Active)       PT Problem       Pt will completed supine <> sit bed mobility from flat bed with Supervision  (Progressing)       Start:  02/23/25    Expected End:  03/09/25            Pt will demonstrate sit to stand transfers with WW + Min A, while maintaining NWB status  (Progressing)       Start:  02/23/25    Expected End:  03/09/25            Pt. will ambulate 25 ft with WW + min A, while maintaining NWB status  (Progressing)       Start:  02/23/25    Expected End:  03/09/25            Pt will maintain balance while turning during ambulation with WW, Min A, And while maintaining NWB  status (Progressing)       Start:  02/23/25    Expected End:  03/09/25            Pt will ascend/descend 6 stairs w/ B HR and Mod A  (Not Progressing)       Start:  02/23/25    Expected End:  03/09/25            Pt will complete B LE strengthening HEP with independence (Not Progressing)       Start:  02/23/25    Expected End:  03/09/25                   Pain - Adult              Education Documentation  Precautions, taught by Joyce Norris, PT at 2/23/2025  2:40 PM.  Learner: Patient  Readiness: Acceptance  Method: Explanation  Response: Demonstrated Understanding, Needs Reinforcement, Verbalizes Understanding    Mobility Training, taught by Joyce Norris, PT at 2/23/2025  2:40 PM.  Learner: Patient  Readiness: Acceptance  Method: Explanation  Response: Demonstrated Understanding, Needs Reinforcement, Verbalizes Understanding

## 2025-02-24 DIAGNOSIS — M86.271 SUBACUTE OSTEOMYELITIS OF RIGHT FOOT (MULTI): ICD-10-CM

## 2025-02-24 LAB
ANION GAP SERPL CALC-SCNC: 9 MMOL/L (ref 10–20)
APPEARANCE UR: ABNORMAL
BACTERIA #/AREA URNS AUTO: ABNORMAL /HPF
BACTERIA SPEC CULT: NORMAL
BILIRUB UR STRIP.AUTO-MCNC: NEGATIVE MG/DL
BUN SERPL-MCNC: 21 MG/DL (ref 6–23)
CALCIUM SERPL-MCNC: 8.6 MG/DL (ref 8.6–10.3)
CHLORIDE SERPL-SCNC: 104 MMOL/L (ref 98–107)
CO2 SERPL-SCNC: 28 MMOL/L (ref 21–32)
COLOR UR: ABNORMAL
CREAT SERPL-MCNC: 0.88 MG/DL (ref 0.5–1.05)
EGFRCR SERPLBLD CKD-EPI 2021: 67 ML/MIN/1.73M*2
ERYTHROCYTE [DISTWIDTH] IN BLOOD BY AUTOMATED COUNT: 14.8 % (ref 11.5–14.5)
FLUAV RNA RESP QL NAA+PROBE: NOT DETECTED
FLUBV RNA RESP QL NAA+PROBE: NOT DETECTED
GLUCOSE BLD MANUAL STRIP-MCNC: 106 MG/DL (ref 74–99)
GLUCOSE BLD MANUAL STRIP-MCNC: 150 MG/DL (ref 74–99)
GLUCOSE BLD MANUAL STRIP-MCNC: 189 MG/DL (ref 74–99)
GLUCOSE SERPL-MCNC: 159 MG/DL (ref 74–99)
GLUCOSE UR STRIP.AUTO-MCNC: ABNORMAL MG/DL
GRAM STN SPEC: NORMAL
HCT VFR BLD AUTO: 34.1 % (ref 36–46)
HGB BLD-MCNC: 11 G/DL (ref 12–16)
HOLD SPECIMEN: NORMAL
KETONES UR STRIP.AUTO-MCNC: NEGATIVE MG/DL
LEUKOCYTE ESTERASE UR QL STRIP.AUTO: ABNORMAL
MCH RBC QN AUTO: 28.9 PG (ref 26–34)
MCHC RBC AUTO-ENTMCNC: 32.3 G/DL (ref 32–36)
MCV RBC AUTO: 90 FL (ref 80–100)
NITRITE UR QL STRIP.AUTO: NEGATIVE
NRBC BLD-RTO: 0 /100 WBCS (ref 0–0)
PH UR STRIP.AUTO: 5 [PH]
PLATELET # BLD AUTO: 213 X10*3/UL (ref 150–450)
POTASSIUM SERPL-SCNC: 3.9 MMOL/L (ref 3.5–5.3)
PROT UR STRIP.AUTO-MCNC: ABNORMAL MG/DL
RBC # BLD AUTO: 3.8 X10*6/UL (ref 4–5.2)
RBC # UR STRIP.AUTO: ABNORMAL MG/DL
RBC #/AREA URNS AUTO: ABNORMAL /HPF
SARS-COV-2 RNA RESP QL NAA+PROBE: NOT DETECTED
SODIUM SERPL-SCNC: 137 MMOL/L (ref 136–145)
SP GR UR STRIP.AUTO: 1.02
SQUAMOUS #/AREA URNS AUTO: ABNORMAL /HPF
UROBILINOGEN UR STRIP.AUTO-MCNC: NORMAL MG/DL
WBC # BLD AUTO: 9.2 X10*3/UL (ref 4.4–11.3)
WBC #/AREA URNS AUTO: >50 /HPF
WBC CLUMPS #/AREA URNS AUTO: ABNORMAL /HPF
YEAST BUDDING #/AREA UR COMP ASSIST: PRESENT /HPF

## 2025-02-24 PROCEDURE — 2500000004 HC RX 250 GENERAL PHARMACY W/ HCPCS (ALT 636 FOR OP/ED)

## 2025-02-24 PROCEDURE — 87040 BLOOD CULTURE FOR BACTERIA: CPT | Mod: ELYLAB | Performed by: STUDENT IN AN ORGANIZED HEALTH CARE EDUCATION/TRAINING PROGRAM

## 2025-02-24 PROCEDURE — 2500000004 HC RX 250 GENERAL PHARMACY W/ HCPCS (ALT 636 FOR OP/ED): Performed by: INTERNAL MEDICINE

## 2025-02-24 PROCEDURE — 36415 COLL VENOUS BLD VENIPUNCTURE: CPT | Performed by: STUDENT IN AN ORGANIZED HEALTH CARE EDUCATION/TRAINING PROGRAM

## 2025-02-24 PROCEDURE — 87086 URINE CULTURE/COLONY COUNT: CPT | Mod: ELYLAB | Performed by: STUDENT IN AN ORGANIZED HEALTH CARE EDUCATION/TRAINING PROGRAM

## 2025-02-24 PROCEDURE — 80048 BASIC METABOLIC PNL TOTAL CA: CPT | Performed by: STUDENT IN AN ORGANIZED HEALTH CARE EDUCATION/TRAINING PROGRAM

## 2025-02-24 PROCEDURE — 2500000002 HC RX 250 W HCPCS SELF ADMINISTERED DRUGS (ALT 637 FOR MEDICARE OP, ALT 636 FOR OP/ED)

## 2025-02-24 PROCEDURE — 81003 URINALYSIS AUTO W/O SCOPE: CPT | Performed by: STUDENT IN AN ORGANIZED HEALTH CARE EDUCATION/TRAINING PROGRAM

## 2025-02-24 PROCEDURE — 2500000004 HC RX 250 GENERAL PHARMACY W/ HCPCS (ALT 636 FOR OP/ED): Performed by: STUDENT IN AN ORGANIZED HEALTH CARE EDUCATION/TRAINING PROGRAM

## 2025-02-24 PROCEDURE — 99232 SBSQ HOSP IP/OBS MODERATE 35: CPT | Performed by: STUDENT IN AN ORGANIZED HEALTH CARE EDUCATION/TRAINING PROGRAM

## 2025-02-24 PROCEDURE — 1210000001 HC SEMI-PRIVATE ROOM DAILY

## 2025-02-24 PROCEDURE — 87636 SARSCOV2 & INF A&B AMP PRB: CPT | Performed by: INTERNAL MEDICINE

## 2025-02-24 PROCEDURE — 82947 ASSAY GLUCOSE BLOOD QUANT: CPT

## 2025-02-24 PROCEDURE — 99232 SBSQ HOSP IP/OBS MODERATE 35: CPT | Performed by: INTERNAL MEDICINE

## 2025-02-24 PROCEDURE — 2500000001 HC RX 250 WO HCPCS SELF ADMINISTERED DRUGS (ALT 637 FOR MEDICARE OP)

## 2025-02-24 PROCEDURE — 97530 THERAPEUTIC ACTIVITIES: CPT | Mod: GP,CQ

## 2025-02-24 PROCEDURE — 85027 COMPLETE CBC AUTOMATED: CPT | Performed by: STUDENT IN AN ORGANIZED HEALTH CARE EDUCATION/TRAINING PROGRAM

## 2025-02-24 RX ORDER — LIDOCAINE HYDROCHLORIDE 10 MG/ML
5 INJECTION, SOLUTION INFILTRATION; PERINEURAL ONCE
Status: DISCONTINUED | OUTPATIENT
Start: 2025-02-24 | End: 2025-02-26 | Stop reason: HOSPADM

## 2025-02-24 RX ADMIN — EMPAGLIFLOZIN 25 MG: 25 TABLET, FILM COATED ORAL at 11:03

## 2025-02-24 RX ADMIN — INSULIN LISPRO 1 UNITS: 100 INJECTION, SOLUTION INTRAVENOUS; SUBCUTANEOUS at 12:54

## 2025-02-24 RX ADMIN — MEROPENEM 1 G: 1 INJECTION, POWDER, FOR SOLUTION INTRAVENOUS at 11:25

## 2025-02-24 RX ADMIN — DOCUSATE SODIUM 200 MG: 100 CAPSULE, LIQUID FILLED ORAL at 21:03

## 2025-02-24 RX ADMIN — Medication 4000 UNITS: at 11:03

## 2025-02-24 RX ADMIN — MEROPENEM 1 G: 1 INJECTION, POWDER, FOR SOLUTION INTRAVENOUS at 21:03

## 2025-02-24 RX ADMIN — LOSARTAN POTASSIUM 25 MG: 25 TABLET, FILM COATED ORAL at 21:02

## 2025-02-24 RX ADMIN — ROSUVASTATIN CALCIUM 10 MG: 10 TABLET, FILM COATED ORAL at 21:03

## 2025-02-24 RX ADMIN — ASPIRIN 81 MG: 81 TABLET, COATED ORAL at 21:03

## 2025-02-24 RX ADMIN — LEVOTHYROXINE SODIUM 150 MCG: 75 TABLET ORAL at 04:35

## 2025-02-24 RX ADMIN — ENOXAPARIN SODIUM 40 MG: 40 INJECTION SUBCUTANEOUS at 18:15

## 2025-02-24 RX ADMIN — PANTOPRAZOLE SODIUM 40 MG: 40 TABLET, DELAYED RELEASE ORAL at 18:15

## 2025-02-24 RX ADMIN — PANTOPRAZOLE SODIUM 40 MG: 40 TABLET, DELAYED RELEASE ORAL at 04:35

## 2025-02-24 RX ADMIN — MAGNESIUM OXIDE 400 MG (241.3 MG MAGNESIUM) TABLET 400 MG: TABLET at 11:03

## 2025-02-24 RX ADMIN — ACETAMINOPHEN 650 MG: 325 TABLET ORAL at 20:16

## 2025-02-24 RX ADMIN — MEROPENEM 1 G: 1 INJECTION, POWDER, FOR SOLUTION INTRAVENOUS at 04:35

## 2025-02-24 ASSESSMENT — COGNITIVE AND FUNCTIONAL STATUS - GENERAL
TOILETING: A LOT
DRESSING REGULAR UPPER BODY CLOTHING: A LITTLE
DRESSING REGULAR LOWER BODY CLOTHING: A LITTLE
CLIMB 3 TO 5 STEPS WITH RAILING: TOTAL
HELP NEEDED FOR BATHING: A LOT
MOVING FROM LYING ON BACK TO SITTING ON SIDE OF FLAT BED WITH BEDRAILS: A LITTLE
MOBILITY SCORE: 16
MOBILITY SCORE: 16
MOVING TO AND FROM BED TO CHAIR: A LITTLE
STANDING UP FROM CHAIR USING ARMS: A LITTLE
TURNING FROM BACK TO SIDE WHILE IN FLAT BAD: A LITTLE
CLIMB 3 TO 5 STEPS WITH RAILING: TOTAL
DAILY ACTIVITIY SCORE: 18
WALKING IN HOSPITAL ROOM: A LOT
MOVING TO AND FROM BED TO CHAIR: A LITTLE
WALKING IN HOSPITAL ROOM: A LOT
STANDING UP FROM CHAIR USING ARMS: A LITTLE
STANDING UP FROM CHAIR USING ARMS: A LITTLE
TURNING FROM BACK TO SIDE WHILE IN FLAT BAD: A LITTLE
CLIMB 3 TO 5 STEPS WITH RAILING: TOTAL
WALKING IN HOSPITAL ROOM: A LOT
TURNING FROM BACK TO SIDE WHILE IN FLAT BAD: A LITTLE
MOBILITY SCORE: 15
MOVING TO AND FROM BED TO CHAIR: A LITTLE

## 2025-02-24 ASSESSMENT — PAIN SCALES - GENERAL
PAINLEVEL_OUTOF10: 5 - MODERATE PAIN
PAINLEVEL_OUTOF10: 0 - NO PAIN
PAINLEVEL_OUTOF10: 0 - NO PAIN

## 2025-02-24 ASSESSMENT — PAIN - FUNCTIONAL ASSESSMENT
PAIN_FUNCTIONAL_ASSESSMENT: 0-10
PAIN_FUNCTIONAL_ASSESSMENT: 0-10

## 2025-02-24 ASSESSMENT — PAIN DESCRIPTION - LOCATION: LOCATION: HEAD

## 2025-02-24 ASSESSMENT — PAIN DESCRIPTION - ORIENTATION: ORIENTATION: LEFT

## 2025-02-24 NOTE — PROGRESS NOTES
02/24/25 1442   Discharge Planning   Home or Post Acute Services Post acute facilities (Rehab/SNF/etc)   Type of Post Acute Facility Services Skilled nursing   Expected Discharge Disposition SNF  ([facility])   Does the patient need discharge transport arranged? Yes   RoundTrip coordination needed? Yes   Has discharge transport been arranged? No   Patient Choice   Provider Choice list and CMS website (https://medicare.gov/care-compare#search) for post-acute Quality and Resource Measure Data were provided and reviewed with: Patient   Intensity of Service   Intensity of Service 0-30 min     Met with pt to discuss dc planning after notified that pt is now considering SNF after working with therapy. [facility] PT 15 OT 15. Pt states podiatrist is recommending rehab SNF for a few weeks. Pt is requesting referral to SNF [facility]. ID following for IV ABX, per notes plan for IV Invanz x2 weeks on discharge. Referral sent to SNF [facility].

## 2025-02-24 NOTE — CARE PLAN
The patient's goals for the shift include comfort and safety    The clinical goals for the shift include Pt will be free from fall/injury throughout this shift.

## 2025-02-24 NOTE — PROGRESS NOTES
Diane Montemayor is a 79 y.o. female on day 7 of admission presenting with Subacute osteomyelitis of right foot (Multi).      Subjective   Patient seen and examined at bedside.  She is not complaining of anything.  No fever or chills, no bleeding.  She is sitting out of bed to chair.  Right lower extremity is wrapped and splinted.    Objective     Last Recorded Vitals  /54   Pulse 73   Temp 37.1 °C (98.8 °F)   Resp 16   Wt 80.7 kg (178 lb)   SpO2 94%   Intake/Output last 3 Shifts:    Intake/Output Summary (Last 24 hours) at 2/24/2025 1528  Last data filed at 2/24/2025 1219  Gross per 24 hour   Intake 100 ml   Output --   Net 100 ml       Admission Weight  Weight: 80.7 kg (178 lb) (02/17/25 1323)    Daily Weight  02/17/25 : 80.7 kg (178 lb)    Image Results  CT angio aorta and bilateral iliofemoral runoff including without contrast if performed  Narrative: Interpreted By:  Giorgio Mcgee,   STUDY:  CT ANGIO AORTA AND BILATERAL ILIOFEMORAL RUN OFF INCLUDING WITHOUT  CONTRAST IF PERFORMED;  2/19/2025 10:56 am      INDICATION:  Signs/Symptoms:diabetic foot ulcer; monophasic waveforms throughoug  b/l LE on PVR.      COMPARISON:  CTA abdomen and pelvis with runoff from March 17, 2021      ACCESSION NUMBER(S):  [id number]      ORDERING CLINICIAN:  GRIS EARL      TECHNIQUE:  CTA of the abdomen and pelvis, with runoff was performed.  Contiguous  axial images were obtained at 3 mm slice thickness through the  abdomen and pelvis, with runoff. 3D Coronal and sagittal  reconstructions at 3 mm slice thickness were performed. 100 ml of  contrast material  Omnipaque 350 were administered intravenously  without immediate complication. FINDINGS:      CTA ABDOMEN VESSELS      Celiac axis: The celiac artery is patent. There is calcified disease  extending into the splenic artery without critical stenosis. There is  partially calcified atherosclerotic disease of the mid to distal  common hepatic artery resulting in moderate  stenosis.      SMA: Proximal SMA contains noncalcified disease resulting in mild  stenosis.      RONALD: There is calcific disease at its origin resulting in at least  mild stenosis, the remainder of this vessel is patent.      Right renal vessels: Proximal right renal artery contains calcific  disease resulting in mild to moderate stenosis.      Left renal vessels: There are 3 left renal arteries. The dominant  left renal artery contains calcific disease at its origin and  proximal segment resulting in mild-to-moderate stenosis.      Infrarenal aorta: Contains diffuse wall calcifications without  significant stenosis or aneurysmal dilation.      CTA  PELVIC VESSELS  R. Common iliac artery: Contains predominantly calcified  atherosclerotic disease without significant stenosis.      R. External iliac artery: No significant stenosis.      R. Internal iliac artery: Contains calcific disease resulting in  moderate stenosis.      L. Common iliac artery: Contains calcified disease at its proximal  and distal segments without significant stenosis.      L. External iliac artery: No significant stenosis.      L. Internal iliac artery: Contains scattered wall calcifications  without significant stenosis.      CTA RIGHT LOWER EXTREMITY  R. Common femoral artery: Contains calcified disease resulting in  mild-to-moderate stenosis.      R. Profunda femoris artery: Contains wall calcifications without  significant stenosis.      R. SFA: Contains diffuse wall calcifications resulting in mild  stenosis near its origin and moderate stenosis of its distal segment,  across Eleuterio's canal.      R. Popliteal artery: Contains partially calcified atherosclerotic  disease resulting in at least mild stenosis.      Evaluation of the tibial vessels is limited due to extensive vascular  wall calcifications and suboptimal opacification.      CTA LEFT LOWER EXTREMITY  L. Common femoral artery: Contains calcified disease without  significant  stenosis.      L. Profunda femoris artery: Contains diffuse wall calcifications  without significant stenosis.      L. SFA: Contains diffuse wall calcifications without significant  stenosis.      L. Popliteal artery: Contains partially calcified atherosclerotic  disease resulting in moderate to severe stenosis.      Evaluation of the tibial vessels is limited due to extensive vascular  wall calcifications and suboptimal opacification.          NON-VASCULAR FINDINGS      Visualized portions of the heart: No pericardial effusion.      Lungs: Included lung bases are clear.      Hepatobiliary: Visualized portions of the liver demonstrate capsular  nodularity suggestive of underlying chronic liver disease. There is  no focal mass lesion within the liver or intrahepatic biliary  dilation. There are several calcified gallstones within the  gallbladder without findings of cholecystitis.      Pancreas: Unremarkable      Spleen: Unremarkable      Adrenal Glands: Unremarkable      Kidneys, ureters, and bladder: No calculi or hydroureteronephrosis.  The distended urinary bladder is within normal limits.      GI tract: No evidence of obstruction. There is colonic  diverticulosis. The appendix is not visualized. There is mild  circumferential wall thickening of the distal esophagus with debris  within the distal esophagus, suggestive of reflux.      Peritoneum and retroperitoneum: No free fluid or free air is noted.      Lymph Nodes: No abdominal or pelvic lymphadenopathy is evident      Reproductive Organs: Unremarkable      Visualized musculoskeletal structures: Status post amputation of the  bilateral great toes. There is chronic deformity of the 3rd  metatarsal head of the right foot, that likely reflects remote  fracture deformity. There are erosive changes at the head of the 3rd  metatarsal and seen to involve the phalanges of the 3rd toe. There is  also remote fracture deformity at the base of the 2nd proximal  phalanx of  the right foot. Status post ORIF of the right medial  malleolus. There are degenerative changes of the spine. There are  median sternotomy wires. Soft tissue emphysema in the anterior  abdominal wall of the left and right lower quadrants likely reflect  injection sites.      Impression: 1. Limited examination due to extensive vascular wall calcifications  and suboptimal opacification. There are varying degrees of stenoses  involving the bilateral SFA and bilateral popliteal arteries. Below  the knees, extensive vascular wall calcifications limits evaluation  and better evaluation may be had with catheter angiography.      2. Deformity of the 3rd metatarsal head in the right foot likely  reflects remote fracture deformity. There are associated bony erosive  changes at this site as well as the phalanges of the 3rd toe  compatible with chronic osteomyelitis. There is also remote fracture  deformity at the base of the right 2nd proximal phalanx.      3. Capsular nodularity of the liver is suggestive of underlying  chronic liver disease. There is also cholelithiasis without  cholecystitis.      4. Imaging findings of reflux esophagitis in the distal esophagus.      Signed by: Giorgio Mcgee 2/24/2025 12:50 PM  Dictation workstation:   [id number]      Physical Exam    General: Well-developed elderly female, in no acute distress  HEENT: AT, NC, no JVD, no lymphadenopathy, neck supple  Lungs: Clear, no wheezing, no crackles  Cardiac: Normal S1-S2, no murmur, no gallop  Abdomen: Soft, nontender, no distention, positive bowel sound  Extremities: No deformity, no edema, pulses intact, ROM limited in RLE, RLE wrapped   Neurological: Alert awake oriented x3, sensation intact, clear speech        Assessment & Plan  Subacute osteomyelitis of right foot (Multi)    Type 2 diabetes mellitus    Atherosclerosis of native artery of both lower extremities with rest pain (Multi)    Other acute osteomyelitis, unspecified site    Type 2  diabetes mellitus with diabetic peripheral angiopathy without gangrene, without long-term current use of insulin (Multi)    Wound of right foot    Peripheral vascular disease, unspecified (CMS-HCC)    Equinus contracture of ankle    History of amputation of hallux (Multi)        An 79 year old female admitted with diabetic right foot wound of 2nd and third digits with xray concerning for osteo        Status post transmetatarsal amputation of the right foot with gastrocnemius recession and application of antibiotic impregnated cement  Postop day 2  ID is following, plan for IV Invanz 1 gr daily for 2 weeks  Midline ordered, Lovenox held   Cultures were sent from tissue margins, came back negative   Currently on meropenem  Nonweightbearing to right lower extremity  Continue aspirin, statin for coronary artery disease  Angio and PVRs showed patent blood flow, seen by vascular  Continue insulin sliding scale  DVT prophylaxis: Lovenox held for now   Continue Jardiance, levothyroxine, losartan, Cardizem, statin, spironolactone  Pain control, supportive care  Disposition: SNF, TCC following for pre-CERT           Estephania Lynn MD

## 2025-02-24 NOTE — PROGRESS NOTES
Physical Therapy    Physical Therapy Treatment    Patient Name: Diane Montemayor  MRN: [id number]  Today's Date: 2/24/2025  Time Calculation  Start Time: 1006  Stop Time: 1046  Time Calculation (min): 40 min     1123/1123-A    Assessment/Plan   PT Assessment  PT Assessment Results: Decreased strength, Decreased range of motion, Decreased endurance, Impaired balance, Decreased mobility, Pain, Orthopedic restrictions  Rehab Prognosis: Good  Barriers to Discharge Home: Caregiver assistance, Physical needs (NWB (R)LE)  Caregiver Assistance: Patient lives alone and/or does not have reliable caregiver assistance  Physical Needs: High falls risk due to function or environment, In-home setup navigation limited by function/safety, 24hr mobility assistance needed (Older built home doorways may not allow use of wheelchair between rooms.)  Treatment Tolerance: Patient tolerated treatment well, Patient limited by fatigue  Medical Staff Made Aware: Yes  End of Session Communication: Bedside nurse, PCT/NA/CTA  Assessment Comment: Patient continues to require (A) for safety with transfers/amb/wheelchair mobility. Patient will benefit from additional PT to address deficits and improve mobility.  End of Session Patient Position: Up in chair, Alarm on (Reclined in chair with LEs elevated(pillow under (R)LE to float foot/ankle; Call light, phone, and tray table within reach.)  PT Plan  Inpatient/Swing Bed or Outpatient: Inpatient  Treatment/Interventions: Bed mobility, Transfer training, Gait training, Stair training, Strengthening, Therapeutic exercise, Home exercise program  PT Plan: Ongoing PT  PT Frequency: 4 times per week  PT Discharge Recommendations: Moderate intensity level of continued care    PT Recommended Transfer Status: Assist x1, ww+gait belt    General Visit Information:   PT  Visit  PT Received On: 02/24/25  General  Family/Caregiver Present: No  Prior to Session Communication: Bedside nurse  Patient Position Received: Up in  chair, Alarm on  General Comment: Pleasant and cooperative. Patient asking to try using a wheelchair for mobility 2° not feeling steady with using ww and doesn't want to hurt her (R)LE.    General Observations:   General Observation: (R)LE splinted and wrapped in bandages; No tele; Alarm.    Subjective     Precautions:  Precautions  LE Weight Bearing Status: Right Non-Weight Bearing  Medical Precautions: Fall precautions  Splinting: Post-op splint/dressing in place  Precautions Comment: (R)TMA (02/22/25---[facility], podiatry)    Objective     Pain:  Pain Assessment  Pain Assessment:  (Patient did not give a numeric pain rating during session; just c/o soreness posterior (R)ankle(pillow elevating (R)LE, floating ankle/foot from the leg rest of the recliner). Patient states she doesn't feel any (R)foot pain 2° neuropathy. Nursing aware.)    Cognition:  Cognition  Overall Cognitive Status: Within Functional Limits    Balance:   Static Sitting Balance  Static Sitting-Comment/Number of Minutes: F+  Dynamic Sitting Balance  Dynamic Sitting-Comments: F  Static Standing Balance  Static Standing-Comment/Number of Minutes: F- with ww  Dynamic Standing Balance  Dynamic Standing-Comments: F- with ww    Treatments:              Ambulation/Gait Training  Ambulation/Gait Training Performed: Yes  Ambulation/Gait Training 1  Surface 1: Level tile  Device 1: Rolling walker  Gait Support Devices: Gait belt  Assistance 1: Minimum assistance  Comments/Distance (ft) 1: ~2ft x2 Chair<-->BSC. Instructed patient in NWB(R)LE. Patient performing heel<-->toe pivoting on (L)LE to complete transfer to/from BS. Forward flexed posture. Patient states she feels unsteady with using ww and asked to try using a wheelchair for mobility. Chair<-->wheelchair with ww. Instructed patient on safety with using wheelchair and to make sure brakes are locked before completing transfers. Instructed patient in wheelchair mobility around obstacles in the room and in  the hallway; ~60ft x2. Intermittent (A) with v/c to propel the wheels to complete directional changes and 180° turns. Patient states she like the wheelchair and would like to continue using one for increasing her activity.  Transfers  Transfer: Yes  Transfer 1  Technique 1: Sit to stand, Stand to sit  Transfer Device 1: Gait belt (ww)  Transfer Level of Assistance 1: Minimum assistance  Trials/Comments 1: (4x). v/c for safe hand placement and technique. Impulsive at times; attempting to stand before gait belt was donned or ww was in front of her. Patient is receptive to v/c and education for safer techniques.             Outcome Measures:     [facility] Basic Mobility  Turning from your back to your side while in a flat bed without using bedrails: A little  Moving from lying on your back to sitting on the side of a flat bed without using bedrails: A little  Moving to and from bed to chair (including a wheelchair): A little  Standing up from a chair using your arms (e.g. wheelchair or bedside chair): A little  To walk in hospital room: A lot  Climbing 3-5 steps with railing: Total  Basic Mobility - Total Score: 15                                      Education Documentation  Precautions, taught by Ginger Gamez PTA at 2/24/2025 11:42 AM.  Learner: Patient  Readiness: Acceptance  Method: Explanation, Demonstration, Teach-back  Response: Verbalizes Understanding, Needs Reinforcement  Comment: See therapy note.    Mobility Training, taught by Ginger Gamez PTA at 2/24/2025 11:42 AM.  Learner: Patient  Readiness: Acceptance  Method: Explanation, Demonstration, Teach-back  Response: Verbalizes Understanding, Needs Reinforcement  Comment: See therapy note.         EDUCATION:  Individual(s) Educated: Patient  Education Provided: Body Mechanics, Fall Risk, Home Safety, POC, Post-Op Precautions, Posture (Balance; [place] with transfers/short amb distances; Wheelchair mobility.)  Patient Response to Education:  Patient/Caregiver Verbalized Understanding of Information, Patient/Caregiver Performed Return Demonstration of Exercises/Activities, Patient/Caregiver Asked Appropriate Questions    Encounter Problems       Encounter Problems (Active)       PT Problem       Pt will completed supine <> sit bed mobility from flat bed with Supervision  (Progressing)       Start:  02/23/25    Expected End:  03/09/25            Pt will demonstrate sit to stand transfers with WW + Min A, while maintaining NWB status  (Progressing)       Start:  02/23/25    Expected End:  03/09/25            Pt. will ambulate 25 ft with WW + min A, while maintaining NWB status  (Progressing)       Start:  02/23/25    Expected End:  03/09/25            Pt will maintain balance while turning during ambulation with WW, Min A, And while maintaining NWB status (Progressing)       Start:  02/23/25    Expected End:  03/09/25            Pt will complete B LE strengthening HEP with independence (Progressing)       Start:  02/23/25    Expected End:  03/09/25                Pt will demonstrate SBA with W/C mobility with use of L LE and B/L Ues (Progressing)       Start:  02/24/25    Expected End:  03/09/25

## 2025-02-25 ENCOUNTER — APPOINTMENT (OUTPATIENT)
Dept: RADIOLOGY | Facility: HOSPITAL | Age: 80
End: 2025-02-25
Payer: MEDICARE

## 2025-02-25 LAB
ANION GAP SERPL CALC-SCNC: 10 MMOL/L (ref 10–20)
BACTERIA UR CULT: NORMAL
BUN SERPL-MCNC: 17 MG/DL (ref 6–23)
CALCIUM SERPL-MCNC: 8.5 MG/DL (ref 8.6–10.3)
CHLORIDE SERPL-SCNC: 104 MMOL/L (ref 98–107)
CO2 SERPL-SCNC: 28 MMOL/L (ref 21–32)
CREAT SERPL-MCNC: 0.73 MG/DL (ref 0.5–1.05)
EGFRCR SERPLBLD CKD-EPI 2021: 83 ML/MIN/1.73M*2
ERYTHROCYTE [DISTWIDTH] IN BLOOD BY AUTOMATED COUNT: 14.3 % (ref 11.5–14.5)
GLUCOSE BLD MANUAL STRIP-MCNC: 117 MG/DL (ref 74–99)
GLUCOSE BLD MANUAL STRIP-MCNC: 142 MG/DL (ref 74–99)
GLUCOSE BLD MANUAL STRIP-MCNC: 142 MG/DL (ref 74–99)
GLUCOSE SERPL-MCNC: 119 MG/DL (ref 74–99)
HCT VFR BLD AUTO: 33.2 % (ref 36–46)
HGB BLD-MCNC: 10.6 G/DL (ref 12–16)
HOLD SPECIMEN: NORMAL
HOLD SPECIMEN: NORMAL
MCH RBC QN AUTO: 28.8 PG (ref 26–34)
MCHC RBC AUTO-ENTMCNC: 31.9 G/DL (ref 32–36)
MCV RBC AUTO: 90 FL (ref 80–100)
NRBC BLD-RTO: 0 /100 WBCS (ref 0–0)
PLATELET # BLD AUTO: 213 X10*3/UL (ref 150–450)
POTASSIUM SERPL-SCNC: 3.8 MMOL/L (ref 3.5–5.3)
RBC # BLD AUTO: 3.68 X10*6/UL (ref 4–5.2)
SODIUM SERPL-SCNC: 138 MMOL/L (ref 136–145)
WBC # BLD AUTO: 9.6 X10*3/UL (ref 4.4–11.3)

## 2025-02-25 PROCEDURE — 80048 BASIC METABOLIC PNL TOTAL CA: CPT | Performed by: STUDENT IN AN ORGANIZED HEALTH CARE EDUCATION/TRAINING PROGRAM

## 2025-02-25 PROCEDURE — C1751 CATH, INF, PER/CENT/MIDLINE: HCPCS

## 2025-02-25 PROCEDURE — 85027 COMPLETE CBC AUTOMATED: CPT | Performed by: STUDENT IN AN ORGANIZED HEALTH CARE EDUCATION/TRAINING PROGRAM

## 2025-02-25 PROCEDURE — 2500000001 HC RX 250 WO HCPCS SELF ADMINISTERED DRUGS (ALT 637 FOR MEDICARE OP)

## 2025-02-25 PROCEDURE — 82947 ASSAY GLUCOSE BLOOD QUANT: CPT

## 2025-02-25 PROCEDURE — 2500000004 HC RX 250 GENERAL PHARMACY W/ HCPCS (ALT 636 FOR OP/ED)

## 2025-02-25 PROCEDURE — 2500000004 HC RX 250 GENERAL PHARMACY W/ HCPCS (ALT 636 FOR OP/ED): Performed by: STUDENT IN AN ORGANIZED HEALTH CARE EDUCATION/TRAINING PROGRAM

## 2025-02-25 PROCEDURE — 36415 COLL VENOUS BLD VENIPUNCTURE: CPT | Performed by: STUDENT IN AN ORGANIZED HEALTH CARE EDUCATION/TRAINING PROGRAM

## 2025-02-25 PROCEDURE — 36410 VNPNXR 3YR/> PHY/QHP DX/THER: CPT

## 2025-02-25 PROCEDURE — 2780000003 HC OR 278 NO HCPCS

## 2025-02-25 PROCEDURE — 99239 HOSP IP/OBS DSCHRG MGMT >30: CPT | Performed by: STUDENT IN AN ORGANIZED HEALTH CARE EDUCATION/TRAINING PROGRAM

## 2025-02-25 PROCEDURE — 2500000004 HC RX 250 GENERAL PHARMACY W/ HCPCS (ALT 636 FOR OP/ED): Performed by: INTERNAL MEDICINE

## 2025-02-25 PROCEDURE — 1210000001 HC SEMI-PRIVATE ROOM DAILY

## 2025-02-25 PROCEDURE — 2500000002 HC RX 250 W HCPCS SELF ADMINISTERED DRUGS (ALT 637 FOR MEDICARE OP, ALT 636 FOR OP/ED)

## 2025-02-25 RX ORDER — ERTAPENEM 1 G/1
1 INJECTION, POWDER, LYOPHILIZED, FOR SOLUTION INTRAMUSCULAR; INTRAVENOUS EVERY 24 HOURS
Status: DISCONTINUED | OUTPATIENT
Start: 2025-02-25 | End: 2025-02-26 | Stop reason: HOSPADM

## 2025-02-25 RX ADMIN — PANTOPRAZOLE SODIUM 40 MG: 40 TABLET, DELAYED RELEASE ORAL at 17:01

## 2025-02-25 RX ADMIN — SPIRONOLACTONE 25 MG: 25 TABLET ORAL at 08:30

## 2025-02-25 RX ADMIN — EMPAGLIFLOZIN 25 MG: 25 TABLET, FILM COATED ORAL at 08:30

## 2025-02-25 RX ADMIN — ENOXAPARIN SODIUM 40 MG: 40 INJECTION SUBCUTANEOUS at 17:01

## 2025-02-25 RX ADMIN — DOCUSATE SODIUM 200 MG: 100 CAPSULE, LIQUID FILLED ORAL at 21:12

## 2025-02-25 RX ADMIN — LOSARTAN POTASSIUM 25 MG: 25 TABLET, FILM COATED ORAL at 08:30

## 2025-02-25 RX ADMIN — DILTIAZEM HYDROCHLORIDE 240 MG: 240 CAPSULE, EXTENDED RELEASE ORAL at 08:30

## 2025-02-25 RX ADMIN — ROSUVASTATIN CALCIUM 10 MG: 10 TABLET, FILM COATED ORAL at 21:12

## 2025-02-25 RX ADMIN — ACETAMINOPHEN 650 MG: 325 TABLET ORAL at 08:29

## 2025-02-25 RX ADMIN — Medication 1000 MCG: at 08:30

## 2025-02-25 RX ADMIN — Medication 4000 UNITS: at 08:30

## 2025-02-25 RX ADMIN — MAGNESIUM OXIDE 400 MG (241.3 MG MAGNESIUM) TABLET 400 MG: TABLET at 08:30

## 2025-02-25 RX ADMIN — ASPIRIN 81 MG: 81 TABLET, COATED ORAL at 21:12

## 2025-02-25 RX ADMIN — LEVOTHYROXINE SODIUM 300 MCG: 0.1 TABLET ORAL at 05:13

## 2025-02-25 RX ADMIN — VILAZODONE HYDROCHLORIDE 40 MG: 40 TABLET ORAL at 08:30

## 2025-02-25 RX ADMIN — ERTAPENEM SODIUM 1 G: 1 INJECTION, POWDER, LYOPHILIZED, FOR SOLUTION INTRAMUSCULAR; INTRAVENOUS at 13:03

## 2025-02-25 RX ADMIN — PANTOPRAZOLE SODIUM 40 MG: 40 TABLET, DELAYED RELEASE ORAL at 05:12

## 2025-02-25 RX ADMIN — MEROPENEM 1 G: 1 INJECTION, POWDER, FOR SOLUTION INTRAVENOUS at 05:13

## 2025-02-25 ASSESSMENT — COGNITIVE AND FUNCTIONAL STATUS - GENERAL
TOILETING: A LOT
STANDING UP FROM CHAIR USING ARMS: A LITTLE
MOBILITY SCORE: 16
CLIMB 3 TO 5 STEPS WITH RAILING: TOTAL
HELP NEEDED FOR BATHING: A LOT
TURNING FROM BACK TO SIDE WHILE IN FLAT BAD: A LITTLE
STANDING UP FROM CHAIR USING ARMS: A LITTLE
WALKING IN HOSPITAL ROOM: A LOT
DRESSING REGULAR LOWER BODY CLOTHING: A LITTLE
CLIMB 3 TO 5 STEPS WITH RAILING: TOTAL
TURNING FROM BACK TO SIDE WHILE IN FLAT BAD: A LITTLE
MOVING TO AND FROM BED TO CHAIR: A LITTLE
DAILY ACTIVITIY SCORE: 18
WALKING IN HOSPITAL ROOM: A LOT
DRESSING REGULAR UPPER BODY CLOTHING: A LITTLE
MOVING TO AND FROM BED TO CHAIR: A LITTLE
MOBILITY SCORE: 16

## 2025-02-25 ASSESSMENT — PAIN SCALES - GENERAL
PAINLEVEL_OUTOF10: 3
PAINLEVEL_OUTOF10: 0 - NO PAIN
PAINLEVEL_OUTOF10: 3

## 2025-02-25 ASSESSMENT — PAIN - FUNCTIONAL ASSESSMENT
PAIN_FUNCTIONAL_ASSESSMENT: 0-10
PAIN_FUNCTIONAL_ASSESSMENT: 0-10

## 2025-02-25 NOTE — PROGRESS NOTES
02/25/25 1552   Discharge Planning   Home or Post Acute Services Post acute facilities (Rehab/SNF/etc)   Type of Post Acute Facility Services Skilled nursing   Expected Discharge Disposition SNF  ([place] [facility])   Does the patient need discharge transport arranged? Yes   RoundTrip coordination needed? Yes   Has discharge transport been arranged? Yes   What day is the transport expected? 02/25/25   What time is the transport expected? 1930     Pt medically cleared for discharge today. Care team and facility updated with discharge details. Pt updated and is in agreement with plan. Pt states she will update her family.

## 2025-02-25 NOTE — CARE PLAN
Problem: Pain - Adult  Goal: Verbalizes/displays adequate comfort level or baseline comfort level  Outcome: Progressing     Problem: Pain - Adult  Goal: Verbalizes/displays adequate comfort level or baseline comfort level  Outcome: Progressing     Problem: Safety - Adult  Goal: Free from fall injury  Outcome: Progressing     Problem: Discharge Planning  Goal: Discharge to home or other facility with appropriate resources  Outcome: Progressing     Problem: Chronic Conditions and Co-morbidities  Goal: Patient's chronic conditions and co-morbidity symptoms are monitored and maintained or improved  Outcome: Progressing     Problem: Nutrition  Goal: Nutrient intake appropriate for maintaining nutritional needs  Outcome: Progressing     Problem: Fall/Injury  Goal: Not fall by end of shift  Outcome: Progressing  Goal: Be free from injury by end of the shift  Outcome: Progressing  Goal: Verbalize understanding of personal risk factors for fall in the hospital  Outcome: Progressing  Goal: Verbalize understanding of risk factor reduction measures to prevent injury from fall in the home  Outcome: Progressing  Goal: Use assistive devices by end of the shift  Outcome: Progressing  Goal: Pace activities to prevent fatigue by end of the shift  Outcome: Progressing     Problem: Diabetes  Goal: Achieve decreasing blood glucose levels by end of shift  Outcome: Progressing  Goal: Increase stability of blood glucose readings by end of shift  Outcome: Progressing  Goal: Maintain electrolyte levels within acceptable range throughout shift  Outcome: Progressing  Goal: Maintain glucose levels >70mg/dl to <250mg/dl throughout shift  Outcome: Progressing  Goal: No changes in neurological exam by end of shift  Outcome: Progressing  Goal: Learn about and adhere to nutrition recommendations by end of shift  Outcome: Progressing  Goal: Vital signs within normal range for age by end of shift  Outcome: Progressing  Goal: Increase self care  and/or family involovement by end of shift  Outcome: Progressing  Goal: Receive DSME education by end of shift  Outcome: Progressing     Problem: Skin  Goal: Decreased wound size/increased tissue granulation at next dressing change  Outcome: Progressing  Goal: Participates in plan/prevention/treatment measures  Outcome: Progressing  Goal: Prevent/manage excess moisture  Outcome: Progressing  Goal: Prevent/minimize sheer/friction injuries  Outcome: Progressing  Goal: Promote/optimize nutrition  Outcome: Progressing  Goal: Promote skin healing  Outcome: Progressing   The patient's goals for the shift include comfort and safety    The clinical goals for the shift include patient will remain free from injury throughout shift    Over the shift, the patient did not make progress toward the following goals. Barriers to progression include none. Recommendations to address these barriers include continued education and reinforcement of information.     27-May-2019 21:21

## 2025-02-25 NOTE — PROCEDURES
Left cephalic, 20g, 8cm, 2/25/25    Pre-Procedure Checklist:  Emergent Line Insertion: No  Type of Line to be Placed: Midline  Consent Obtained: N/A  Emergency Medication Necessary: No  Patient Identified with 2 Independent Identifiers: Yes  Review of Allergies, Anticoagulation, Relevant Labs, ECG/Telemetry: Yes  Risks/Benefits/Alternatives Discussed with Patient/POA/Legal Representative: Yes  Stop Sign on Door: Yes  Time Out Performed: Yes  Catheter Exchange: No    Positioning Checklist:  All People, Including Patient, in the Room with Cap and Mask: Yes  Fluoroscopy Used to Identify Vessel and Guide Insertion: No   Sterile Cover Used: Yes  Full Barrier Precautions Followed (Mask, Cap, Gown, Gloves): Yes  Hands Washed: Yes  Monitors Attached with Sound Alarms On: No  Full Body Sterile Drape (Head-to-Toe) Used to Cover Patient: Yes  Trendelenburg Position (For IJ and Subclavian): No  CHG Skin Prep Used and Allowed to Air Dry to Skin Procedure: Yes    Procedure Checklist:  Blood Aspirated From All Lumens, All Ports Subsequently Flushed: Yes  Catheter Caps Placed on All Lumens; Lumens Clamped: Yes  Maintain Guidewire Control Throughout, Ensuring Guidewire Removal: Yes  Maintain Sterile Field Throughout Insertion: Yes  Catheter Secured: Yes  Confirmatory Test of Venous Placement: Non-Pulsatile Blood    Post Procedure Checklist:  Date and Time Written on Dressing: Yes  Sharp and Wire Count and Safe Disposal of all Sharps/Wires: Yes  Sterile Dressing Applied Per Protocol: Yes  X-ray Ordered or ECG Image: N/A    Midline Insertion Details:  Midline expires in 28 days date from 2/25/25  See LDA assessment for further midline details.  Additional Details: Line was inserted using Modified Seldinger's Technique.   Midline ready for immediate use.  Placed by: Iona Ponce RN

## 2025-02-25 NOTE — CARE PLAN
The patient's goals for the shift include comfort and safety    The clinical goals for the shift include Patient will remain safe and free from injury throughout the shift      Problem: Pain - Adult  Goal: Verbalizes/displays adequate comfort level or baseline comfort level  Outcome: Progressing     Problem: Safety - Adult  Goal: Free from fall injury  Outcome: Progressing     Problem: Fall/Injury  Goal: Not fall by end of shift  Outcome: Progressing  Goal: Be free from injury by end of the shift  Outcome: Progressing  Goal: Verbalize understanding of personal risk factors for fall in the hospital  Outcome: Progressing  Goal: Verbalize understanding of risk factor reduction measures to prevent injury from fall in the home  Outcome: Progressing  Goal: Use assistive devices by end of the shift  Outcome: Progressing  Goal: Pace activities to prevent fatigue by end of the shift  Outcome: Progressing     Problem: Diabetes  Goal: Achieve decreasing blood glucose levels by end of shift  Outcome: Progressing  Goal: Increase stability of blood glucose readings by end of shift  Outcome: Progressing  Goal: Maintain electrolyte levels within acceptable range throughout shift  Outcome: Progressing  Goal: Maintain glucose levels >70mg/dl to <250mg/dl throughout shift  Outcome: Progressing  Goal: No changes in neurological exam by end of shift  Outcome: Progressing  Goal: Learn about and adhere to nutrition recommendations by end of shift  Outcome: Progressing  Goal: Vital signs within normal range for age by end of shift  Outcome: Progressing  Goal: Increase self care and/or family involovement by end of shift  Outcome: Progressing  Goal: Receive DSME education by end of shift  Outcome: Progressing     Problem: Skin  Goal: Decreased wound size/increased tissue granulation at next dressing change  Outcome: Progressing  Flowsheets (Taken 2/24/2025 5506)  Decreased wound size/increased tissue granulation at next dressing change:  Promote sleep for wound healing  Goal: Participates in plan/prevention/treatment measures  Outcome: Progressing  Flowsheets (Taken 2/24/2025 2330)  Participates in plan/prevention/treatment measures: Elevate heels  Goal: Prevent/manage excess moisture  Outcome: Progressing  Flowsheets (Taken 2/24/2025 2330)  Prevent/manage excess moisture:   Monitor for/manage infection if present   Moisturize dry skin  Goal: Prevent/minimize sheer/friction injuries  Outcome: Progressing  Flowsheets (Taken 2/24/2025 2330)  Prevent/minimize sheer/friction injuries: HOB 30 degrees or less  Goal: Promote/optimize nutrition  Outcome: Progressing  Flowsheets (Taken 2/24/2025 2330)  Promote/optimize nutrition: Offer water/supplements/favorite foods  Goal: Promote skin healing  Outcome: Progressing  Flowsheets (Taken 2/24/2025 2330)  Promote skin healing: Assess skin/pad under line(s)/device(s)

## 2025-02-25 NOTE — DISCHARGE SUMMARY
Discharge Diagnosis  Subacute osteomyelitis of right foot (Multi)  s/p transmetatarsal amputation of the right foot     Issues Requiring Follow-Up  Outpatient follow-up with podiatry office in 2-3 weeks  Outpatient follow-up with infectious disease office in 2-3 weeks  Outpatient follow-up with primary care as needed  Weekly lab workup including CBC, BMP, CRP and results needs to be faxed to ID office at [phone]     Discharge Meds     Medication List      START taking these medications     ertapenem 1,000 mg in sodium chloride 0.9% 50 mL IV; Infuse 1,000 mg at   120 mL/hr over 30 minutes into a venous catheter once every 24 hours for   14 days.     CHANGE how you take these medications     docusate sodium 100 mg capsule; Commonly known as: Colace; TAKE 1   CAPSULE(100 MG) BY MOUTH TWICE DAILY AS NEEDED FOR CONSTIPATION; What   changed: See the new instructions.     CONTINUE taking these medications     albuterol 90 mcg/actuation inhaler; INHALE 2 PUFFS BY MOUTH EVERY 4   HOURS AS DIRECTED AS NEEDED FOR COUGH OR WHEEZING   aspirin 81 mg EC tablet   cholecalciferol 50 MCG (2000 UT) tablet; Commonly known as: Vitamin D-3   coenzyme Q10 400 mg capsule   cyanocobalamin 1,000 mcg tablet; Commonly known as: Vitamin B-12   dilTIAZem  mg 24 hr capsule; Commonly known as: Dilacor XR; Take 1   capsule (240 mg) by mouth once daily.   empagliflozin 25 mg; Commonly known as: Jardiance; Take 1 tablet (25 mg)   by mouth once daily.   fluconazole 150 mg tablet; Commonly known as: Diflucan; Take 1 tablet   (150 mg) by mouth 1 (one) time per week in the early morning..   lactobacillus 10 billion cell capsule; Commonly known as: Culturelle   levothyroxine 150 mcg tablet; Commonly known as: Synthroid, Levoxyl;   TAKE 1 TABLET BY MOUTH DAILY EXCEPT 2 TABLETS ON TUESDAY AND THURSDAY   losartan 25 mg tablet; Commonly known as: Cozaar; Take 1 tablet (25 mg)   by mouth 2 times a day.   magnesium oxide 400 mg tablet; Commonly known  as: Mag-Ox   metFORMIN  mg 24 hr tablet; Commonly known as: Glucophage-XR; Take   2 tablets (1,000 mg) by mouth 2 times daily (morning and late afternoon).   Take with meals.   Mounjaro 10 mg/0.5 mL pen injector; Generic drug: tirzepatide; Inject 10   mg under the skin 1 (one) time per week.   omeprazole 40 mg DR capsule; Commonly known as: PriLOSEC; TAKE 1 CAPSULE   BY MOUTH TWICE DAILY   rosuvastatin 10 mg tablet; Commonly known as: Crestor; TAKE 1 TABLET(10   MG) BY MOUTH DAILY AT BEDTIME   spironolactone 25 mg tablet; Commonly known as: Aldactone; Take 1 tablet   (25 mg) by mouth once daily.   vilazodone 40 mg tablet; Commonly known as: Viibryd; TAKE 1 TABLET BY   MOUTH EVERY DAY WITH BREAKFAST       Test Results Pending At Discharge  Pending Labs       Order Current Status    Surgical Pathology Exam In process    Urine Culture In process    Blood Culture Preliminary result    Blood Culture Preliminary result            Hospital Course  An 79 year old female admitted with diabetic right foot wound of 2nd and third digits with xray concerning for osteomyelitis      Podiatry was consulted, ID was consulted.  Status post transmetatarsal amputation of the right foot with gastrocnemius recession and application of antibiotic impregnated cement.  Postop she was continued on meropenem every 8 hours.  ID recommended to discharge patient on IV Invanz 1 gr daily for 2 weeks.  Meropenem was discontinued.  Midline ordered and placed.   Cultures were sent from tissue margins, came back negative.   Podiatry recommended nonweightbearing to right lower extremity  Continued aspirin, statin for coronary artery disease.  Angio and PVRs showed patent blood flow, seen by vascular surgery.   Continues insulin sliding scale, POCT glucose, DM diet.  DVT prophylaxis: Was with Lovenox subcu.  Continued home Jardiance, levothyroxine, losartan, Cardizem, statin, spironolactone.  Pain control, supportive care was also  considered.      Patient is currently hemodynamically stable and ready for discharge to SNF for better recovery.  She will be continued on IV Invanz 1 g daily for 2 more weeks postdischarge.  Patient needs weekly lab workup including CBC, BMP, CRP and the result needs to be faxed to ID office.  Patient is outpatient follow-up with ID, podiatry, primary care as needed and for further recommendation and medication adjustment.  She will be continued on rest of her home medications.       Pertinent Physical Exam At Time of Discharge  Physical Exam    General: Well-developed elderly female, in no acute distress  HEENT: AT, NC, no JVD, no lymphadenopathy, neck supple  Lungs: Clear, no wheezing, no crackles  Cardiac: Normal S1-S2, no murmur, no gallop  Abdomen: Soft, nontender, no distention, positive bowel sound  Extremities: No deformity, no edema, pulses intact, ROM limited in RLE, RLE wrapped   Neurological: Alert awake oriented x3, sensation intact, clear speech      Outpatient Follow-Up  Future Appointments   Date Time Provider Department Center   2/25/2025  1:00 PM [facility]   3/7/2025 11:00 AM Ramses Black DO [email or web address] [place]   3/13/2025 10:20 AM Sadie Montgomery PharmD [id number]       Time spent 40 minutes   Estephania Lnyn MD

## 2025-02-25 NOTE — PROGRESS NOTES
PODIATRIC MEDICINE AND SURGERY   CONSULT PROGRESS NOTE    Interval HPI:  - Patient resting comfortably in chair  - vital signs stable per recorded values  - Labs stable  - pain well controlled   - s/p TMA +GR + abx impregnated cement + application of splint (2/22/25)  - is being discharged to SNF tonight    ASSESSMENT:    Patient is a 79 y.o. female with pmh consistent for DM, previous amputation secondary to OM, Hypothyroidism, RA, PAD, PE, CKD, HTN, HF. Patient was admitted for concern of OM. Podiatric consultation was requested for previous concern. Patient is a known patient from my private practice. MRI positive to 2/3 mets. One vessel flow but vascular feels should be enough to heal. Now s/p TMA +GR + abx impregnated cement + application of splint (2/22/25)    PLAN AND RECOMMENDATIONS:  - Patient seen and evaluated   - Dressing changed today and site is healthy and appears as should   - Please do not change the Right foot dressings.  Podiatry will change the first postop dressing. If strike through occurs, elevate the leg on pillows.  Nursing then can reinforce dressing with ABD, Kerlix, and ACE over top of existing dressing.   - Cleared for discharge from podiatry   - ID on board. Appreciate input. Antibiotics per their recommendations.   - Will follow OR cultures. Currently showing NGTD.   - NWB to RLE  - Elevate RLE at or above the level of the heart.  - Pain management per primary team.  - Will follow next Tuesday in my office   - Podiatry to continue to follow       Past Medical History:   Diagnosis Date    Body mass index (BMI) 39.0-39.9, adult 02/23/2022    BMI 39.0-39.9,adult    Body mass index (BMI) 39.0-39.9, adult 06/30/2021    BMI 39.0-39.9,adult    Morbid (severe) obesity due to excess calories (Multi) 02/23/2022    Class 2 severe obesity due to excess calories with serious comorbidity and body mass index (BMI) of 39.0 to 39.9 in adult    Morbid (severe) obesity due to excess calories (Multi)  06/30/2021    Class 2 severe obesity due to excess calories with serious comorbidity and body mass index (BMI) of 37.0 to 37.9 in adult    Other acute sinusitis 12/31/2019    Other acute sinusitis, recurrence not specified    Personal history of other diseases of the musculoskeletal system and connective tissue     History of osteomyelitis    Personal history of other diseases of the respiratory system     History of chronic obstructive lung disease    Personal history of other drug therapy 11/14/2019    History of influenza vaccination    Personal history of other endocrine, nutritional and metabolic disease     History of hyperlipidemia    Personal history of other endocrine, nutritional and metabolic disease     History of hypothyroidism    Personal history of other mental and behavioral disorders     History of major depression    Personal history of other specified conditions 09/04/2019    History of wheezing    Systemic lupus erythematosus, unspecified     Lupus    Type 2 diabetes mellitus with other diabetic neurological complication     Type 2 diabetes mellitus with other neurologic complication, with long-term current use of insulin     Past Surgical History:   Procedure Laterality Date    AMPUTATION      CT ANGIO AORTA AND BILATERAL ILIOFEMORAL RUN OFF INCLUDING WITHOUT CONTRAST IF PERFORMED  03/17/2021    CT AORTA AND BILATERAL ILIOFEMORAL RUNOFF ANGIOGRAM W AND/OR WO IV CONTRAST 3/17/2021 [facility] CLINICAL LEGACY    FOOT SURGERY  11/03/2017    Foot Repair    INVASIVE VASCULAR PROCEDURE Right 02/20/2025    Procedure: Lower Extremity Angiogram;  Surgeon: Alvino Arreola MD;  Location: [facility];  Service: Vascular Surgery;  Laterality: Right;  artery puncture at 7:45 AM    OTHER SURGICAL HISTORY  12/13/2021    Ankle surgery    OTHER SURGICAL HISTORY  12/13/2021    Coronary artery bypass graft    OTHER SURGICAL HISTORY  12/13/2021    Cataract surgery    OTHER SURGICAL HISTORY  12/13/2021     Angioplasty    OTHER SURGICAL HISTORY  12/13/2021    Complete colonoscopy    OTHER SURGICAL HISTORY  12/13/2021    Tubal ligation    TOE AMPUTATION       Allergies   Allergen Reactions    Lisinopril Cough     Family History   Problem Relation Name Age of Onset    Heart failure Mother      Heart attack Mother      Coronary artery disease Mother      Coronary artery disease Father      Heart failure Father      Kidney failure Father       Social History     Socioeconomic History    Marital status:      Spouse name: Not on file    Number of children: Not on file    Years of education: Not on file    Highest education level: Not on file   Occupational History    Not on file   Tobacco Use    Smoking status: Never    Smokeless tobacco: Never   Vaping Use    Vaping status: Never Used   Substance and Sexual Activity    Alcohol use: Not Currently    Drug use: Never    Sexual activity: Not on file   Other Topics Concern    Not on file   Social History Narrative    Not on file     Social Drivers of Health     Financial Resource Strain: Low Risk  (2/17/2025)    Overall Financial Resource Strain (CARDIA)     Difficulty of Paying Living Expenses: Not hard at all   Food Insecurity: No Food Insecurity (2/17/2025)    Hunger Vital Sign     Worried About Running Out of Food in the Last Year: Never true     Ran Out of Food in the Last Year: Never true   Transportation Needs: No Transportation Needs (2/17/2025)    PRAPARE - Transportation     Lack of Transportation (Medical): No     Lack of Transportation (Non-Medical): No   Physical Activity: Insufficiently Active (2/17/2025)    Exercise Vital Sign     Days of Exercise per Week: 3 days     Minutes of Exercise per Session: 20 min   Stress: Stress Concern Present (2/17/2025)    [employer] [place] [employer] - Occupational Stress Questionnaire     Feeling of Stress : To some extent   Social Connections: Moderately Isolated (2/17/2025)    Social Connection and Isolation  Panel [NHANES]     Frequency of Communication with Friends and Family: More than three times a week     Frequency of Social Gatherings with Friends and Family: More than three times a week     Attends [employer] Services: More than 4 times per year     Active Member of Clubs or Organizations: No     Attends Club or Organization Meetings: Never     Marital Status:    Intimate Partner Violence: Not At Risk (2/17/2025)    Humiliation, Afraid, Rape, and Kick questionnaire     Fear of Current or Ex-Partner: No     Emotionally Abused: No     Physically Abused: No     Sexually Abused: No   Housing Stability: Low Risk  (2/17/2025)    Housing Stability Vital Sign     Unable to Pay for Housing in the Last Year: No     Number of Times Moved in the Last Year: 0     Homeless in the Last Year: No       Current Facility-Administered Medications:     acetaminophen (Tylenol) tablet 650 mg, 650 mg, oral, q4h PRN, 650 mg at 02/25/25 0829 **OR** acetaminophen (Tylenol) oral liquid 650 mg, 650 mg, nasogastric tube, q4h PRN **OR** acetaminophen (Tylenol) suppository 650 mg, 650 mg, rectal, q4h PRN, Haresh Antonio, DPM    acetaminophen (Tylenol) tablet 650 mg, 650 mg, oral, q4h PRN **OR** acetaminophen (Tylenol) oral liquid 650 mg, 650 mg, oral, q4h PRN **OR** acetaminophen (Tylenol) suppository 650 mg, 650 mg, rectal, q4h PRN, Haresh Antonio, DPM    ALPRAZolam (Xanax) tablet 0.25 mg, 0.25 mg, oral, BID PRN, Haresh Antonio, DPM    aspirin EC tablet 81 mg, 81 mg, oral, Nightly, Haresh Antonio, DPM, 81 mg at 02/24/25 2103    cholecalciferol (Vitamin D-3) tablet 4,000 Units, 4,000 Units, oral, Daily, Haresh Antonio, DPM, 4,000 Units at 02/25/25 0830    cyanocobalamin (Vitamin B-12) tablet 1,000 mcg, 1,000 mcg, oral, Daily, Haresh Antonio, DPM, 1,000 mcg at 02/25/25 0830    dilTIAZem CD (Cardizem CD) 24 hr capsule 240 mg, 240 mg, oral, Daily, Haresh Paco, DPM, 240 mg at 02/25/25 0830    docusate sodium (Colace) capsule 200 mg, 200 mg, oral,  Nightly, Haresh Antonio, DPM, 200 mg at 02/24/25 2103    empagliflozin (Jardiance) tablet 25 mg, 25 mg, oral, Daily, Haresh Antonio, DPM, 25 mg at 02/25/25 0830    enoxaparin (Lovenox) syringe 40 mg, 40 mg, subcutaneous, q24h, Estephania Lynn MD, 40 mg at 02/24/25 1815    ertapenem (INVanz) 1 g in sodium chloride 0.9% IV 50 mL, 1 g, intravenous, q24h, Estephania Lynn MD, Stopped at 02/25/25 1441    insulin lispro injection 0-5 Units, 0-5 Units, subcutaneous, TID AC, Haresh Antonio DPM, 1 Units at 02/24/25 1254    levothyroxine (Synthroid, Levoxyl) tablet 150 mcg, 150 mcg, oral, Once per day on Sunday Monday Wednesday Friday Saturday, Haresh Antonio, DPM, 150 mcg at 02/24/25 0435    levothyroxine (Synthroid, Levoxyl) tablet 300 mcg, 300 mcg, oral, Once per day on Tuesday Thursday, Haresh Antonio, DPM, 300 mcg at 02/25/25 0513    lidocaine (Xylocaine) 10 mg/mL (1 %) injection 5 mL, 5 mL, infiltration, Once, Estephania Lynn MD    losartan (Cozaar) tablet 25 mg, 25 mg, oral, BID, Haresh Antonio, DPM, 25 mg at 02/25/25 0830    magnesium oxide (Mag-Ox) tablet 400 mg, 400 mg, oral, Daily, Haresh Antonio, DPM, 400 mg at 02/25/25 0830    ondansetron (Zofran) tablet 4 mg, 4 mg, oral, q8h PRN **OR** ondansetron (Zofran) injection 4 mg, 4 mg, intravenous, q8h PRN, BRADY BullardM, 4 mg at 02/22/25 1336    oxyCODONE (Roxicodone) immediate release tablet 5 mg, 5 mg, oral, q6h PRN, Haresh Antonio, DPM    pantoprazole (ProtoNix) EC tablet 40 mg, 40 mg, oral, BID AC, Haresh Antonio, DPM, 40 mg at 02/25/25 0512    polyethylene glycol (Glycolax, Miralax) packet 17 g, 17 g, oral, Daily PRN, Haersh Antonio, DPM    rosuvastatin (Crestor) tablet 10 mg, 10 mg, oral, Nightly, Haresh Antonio, DPM, 10 mg at 02/24/25 2103    spironolactone (Aldactone) tablet 25 mg, 25 mg, oral, Daily, Haresh Antonio, DPM, 25 mg at 02/25/25 0830    vilazodone (Viibryd) tablet 40 mg, 40 mg, oral, Daily with breakfast, Haersh Antonio, DPM, 40 mg at  "25 0830     REVIEW OF SYSTEMS:  Negative unless deemed otherwise in interval HPI    OBJECTIVE:  /55 (BP Location: Left arm, Patient Position: Sitting)   Pulse 66   Temp 36.7 °C (98.1 °F) (Temporal)   Resp 16   Ht 1.676 m (5' 6\")   Wt 80.7 kg (178 lb)   SpO2 96%   BMI 28.73 kg/m²     Alert and oriented to person, place, and time. No acute distress.    Pt with dressing clean dry and intact. Dressing left intact. No strike through noted to the dressing. No lymphangitis or cellulitis extending past the dressing site.     Calf supple, non-tender. Negative Sudarshan/Fisher test. No palpable cords. No acute s/s of DVT.    Site well coapted without s/s of infection. Sutures all intact. Mild edema.       LABS:   Sedimentation Rate   Date/Time Value Ref Range Status   2023 05:27 AM 46 (H) 0 - 30 mm/h Final     Comment:     Please note new reference ranges as of 2022.     C-Reactive Protein   Date/Time Value Ref Range Status   2025 01:39 PM 0.19 <1.00 mg/dL Final        MICROBIOLOGY:  25 OR Forefoot Path: pending   25 OR second met clean margin path: pending   25 OR third met clean margin path: pending   25 OR second met micro: NGTD  25 OR second met clean margin micro: NGTD  25 OR third met clean margin micro: NGTD      25 wound culture: MSSA  25 Blood cultures: NGTD    IMAGIN25 MRI IMPRESSION:  1. Extensive osteomyelitis in the 3rd ray involving the entire 3rd  metatarsal, the 3rd proximal phalanx and the 3rd middle phalanx. This  includes osseous destructive changes and abnormal marrow signal  2. Septic arthritis of the 3rd MTP joint with complex fluid and  dislocation of the joint.  3. Associated cellulitis and phlegmonous changes in the 3rd ray. No  discrete abscess seen.  4. Deformity and collapse of the 2nd metatarsal head suggestive of  underlying avascular necrosis.  5. Postsurgical changes in the forefoot.      25 Xr R foot " IMPRESSION:  1. Erosions involving the distal 3rd metatarsal and proximal 3rd  proximal phalanx, concerning for osteomyelitis.  2. No acute fracture or dislocation identified.  3. Postoperative and degenerative changes, as described above.      VASCULAR STUDIES:  PVR 2/18/25 Impression:    Right Lower PVR: Evidence of mild arterial occlusive disease in the right lower extremity at rest. Decreased digital perfusion noted. Monophasic flow is noted in the right common femoral artery, right posterior tibial artery and right dorsalis pedis artery.    Left Lower PVR: Evidence of mild arterial occlusive disease in the left lower extremity at rest. Decreased digital perfusion noted. Monophasic flow is noted in the left common femoral artery, left posterior tibial artery and left dorsalis pedis artery.     Imaging & Doppler Findings:     RIGHT Lower PVR                Pressures Ratios  Right Posterior Tibial (Ankle) 160 mmHg  1.14  Right Dorsalis Pedis (Ankle)   164 mmHg  1.17  Right Digit (Great Toe)        69 mmHg   0.49     LEFT Lower PVR                Pressures Ratios  Left Posterior Tibial (Ankle) 170 mmHg  1.21  Left Dorsalis Pedis (Ankle)   154 mmHg  1.10  Left Digit (Great Toe)        68 mmHg   0.49    _______________________________________  Haresh Antonio DPM  Work Cell: [phone]  Office phone: [phone]  February 25, 2025

## 2025-02-25 NOTE — PROGRESS NOTES
"Infectious Disease Progress Note       2/24/2025     79-year-old female with diabetes mellitus type 2 and peripheral arterial disease presented from podiatry clinic with right foot wound.  Concern for osteomyelitis second and third right foot digit.  MRI 2/18/2025 pending results  X-ray 2/17/2025 with erosions involving the distal third metatarsal and proximal third phalanx concerning for osteomyelitis  PVR pending final results but prelim is showing evidence of mild arterial occlusive disease right lower extremity and mild arterial occlusive disease in the left lower extremity.  Blood cultures x 2 remain normal drawn 2/17/2025.     Postop status post right foot TMA plus GR plus antibiotic impregnated cement and application of splint per podiatry note.  Operative site was closed.  Operative note reviewed  \"Distal shaft and head of the second and third metatarsals were noted to be soft consistent with diagnosis of osteomyelitis.  Bone at margins was noted to be hard.  Good fill of antibiotic impregnated cement to the second and third metatarsals.  Post gastrocnemius recession much improved ankle joint dorsiflexion. good closure of flaps noted\"    Cultures from bone resection x 3 obtained 2/22/2025 all negative to date  Pathology pending    Patient feeling achy today, fever last PM       Lab Results   Component Value Date    WBC 9.2 02/24/2025    HGB 11.0 (L) 02/24/2025    HCT 34.1 (L) 02/24/2025    MCV 90 02/24/2025     02/24/2025     Lab Results   Component Value Date    GLUCOSE 159 (H) 02/24/2025    CALCIUM 8.6 02/24/2025     02/24/2025    K 3.9 02/24/2025    CO2 28 02/24/2025     02/24/2025    BUN 21 02/24/2025    CREATININE 0.88 02/24/2025       WBC trends are being monitored. Antibiotic doses are being adjusted per most recent renal labs.     Vitals:    02/24/25 2101   BP: 115/54   Pulse: 76   Resp:    Temp: 37 °C (98.6 °F)   SpO2: 94%     Patient is awake and alert, sitting in a chair, post " surgery.   NAD  Neck supple  Heart S1S2  Chest: Equal expansion, bilaterally clear to auscultation  Abdomen: soft, ND, NTTP, no guarding  Extrem: Foot is wrapped in dressing.  Photos reviewed  Skin: no rashes, no diaphoresis  Neuro: CNS intact  Affect appropriate and patient is interactive        Patient Active Problem List   Diagnosis    Constipation    Class 3 severe obesity due to excess calories with serious comorbidity and body mass index (BMI) of 40.0 to 44.9 in adult    Hypothyroidism    BMI 40.0-44.9, adult (Multi)    Type 2 diabetes mellitus    Morbidly obese (Multi)    Depression, unspecified    B12 deficiency    Dizziness    Fatigue    Hypercholesteremia    Hyperlipidemia    Low back pain    Malaise and fatigue    Medicare annual wellness visit, subsequent    Yeast infection    Rheumatoid arthritis involving both shoulders, unspecified whether rheumatoid factor present (Multi)    Acquired absence of left great toe (Multi)    Atherosclerosis of native artery of both lower extremities with rest pain (Multi)    Other acute osteomyelitis, unspecified site    Benign carcinoid tumors of other sites    Pulmonary emphysema, unspecified emphysema type (Multi)    Moderate episode of recurrent major depressive disorder    Type 2 diabetes mellitus with diabetic peripheral angiopathy without gangrene, without long-term current use of insulin (Multi)    Stage 3a chronic kidney disease (Multi)    Coronary artery disease involving native coronary artery of native heart without angina pectoris    Hypertension    BMI 33.0-33.9,adult    Never smoked cigarettes    Wound of right foot    Class 1 obesity due to excess calories without serious comorbidity with body mass index (BMI) of 33.0 to 33.9 in adult    Chronic combined systolic (congestive) and diastolic (congestive) heart failure    Ventricular tachycardia (Multi)    Subacute osteomyelitis of right foot (Multi)    Peripheral vascular disease, unspecified (CMS-[facility])     Equinus contracture of ankle    History of amputation of hallux (Multi)       Assessment:   Right foot osteomyelitis second and third metatarsals  Peripheral arterial disease demonstrated on PVR, angio planned tomorrow right TMA   Diabetes mellitus type 2     Plan:   Meropenem ,  New fever workup          If no evidence of Pseudomonas, changed to Invanz on discharge. Length of therapy will depend on surgical samples from the margin.  Anticipate at least 2 weeks of IV Invanz to facilitate wound healing postop.  If pathology from the margin shows evidence of osteomyelitis or positive culture then patient will need antibiotic therapy extended to 8 weeks.    Weekly CBC BMP CRP while on antibiotics.    Please ensure that patient's labs are faxed to [phone].   In case of any questions, please call the [facility] outpatient infusion center infectious disease office at [phone] Monday through Friday from 9 AM to 4 PM.  There is no answering service after hours or on weekends.  An epic chat message must be sent after hours, please direct that message to ALL Dr. Olive Nicole, Dr Jair Giron, and Lynnette Cannon  Thank you for your cooperation in this matter.  We will not be able to follow labs without this step.    Of note, she has retained screws/plate and scars bilateral LE  proximal to the foot.       Imaging and labs were reviewed per medical records and any ID pertinent labs were also addressed  Time spent before, during and after care today, including coordination of care >40 min      Hardeep Giron MD

## 2025-02-26 ENCOUNTER — NURSING HOME VISIT (OUTPATIENT)
Dept: POST ACUTE CARE | Facility: EXTERNAL LOCATION | Age: 80
End: 2025-02-26
Payer: MEDICARE

## 2025-02-26 VITALS
HEART RATE: 82 BPM | RESPIRATION RATE: 18 BRPM | BODY MASS INDEX: 28.61 KG/M2 | HEIGHT: 66 IN | SYSTOLIC BLOOD PRESSURE: 124 MMHG | TEMPERATURE: 98.8 F | DIASTOLIC BLOOD PRESSURE: 60 MMHG | OXYGEN SATURATION: 94 % | WEIGHT: 178 LBS

## 2025-02-26 VITALS
BODY MASS INDEX: 30.67 KG/M2 | DIASTOLIC BLOOD PRESSURE: 65 MMHG | WEIGHT: 190 LBS | HEART RATE: 74 BPM | SYSTOLIC BLOOD PRESSURE: 110 MMHG

## 2025-02-26 DIAGNOSIS — E11.69 TYPE 2 DIABETES MELLITUS WITH OTHER SPECIFIED COMPLICATION, UNSPECIFIED WHETHER LONG TERM INSULIN USE (MULTI): ICD-10-CM

## 2025-02-26 DIAGNOSIS — F33.1 MODERATE EPISODE OF RECURRENT MAJOR DEPRESSIVE DISORDER: ICD-10-CM

## 2025-02-26 DIAGNOSIS — M06.9 RHEUMATOID ARTHRITIS INVOLVING BOTH SHOULDERS, UNSPECIFIED WHETHER RHEUMATOID FACTOR PRESENT (MULTI): ICD-10-CM

## 2025-02-26 DIAGNOSIS — M86.271 SUBACUTE OSTEOMYELITIS OF RIGHT FOOT (MULTI): Primary | ICD-10-CM

## 2025-02-26 DIAGNOSIS — I25.10 CORONARY ARTERY DISEASE INVOLVING NATIVE CORONARY ARTERY OF NATIVE HEART WITHOUT ANGINA PECTORIS: ICD-10-CM

## 2025-02-26 LAB — GLUCOSE BLD MANUAL STRIP-MCNC: 128 MG/DL (ref 74–99)

## 2025-02-26 PROCEDURE — 82947 ASSAY GLUCOSE BLOOD QUANT: CPT

## 2025-02-26 ASSESSMENT — ENCOUNTER SYMPTOMS
ACTIVITY CHANGE: 1
DIARRHEA: 0
SHORTNESS OF BREATH: 0
WEAKNESS: 1
APNEA: 0
ABDOMINAL PAIN: 0
NERVOUS/ANXIOUS: 0
ARTHRALGIAS: 1
DIZZINESS: 0
NAUSEA: 0
COUGH: 0
WOUND: 1
FATIGUE: 0

## 2025-02-26 NOTE — CARE PLAN
The patient's goals for the shift include comfort and safety    The clinical goals for the shift include patient will remain free from injury throughout shift    Over the shift, the patient did not make progress toward the following goals. Barriers to progression include . Recommendations to address these barriers include   Problem: Pain - Adult  Goal: Verbalizes/displays adequate comfort level or baseline comfort level  Outcome: Progressing     Problem: Safety - Adult  Goal: Free from fall injury  Outcome: Progressing     Problem: Discharge Planning  Goal: Discharge to home or other facility with appropriate resources  Outcome: Progressing     Problem: Chronic Conditions and Co-morbidities  Goal: Patient's chronic conditions and co-morbidity symptoms are monitored and maintained or improved  Outcome: Progressing     Problem: Nutrition  Goal: Nutrient intake appropriate for maintaining nutritional needs  Outcome: Progressing     Problem: Fall/Injury  Goal: Not fall by end of shift  Outcome: Progressing  Goal: Be free from injury by end of the shift  Outcome: Progressing  Goal: Verbalize understanding of personal risk factors for fall in the hospital  Outcome: Progressing  Goal: Verbalize understanding of risk factor reduction measures to prevent injury from fall in the home  Outcome: Progressing  Goal: Use assistive devices by end of the shift  Outcome: Progressing  Goal: Pace activities to prevent fatigue by end of the shift  Outcome: Progressing     Problem: Diabetes  Goal: Achieve decreasing blood glucose levels by end of shift  Outcome: Progressing  Goal: Increase stability of blood glucose readings by end of shift  Outcome: Progressing  Goal: Maintain electrolyte levels within acceptable range throughout shift  Outcome: Progressing  Goal: Maintain glucose levels >70mg/dl to <250mg/dl throughout shift  Outcome: Progressing  Goal: No changes in neurological exam by end of shift  Outcome: Progressing  Goal: Learn  about and adhere to nutrition recommendations by end of shift  Outcome: Progressing  Goal: Vital signs within normal range for age by end of shift  Outcome: Progressing  Goal: Increase self care and/or family involovement by end of shift  Outcome: Progressing  Goal: Receive DSME education by end of shift  Outcome: Progressing     Problem: Skin  Goal: Decreased wound size/increased tissue granulation at next dressing change  Outcome: Met  Flowsheets (Taken 2/26/2025 0034)  Decreased wound size/increased tissue granulation at next dressing change:   Promote sleep for wound healing   Protective dressings over bony prominences  Goal: Participates in plan/prevention/treatment measures  Outcome: Met  Flowsheets (Taken 2/26/2025 0034)  Participates in plan/prevention/treatment measures:   Discuss with provider PT/OT consult   Elevate heels   Increase activity/out of bed for meals  Goal: Prevent/manage excess moisture  Outcome: Progressing  Flowsheets (Taken 2/26/2025 0034)  Prevent/manage excess moisture:   Monitor for/manage infection if present   Follow provider orders for dressing changes   Moisturize dry skin  Goal: Prevent/minimize sheer/friction injuries  Outcome: Progressing  Flowsheets (Taken 2/26/2025 0034)  Prevent/minimize sheer/friction injuries:   Use pull sheet   Increase activity/out of bed for meals   HOB 30 degrees or less  Goal: Promote/optimize nutrition  Outcome: Progressing  Flowsheets (Taken 2/26/2025 0034)  Promote/optimize nutrition:   Monitor/record intake including meals   Consume > 50% meals/supplements   Offer water/supplements/favorite foods  Goal: Promote skin healing  Outcome: Progressing  Flowsheets (Taken 2/26/2025 0034)  Promote skin healing:   Protective dressings over bony prominences   Ensure correct size (line/device) and apply per  instructions   Assess skin/pad under line(s)/device(s)   .

## 2025-02-26 NOTE — LETTER
Patient: Diane Montemayor  : 1945    Encounter Date: 2025    Subjective  Patient ID: Diane Montemayor is a 79 y.o. female who is acute skilled care and presents for initial visit for skilled nursing.    79-year-old female patient has been admitted as the wound on the right leg was not healing, this wound has been there for several years now, they found that patient was having osteomyelitis of metatarsal bone of right foot it was a third metatarsal third proximal and distal phalanx, there was a significant erosions, there was a bone marrow edema, adjacent bones also showed osteomyelitis, previously patient has been treated by multiple antibiotics, patient has history of coronary artery disease, patient is a heavyset female patient, imaging study shows peripheral vascular disease with good collaterals and also nodular appearance of the liver consistent with 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  Patient's creatinine was normal, hemoglobin was 10.6, patient is nonweightbearing, after having amputation patient is brought over here for skilled nursing and rehabilitation and patient's cultures were negative but has been prescribed IV Invanz.  As I see this patient patient needs a recliner, patient needs a bedside commode and walker, told patient that she is in the process to be evaluated by physical therapy.  All the hospitalization related data and information were reviewed.         Review of Systems   Constitutional:  Positive for activity change. Negative for fatigue.   Respiratory:  Negative for apnea, cough and shortness of breath.    Cardiovascular:  Negative for chest pain and leg swelling.   Gastrointestinal:  Negative for abdominal pain, diarrhea and nausea.   Musculoskeletal:  Positive for arthralgias.   Skin:  Positive for wound.   Neurological:  Positive for weakness. Negative for dizziness.   Psychiatric/Behavioral:  Negative for behavioral problems. The patient is not nervous/anxious.         Objective  /65   Pulse 74   Wt 86.2 kg (190 lb)   LMP 05/15/1970 Comment: Pt states she had tubal litigation done in 1970.  BMI 30.67 kg/m²     Physical Exam  Constitutional:       Appearance: Normal appearance. She is obese.   HENT:      Head: Normocephalic.   Eyes:      Conjunctiva/sclera: Conjunctivae normal.   Cardiovascular:      Rate and Rhythm: Normal rate and regular rhythm.      Heart sounds: Normal heart sounds.   Pulmonary:      Breath sounds: Normal breath sounds.   Abdominal:      Palpations: Abdomen is soft.      Tenderness: There is no abdominal tenderness.   Musculoskeletal:         General: Tenderness present.      Cervical back: Neck supple. No tenderness.      Comments: Cast and dressings rt foot and rt leg   Skin:     General: Skin is warm and dry.      Findings: Wound present.   Neurological:      Mental Status: She is oriented to person, place, and time. Mental status is at baseline.   Psychiatric:         Mood and Affect: Mood normal.         Assessment/Plan  Problem List Items Addressed This Visit             ICD-10-CM    Type 2 diabetes mellitus E11.9    Rheumatoid arthritis involving both shoulders, unspecified whether rheumatoid factor present (Multi) M06.9    Moderate episode of recurrent major depressive disorder F33.1    Coronary artery disease involving native coronary artery of native heart without angina pectoris I25.10    Subacute osteomyelitis of right foot (Multi) - Primary M86.271   Patient was evaluated, medications include aspirin, albuterol, cholecalciferol, diltiazem at 240 mg, empagliflozin, Invanz till 12 March, fluconazole as needed, lactobacillus, levothyroxine, losartan, metformin 1000 mg twice a day, tirzepatide 10 mg, omeprazole, rosuvastatin, Vilazodone  spironolactone.  Patient is on extensive therapeutics, primary reason is a diabetic foot infection, required amputation, it is a midfoot amputation, antibiotic beads has been placed.  IV antibiotics  for total of 2 weeks.  Nature and severity of coronary artery disease is unknown.  Peripheral arterial disease is there, rheumatoid arthritis has been listed as a diagnosis not on any specific therapy.  Chronic depression has been treated with vilazodone, patient must have diabetes with obesity continue tirzepatide.  Physical therapy, Occupational Therapy evaluation are in process, dressings and the cast will be removed by podiatry at follow-up, wound was not inspected by me, routine care precautions and other safety precautions has to be taken as per the facility protocol.  Pain control is reasonable, patient should have private room so that she can have recliner she says, discussed with nursing staff, plan of care was reviewed, laboratories will be done as per our routine, PICC line was inspected, periodic assessment and follow-up will be done.     Goals    None           Electronically Signed By: Vinnie Harvey MD   2/26/25  9:37 PM

## 2025-02-26 NOTE — NURSING NOTE
[facility] here to transport patient to [facility]. Patients  belongings sent with her.

## 2025-02-27 LAB — GLUCOSE BLD MANUAL STRIP-MCNC: 272 MG/DL (ref 74–99)

## 2025-02-27 NOTE — PROGRESS NOTES
Subjective   Patient ID: Diane Montemayor is a 79 y.o. female who is acute skilled care and presents for initial visit for skilled nursing.    79-year-old female patient has been admitted as the wound on the right leg was not healing, this wound has been there for several years now, they found that patient was having osteomyelitis of metatarsal bone of right foot it was a third metatarsal third proximal and distal phalanx, there was a significant erosions, there was a bone marrow edema, adjacent bones also showed osteomyelitis, previously patient has been treated by multiple antibiotics, patient has history of coronary artery disease, patient is a heavyset female patient, imaging study shows peripheral vascular disease with good collaterals and also nodular appearance of the liver consistent with 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  Patient's creatinine was normal, hemoglobin was 10.6, patient is nonweightbearing, after having amputation patient is brought over here for skilled nursing and rehabilitation and patient's cultures were negative but has been prescribed IV Invanz.  As I see this patient patient needs a recliner, patient needs a bedside commode and walker, told patient that she is in the process to be evaluated by physical therapy.  All the hospitalization related data and information were reviewed.         Review of Systems   Constitutional:  Positive for activity change. Negative for fatigue.   Respiratory:  Negative for apnea, cough and shortness of breath.    Cardiovascular:  Negative for chest pain and leg swelling.   Gastrointestinal:  Negative for abdominal pain, diarrhea and nausea.   Musculoskeletal:  Positive for arthralgias.   Skin:  Positive for wound.   Neurological:  Positive for weakness. Negative for dizziness.   Psychiatric/Behavioral:  Negative for behavioral problems. The patient is not nervous/anxious.        Objective   /65   Pulse 74   Wt 86.2 kg (190 lb)   LMP  05/15/1970 Comment: Pt states she had tubal litigation done in 1970.  BMI 30.67 kg/m²     Physical Exam  Constitutional:       Appearance: Normal appearance. She is obese.   HENT:      Head: Normocephalic.   Eyes:      Conjunctiva/sclera: Conjunctivae normal.   Cardiovascular:      Rate and Rhythm: Normal rate and regular rhythm.      Heart sounds: Normal heart sounds.   Pulmonary:      Breath sounds: Normal breath sounds.   Abdominal:      Palpations: Abdomen is soft.      Tenderness: There is no abdominal tenderness.   Musculoskeletal:         General: Tenderness present.      Cervical back: Neck supple. No tenderness.      Comments: Cast and dressings rt foot and rt leg   Skin:     General: Skin is warm and dry.      Findings: Wound present.   Neurological:      Mental Status: She is oriented to person, place, and time. Mental status is at baseline.   Psychiatric:         Mood and Affect: Mood normal.         Assessment/Plan   Problem List Items Addressed This Visit             ICD-10-CM    Type 2 diabetes mellitus E11.9    Rheumatoid arthritis involving both shoulders, unspecified whether rheumatoid factor present (Multi) M06.9    Moderate episode of recurrent major depressive disorder F33.1    Coronary artery disease involving native coronary artery of native heart without angina pectoris I25.10    Subacute osteomyelitis of right foot (Multi) - Primary M86.271   Patient was evaluated, medications include aspirin, albuterol, cholecalciferol, diltiazem at 240 mg, empagliflozin, Invanz till 12 March, fluconazole as needed, lactobacillus, levothyroxine, losartan, metformin 1000 mg twice a day, tirzepatide 10 mg, omeprazole, rosuvastatin, Vilazodone  spironolactone.  Patient is on extensive therapeutics, primary reason is a diabetic foot infection, required amputation, it is a midfoot amputation, antibiotic beads has been placed.  IV antibiotics for total of 2 weeks.  Nature and severity of coronary artery disease  is unknown.  Peripheral arterial disease is there, rheumatoid arthritis has been listed as a diagnosis not on any specific therapy.  Chronic depression has been treated with vilazodone, patient must have diabetes with obesity continue tirzepatide.  Physical therapy, Occupational Therapy evaluation are in process, dressings and the cast will be removed by podiatry at follow-up, wound was not inspected by me, routine care precautions and other safety precautions has to be taken as per the facility protocol.  Pain control is reasonable, patient should have private room so that she can have recliner she says, discussed with nursing staff, plan of care was reviewed, laboratories will be done as per our routine, PICC line was inspected, periodic assessment and follow-up will be done.     Goals    None

## 2025-02-28 ENCOUNTER — NURSING HOME VISIT (OUTPATIENT)
Dept: POST ACUTE CARE | Facility: EXTERNAL LOCATION | Age: 80
End: 2025-02-28
Payer: MEDICARE

## 2025-02-28 ENCOUNTER — DOCUMENTATION (OUTPATIENT)
Dept: POST ACUTE CARE | Facility: EXTERNAL LOCATION | Age: 80
End: 2025-02-28
Payer: MEDICARE

## 2025-02-28 DIAGNOSIS — M86.271 SUBACUTE OSTEOMYELITIS OF RIGHT FOOT (MULTI): Primary | ICD-10-CM

## 2025-02-28 DIAGNOSIS — Z74.09 IMPAIRED FUNCTIONAL MOBILITY, BALANCE, GAIT, AND ENDURANCE: ICD-10-CM

## 2025-02-28 DIAGNOSIS — Z89.431 S/P TRANSMETATARSAL AMPUTATION OF FOOT, RIGHT (MULTI): ICD-10-CM

## 2025-02-28 DIAGNOSIS — E78.2 MIXED HYPERLIPIDEMIA: ICD-10-CM

## 2025-02-28 DIAGNOSIS — E11.51 TYPE 2 DIABETES MELLITUS WITH DIABETIC PERIPHERAL ANGIOPATHY WITHOUT GANGRENE, WITHOUT LONG-TERM CURRENT USE OF INSULIN (MULTI): ICD-10-CM

## 2025-02-28 DIAGNOSIS — M86.271 SUBACUTE OSTEOMYELITIS OF RIGHT FOOT (MULTI): ICD-10-CM

## 2025-02-28 PROCEDURE — 99310 SBSQ NF CARE HIGH MDM 45: CPT | Performed by: PHYSICIAN ASSISTANT

## 2025-02-28 NOTE — LETTER
"Patient: Diane Montemayor  : 1945    Encounter Date: 2025  Name: Diane Montemayor  YOB: 1945    Chief complaint: Osteomyelitis of 2nd and 3rd metatarsals.     HPI: This is a 79 year old  female who has a medical history significant for osteomyelitis of R ankle, CAD with CABG, diabetes, COPD, hypertension, MDD, gerd, SLE. Patient was sent from Podiatry clinic to the ER for evaluation of R foot wound and concern for osteomyelitis. MRI showing Extensive osteomyelitis in the 3rd ray involving the entire 3rd metatarsal, the 3rd proximal phalanx and the 3rd middle phalanx, associated cellulitis and phlegmonous changes in the 3rd ray. No discrete abscess seen. Patient had angiogram of RLE performed which showed one vessel flow through peroneal. Vascular noted enough flow to heal TMA . Patient had TMA of R foot with gastrocnemius recession of the R foot and application of antibiotic impregnated cement on 2025. Patient was treated with Meropenem pre operatively, with antibiotic adjustments made according to surgical cultures. Patient was discharged on Ertapenem. Patient improved and was discharged to SNF for rehab.   Patient seen and examined in her room. She is alert and cooperative. She is able to provide medical history. Patient denies fever,chills, sob, chest pain, abdominal pain, dysuria, or diarrhea.     Wound Check  She was originally treated 5 to 10 days ago. Previous treatment included IV/IM antibiotics. There has been no drainage from the wound. There is no redness present. The swelling has improved. The pain has improved. There is difficulty moving the extremity or digit due to weakness.     Review of systems:   ROS negative except were noted in HPI.    Code Status: full code    Visit Vitals  /61   Pulse 74   Temp 36.8 °C (98.2 °F)   Resp 18   Ht 1.676 m (5' 6\")   Wt 80.7 kg (178 lb)   LMP 05/15/1970 Comment: Pt states she had tubal litigation done in .   SpO2 " 97%   BMI 28.73 kg/m²   OB Status Menopausal   Smoking Status Never   BSA 1.94 m²      Physical Exam  Constitutional:       General: She is not in acute distress.  HENT:      Head: Normocephalic.      Nose: Nose normal.   Eyes:      Extraocular Movements: Extraocular movements intact.      Pupils: Pupils are equal, round, and reactive to light.   Cardiovascular:      Rate and Rhythm: Normal rate and regular rhythm.   Pulmonary:      Effort: Pulmonary effort is normal.      Breath sounds: Normal breath sounds. No wheezing or rales.   Abdominal:      General: Bowel sounds are normal. There is no distension.      Palpations: Abdomen is soft.      Tenderness: There is no abdominal tenderness.   Genitourinary:     Comments: voiding  Musculoskeletal:         General: Normal range of motion.      Cervical back: Normal range of motion.      Comments: R Leg with intact dressing. S/P R TMA   Skin:     General: Skin is warm and dry.      Capillary Refill: Capillary refill takes less than 2 seconds.   Neurological:      General: No focal deficit present.      Mental Status: She is alert and oriented to person, place, and time.   Psychiatric:         Mood and Affect: Mood normal.         Behavior: Behavior normal.     Medications reviewed during visit at facility.  Tylenol 650 mg po q 4 hours prn  MOM 30 ml po daily prn  Colace 100 mg po bid  Spironolactone 25 mg po daily  Levothyroxine 150 mcg po daily  Vitamin D 3 50 mcg po daily  Vitamin B 12 1000 mcg po daily  Coenzyme Q 10 400 mg po daily  Lactobacillus one capsule po daily  Magnesium 400 mg po daily  Omeprazole 20 mg po daily  Diltiazem  mg po daily  Metformin 1000 mg po bid  Albuterol 108 two puffs q 4 hours  Empagliflozin 25 mg po daily  Aspirin 81 mg po daily  Vilazodone  40 mg po daily  Atorvastatin 20 mg po daily  Fluconazole 150 mg po daily  Mounjara 10 mg subcutaneous daily  Ertapenem 1 gram IV q hs x 14 days.  Labs reviewed at facility:  ontains abnormal data  CBC  Order: [id number]   Status: Final result       Visible to patient: Yes (not seen)    0 Result Notes   important suggestion  Newer results are available. Click to view them now.                        Component  Ref Range & Units 4 d ago 5 d ago 6 d ago 7 d ago 8 d ago 9 d ago 10 d ago   WBC  4.4 - 11.3 x10*3/uL 9.2 12.7 High  7.3 7.1 6.1 7.0 7.0   nRBC  0.0 - 0.0 /100 WBCs 0.0 0.0 0.0 0.0 0.0 0.0 0.0   RBC  4.00 - 5.20 x10*6/uL 3.80 Low  3.96 Low  4.34 4.51 4.52 4.69 4.88   Hemoglobin  12.0 - 16.0 g/dL 11.0 Low  11.4 Low  12.6 12.9 13.1 13.6 14.0   Hematocrit  36.0 - 46.0 % 34.1 Low  35.9 Low  38.6 40.0 39.9 41.4 43.8   MCV  80 - 100 fL 90 91 89 89 88 88 90   MCH  26.0 - 34.0 pg 28.9 28.8 29.0 28.6 29.0 29.0 28.7   MCHC  32.0 - 36.0 g/dL 32.3 31.8 Low  32.6 32.3 32.8 32.9 32.0   RDW  11.5 - 14.5 % 14.8 High  14.6 High  14.6 High  14.7 High  14.7 High  14.7 High  14.9 High    Platelets  150 - 450 x10*3/uL 213 250 260 261 271 267 301   Resulting Agency [facility]                Specimen Collected: 02/24/25 07:04 Last Resulted: 02/24/25 07:23    Contains abnormal data Basic Metabolic Panel  Order: [id number]   Status: Final result       Visible to patient: Yes (not seen)    0 Result Notes       2 HM Topics   important suggestion  Newer results are available. Click to view them now.                        Component  Ref Range & Units 4 d ago 5 d ago 6 d ago 7 d ago 8 d ago 9 d ago 10 d ago   Glucose  74 - 99 mg/dL 159 High  242 High  118 High  113 High  118 High  118 High  95   Sodium  136 - 145 mmol/L 137 138 139 140 139 141 138   Potassium  3.5 - 5.3 mmol/L 3.9 4.0 4.2 4.0 4.0 4.0 4.1   Chloride  98 - 107 mmol/L 104 103 102 103 104 105 103   Bicarbonate  21 - 32 mmol/L 28 29 33 High  31 29 28 29   Anion Gap  10 - 20 mmol/L 9 Low  10 8 Low  10 10 12 10   Urea Nitrogen  6 - 23 mg/dL 21 17 15 13 12 12 11   Creatinine  0.50 - 1.05 mg/dL 0.88 0.80 0.87 0.82 0.75 0.76 0.61   eGFR  >60 mL/min/1.73m*2 67  75 CM 67 CM 72 CM 81 CM 79 CM >90 CM   Comment: Calculations of estimated GFR are performed using the 2021 CKD-EPI Study Refit equation without the race variable for the IDMS-Traceable creatinine methods.  https://jasn.asnjournals.org/content/early/2021/09/22/ASN.7567716207   Calcium  8.6 - 10.3 mg/dL 8.6 8.9 9.3 9.2 9.1 9.4 9.4   Resulting Agency [facility]                Specimen Collected: 02/24/25 07:04 Last Resulted: 02/24/25 07:41     Assessment/Plan   Problem List Items Addressed This Visit       Hyperlipidemia     Maintain statin.             Type 2 diabetes mellitus with diabetic peripheral angiopathy without gangrene, without long-term current use of insulin (Multi)     Mounjara 10 mg subcutaneous daily. Review blood sugars. Blood sugar today 90. Empagliflozin 25 mg po daily. Metformin 1000 mg po bid.            Subacute osteomyelitis of right foot (Multi) - Primary     Ertapenem 1 gram IV q hs x 14 days. CMP CBC with diff q Monday. Schedule follow up with Dr. Giron 3/12/2025            S/P transmetatarsal amputation of foot, right (Multi)     Monitor incision for signs of bleeding or infection. Schedule follow up with Dr. Haresh Antonio on 3-13-24             Impaired functional mobility, balance, gait, and endurance     NWB RLE. PT and OT to assess and treat.             Time:  I spent 45 minutes or greater with the patient. Greater than 50% of this time was spent in counseling and or coordination of care. The time includes prep time of reviewing vital signs, report from direct nursing staff and or therapists, hospital documentation, reviewing labs, radiographs, diagnostic tests and or consultations, time directly spent with the patient interviewing, examining, and education regarding diagnosis, treatments, and medications, as well as documentation in the electronic medical record, and reviewing the plan of care and any new orders with the patient, nursing staff and other staff  directly related to the patients care.      Tony Gordon PA-C       Electronically Signed By: Tony Gordon PA-C   3/8/25 11:05 AM

## 2025-03-01 PROBLEM — Z74.09 IMPAIRED FUNCTIONAL MOBILITY, BALANCE, GAIT, AND ENDURANCE: Status: ACTIVE | Noted: 2025-03-01

## 2025-03-01 PROBLEM — Z89.431 S/P TRANSMETATARSAL AMPUTATION OF FOOT, RIGHT (MULTI): Status: ACTIVE | Noted: 2025-03-01

## 2025-03-01 LAB
BACTERIA BLD CULT: NORMAL
BACTERIA BLD CULT: NORMAL

## 2025-03-01 NOTE — ASSESSMENT & PLAN NOTE
Mounjara 10 mg subcutaneous daily. Review blood sugars. Blood sugar today 90. Empagliflozin 25 mg po daily. Metformin 1000 mg po bid.

## 2025-03-01 NOTE — ASSESSMENT & PLAN NOTE
Ertapenem 1 gram IV q hs x 14 days. CMP CBC with diff q Monday. Schedule follow up with Dr. Giron 3/12/2025

## 2025-03-01 NOTE — PROGRESS NOTES
2/28/2025  Name: Diane Montemayor  YOB: 1945    Chief complaint: Osteomyelitis of 2nd and 3rd metatarsals.    HPI: This is a 79 year old  female who has a medical history significant for osteomyelitis of R ankle, CAD with CABG, diabetes, COPD, hypertension, MDD, gerd, SLE. Patient was sent from Podiatry clinic to the ER for evaluation of R foot wound and concern for osteomyelitis. MRI showing Extensive osteomyelitis in the 3rd ray involving the entire 3rd metatarsal, the 3rd proximal phalanx and the 3rd middle phalanx, associated cellulitis and phlegmonous changes in the 3rd ray. No discrete abscess seen. Patient had angiogram of RLE performed which showed one vessel flow through peroneal. Vascular noted enough flow to heal TMA . Patient had TMA of R foot with gastrocnemius recession of the R foot and application of antibiotic impregnated cement on 2/22/2025. Patient was treated with Meropenem pre operatively, with antibiotic adjustments made according to surgical cultures. Patient was discharged on Ertapenem. Patient improved and was discharged to SNF for rehab.    Patient seen and examined in her room. She is alert and cooperative. She is able to provide medical history. Patient denies fever,chills, sob, chest pain, abdominal pain, dysuria, or diarrhea.    Wound Check  She was originally treated 5 to 10 days ago. Previous treatment included IV/IM antibiotics. There has been no drainage from the wound. There is no redness present. The swelling has improved. The pain has improved. There is difficulty moving the extremity or digit due to weakness.     Review of systems:   ROS negative except were noted in HPI.    Code Status: full code    [facility] 05/15/1970 Comment: Pt states she had tubal litigation done in 1970.     Physical Exam  Constitutional:       General: She is not in acute distress.  HENT:      Head: Normocephalic.      Nose: Nose normal.   Eyes:      Extraocular Movements: Extraocular  movements intact.      Pupils: Pupils are equal, round, and reactive to light.   Cardiovascular:      Rate and Rhythm: Normal rate and regular rhythm.   Pulmonary:      Effort: Pulmonary effort is normal.      Breath sounds: Normal breath sounds. No wheezing or rales.   Abdominal:      General: Bowel sounds are normal. There is no distension.      Palpations: Abdomen is soft.      Tenderness: There is no abdominal tenderness.   Genitourinary:     Comments: voiding  Musculoskeletal:         General: Normal range of motion.      Cervical back: Normal range of motion.      Comments: R Leg with intact dressing. S/P R TMA   Skin:     General: Skin is warm and dry.      Capillary Refill: Capillary refill takes less than 2 seconds.   Neurological:      General: No focal deficit present.      Mental Status: She is alert and oriented to person, place, and time.   Psychiatric:         Mood and Affect: Mood normal.         Behavior: Behavior normal.        Medications reviewed during visit at facility.  Tylenol 650 mg po q 4 hours prn  MOM 30 ml po daily prn  Colace 100 mg po bid  Spironolactone 25 mg po daily  Levothyroxine 150 mcg po daily  Vitamin D 3 50 mcg po daily  Vitamin B 12 1000 mcg po daily  Coenzyme Q 10 400 mg po daily  Lactobacillus one capsule po daily  Magnesium 400 mg po daily  Omeprazole 20 mg po daily  Diltiazem  mg po daily  Metformin 1000 mg po bid  Albuterol 108 two puffs q 4 hours  Empagliflozin 25 mg po daily  Aspirin 81 mg po daily  Vilazodone  40 mg po daily  Atorvastatin 20 mg po daily  Fluconazole 150 mg po daily  Mounjara 10 mg subcutaneous daily  Ertapenem 1 gram IV q hs x 14 days.    Labs reviewed at facility:  ontains abnormal data CBC  Order: [id number]   Status: Final result       Visible to patient: Yes (not seen)    0 Result Notes   important suggestion  Newer results are available. Click to view them now.               Component  Ref Range & Units 4 d ago 5 d ago 6 d ago 7 d ago 8 d  ago 9 d ago 10 d ago   WBC  4.4 - 11.3 x10*3/uL 9.2 12.7 High  7.3 7.1 6.1 7.0 7.0   nRBC  0.0 - 0.0 /100 WBCs 0.0 0.0 0.0 0.0 0.0 0.0 0.0   RBC  4.00 - 5.20 x10*6/uL 3.80 Low  3.96 Low  4.34 4.51 4.52 4.69 4.88   Hemoglobin  12.0 - 16.0 g/dL 11.0 Low  11.4 Low  12.6 12.9 13.1 13.6 14.0   Hematocrit  36.0 - 46.0 % 34.1 Low  35.9 Low  38.6 40.0 39.9 41.4 43.8   MCV  80 - 100 fL 90 91 89 89 88 88 90   MCH  26.0 - 34.0 pg 28.9 28.8 29.0 28.6 29.0 29.0 28.7   MCHC  32.0 - 36.0 g/dL 32.3 31.8 Low  32.6 32.3 32.8 32.9 32.0   RDW  11.5 - 14.5 % 14.8 High  14.6 High  14.6 High  14.7 High  14.7 High  14.7 High  14.9 High    Platelets  150 - 450 x10*3/uL 213 250 260 261 271 267 301   Resulting Agency [facility]             Specimen Collected: 02/24/25 07:04 Last Resulted: 02/24/25 07:23    Contains abnormal data Basic Metabolic Panel  Order: [id number]   Status: Final result       Visible to patient: Yes (not seen)    0 Result Notes       2  Topics   important suggestion  Newer results are available. Click to view them now.               Component  Ref Range & Units 4 d ago 5 d ago 6 d ago 7 d ago 8 d ago 9 d ago 10 d ago   Glucose  74 - 99 mg/dL 159 High  242 High  118 High  113 High  118 High  118 High  95   Sodium  136 - 145 mmol/L 137 138 139 140 139 141 138   Potassium  3.5 - 5.3 mmol/L 3.9 4.0 4.2 4.0 4.0 4.0 4.1   Chloride  98 - 107 mmol/L 104 103 102 103 104 105 103   Bicarbonate  21 - 32 mmol/L 28 29 33 High  31 29 28 29   Anion Gap  10 - 20 mmol/L 9 Low  10 8 Low  10 10 12 10   Urea Nitrogen  6 - 23 mg/dL 21 17 15 13 12 12 11   Creatinine  0.50 - 1.05 mg/dL 0.88 0.80 0.87 0.82 0.75 0.76 0.61   eGFR  >60 mL/min/1.73m*2 67 75 CM 67 CM 72 CM 81 CM 79 CM >90 CM   Comment: Calculations of estimated GFR are performed using the 2021 CKD-EPI Study Refit equation without the race variable for the IDMS-Traceable creatinine methods.  https://jasn.asnjournals.org/content/early/2021/09/22/ASN.0706674588    Calcium  8.6 - 10.3 mg/dL 8.6 8.9 9.3 9.2 9.1 9.4 9.4   Resulting Agency [facility]             Specimen Collected: 02/24/25 07:04 Last Resulted: 02/24/25 07:41       Assessment/Plan    Problem List Items Addressed This Visit       Hyperlipidemia     Maintain statin.          Type 2 diabetes mellitus with diabetic peripheral angiopathy without gangrene, without long-term current use of insulin (Multi)     Mounjara 10 mg subcutaneous daily. Review blood sugars. Blood sugar today 90. Empagliflozin 25 mg po daily. Metformin 1000 mg po bid.         Subacute osteomyelitis of right foot (Multi) - Primary     Ertapenem 1 gram IV q hs x 14 days. CMP CBC with diff q Monday. Schedule follow up with Dr. Giron 3/12/2025         S/P transmetatarsal amputation of foot, right (Multi)     Monitor incision for signs of bleeding or infection. Schedule follow up with Dr. Haresh nAtonio on 3-13-24          Impaired functional mobility, balance, gait, and endurance     NWB RLE. PT and OT to assess and treat.            Time:  I spent 45 minutes or greater with the patient. Greater than 50% of this time was spent in counseling and or coordination of care. The time includes prep time of reviewing vital signs, report from direct nursing staff and or therapists, hospital documentation, reviewing labs, radiographs, diagnostic tests and or consultations, time directly spent with the patient interviewing, examining, and education regarding diagnosis, treatments, and medications, as well as documentation in the electronic medical record, and reviewing the plan of care and any new orders with the patient, nursing staff and other staff directly related to the patients care.      Tony Gordon PA-C

## 2025-03-01 NOTE — ASSESSMENT & PLAN NOTE
Monitor incision for signs of bleeding or infection. Schedule follow up with Dr. Haresh Antonio on 3-13-24    Yes

## 2025-03-02 ENCOUNTER — NURSING HOME VISIT (OUTPATIENT)
Dept: POST ACUTE CARE | Facility: EXTERNAL LOCATION | Age: 80
End: 2025-03-02
Payer: MEDICARE

## 2025-03-02 DIAGNOSIS — I25.10 CORONARY ARTERY DISEASE INVOLVING NATIVE CORONARY ARTERY OF NATIVE HEART WITHOUT ANGINA PECTORIS: ICD-10-CM

## 2025-03-02 DIAGNOSIS — M86.271 SUBACUTE OSTEOMYELITIS OF RIGHT FOOT (MULTI): ICD-10-CM

## 2025-03-02 DIAGNOSIS — Z89.431 S/P TRANSMETATARSAL AMPUTATION OF FOOT, RIGHT (MULTI): Primary | ICD-10-CM

## 2025-03-02 DIAGNOSIS — E11.51 TYPE 2 DIABETES MELLITUS WITH DIABETIC PERIPHERAL ANGIOPATHY WITHOUT GANGRENE, WITHOUT LONG-TERM CURRENT USE OF INSULIN (MULTI): ICD-10-CM

## 2025-03-02 DIAGNOSIS — M06.9 RHEUMATOID ARTHRITIS INVOLVING BOTH SHOULDERS, UNSPECIFIED WHETHER RHEUMATOID FACTOR PRESENT (MULTI): ICD-10-CM

## 2025-03-02 PROCEDURE — 99308 SBSQ NF CARE LOW MDM 20: CPT | Performed by: INTERNAL MEDICINE

## 2025-03-02 NOTE — LETTER
Patient: Diane Montemayor  : 1945    Encounter Date: 2025    Subjective  Patient ID: Diane Montemayor is a 79 y.o. female who is acute skilled care being seen and evaluated for multiple medical problems.    Patient is doing well, she continue to receive IV Invanz, right midfoot transmetatarsal amputation ADL was reviewed on the photograph, dressings were not removed by myself, every Tuesday she goes to podiatry office and dressings to get removed over there, blood sugar reported was 74 and serial blood sugars were unremarkable, laboratories were reviewed, she is doing well, it is a longstanding process of wounds and subacute osteomyelitis leading to this situation of midfoot amputation, patient's PICC line was inspected, she remains on antidepressants antihypertensives pain is not significant.  Weightbearing is not allowed, IV Invanz will be going for next 12 days still.         Review of Systems   Constitutional:  Negative for activity change and fatigue.   Respiratory:  Negative for cough, shortness of breath and wheezing.    Cardiovascular:  Negative for chest pain and leg swelling.   Gastrointestinal:  Negative for abdominal pain, nausea and vomiting.   Musculoskeletal:  Positive for arthralgias.   Skin:  Positive for wound.   Neurological:  Negative for dizziness and weakness.       Objective  /51   Pulse 68   Wt 80.7 kg (178 lb)   LMP 05/15/1970 Comment: Pt states she had tubal litigation done in .  BMI 28.73 kg/m²     Physical Exam  Vitals reviewed.   Constitutional:       Appearance: Normal appearance. She is overweight.   HENT:      Head: Normocephalic.   Eyes:      Conjunctiva/sclera: Conjunctivae normal.   Cardiovascular:      Rate and Rhythm: Regular rhythm.   Pulmonary:      Effort: Pulmonary effort is normal.      Breath sounds: Normal breath sounds.   Abdominal:      Palpations: Abdomen is soft.   Musculoskeletal:         General: Swelling and tenderness present.      Cervical back:  Normal range of motion.      Comments: Transmetatarsal amputation   Skin:     General: Skin is warm and dry.      Findings: Wound present.   Neurological:      Mental Status: Mental status is at baseline.   Psychiatric:         Behavior: Behavior normal.         Assessment/Plan  Problem List Items Addressed This Visit             ICD-10-CM    Rheumatoid arthritis involving both shoulders, unspecified whether rheumatoid factor present (Multi) M06.9    Type 2 diabetes mellitus with diabetic peripheral angiopathy without gangrene, without long-term current use of insulin (Multi) E11.51    Coronary artery disease involving native coronary artery of native heart without angina pectoris I25.10    Subacute osteomyelitis of right foot (Multi) M86.271    S/P transmetatarsal amputation of foot, right (Multi) - Primary Z89.431   Patient is doing well, diltiazem, losartan, empagliflozin, metformin, statin, spironolactone, tirzepatide to be continued.  Wound looks well, have not noticed any drainage, she has a rheumatoid arthritis inflammatory arthritis with deformity not on any specific therapy.  Osteomyelitis is cured with amputation of the distal metatarsals.  No angina, sugars are reviewed, rehabilitation has been going well, will be here at least till more weightbearing is allowed or IV antibiotics are finished, nursing staff did not report any problem or concern, there is a viral outbreak here in the facility hopefully patient will do well, patient is immunocompromise.  Continue skilled nursing and rehabilitation under medical care, periodic phone communication with the nursing staff has revealed that patient has been doing well.  Patient is in the process to lose weight also.     Goals    None           Electronically Signed By: Vinnie Havrey MD   3/3/25  9:29 PM

## 2025-03-03 VITALS
DIASTOLIC BLOOD PRESSURE: 51 MMHG | HEART RATE: 68 BPM | BODY MASS INDEX: 28.73 KG/M2 | SYSTOLIC BLOOD PRESSURE: 114 MMHG | WEIGHT: 178 LBS

## 2025-03-03 ASSESSMENT — ENCOUNTER SYMPTOMS
VOMITING: 0
NAUSEA: 0
ACTIVITY CHANGE: 0
COUGH: 0
DIZZINESS: 0
FATIGUE: 0
SHORTNESS OF BREATH: 0
WEAKNESS: 0
WHEEZING: 0
ABDOMINAL PAIN: 0
WOUND: 1
ARTHRALGIAS: 1

## 2025-03-04 NOTE — PROGRESS NOTES
Subjective   Patient ID: Diane Montemayor is a 79 y.o. female who is acute skilled care being seen and evaluated for multiple medical problems.    Patient is doing well, she continue to receive IV Invanz, right midfoot transmetatarsal amputation ADL was reviewed on the photograph, dressings were not removed by myself, every Tuesday she goes to podiatry office and dressings to get removed over there, blood sugar reported was 74 and serial blood sugars were unremarkable, laboratories were reviewed, she is doing well, it is a longstanding process of wounds and subacute osteomyelitis leading to this situation of midfoot amputation, patient's PICC line was inspected, she remains on antidepressants antihypertensives pain is not significant.  Weightbearing is not allowed, IV Invanz will be going for next 12 days still.         Review of Systems   Constitutional:  Negative for activity change and fatigue.   Respiratory:  Negative for cough, shortness of breath and wheezing.    Cardiovascular:  Negative for chest pain and leg swelling.   Gastrointestinal:  Negative for abdominal pain, nausea and vomiting.   Musculoskeletal:  Positive for arthralgias.   Skin:  Positive for wound.   Neurological:  Negative for dizziness and weakness.       Objective   /51   Pulse 68   Wt 80.7 kg (178 lb)   LMP 05/15/1970 Comment: Pt states she had tubal litigation done in 1970.  BMI 28.73 kg/m²     Physical Exam  Vitals reviewed.   Constitutional:       Appearance: Normal appearance. She is overweight.   HENT:      Head: Normocephalic.   Eyes:      Conjunctiva/sclera: Conjunctivae normal.   Cardiovascular:      Rate and Rhythm: Regular rhythm.   Pulmonary:      Effort: Pulmonary effort is normal.      Breath sounds: Normal breath sounds.   Abdominal:      Palpations: Abdomen is soft.   Musculoskeletal:         General: Swelling and tenderness present.      Cervical back: Normal range of motion.      Comments: Transmetatarsal amputation    Skin:     General: Skin is warm and dry.      Findings: Wound present.   Neurological:      Mental Status: Mental status is at baseline.   Psychiatric:         Behavior: Behavior normal.         Assessment/Plan   Problem List Items Addressed This Visit             ICD-10-CM    Rheumatoid arthritis involving both shoulders, unspecified whether rheumatoid factor present (Multi) M06.9    Type 2 diabetes mellitus with diabetic peripheral angiopathy without gangrene, without long-term current use of insulin (Multi) E11.51    Coronary artery disease involving native coronary artery of native heart without angina pectoris I25.10    Subacute osteomyelitis of right foot (Multi) M86.271    S/P transmetatarsal amputation of foot, right (Multi) - Primary Z89.431   Patient is doing well, diltiazem, losartan, empagliflozin, metformin, statin, spironolactone, tirzepatide to be continued.  Wound looks well, have not noticed any drainage, she has a rheumatoid arthritis inflammatory arthritis with deformity not on any specific therapy.  Osteomyelitis is cured with amputation of the distal metatarsals.  No angina, sugars are reviewed, rehabilitation has been going well, will be here at least till more weightbearing is allowed or IV antibiotics are finished, nursing staff did not report any problem or concern, there is a viral outbreak here in the facility hopefully patient will do well, patient is immunocompromise.  Continue skilled nursing and rehabilitation under medical care, periodic phone communication with the nursing staff has revealed that patient has been doing well.  Patient is in the process to lose weight also.     Goals    None

## 2025-03-05 ENCOUNTER — NURSING HOME VISIT (OUTPATIENT)
Dept: POST ACUTE CARE | Facility: EXTERNAL LOCATION | Age: 80
End: 2025-03-05
Payer: MEDICARE

## 2025-03-05 DIAGNOSIS — M86.271 SUBACUTE OSTEOMYELITIS OF RIGHT FOOT (MULTI): ICD-10-CM

## 2025-03-05 DIAGNOSIS — F33.1 MODERATE EPISODE OF RECURRENT MAJOR DEPRESSIVE DISORDER: ICD-10-CM

## 2025-03-05 DIAGNOSIS — I25.10 CORONARY ARTERY DISEASE INVOLVING NATIVE CORONARY ARTERY OF NATIVE HEART WITHOUT ANGINA PECTORIS: ICD-10-CM

## 2025-03-05 DIAGNOSIS — Z89.431 S/P TRANSMETATARSAL AMPUTATION OF FOOT, RIGHT (MULTI): Primary | ICD-10-CM

## 2025-03-05 DIAGNOSIS — M06.9 RHEUMATOID ARTHRITIS INVOLVING BOTH SHOULDERS, UNSPECIFIED WHETHER RHEUMATOID FACTOR PRESENT (MULTI): ICD-10-CM

## 2025-03-05 DIAGNOSIS — E11.51 TYPE 2 DIABETES MELLITUS WITH DIABETIC PERIPHERAL ANGIOPATHY WITHOUT GANGRENE, WITHOUT LONG-TERM CURRENT USE OF INSULIN (MULTI): ICD-10-CM

## 2025-03-05 PROCEDURE — 99308 SBSQ NF CARE LOW MDM 20: CPT | Performed by: INTERNAL MEDICINE

## 2025-03-05 NOTE — LETTER
Patient: Diane Montemayor  : 1945    Encounter Date: 2025    Subjective  Patient ID: Diane Montemayor is a 79 y.o. female who is acute skilled care being seen and evaluated for multiple medical problems.    79-year-old female patient is doing well, seen by podiatry, weightbearing is not allowed, patient is on Mounjaro, she has been started on Mounjaro since surgery, she has a some gastrointestinal side effects with nausea and vomiting, we suggested to reduce the dose of Mounjaro she does not want to do it, her photograph of the wound was reviewed, it is a transmetatarsal amputation.  Hemoglobin is 11.3, reported blood sugar was 200, blood sugars are in 200 range, she continue to receive IV antibiotics for duration of time, still weak, still required help and assist, still nonweightbearing so ADLs may not be completely normal, somewhat depressed not necessarily anxious, no chest pains, weak readings are stable, no fever or chills and there is no report of any postoperative wound infections or wound complications it is a subacute osteomyelitis which required IV antibiotics for duration of time.         Review of Systems   Constitutional:  Negative for activity change and fatigue.   Respiratory:  Negative for cough, shortness of breath and wheezing.    Cardiovascular:  Negative for chest pain and leg swelling.   Gastrointestinal:  Positive for nausea and vomiting. Negative for abdominal pain and constipation.   Musculoskeletal:  Positive for arthralgias.   Skin:  Positive for wound.   Neurological:  Positive for weakness. Negative for dizziness.       Objective  /66   Pulse 78   Wt 80.7 kg (178 lb)   LMP 05/15/1970 Comment: Pt states she had tubal litigation done in .  BMI 28.73 kg/m²     Physical Exam  Vitals reviewed.   Constitutional:       Appearance: Normal appearance. She is normal weight.   HENT:      Head: Normocephalic.   Eyes:      Conjunctiva/sclera: Conjunctivae normal.   Cardiovascular:       Rate and Rhythm: Normal rate and regular rhythm.   Pulmonary:      Effort: Pulmonary effort is normal.      Breath sounds: Normal breath sounds. No rales.   Abdominal:      Palpations: Abdomen is soft.   Musculoskeletal:         General: Tenderness present. No deformity.      Cervical back: Neck supple.   Skin:     General: Skin is warm and dry.      Findings: Wound present.             Comments: Amputated area   Psychiatric:         Behavior: Behavior normal.         Assessment/Plan  Problem List Items Addressed This Visit             ICD-10-CM    Rheumatoid arthritis involving both shoulders, unspecified whether rheumatoid factor present (Multi) M06.9    Moderate episode of recurrent major depressive disorder F33.1    Type 2 diabetes mellitus with diabetic peripheral angiopathy without gangrene, without long-term current use of insulin (Multi) E11.51    Coronary artery disease involving native coronary artery of native heart without angina pectoris I25.10    Subacute osteomyelitis of right foot (Multi) M86.271    S/P transmetatarsal amputation of foot, right (Multi) - Primary Z89.431   Patient is doing well and she believes that her gastrointestinal side effects will subside, she remains on diltiazem, empagliflozin, Invanz, levothyroxine, losartan, metformin, Viibryd, Mounjaro, spironolactone, rosuvastatin.  List reviewed updated.  Weekly follow-up with the podiatry to be continued.  Weekly laboratories to be continued, PICC line is secured.  She is very much alert and awake female patient aware of what is going on, it was a longstanding wound and osteomyelitis which required a transmetatarsal amputation finally.  She has history of RA not on any specific therapy, there is no angina, she has history of longstanding depression stays on the Viibryd.  Continue medications as listed, periodic follow-up will be continued, patient does not report any out of ordinary events or concerns, antibiotics are well-tolerated.   Adjustment will not be done for slight hyperglycemia.  Persistent nausea and vomiting needs to be reported to me.  Patient assured me.     Goals    None           Electronically Signed By: Vinnie Harvey MD   3/8/25  1:59 PM

## 2025-03-07 ENCOUNTER — APPOINTMENT (OUTPATIENT)
Dept: PRIMARY CARE | Facility: CLINIC | Age: 80
End: 2025-03-07
Payer: MEDICARE

## 2025-03-07 DIAGNOSIS — E03.9 HYPOTHYROIDISM, UNSPECIFIED TYPE: ICD-10-CM

## 2025-03-07 RX ORDER — LEVOTHYROXINE SODIUM 150 UG/1
TABLET ORAL
Qty: 114 TABLET | Refills: 1 | Status: SHIPPED | OUTPATIENT
Start: 2025-03-07

## 2025-03-08 VITALS
HEIGHT: 66 IN | DIASTOLIC BLOOD PRESSURE: 61 MMHG | BODY MASS INDEX: 28.61 KG/M2 | TEMPERATURE: 98.2 F | WEIGHT: 178 LBS | RESPIRATION RATE: 18 BRPM | SYSTOLIC BLOOD PRESSURE: 110 MMHG | OXYGEN SATURATION: 97 % | HEART RATE: 74 BPM

## 2025-03-08 VITALS
WEIGHT: 178 LBS | HEART RATE: 78 BPM | BODY MASS INDEX: 28.73 KG/M2 | SYSTOLIC BLOOD PRESSURE: 123 MMHG | DIASTOLIC BLOOD PRESSURE: 66 MMHG

## 2025-03-08 ASSESSMENT — ENCOUNTER SYMPTOMS
ACTIVITY CHANGE: 0
VOMITING: 1
WHEEZING: 0
CONSTIPATION: 0
ABDOMINAL PAIN: 0
WOUND: 1
ARTHRALGIAS: 1
WEAKNESS: 1
NAUSEA: 1
FATIGUE: 0
SHORTNESS OF BREATH: 0
DIZZINESS: 0
COUGH: 0

## 2025-03-08 NOTE — ASSESSMENT & PLAN NOTE
Monitor incision for signs of bleeding or infection. Schedule follow up with Dr. Haresh Antonio on 3-13-24

## 2025-03-08 NOTE — PROGRESS NOTES
Subjective   Patient ID: Diane Montemayor is a 79 y.o. female who is acute skilled care being seen and evaluated for multiple medical problems.    79-year-old female patient is doing well, seen by podiatry, weightbearing is not allowed, patient is on Mounjaro, she has been started on Mounjaro since surgery, she has a some gastrointestinal side effects with nausea and vomiting, we suggested to reduce the dose of Mounjaro she does not want to do it, her photograph of the wound was reviewed, it is a transmetatarsal amputation.  Hemoglobin is 11.3, reported blood sugar was 200, blood sugars are in 200 range, she continue to receive IV antibiotics for duration of time, still weak, still required help and assist, still nonweightbearing so ADLs may not be completely normal, somewhat depressed not necessarily anxious, no chest pains, weak readings are stable, no fever or chills and there is no report of any postoperative wound infections or wound complications it is a subacute osteomyelitis which required IV antibiotics for duration of time.         Review of Systems   Constitutional:  Negative for activity change and fatigue.   Respiratory:  Negative for cough, shortness of breath and wheezing.    Cardiovascular:  Negative for chest pain and leg swelling.   Gastrointestinal:  Positive for nausea and vomiting. Negative for abdominal pain and constipation.   Musculoskeletal:  Positive for arthralgias.   Skin:  Positive for wound.   Neurological:  Positive for weakness. Negative for dizziness.       Objective   /66   Pulse 78   Wt 80.7 kg (178 lb)   LMP 05/15/1970 Comment: Pt states she had tubal litigation done in 1970.  BMI 28.73 kg/m²     Physical Exam  Vitals reviewed.   Constitutional:       Appearance: Normal appearance. She is normal weight.   HENT:      Head: Normocephalic.   Eyes:      Conjunctiva/sclera: Conjunctivae normal.   Cardiovascular:      Rate and Rhythm: Normal rate and regular rhythm.   Pulmonary:       Effort: Pulmonary effort is normal.      Breath sounds: Normal breath sounds. No rales.   Abdominal:      Palpations: Abdomen is soft.   Musculoskeletal:         General: Tenderness present. No deformity.      Cervical back: Neck supple.   Skin:     General: Skin is warm and dry.      Findings: Wound present.             Comments: Amputated area   Psychiatric:         Behavior: Behavior normal.         Assessment/Plan   Problem List Items Addressed This Visit             ICD-10-CM    Rheumatoid arthritis involving both shoulders, unspecified whether rheumatoid factor present (Multi) M06.9    Moderate episode of recurrent major depressive disorder F33.1    Type 2 diabetes mellitus with diabetic peripheral angiopathy without gangrene, without long-term current use of insulin (Multi) E11.51    Coronary artery disease involving native coronary artery of native heart without angina pectoris I25.10    Subacute osteomyelitis of right foot (Multi) M86.271    S/P transmetatarsal amputation of foot, right (Multi) - Primary Z89.431   Patient is doing well and she believes that her gastrointestinal side effects will subside, she remains on diltiazem, empagliflozin, Invanz, levothyroxine, losartan, metformin, Viibryd, Mounjaro, spironolactone, rosuvastatin.  List reviewed updated.  Weekly follow-up with the podiatry to be continued.  Weekly laboratories to be continued, PICC line is secured.  She is very much alert and awake female patient aware of what is going on, it was a longstanding wound and osteomyelitis which required a transmetatarsal amputation finally.  She has history of RA not on any specific therapy, there is no angina, she has history of longstanding depression stays on the Viibryd.  Continue medications as listed, periodic follow-up will be continued, patient does not report any out of ordinary events or concerns, antibiotics are well-tolerated.  Adjustment will not be done for slight hyperglycemia.  Persistent  nausea and vomiting needs to be reported to me.  Patient assured me.     Goals    None

## 2025-03-08 NOTE — PROGRESS NOTES
"2/28/2025  Name: Diane Montemayor  YOB: 1945    Chief complaint: Osteomyelitis of 2nd and 3rd metatarsals.     HPI: This is a 79 year old  female who has a medical history significant for osteomyelitis of R ankle, CAD with CABG, diabetes, COPD, hypertension, MDD, gerd, SLE. Patient was sent from Podiatry clinic to the ER for evaluation of R foot wound and concern for osteomyelitis. MRI showing Extensive osteomyelitis in the 3rd ray involving the entire 3rd metatarsal, the 3rd proximal phalanx and the 3rd middle phalanx, associated cellulitis and phlegmonous changes in the 3rd ray. No discrete abscess seen. Patient had angiogram of RLE performed which showed one vessel flow through peroneal. Vascular noted enough flow to heal TMA . Patient had TMA of R foot with gastrocnemius recession of the R foot and application of antibiotic impregnated cement on 2/22/2025. Patient was treated with Meropenem pre operatively, with antibiotic adjustments made according to surgical cultures. Patient was discharged on Ertapenem. Patient improved and was discharged to SNF for rehab.   Patient seen and examined in her room. She is alert and cooperative. She is able to provide medical history. Patient denies fever,chills, sob, chest pain, abdominal pain, dysuria, or diarrhea.     Wound Check  She was originally treated 5 to 10 days ago. Previous treatment included IV/IM antibiotics. There has been no drainage from the wound. There is no redness present. The swelling has improved. The pain has improved. There is difficulty moving the extremity or digit due to weakness.     Review of systems:   ROS negative except were noted in HPI.    Code Status: full code    Visit Vitals  /61   Pulse 74   Temp 36.8 °C (98.2 °F)   Resp 18   Ht 1.676 m (5' 6\")   Wt 80.7 kg (178 lb)   LMP 05/15/1970 Comment: Pt states she had tubal litigation done in 1970.   SpO2 97%   BMI 28.73 kg/m²   OB Status Menopausal   Smoking Status Never "   BSA 1.94 m²      Physical Exam  Constitutional:       General: She is not in acute distress.  HENT:      Head: Normocephalic.      Nose: Nose normal.   Eyes:      Extraocular Movements: Extraocular movements intact.      Pupils: Pupils are equal, round, and reactive to light.   Cardiovascular:      Rate and Rhythm: Normal rate and regular rhythm.   Pulmonary:      Effort: Pulmonary effort is normal.      Breath sounds: Normal breath sounds. No wheezing or rales.   Abdominal:      General: Bowel sounds are normal. There is no distension.      Palpations: Abdomen is soft.      Tenderness: There is no abdominal tenderness.   Genitourinary:     Comments: voiding  Musculoskeletal:         General: Normal range of motion.      Cervical back: Normal range of motion.      Comments: R Leg with intact dressing. S/P R TMA   Skin:     General: Skin is warm and dry.      Capillary Refill: Capillary refill takes less than 2 seconds.   Neurological:      General: No focal deficit present.      Mental Status: She is alert and oriented to person, place, and time.   Psychiatric:         Mood and Affect: Mood normal.         Behavior: Behavior normal.     Medications reviewed during visit at facility.  Tylenol 650 mg po q 4 hours prn  MOM 30 ml po daily prn  Colace 100 mg po bid  Spironolactone 25 mg po daily  Levothyroxine 150 mcg po daily  Vitamin D 3 50 mcg po daily  Vitamin B 12 1000 mcg po daily  Coenzyme Q 10 400 mg po daily  Lactobacillus one capsule po daily  Magnesium 400 mg po daily  Omeprazole 20 mg po daily  Diltiazem  mg po daily  Metformin 1000 mg po bid  Albuterol 108 two puffs q 4 hours  Empagliflozin 25 mg po daily  Aspirin 81 mg po daily  Vilazodone  40 mg po daily  Atorvastatin 20 mg po daily  Fluconazole 150 mg po daily  Mounjara 10 mg subcutaneous daily  Ertapenem 1 gram IV q hs x 14 days.  Labs reviewed at facility:  ontains abnormal data CBC  Order: [id number]   Status: Final result       Visible to  patient: Yes (not seen)    0 Result Notes   important suggestion  Newer results are available. Click to view them now.                        Component  Ref Range & Units 4 d ago 5 d ago 6 d ago 7 d ago 8 d ago 9 d ago 10 d ago   WBC  4.4 - 11.3 x10*3/uL 9.2 12.7 High  7.3 7.1 6.1 7.0 7.0   nRBC  0.0 - 0.0 /100 WBCs 0.0 0.0 0.0 0.0 0.0 0.0 0.0   RBC  4.00 - 5.20 x10*6/uL 3.80 Low  3.96 Low  4.34 4.51 4.52 4.69 4.88   Hemoglobin  12.0 - 16.0 g/dL 11.0 Low  11.4 Low  12.6 12.9 13.1 13.6 14.0   Hematocrit  36.0 - 46.0 % 34.1 Low  35.9 Low  38.6 40.0 39.9 41.4 43.8   MCV  80 - 100 fL 90 91 89 89 88 88 90   MCH  26.0 - 34.0 pg 28.9 28.8 29.0 28.6 29.0 29.0 28.7   MCHC  32.0 - 36.0 g/dL 32.3 31.8 Low  32.6 32.3 32.8 32.9 32.0   RDW  11.5 - 14.5 % 14.8 High  14.6 High  14.6 High  14.7 High  14.7 High  14.7 High  14.9 High    Platelets  150 - 450 x10*3/uL 213 250 260 261 271 267 301   Resulting Agency [facility]                Specimen Collected: 02/24/25 07:04 Last Resulted: 02/24/25 07:23    Contains abnormal data Basic Metabolic Panel  Order: [id number]   Status: Final result       Visible to patient: Yes (not seen)    0 Result Notes       2 HM Topics   important suggestion  Newer results are available. Click to view them now.                        Component  Ref Range & Units 4 d ago 5 d ago 6 d ago 7 d ago 8 d ago 9 d ago 10 d ago   Glucose  74 - 99 mg/dL 159 High  242 High  118 High  113 High  118 High  118 High  95   Sodium  136 - 145 mmol/L 137 138 139 140 139 141 138   Potassium  3.5 - 5.3 mmol/L 3.9 4.0 4.2 4.0 4.0 4.0 4.1   Chloride  98 - 107 mmol/L 104 103 102 103 104 105 103   Bicarbonate  21 - 32 mmol/L 28 29 33 High  31 29 28 29   Anion Gap  10 - 20 mmol/L 9 Low  10 8 Low  10 10 12 10   Urea Nitrogen  6 - 23 mg/dL 21 17 15 13 12 12 11   Creatinine  0.50 - 1.05 mg/dL 0.88 0.80 0.87 0.82 0.75 0.76 0.61   eGFR  >60 mL/min/1.73m*2 67 75 CM 67 CM 72 CM 81 CM 79 CM >90 CM   Comment: Calculations of  estimated GFR are performed using the 2021 CKD-EPI Study Refit equation without the race variable for the IDMS-Traceable creatinine methods.  https://jasn.asnjournals.org/content/early/2021/09/22/ASN.7017996661   Calcium  8.6 - 10.3 mg/dL 8.6 8.9 9.3 9.2 9.1 9.4 9.4   Resulting Agency [facility]                Specimen Collected: 02/24/25 07:04 Last Resulted: 02/24/25 07:41     Assessment/Plan    Problem List Items Addressed This Visit       Hyperlipidemia     Maintain statin.             Type 2 diabetes mellitus with diabetic peripheral angiopathy without gangrene, without long-term current use of insulin (Multi)     Mounjara 10 mg subcutaneous daily. Review blood sugars. Blood sugar today 90. Empagliflozin 25 mg po daily. Metformin 1000 mg po bid.            Subacute osteomyelitis of right foot (Multi) - Primary     Ertapenem 1 gram IV q hs x 14 days. CMP CBC with diff q Monday. Schedule follow up with Dr. Giron 3/12/2025            S/P transmetatarsal amputation of foot, right (Multi)     Monitor incision for signs of bleeding or infection. Schedule follow up with Dr. Haresh Antonio on 3-13-24             Impaired functional mobility, balance, gait, and endurance     NWB RLE. PT and OT to assess and treat.             Time:  I spent 45 minutes or greater with the patient. Greater than 50% of this time was spent in counseling and or coordination of care. The time includes prep time of reviewing vital signs, report from direct nursing staff and or therapists, hospital documentation, reviewing labs, radiographs, diagnostic tests and or consultations, time directly spent with the patient interviewing, examining, and education regarding diagnosis, treatments, and medications, as well as documentation in the electronic medical record, and reviewing the plan of care and any new orders with the patient, nursing staff and other staff directly related to the patients care.      Tony Gordon PA-C  Yes

## 2025-03-10 DIAGNOSIS — F32.A DEPRESSION, UNSPECIFIED DEPRESSION TYPE: ICD-10-CM

## 2025-03-10 RX ORDER — VILAZODONE HYDROCHLORIDE 40 MG/1
40 TABLET ORAL
Qty: 90 TABLET | Refills: 0 | Status: SHIPPED | OUTPATIENT
Start: 2025-03-10

## 2025-03-11 LAB
LABORATORY COMMENT REPORT: NORMAL
PATH REPORT.FINAL DX SPEC: NORMAL
PATH REPORT.GROSS SPEC: NORMAL
PATH REPORT.RELEVANT HX SPEC: NORMAL
PATH REPORT.TOTAL CANCER: NORMAL

## 2025-03-12 ENCOUNTER — FOLLOW-UP (OUTPATIENT)
Facility: CLINIC | Age: 80
End: 2025-03-12
Payer: MEDICARE

## 2025-03-12 VITALS
TEMPERATURE: 98.4 F | RESPIRATION RATE: 20 BRPM | DIASTOLIC BLOOD PRESSURE: 63 MMHG | OXYGEN SATURATION: 99 % | SYSTOLIC BLOOD PRESSURE: 143 MMHG | WEIGHT: 177.47 LBS | BODY MASS INDEX: 28.64 KG/M2 | HEART RATE: 106 BPM

## 2025-03-12 DIAGNOSIS — E13.69 OTHER SPECIFIED DIABETES MELLITUS WITH OTHER SPECIFIED COMPLICATION, UNSPECIFIED WHETHER LONG TERM INSULIN USE (MULTI): ICD-10-CM

## 2025-03-12 DIAGNOSIS — I73.9 PAD (PERIPHERAL ARTERY DISEASE) (CMS-HCC): ICD-10-CM

## 2025-03-12 DIAGNOSIS — M86.271 SUBACUTE OSTEOMYELITIS OF RIGHT FOOT (MULTI): ICD-10-CM

## 2025-03-12 DIAGNOSIS — M86.9 OSTEOMYELITIS OF FOOT, UNSPECIFIED LATERALITY, UNSPECIFIED TYPE: Primary | ICD-10-CM

## 2025-03-12 PROCEDURE — 99214 OFFICE O/P EST MOD 30 MIN: CPT | Performed by: INTERNAL MEDICINE

## 2025-03-12 ASSESSMENT — PAIN SCALES - GENERAL: PAINLEVEL_OUTOF10: 0-NO PAIN

## 2025-03-12 NOTE — PROGRESS NOTES
"Infectious Disease Progress Note       3/12/2025     79-year-old female with diabetes mellitus type 2 and peripheral arterial disease presented from podiatry clinic with right foot wound.  Concern for osteomyelitis second and third right foot digit.  MRI 2/18/2025 pending results  X-ray 2/17/2025 with erosions involving the distal third metatarsal and proximal third phalanx concerning for osteomyelitis  PVR pending final results but prelim is showing evidence of mild arterial occlusive disease right lower extremity and mild arterial occlusive disease in the left lower extremity.  Blood cultures x 2 remain normal drawn 2/17/2025.     Postop status post right foot TMA plus GR plus antibiotic impregnated cement and application of splint per podiatry note.  Operative site was closed.  Operative note reviewed  \"Distal shaft and head of the second and third metatarsals were noted to be soft consistent with diagnosis of osteomyelitis.  Bone at margins was noted to be hard.  Good fill of antibiotic impregnated cement to the second and third metatarsals.  Post gastrocnemius recession much improved ankle joint dorsiflexion. good closure of flaps noted\"    Cultures from bone resection x 3 obtained 2/22/2025 all negative to date  Pathology still showing resiudal osteomyleitis in 1 sample it appears , clean margin;      On Invanz doing ok  Following podiatry had some hematoma formation, milked last week      Had midline replaced       Lab Results   Component Value Date    WBC 9.6 02/25/2025    HGB 10.6 (L) 02/25/2025    HCT 33.2 (L) 02/25/2025    MCV 90 02/25/2025     02/25/2025     Lab Results   Component Value Date    GLUCOSE 119 (H) 02/25/2025    CALCIUM 8.5 (L) 02/25/2025     02/25/2025    K 3.8 02/25/2025    CO2 28 02/25/2025     02/25/2025    BUN 17 02/25/2025    CREATININE 0.73 02/25/2025       WBC trends are being monitored. Antibiotic doses are being adjusted per most recent renal labs.     There were " no vitals filed for this visit.    Patient is awake and alert, sitting in a chair,  NAD  Neck supple  Heart S1S2  Chest: Equal expansion, bilaterally clear to auscultation  Abdomen: soft, ND, NTTP, no guarding  Extrem: Foot is wrapped in dressing.  Photos reviewed  Skin: no rashes, no diaphoresis  Neuro: CNS intact  Affect appropriate and patient is interactive        Patient Active Problem List   Diagnosis    Constipation    Class 3 severe obesity due to excess calories with serious comorbidity and body mass index (BMI) of 40.0 to 44.9 in adult    Hypothyroidism    BMI 40.0-44.9, adult (Multi)    Type 2 diabetes mellitus    Morbidly obese (Multi)    Depression, unspecified    B12 deficiency    Dizziness    Fatigue    Hypercholesteremia    Hyperlipidemia    Low back pain    Malaise and fatigue    Medicare annual wellness visit, subsequent    Yeast infection    Rheumatoid arthritis involving both shoulders, unspecified whether rheumatoid factor present (Multi)    Acquired absence of left great toe (Multi)    Atherosclerosis of native artery of both lower extremities with rest pain (Multi)    Other acute osteomyelitis, unspecified site    Benign carcinoid tumors of other sites    Pulmonary emphysema, unspecified emphysema type (Multi)    Moderate episode of recurrent major depressive disorder    Type 2 diabetes mellitus with diabetic peripheral angiopathy without gangrene, without long-term current use of insulin (Multi)    Stage 3a chronic kidney disease (Multi)    Coronary artery disease involving native coronary artery of native heart without angina pectoris    Hypertension    BMI 33.0-33.9,adult    Never smoked cigarettes    Wound of right foot    Class 1 obesity due to excess calories without serious comorbidity with body mass index (BMI) of 33.0 to 33.9 in adult    Chronic combined systolic (congestive) and diastolic (congestive) heart failure    Ventricular tachycardia (Multi)    Subacute osteomyelitis of right  foot (Multi)    Peripheral vascular disease, unspecified (CMS-HCC)    Equinus contracture of ankle    History of amputation of hallux (Multi)    S/P transmetatarsal amputation of foot, right (Multi)    Impaired functional mobility, balance, gait, and endurance       Assessment:   Right foot osteomyelitis second and third metatarsals  Peripheral arterial disease demonstrated on PVR, angio planned tomorrow right TMA   Diabetes mellitus type 2     Plan:   Invanz      Cultures negative, hx mssa  Course to be extended based on path, recent midline replaced so can keep approx 3 more weeks with out change, pt wants to go home . Will work on setting up      See back before Ivs end  Pt to follow up with podiatry         Hardeep Giron MD

## 2025-03-13 ENCOUNTER — APPOINTMENT (OUTPATIENT)
Dept: PHARMACY | Facility: HOSPITAL | Age: 80
End: 2025-03-13
Payer: MEDICARE

## 2025-03-14 ENCOUNTER — DOCUMENTATION (OUTPATIENT)
Dept: PRIMARY CARE | Facility: CLINIC | Age: 80
End: 2025-03-14
Payer: MEDICARE

## 2025-03-14 ENCOUNTER — NURSING HOME VISIT (OUTPATIENT)
Dept: POST ACUTE CARE | Facility: EXTERNAL LOCATION | Age: 80
End: 2025-03-14
Payer: MEDICARE

## 2025-03-14 DIAGNOSIS — Z74.09 IMPAIRED FUNCTIONAL MOBILITY, BALANCE, GAIT, AND ENDURANCE: Primary | ICD-10-CM

## 2025-03-14 DIAGNOSIS — Z89.431 S/P TRANSMETATARSAL AMPUTATION OF FOOT, RIGHT (MULTI): ICD-10-CM

## 2025-03-14 DIAGNOSIS — M86.271 SUBACUTE OSTEOMYELITIS OF RIGHT FOOT (MULTI): ICD-10-CM

## 2025-03-14 PROCEDURE — 99308 SBSQ NF CARE LOW MDM 20: CPT | Performed by: PHYSICIAN ASSISTANT

## 2025-03-14 NOTE — PROGRESS NOTES
[facility] called and spoke to patient on 3/14/25 to gather info on pt's recent physical/mental health status since recent amputation.  [facility] provided therapeutic support and feedback for patient today.  Applauded pt for positive outlook and coping skills utilized during this challenging time.  Pt reported she is ready to get home but is treated well in the NH.  Pt has been doing her PT and gaining more strength.  Pt discussed her granddtr walked in to visit her and would call [facility] back another time.

## 2025-03-14 NOTE — LETTER
"Patient: Diane Montemayor  : 1945    Encounter Date: 2025    3/14/2025  Name: Diane Montemayor  YOB: 1945    Chief complaint: Impaired mobility.    HPI: Patient seen and examined in her room. She is seated at time of examine. R foot is elevated. She is comfortable on current medications and offers no new complaints. She is receiving Ertapenem for osteomyelitis, and remains afebrile. Dr. Nicole of I/D service is following. Review of chart shows patient's son is learning appropriated care techniques to help with ADLs at home when patient is discharged. At present, patient has NWB status to R foot. She is afebrile.     Extremity Weakness  The current episode started 1 to 4 weeks ago. The problem has been gradually improving. Pertinent negatives include no abdominal pain, anorexia, arthralgias, change in bowel habit, chest pain, coughing, fever, headaches, joint swelling, myalgias, nausea, numbness or urinary symptoms. Nothing aggravates the symptoms. She has tried relaxation and rest for the symptoms. The treatment provided moderate relief.     Review of systems:   ROS negative except were noted in HPI.    Code Status: full code    /60   Pulse 75   Temp 36.7 °C (98 °F)   Resp 18   Ht 1.676 m (5' 6\")   Wt 80.7 kg (178 lb)   LMP 05/15/1970 Comment: Pt states she had tubal litigation done in .  SpO2 96%   BMI 28.73 kg/m²      Physical Exam  Constitutional:       General: She is not in acute distress.  HENT:      Head: Normocephalic.      Nose: Nose normal.   Eyes:      Extraocular Movements: Extraocular movements intact.      Pupils: Pupils are equal, round, and reactive to light.   Cardiovascular:      Rate and Rhythm: Normal rate and regular rhythm.   Pulmonary:      Effort: Pulmonary effort is normal.      Breath sounds: Normal breath sounds. No wheezing or rales.   Abdominal:      General: Bowel sounds are normal. There is no distension.      Palpations: Abdomen is soft.      " Tenderness: There is no abdominal tenderness.   Genitourinary:     Comments: voiding  Musculoskeletal:         General: Normal range of motion.      Cervical back: Normal range of motion.      Comments: R Leg with intact dressing. S/P R TMA   Skin:     General: Skin is warm and dry.      Capillary Refill: Capillary refill takes less than 2 seconds.   Neurological:      General: No focal deficit present.      Mental Status: She is alert and oriented to person, place, and time.   Psychiatric:         Mood and Affect: Mood normal.         Behavior: Behavior normal.     Medications reviewed during visit at facility.  Tylenol 650 mg po q 4 hours prn  MOM 30 ml po daily prn  Colace 100 mg po bid  Spironolactone 25 mg po daily  Levothyroxine 150 mcg po daily  Vitamin D 3 50 mcg po daily  Vitamin B 12 1000 mcg po daily  Losartan 25 mg po bid  Coenzyme Q 10 400 mg po daily  Lactobacillus one capsule po daily  Magnesium 400 mg po daily  Omeprazole 20 mg po daily  Diltiazem  mg po daily  Metformin 1000 mg po bid  Albuterol 108 two puffs q 4 hours  Empagliflozin 25 mg po daily  Aspirin 81 mg po daily  Vilazodone  40 mg po daily  Atorvastatin 20 mg po daily  Fluconazole 150 mg po daily  Mounjara 10 mg subcutaneous daily  Ertapenem 1 gram IV q hs x 14 days.  Labs reviewed at facility:    Laboratory: 3/10/2025 15:13 CBC and Differential / Comp Metabolic Panel  Latest Version (more available)  Reviewed by codi on 3/10/2025 18:23  Resident Information  Resident: Diane Montemayor ([id number])  Admit Date: 2/26/2025  Admitting Provider:   Attending Provider:   Copy to List:   Report Information  Collection Date: 3/10/2025 06:20  Received Date: 3/10/2025 06:20  Reported Date: 3/10/2025 15:13  Ord. Provider: Vinnie Harvey  Source Key: [id number]  Lab Information  Status: Completed  Flag:    Reporting Lab:   [facility]  Order #:   [id number]  Vendor Order #:   [id number]  Category:   Chemistry, Hematology, Unknown  Category  Order Notes  Result for:  FIDE FRIEDMAN  ( 1945, F)      Results   Show All Details Result Units Ref. Range Flag Status   CBC and Differential       White Blood Cell Count  8.32 k/uL 3.70-11.00  Final           RBC  4.16 m/uL 3.90-5.20  Final           Hemoglobin  11.7 g/dL 11.5-15.5  Final           Hematocrit  37.2 % 36.0-46.0  Final           MCV  89.4 fL 80.0-100.0  Final           MCH  28.1 pg 26.0-34.0  Final           MCHC  31.5 g/dL 30.5-36.0  Final           RDW-CV  13.8 % 11.5-15.0  Final           Platelet Count  384 k/uL 150-400  Final           MPV  10.7 fL 9.0-12.7  Final           Neut%  64.3 %   Final           Abs Neut  5.35 k/uL 1.45-7.50  Final           Lymph%  25.7 %   Final           Abs Lymph  2.14 k/uL 1.00-4.00  Final           Mono%  6.5 %   Final           Abs Mono  0.54 k/uL <0.87  Final           Eosin%  1.9 %   Final           Abs Eosin  0.16 k/uL <0.46  Final           Baso%  1.1 %   Final           Abs Baso  0.09 k/uL <0.11  Final           Immature Gran %%  0.5 %   Final           Abs Immature Gran  0.04 k/uL <0.10  Final           NRBC  0.0 /100 WBC   Final           Absolute nRBC  <0.01 k/uL <0.01  Final           Diff Type  Auto    Final      Scheduled: 3/10/2025 7:00 AM  Scheduled: 3/10/2025 7:00 AM   Comp Metabolic Panel       Protein, Total  6.7 g/dL 6.3-8.0  Final           Albumin  3.2 g/dL 3.9-4.9 L Final           Calcium, Total  9.3 mg/dL 8.5-10.2  Final           Bilirubin, Total  0.5 mg/dL 0.2-1.3  Final           Alkaline Phosphatase  86 U/L   Final           AST  14 U/L 13-35  Final           ALT  6 U/L 7-38 L Final           Glucose  58 mg/dL 74-99 L Final   BUN  13 mg/dL 7-21  Final           Creatinine  0.88 mg/dL 0.58-0.96  Final           Sodium  141 mmol/L 136-144  Final           Potassium  4.4 mmol/L 3.7-5.1  Final           Chloride  101 mmol/L   Final           CO2  24 mmol/L 22-30  Final           Anion  Gap  16 mmol/L 8-15 H Final           Estimated Glomerular Filtration Rate  67 mL/min/1.73 meters squared >=60  Final   TSH  0.151 mIU/L 0.270-4.200 L Final      Scheduled: 3/10/2025 7:00 AM  Scheduled: 3/10/2025 7:00 AM   Hemoglobin A1c  Scheduled: 3/10/2025 7:00 AM  Scheduled: 3/10/2025 7:00 AM       Hemoglobin A1c  5.0 % 4.3-5.6  Final    Assessment/Plan   Problem List Items Addressed This Visit       Subacute osteomyelitis of right foot (Multi)     Complete Ertapenem. Schedule follow up with Dr. Nicole (ID) on 4/2/25         S/P transmetatarsal amputation of foot, right (Multi)     Monitor for signs of infection. CMP CBC with diff q Monday. NWB to LLE         Impaired functional mobility, balance, gait, and endurance - Primary     Review physical and occupational therapy notes. Discuss home going needs with social service.            Time:    Tony Gordon PA-C       Electronically Signed By: Tony Gordon PA-C   3/16/25  9:56 PM

## 2025-03-15 VITALS
TEMPERATURE: 98 F | RESPIRATION RATE: 18 BRPM | HEART RATE: 75 BPM | BODY MASS INDEX: 28.61 KG/M2 | DIASTOLIC BLOOD PRESSURE: 60 MMHG | WEIGHT: 178 LBS | OXYGEN SATURATION: 96 % | HEIGHT: 66 IN | SYSTOLIC BLOOD PRESSURE: 117 MMHG

## 2025-03-16 ASSESSMENT — ENCOUNTER SYMPTOMS
EXTREMITY WEAKNESS: 1
MYALGIAS: 0
NUMBNESS: 0
FEVER: 0
ABDOMINAL PAIN: 0
ANOREXIA: 0
JOINT SWELLING: 0
NAUSEA: 0
HEADACHES: 0
COUGH: 0
ARTHRALGIAS: 0
CHANGE IN BOWEL HABIT: 0

## 2025-03-16 NOTE — PROGRESS NOTES
"3/14/2025  Name: Diane Montemayor  YOB: 1945    Chief complaint: Impaired mobility.    HPI: Patient seen and examined in her room. She is seated at time of examine. R foot is elevated. She is comfortable on current medications and offers no new complaints. She is receiving Ertapenem for osteomyelitis, and remains afebrile. Dr. Nicole of I/D service is following. Review of chart shows patient's son is learning appropriated care techniques to help with ADLs at home when patient is discharged. At present, patient has NWB status to R foot. She is afebrile.     Extremity Weakness  The current episode started 1 to 4 weeks ago. The problem has been gradually improving. Pertinent negatives include no abdominal pain, anorexia, arthralgias, change in bowel habit, chest pain, coughing, fever, headaches, joint swelling, myalgias, nausea, numbness or urinary symptoms. Nothing aggravates the symptoms. She has tried relaxation and rest for the symptoms. The treatment provided moderate relief.     Review of systems:   ROS negative except were noted in HPI.    Code Status: full code    /60   Pulse 75   Temp 36.7 °C (98 °F)   Resp 18   Ht 1.676 m (5' 6\")   Wt 80.7 kg (178 lb)   LMP 05/15/1970 Comment: Pt states she had tubal litigation done in 1970.  SpO2 96%   BMI 28.73 kg/m²      Physical Exam  Constitutional:       General: She is not in acute distress.  HENT:      Head: Normocephalic.      Nose: Nose normal.   Eyes:      Extraocular Movements: Extraocular movements intact.      Pupils: Pupils are equal, round, and reactive to light.   Cardiovascular:      Rate and Rhythm: Normal rate and regular rhythm.   Pulmonary:      Effort: Pulmonary effort is normal.      Breath sounds: Normal breath sounds. No wheezing or rales.   Abdominal:      General: Bowel sounds are normal. There is no distension.      Palpations: Abdomen is soft.      Tenderness: There is no abdominal tenderness.   Genitourinary:     Comments: " voiding  Musculoskeletal:         General: Normal range of motion.      Cervical back: Normal range of motion.      Comments: R Leg with intact dressing. S/P R TMA   Skin:     General: Skin is warm and dry.      Capillary Refill: Capillary refill takes less than 2 seconds.   Neurological:      General: No focal deficit present.      Mental Status: She is alert and oriented to person, place, and time.   Psychiatric:         Mood and Affect: Mood normal.         Behavior: Behavior normal.     Medications reviewed during visit at facility.  Tylenol 650 mg po q 4 hours prn  MOM 30 ml po daily prn  Colace 100 mg po bid  Spironolactone 25 mg po daily  Levothyroxine 150 mcg po daily  Vitamin D 3 50 mcg po daily  Vitamin B 12 1000 mcg po daily  Losartan 25 mg po bid  Coenzyme Q 10 400 mg po daily  Lactobacillus one capsule po daily  Magnesium 400 mg po daily  Omeprazole 20 mg po daily  Diltiazem  mg po daily  Metformin 1000 mg po bid  Albuterol 108 two puffs q 4 hours  Empagliflozin 25 mg po daily  Aspirin 81 mg po daily  Vilazodone  40 mg po daily  Atorvastatin 20 mg po daily  Fluconazole 150 mg po daily  Mounjara 10 mg subcutaneous daily  Ertapenem 1 gram IV q hs x 14 days.  Labs reviewed at facility:    Laboratory: 3/10/2025 15:13 CBC and Differential / Comp Metabolic Panel  Latest Version (more available)  Reviewed by codi on 3/10/2025 18:23  Resident Information  Resident: Fide Montemayor ([id number])  Admit Date: 2025  Admitting Provider:   Attending Provider:   Copy to List:   Report Information  Collection Date: 3/10/2025 06:20  Received Date: 3/10/2025 06:20  Reported Date: 3/10/2025 15:13  Ord. Provider: Vinnie Harvey  Source Key: [id number]  Lab Information  Status: Completed  Flag:    Reporting Lab:   [facility]  Order #:   [id number]  Vendor Order #:   [id number]  Category:   Chemistry, Hematology, Unknown Category  Order Notes  Result for:  FIDE MONTEMAYOR  ( 1945, F)       Results   Show All Details Result Units Ref. Range Flag Status   CBC and Differential       White Blood Cell Count  8.32 k/uL 3.70-11.00  Final           RBC  4.16 m/uL 3.90-5.20  Final           Hemoglobin  11.7 g/dL 11.5-15.5  Final           Hematocrit  37.2 % 36.0-46.0  Final           MCV  89.4 fL 80.0-100.0  Final           MCH  28.1 pg 26.0-34.0  Final           MCHC  31.5 g/dL 30.5-36.0  Final           RDW-CV  13.8 % 11.5-15.0  Final           Platelet Count  384 k/uL 150-400  Final           MPV  10.7 fL 9.0-12.7  Final           Neut%  64.3 %   Final           Abs Neut  5.35 k/uL 1.45-7.50  Final           Lymph%  25.7 %   Final           Abs Lymph  2.14 k/uL 1.00-4.00  Final           Mono%  6.5 %   Final           Abs Mono  0.54 k/uL <0.87  Final           Eosin%  1.9 %   Final           Abs Eosin  0.16 k/uL <0.46  Final           Baso%  1.1 %   Final           Abs Baso  0.09 k/uL <0.11  Final           Immature Gran %%  0.5 %   Final           Abs Immature Gran  0.04 k/uL <0.10  Final           NRBC  0.0 /100 WBC   Final           Absolute nRBC  <0.01 k/uL <0.01  Final           Diff Type  Auto    Final      Scheduled: 3/10/2025 7:00 AM  Scheduled: 3/10/2025 7:00 AM   Comp Metabolic Panel       Protein, Total  6.7 g/dL 6.3-8.0  Final           Albumin  3.2 g/dL 3.9-4.9 L Final           Calcium, Total  9.3 mg/dL 8.5-10.2  Final           Bilirubin, Total  0.5 mg/dL 0.2-1.3  Final           Alkaline Phosphatase  86 U/L   Final           AST  14 U/L 13-35  Final           ALT  6 U/L 7-38 L Final           Glucose  58 mg/dL 74-99 L Final   BUN  13 mg/dL 7-21  Final           Creatinine  0.88 mg/dL 0.58-0.96  Final           Sodium  141 mmol/L 136-144  Final           Potassium  4.4 mmol/L 3.7-5.1  Final           Chloride  101 mmol/L   Final           CO2  24 mmol/L 22-30  Final           Anion Gap  16 mmol/L 8-15 H Final           Estimated Glomerular Filtration Rate  67 mL/min/1.73 meters  squared >=60  Final   TSH  0.151 mIU/L 0.270-4.200 L Final      Scheduled: 3/10/2025 7:00 AM  Scheduled: 3/10/2025 7:00 AM   Hemoglobin A1c  Scheduled: 3/10/2025 7:00 AM  Scheduled: 3/10/2025 7:00 AM       Hemoglobin A1c  5.0 % 4.3-5.6  Final    Assessment/Plan    Problem List Items Addressed This Visit       Subacute osteomyelitis of right foot (Multi)     Complete Ertapenem. Schedule follow up with Dr. Nicole (ID) on 4/2/25         S/P transmetatarsal amputation of foot, right (Multi)     Monitor for signs of infection. CMP CBC with diff q Monday. NWB to LLE         Impaired functional mobility, balance, gait, and endurance - Primary     Review physical and occupational therapy notes. Discuss home going needs with social service.            Time:    Tony Gordon PA-C

## 2025-03-23 ENCOUNTER — NURSING HOME VISIT (OUTPATIENT)
Dept: POST ACUTE CARE | Facility: EXTERNAL LOCATION | Age: 80
End: 2025-03-23
Payer: MEDICARE

## 2025-03-23 VITALS — DIASTOLIC BLOOD PRESSURE: 62 MMHG | HEART RATE: 78 BPM | SYSTOLIC BLOOD PRESSURE: 134 MMHG

## 2025-03-23 DIAGNOSIS — F33.1 MODERATE EPISODE OF RECURRENT MAJOR DEPRESSIVE DISORDER: ICD-10-CM

## 2025-03-23 DIAGNOSIS — M86.271 SUBACUTE OSTEOMYELITIS OF RIGHT FOOT (MULTI): ICD-10-CM

## 2025-03-23 DIAGNOSIS — M06.9 RHEUMATOID ARTHRITIS INVOLVING BOTH SHOULDERS, UNSPECIFIED WHETHER RHEUMATOID FACTOR PRESENT (MULTI): ICD-10-CM

## 2025-03-23 DIAGNOSIS — E11.51 TYPE 2 DIABETES MELLITUS WITH DIABETIC PERIPHERAL ANGIOPATHY WITHOUT GANGRENE, WITHOUT LONG-TERM CURRENT USE OF INSULIN (MULTI): ICD-10-CM

## 2025-03-23 DIAGNOSIS — U07.1 COVID-19: ICD-10-CM

## 2025-03-23 DIAGNOSIS — Z89.431 S/P TRANSMETATARSAL AMPUTATION OF FOOT, RIGHT (MULTI): Primary | ICD-10-CM

## 2025-03-23 DIAGNOSIS — I25.10 CORONARY ARTERY DISEASE INVOLVING NATIVE CORONARY ARTERY OF NATIVE HEART WITHOUT ANGINA PECTORIS: ICD-10-CM

## 2025-03-23 PROCEDURE — 99308 SBSQ NF CARE LOW MDM 20: CPT | Performed by: INTERNAL MEDICINE

## 2025-03-23 ASSESSMENT — ENCOUNTER SYMPTOMS
AGITATION: 0
SHORTNESS OF BREATH: 0
UNEXPECTED WEIGHT CHANGE: 1
STRIDOR: 0
COUGH: 0
VOMITING: 0
FATIGUE: 0
NAUSEA: 1
ABDOMINAL PAIN: 0
APNEA: 0
CONFUSION: 0
WOUND: 1
ARTHRALGIAS: 1
DIZZINESS: 0
APPETITE CHANGE: 1

## 2025-03-23 NOTE — PROGRESS NOTES
Subjective   Patient ID: Diane Montemayor is a 79 y.o. female who is acute skilled care being seen and evaluated for multiple medical problems.    79-year-old female patient was seen for follow-up today, she has a COVID, she is recovering, she lost a lot of weight, she continued to lose weight, her laboratories has been reviewed, CBC chemistries has been consistently normal, patient remains on IV antibiotics for duration of time, last week because of COVID she could not obtain her follow-up with podiatry, still on a nonweightbearing status.  Patient has been having stable blood sugars, patient remains on empagliflozin and there is a bedside, patient remains on Invanz, patient has history of depression anxiety rheumatoid arthritis and immunosuppressed state.  She is trying her best, she is rehabilitating, it is not unusual to have a viral infections in a nursing home setting because of close confinement but it is a uncomplicated COVID-19 infection she got.         Review of Systems   Constitutional:  Positive for appetite change and unexpected weight change. Negative for fatigue.   Respiratory:  Negative for apnea, cough, shortness of breath and stridor.    Cardiovascular:  Negative for chest pain and leg swelling.   Gastrointestinal:  Positive for nausea. Negative for abdominal pain and vomiting.   Musculoskeletal:  Positive for arthralgias and gait problem.   Skin:  Positive for wound.   Allergic/Immunologic: Positive for immunocompromised state.   Neurological:  Negative for dizziness.   Psychiatric/Behavioral:  Negative for agitation, behavioral problems and confusion.        Objective   /62   Pulse 78     Physical Exam  Vitals reviewed.   Constitutional:       Appearance: Normal appearance. She is normal weight. She is not ill-appearing or toxic-appearing.   HENT:      Head: Normocephalic.   Eyes:      Conjunctiva/sclera: Conjunctivae normal.   Cardiovascular:      Rate and Rhythm: Normal rate and regular  rhythm.   Pulmonary:      Effort: Pulmonary effort is normal.      Breath sounds: Normal breath sounds.   Abdominal:      Palpations: Abdomen is soft.   Musculoskeletal:         General: Normal range of motion.      Cervical back: Neck supple.      Comments: Amputated left midfoot   Skin:     General: Skin is warm and dry.      Findings: Wound present.   Neurological:      Mental Status: Mental status is at baseline.   Psychiatric:         Behavior: Behavior normal.         Assessment/Plan   Problem List Items Addressed This Visit             ICD-10-CM    Rheumatoid arthritis involving both shoulders, unspecified whether rheumatoid factor present (Multi) M06.9    Moderate episode of recurrent major depressive disorder F33.1    Type 2 diabetes mellitus with diabetic peripheral angiopathy without gangrene, without long-term current use of insulin (Multi) E11.51    Coronary artery disease involving native coronary artery of native heart without angina pectoris I25.10    Subacute osteomyelitis of right foot (Multi) M86.271    S/P transmetatarsal amputation of foot, right (Multi) - Primary Z89.431     Other Visit Diagnoses         Codes    COVID-19     U07.1        Patient is doing well although very frail, a lot of weight loss, complicated osteomyelitis infection of the foot, midfoot amputation, this Tuesday will have a podiatry follow-up, COVID-19 has been subsiding, she is able to eat better, IV antibiotics are tolerated it will be continued till 2 April.  Still anxious and depressed, stable blood sugars, not having that much of pain and discomfort, no chest pains, no angina.  She is trying her best to consume adequate amount of nutrition, protein intake needs to be consistent and maintained.  Otherwise patient is doing well, continue to receive skilled nursing and rehabilitation, podiatry follow-up, IV antibiotics for duration of therapy, periodic lab monitoring, clinical assessment and follow-up appropriately, any  change in the condition needs to be notified to us, patient remains calm and comfortable.     Goals    None

## 2025-03-23 NOTE — LETTER
Patient: Diane Montemayor  : 1945    Encounter Date: 2025    Subjective  Patient ID: Diane Montemayor is a 79 y.o. female who is acute skilled care being seen and evaluated for multiple medical problems.    79-year-old female patient was seen for follow-up today, she has a COVID, she is recovering, she lost a lot of weight, she continued to lose weight, her laboratories has been reviewed, CBC chemistries has been consistently normal, patient remains on IV antibiotics for duration of time, last week because of COVID she could not obtain her follow-up with podiatry, still on a nonweightbearing status.  Patient has been having stable blood sugars, patient remains on empagliflozin and there is a bedside, patient remains on Invanz, patient has history of depression anxiety rheumatoid arthritis and immunosuppressed state.  She is trying her best, she is rehabilitating, it is not unusual to have a viral infections in a nursing home setting because of close confinement but it is a uncomplicated COVID-19 infection she got.         Review of Systems   Constitutional:  Positive for appetite change and unexpected weight change. Negative for fatigue.   Respiratory:  Negative for apnea, cough, shortness of breath and stridor.    Cardiovascular:  Negative for chest pain and leg swelling.   Gastrointestinal:  Positive for nausea. Negative for abdominal pain and vomiting.   Musculoskeletal:  Positive for arthralgias and gait problem.   Skin:  Positive for wound.   Allergic/Immunologic: Positive for immunocompromised state.   Neurological:  Negative for dizziness.   Psychiatric/Behavioral:  Negative for agitation, behavioral problems and confusion.        Objective  /62   Pulse 78     Physical Exam  Vitals reviewed.   Constitutional:       Appearance: Normal appearance. She is normal weight. She is not ill-appearing or toxic-appearing.   HENT:      Head: Normocephalic.   Eyes:      Conjunctiva/sclera: Conjunctivae normal.    Cardiovascular:      Rate and Rhythm: Normal rate and regular rhythm.   Pulmonary:      Effort: Pulmonary effort is normal.      Breath sounds: Normal breath sounds.   Abdominal:      Palpations: Abdomen is soft.   Musculoskeletal:         General: Normal range of motion.      Cervical back: Neck supple.      Comments: Amputated left midfoot   Skin:     General: Skin is warm and dry.      Findings: Wound present.   Neurological:      Mental Status: Mental status is at baseline.   Psychiatric:         Behavior: Behavior normal.         Assessment/Plan  Problem List Items Addressed This Visit             ICD-10-CM    Rheumatoid arthritis involving both shoulders, unspecified whether rheumatoid factor present (Multi) M06.9    Moderate episode of recurrent major depressive disorder F33.1    Type 2 diabetes mellitus with diabetic peripheral angiopathy without gangrene, without long-term current use of insulin (Multi) E11.51    Coronary artery disease involving native coronary artery of native heart without angina pectoris I25.10    Subacute osteomyelitis of right foot (Multi) M86.271    S/P transmetatarsal amputation of foot, right (Multi) - Primary Z89.431     Other Visit Diagnoses         Codes    COVID-19     U07.1        Patient is doing well although very frail, a lot of weight loss, complicated osteomyelitis infection of the foot, midfoot amputation, this Tuesday will have a podiatry follow-up, COVID-19 has been subsiding, she is able to eat better, IV antibiotics are tolerated it will be continued till 2 April.  Still anxious and depressed, stable blood sugars, not having that much of pain and discomfort, no chest pains, no angina.  She is trying her best to consume adequate amount of nutrition, protein intake needs to be consistent and maintained.  Otherwise patient is doing well, continue to receive skilled nursing and rehabilitation, podiatry follow-up, IV antibiotics for duration of therapy, periodic lab  monitoring, clinical assessment and follow-up appropriately, any change in the condition needs to be notified to us, patient remains calm and comfortable.     Goals    None           Electronically Signed By: Vinnie Harvey MD   3/23/25  3:23 PM

## 2025-03-28 ENCOUNTER — NURSING HOME VISIT (OUTPATIENT)
Dept: POST ACUTE CARE | Facility: EXTERNAL LOCATION | Age: 80
End: 2025-03-28
Payer: MEDICARE

## 2025-03-28 DIAGNOSIS — Z89.431 S/P TRANSMETATARSAL AMPUTATION OF FOOT, RIGHT (MULTI): Primary | ICD-10-CM

## 2025-03-28 DIAGNOSIS — Z74.09 IMPAIRED FUNCTIONAL MOBILITY, BALANCE, GAIT, AND ENDURANCE: ICD-10-CM

## 2025-03-28 DIAGNOSIS — E11.51 TYPE 2 DIABETES MELLITUS WITH DIABETIC PERIPHERAL ANGIOPATHY WITHOUT GANGRENE, WITHOUT LONG-TERM CURRENT USE OF INSULIN (MULTI): ICD-10-CM

## 2025-03-28 DIAGNOSIS — J43.9 PULMONARY EMPHYSEMA, UNSPECIFIED EMPHYSEMA TYPE (MULTI): ICD-10-CM

## 2025-03-28 PROCEDURE — 99308 SBSQ NF CARE LOW MDM 20: CPT | Performed by: PHYSICIAN ASSISTANT

## 2025-03-28 NOTE — LETTER
"Patient: Diane Montemayor  : 1945    Encounter Date: 2025    3/28/2025  Name: Diane Montemayor  YOB: 1945    Chief complaint: Follow up for L TMA.    HPI: Patient seated at time of exam. She is making steady progress with healing of L TMA. Recently seen by Podiatry service who removed sutures during last visit on 3/25/2025. She now has a supportive shoe and does not require dressing to surgical site. She is followed by Haresh Antonio and will have follow up on 2025. Now advanced to WBAT to R foot. Patient denies fever, chills, sob, chest pain, palpitation, or pain in R foot.    Wound Check  She was originally treated more than 14 days ago. Previous treatment included IV/IM antibiotics. There has been no drainage from the wound. There is no redness present. There is no swelling present. There is no pain present. She has no difficulty moving the affected extremity or digit.     Review of systems:   ROS negative except were noted in HPI.    Code Status: full code    /80   Pulse 83   Temp 36.4 °C (97.6 °F)   Resp 18   Ht 1.676 m (5' 6\")   Wt 80.7 kg (178 lb)   SpO2 96%   BMI 28.73 kg/m²      Physical Exam  Constitutional:       General: She is not in acute distress.  HENT:      Head: Normocephalic.      Nose: Nose normal.   Eyes:      Extraocular Movements: Extraocular movements intact.      Pupils: Pupils are equal, round, and reactive to light.   Cardiovascular:      Rate and Rhythm: Normal rate and regular rhythm.   Pulmonary:      Effort: Pulmonary effort is normal.      Breath sounds: Normal breath sounds. No wheezing or rales.   Abdominal:      General: Bowel sounds are normal. There is no distension.      Palpations: Abdomen is soft.      Tenderness: There is no abdominal tenderness.   Genitourinary:     Comments: voiding  Musculoskeletal:         General: Normal range of motion.      Cervical back: Normal range of motion.      Comments: R foot with walking shoe.  Skin:     " "General: Skin is warm and dry.      Capillary Refill: Capillary refill takes less than 2 seconds.   Neurological:      General: No focal deficit present.      Mental Status: She is alert and oriented to person, place, and time.   Psychiatric:         Mood and Affect: Mood normal.         Behavior: Behavior normal.     Medications reviewed during visit at facility.  Tylenol 650 mg po q 4 hours prn  MOM 30 ml po daily prn  Colace 100 mg po bid  Spironolactone 25 mg po daily  Levothyroxine 150 mcg po daily  Vitamin D 3 50 mcg po daily  Vitamin B 12 1000 mcg po daily  Losartan 25 mg po bid  Coenzyme Q 10 400 mg po daily  Lactobacillus one capsule po daily  Magnesium 400 mg po daily  Omeprazole 20 mg po daily  Diltiazem  mg po daily  Metformin 1000 mg po bid  Albuterol 108 two puffs q 4 hours  Empagliflozin 25 mg po daily  Aspirin 81 mg po daily  Vilazodone  40 mg po daily  Atorvastatin 20 mg po daily  Fluconazole 150 mg po daily  Mounjara 10 mg subcutaneous daily  Ertapenem 1 gram IV q hs x 14 days.  Labs reviewed at facility:    Assessment/Plan   Problem List Items Addressed This Visit       Pulmonary emphysema, unspecified emphysema type (Multi)     Describes her breathing as \" good\". Pulse oximetry 96% on room air.         Type 2 diabetes mellitus with diabetic peripheral angiopathy without gangrene, without long-term current use of insulin (Multi)     Mounjara 10 mg subcutaneous daily. Review blood sugars. Blood sugar today 130. Empagliflozin 25 mg po daily. Metformin 1000 mg po bid.               S/P transmetatarsal amputation of foot, right (Multi) - Primary     Continue Ertapenem until 4/2/2025. Review lab work. Fax CBC/CMP/CRP to Dr. Nicole (ID)2.          Impaired functional mobility, balance, gait, and endurance     Review physical and occupational therapy notes. Now has WBAT to R foot.            Time:    Tony Gordon PA-C       Electronically Signed By: Tony Gordon PA-C   3/29/25 11:53 " AM

## 2025-03-29 VITALS
HEART RATE: 83 BPM | OXYGEN SATURATION: 96 % | WEIGHT: 178 LBS | DIASTOLIC BLOOD PRESSURE: 80 MMHG | SYSTOLIC BLOOD PRESSURE: 118 MMHG | BODY MASS INDEX: 28.61 KG/M2 | HEIGHT: 66 IN | RESPIRATION RATE: 18 BRPM | TEMPERATURE: 97.6 F

## 2025-03-29 NOTE — PROGRESS NOTES
"3/28/2025  Name: Diane Montemayor  YOB: 1945    Chief complaint: Follow up for L TMA.    HPI: Patient seated at time of exam. She is making steady progress with healing of L TMA. Recently seen by Podiatry service who removed sutures during last visit on 3/25/2025. She now has a supportive shoe and does not require dressing to surgical site. She is followed by Haresh Antonio and will have follow up on 4/8/2025. Now advanced to WBAT to R foot. Patient denies fever, chills, sob, chest pain, palpitation, or pain in R foot.    Wound Check  She was originally treated more than 14 days ago. Previous treatment included IV/IM antibiotics. There has been no drainage from the wound. There is no redness present. There is no swelling present. There is no pain present. She has no difficulty moving the affected extremity or digit.     Review of systems:   ROS negative except were noted in HPI.    Code Status: full code    /80   Pulse 83   Temp 36.4 °C (97.6 °F)   Resp 18   Ht 1.676 m (5' 6\")   Wt 80.7 kg (178 lb)   SpO2 96%   BMI 28.73 kg/m²      Physical Exam  Constitutional:       General: She is not in acute distress.  HENT:      Head: Normocephalic.      Nose: Nose normal.   Eyes:      Extraocular Movements: Extraocular movements intact.      Pupils: Pupils are equal, round, and reactive to light.   Cardiovascular:      Rate and Rhythm: Normal rate and regular rhythm.   Pulmonary:      Effort: Pulmonary effort is normal.      Breath sounds: Normal breath sounds. No wheezing or rales.   Abdominal:      General: Bowel sounds are normal. There is no distension.      Palpations: Abdomen is soft.      Tenderness: There is no abdominal tenderness.   Genitourinary:     Comments: voiding  Musculoskeletal:         General: Normal range of motion.      Cervical back: Normal range of motion.      Comments: R foot with walking shoe.  Skin:     General: Skin is warm and dry.      Capillary Refill: Capillary refill " "takes less than 2 seconds.   Neurological:      General: No focal deficit present.      Mental Status: She is alert and oriented to person, place, and time.   Psychiatric:         Mood and Affect: Mood normal.         Behavior: Behavior normal.     Medications reviewed during visit at facility.  Tylenol 650 mg po q 4 hours prn  MOM 30 ml po daily prn  Colace 100 mg po bid  Spironolactone 25 mg po daily  Levothyroxine 150 mcg po daily  Vitamin D 3 50 mcg po daily  Vitamin B 12 1000 mcg po daily  Losartan 25 mg po bid  Coenzyme Q 10 400 mg po daily  Lactobacillus one capsule po daily  Magnesium 400 mg po daily  Omeprazole 20 mg po daily  Diltiazem  mg po daily  Metformin 1000 mg po bid  Albuterol 108 two puffs q 4 hours  Empagliflozin 25 mg po daily  Aspirin 81 mg po daily  Vilazodone  40 mg po daily  Atorvastatin 20 mg po daily  Fluconazole 150 mg po daily  Mounjara 10 mg subcutaneous daily  Ertapenem 1 gram IV q hs x 14 days.  Labs reviewed at facility:    Assessment/Plan    Problem List Items Addressed This Visit       Pulmonary emphysema, unspecified emphysema type (Multi)     Describes her breathing as \" good\". Pulse oximetry 96% on room air.         Type 2 diabetes mellitus with diabetic peripheral angiopathy without gangrene, without long-term current use of insulin (Multi)     Mounjara 10 mg subcutaneous daily. Review blood sugars. Blood sugar today 130. Empagliflozin 25 mg po daily. Metformin 1000 mg po bid.               S/P transmetatarsal amputation of foot, right (Multi) - Primary     Continue Ertapenem until 4/2/2025. Review lab work. Fax CBC/CMP/CRP to Dr. Nicole (ID)2.          Impaired functional mobility, balance, gait, and endurance     Review physical and occupational therapy notes. Now has WBAT to R foot.            Time:    Tony Gordon PA-C   "

## 2025-03-29 NOTE — ASSESSMENT & PLAN NOTE
Mounjara 10 mg subcutaneous daily. Review blood sugars. Blood sugar today 130. Empagliflozin 25 mg po daily. Metformin 1000 mg po bid.

## 2025-03-31 ENCOUNTER — DOCUMENTATION (OUTPATIENT)
Dept: PRIMARY CARE | Facility: CLINIC | Age: 80
End: 2025-03-31
Payer: MEDICARE

## 2025-03-31 DIAGNOSIS — F33.1 MODERATE EPISODE OF RECURRENT MAJOR DEPRESSIVE DISORDER: Primary | ICD-10-CM

## 2025-03-31 DIAGNOSIS — K21.9 GASTROESOPHAGEAL REFLUX DISEASE WITHOUT ESOPHAGITIS: ICD-10-CM

## 2025-03-31 DIAGNOSIS — I10 PRIMARY HYPERTENSION: ICD-10-CM

## 2025-03-31 DIAGNOSIS — K59.00 CONSTIPATION, UNSPECIFIED CONSTIPATION TYPE: ICD-10-CM

## 2025-03-31 PROCEDURE — G2214 INIT/SUB PSYCH CARE M 1ST 30: HCPCS | Performed by: FAMILY MEDICINE

## 2025-03-31 RX ORDER — LOSARTAN POTASSIUM 25 MG/1
25 TABLET ORAL 2 TIMES DAILY
Qty: 180 TABLET | Refills: 1 | OUTPATIENT
Start: 2025-03-31

## 2025-03-31 RX ORDER — OMEPRAZOLE 40 MG/1
CAPSULE, DELAYED RELEASE ORAL
Qty: 180 CAPSULE | Refills: 1 | OUTPATIENT
Start: 2025-03-31

## 2025-03-31 RX ORDER — DOCUSATE SODIUM 100 MG/1
CAPSULE, LIQUID FILLED ORAL
Qty: 60 CAPSULE | Refills: 2 | Status: SHIPPED | OUTPATIENT
Start: 2025-03-31

## 2025-04-01 ENCOUNTER — NURSING HOME VISIT (OUTPATIENT)
Dept: POST ACUTE CARE | Facility: EXTERNAL LOCATION | Age: 80
End: 2025-04-01
Payer: MEDICARE

## 2025-04-01 VITALS
WEIGHT: 162 LBS | DIASTOLIC BLOOD PRESSURE: 64 MMHG | HEIGHT: 66 IN | TEMPERATURE: 98.2 F | SYSTOLIC BLOOD PRESSURE: 120 MMHG | BODY MASS INDEX: 26.03 KG/M2 | RESPIRATION RATE: 16 BRPM | HEART RATE: 82 BPM | OXYGEN SATURATION: 96 %

## 2025-04-01 DIAGNOSIS — E11.69 TYPE 2 DIABETES MELLITUS WITH OTHER SPECIFIED COMPLICATION, UNSPECIFIED WHETHER LONG TERM INSULIN USE (MULTI): ICD-10-CM

## 2025-04-01 DIAGNOSIS — K21.9 GASTROESOPHAGEAL REFLUX DISEASE WITHOUT ESOPHAGITIS: ICD-10-CM

## 2025-04-01 DIAGNOSIS — Z89.431 S/P TRANSMETATARSAL AMPUTATION OF FOOT, RIGHT (MULTI): ICD-10-CM

## 2025-04-01 DIAGNOSIS — Z74.09 IMPAIRED FUNCTIONAL MOBILITY, BALANCE, GAIT, AND ENDURANCE: Primary | ICD-10-CM

## 2025-04-01 DIAGNOSIS — I10 HYPERTENSION, UNSPECIFIED TYPE: ICD-10-CM

## 2025-04-01 PROCEDURE — 99308 SBSQ NF CARE LOW MDM 20: CPT | Performed by: PHYSICIAN ASSISTANT

## 2025-04-01 RX ORDER — DILTIAZEM HYDROCHLORIDE 240 MG/1
240 CAPSULE, EXTENDED RELEASE ORAL DAILY
Qty: 90 CAPSULE | Refills: 0 | Status: SHIPPED | OUTPATIENT
Start: 2025-04-01

## 2025-04-01 RX ORDER — OMEPRAZOLE 40 MG/1
CAPSULE, DELAYED RELEASE ORAL
Qty: 180 CAPSULE | Refills: 0 | Status: SHIPPED | OUTPATIENT
Start: 2025-04-01

## 2025-04-01 RX ORDER — OMEPRAZOLE 40 MG/1
CAPSULE, DELAYED RELEASE ORAL
Qty: 180 CAPSULE | Refills: 0 | OUTPATIENT
Start: 2025-04-01

## 2025-04-01 RX ORDER — LOSARTAN POTASSIUM 25 MG/1
25 TABLET ORAL 2 TIMES DAILY
Qty: 180 TABLET | Refills: 0 | Status: SHIPPED | OUTPATIENT
Start: 2025-04-01

## 2025-04-01 NOTE — LETTER
"Patient: Diane Montemayor  : 1945    Encounter Date: 2025  Name: Diane Montemayor  YOB: 1945    Chief complaint: Impaired mobility. S/P L TMA.    HPI: Patient resting comfortably in recliner chair. Examination of R foot shows intact surgical incision. There is a small wound on the lateral aspect of R foot. Possibly associated with wearing surgical shoe. No drainage. She will complete Ertapenem course tomorrow. Afebrile. Lab work shows WBC of 8.48. Will see I/D service tomorrow. Review of chart show she is able to propel wheelchair > 25 feet. She is not walking yet.    Extremity Weakness  This is a new problem. The current episode started 1 to 4 weeks ago. The problem has been gradually improving. Pertinent negatives include no abdominal pain, anorexia, arthralgias, change in bowel habit, chest pain, chills, coughing, fever, joint swelling, nausea, numbness or urinary symptoms. Nothing aggravates the symptoms. She has tried relaxation, rest and walking for the symptoms. The treatment provided moderate relief.     Review of systems:   ROS negative except were noted in HPI.    Code Status: full code    /64   Pulse 82   Temp 36.8 °C (98.2 °F)   Resp 16   Ht 1.676 m (5' 6\")   Wt 73.5 kg (162 lb)   SpO2 96%   BMI 26.15 kg/m²      Physical Exam  Constitutional:       General: She is not in acute distress.  HENT:      Head: Normocephalic.      Nose: Nose normal.   Eyes:      Extraocular Movements: Extraocular movements intact.      Pupils: Pupils are equal, round, and reactive to light.   Cardiovascular:      Rate and Rhythm: Normal rate and regular rhythm.   Pulmonary:      Effort: Pulmonary effort is normal.      Breath sounds: Normal breath sounds. No wheezing or rales.   Abdominal:      General: Bowel sounds are normal. There is no distension.      Palpations: Abdomen is soft.      Tenderness: There is no abdominal tenderness.   Genitourinary:     Comments: " voiding  Musculoskeletal:         General: Normal range of motion.      Cervical back: Normal range of motion.      Comments: R foot with intact dressing  Skin:     General: Skin is warm and dry.      Capillary Refill: Capillary refill takes less than 2 seconds.   Neurological:      General: No focal deficit present.      Mental Status: She is alert and oriented to person, place, and time.   Psychiatric:         Mood and Affect: Mood normal.         Behavior: Behavior normal.     Medications reviewed during visit at facility.  Tylenol 650 mg po q 4 hours prn  MOM 30 ml po daily prn  Colace 100 mg po bid  Spironolactone 25 mg po daily  Levothyroxine 150 mcg po daily  Vitamin D 3 50 mcg po daily  Vitamin B 12 1000 mcg po daily  Losartan 25 mg po bid  Coenzyme Q 10 400 mg po daily  Lactobacillus one capsule po daily  Magnesium 400 mg po daily  Omeprazole 20 mg po daily  Diltiazem  mg po daily  Metformin 1000 mg po bid  Albuterol 108 two puffs q 4 hours  Empagliflozin 25 mg po daily  Aspirin 81 mg po daily  Vilazodone  40 mg po daily  Atorvastatin 20 mg po daily  Fluconazole 150 mg po daily  Mounjara 10 mg subcutaneous daily  Ertapenem 1 gram IV q hs x 14 days.  Labs reviewed at facility:    Laboratory: 3/31/2025 13:40 CBC and Differential / Comp Metabolic Panel  Latest Version (more available)  Reviewed by yi on 3/31/2025 18:09  Resident Information  Resident: Fide Montemayor ([id number])  Admit Date: 2025  Admitting Provider:   Attending Provider:   Copy to List:   Report Information  Collection Date: 3/31/2025 06:41  Received Date: 3/31/2025 06:41  Reported Date: 3/31/2025 13:40  Ord. Provider: Vinnie Harvey  Source Spangler: [id number]  Lab Information  Status: Completed  Flag:    Reporting Lab:   [facility]  Order #:   [id number]  Vendor Order #:   [id number]  Category:   Chemistry, Hematology, Unknown Category  Order Notes  Result for:  FIDE MONTEMAYOR  ( 1945, F)      Results   Show All  Details Result Units Ref. Range Flag Status   CBC and Differential       White Blood Cell Count  8.48 k/uL 3.70-11.00  Final           RBC  4.59 m/uL 3.90-5.20  Final           Hemoglobin  12.7 g/dL 11.5-15.5  Final           Hematocrit  38.8 % 36.0-46.0  Final           MCV  84.5 fL 80.0-100.0  Final           MCH  27.7 pg 26.0-34.0  Final           MCHC  32.7 g/dL 30.5-36.0  Final           RDW-CV  13.8 % 11.5-15.0  Final           Platelet Count  328 k/uL 150-400  Final           MPV  11.0 fL 9.0-12.7  Final           Neut%  63.2 %   Final           Abs Neut  5.37 k/uL 1.45-7.50  Final           Lymph%  24.2 %   Final           Abs Lymph  2.05 k/uL 1.00-4.00  Final           Mono%  8.4 %   Final           Abs Mono  0.71 k/uL <0.87  Final           Eosin%  2.4 %   Final           Abs Eosin  0.20 k/uL <0.46  Final           Baso%  1.2 %   Final           Abs Baso  0.10 k/uL <0.11  Final           Immature Gran %%  0.6 %   Final           Abs Immature Gran  0.05 k/uL <0.10  Final           NRBC  0.0 /100 WBC   Final           Absolute nRBC  <0.01 k/uL <0.01  Final           Diff Type  Auto    Final      Scheduled: 3/31/2025 7:00 AM  Scheduled: 3/31/2025 7:00 AM   Comp Metabolic Panel       Protein, Total  6.8 g/dL 6.3-8.0  Final           Albumin  3.8 g/dL 3.9-4.9 L Final           Calcium, Total  9.6 mg/dL 8.5-10.2  Final           Bilirubin, Total  0.6 mg/dL 0.2-1.3  Final           Alkaline Phosphatase  76 U/L   Final           AST  15 U/L 13-35  Final           ALT  6 U/L 7-38 L Final           Glucose  66 mg/dL 74-99 L Final   BUN  14 mg/dL 7-21  Final           Creatinine  0.87 mg/dL 0.58-0.96  Final           Sodium  139 mmol/L 136-144  Final           Potassium  4.4 mmol/L 3.7-5.1  Final           Chloride  99 mmol/L   Final           CO2  25 mmol/L 22-30  Final           Anion Gap  15 mmol/L 8-15  Final           Estimated Glomerular Filtration Rate  67 mL/min/1.73 meters  squared >=60  Final    Assessment/Plan   Problem List Items Addressed This Visit       Type 2 diabetes mellitus     Review blood sugars. Blood sugar today 96. Continue with Metformin, Mounjara  and Empagliflozin.          S/P transmetatarsal amputation of foot, right (Multi)     Schedule follow up with Dr. Haresh Antonio on 4/3/25. Can start transitioning to weight bear to the R heel with assistive devices.         Impaired functional mobility, balance, gait, and endurance - Primary     Review physical and occupational therapy notes. Discuss home going needs with social service.            Time:    Tony Godron PA-C       Electronically Signed By: Tony Gordon PA-C   4/2/25 10:34 PM

## 2025-04-01 NOTE — TELEPHONE ENCOUNTER
Patient is calling in regards to the [employer] message that she received in regards to the RXS  that were refused because she needed an appointment. She states she's been in the hospital, [facility] and [facility]. She had 4 toes removed and part of her foot and hasn't been able to come in for an appointment. She needs these RXS sent in to her pharmacy, [facility]   She's aware that this message will be left with another provider since Dr Black is out of the office.    Thanks

## 2025-04-02 ENCOUNTER — NURSING HOME VISIT (OUTPATIENT)
Dept: POST ACUTE CARE | Facility: EXTERNAL LOCATION | Age: 80
End: 2025-04-02
Payer: MEDICARE

## 2025-04-02 ENCOUNTER — FOLLOW-UP (OUTPATIENT)
Facility: CLINIC | Age: 80
End: 2025-04-02
Payer: MEDICARE

## 2025-04-02 VITALS
OXYGEN SATURATION: 98 % | SYSTOLIC BLOOD PRESSURE: 105 MMHG | RESPIRATION RATE: 20 BRPM | DIASTOLIC BLOOD PRESSURE: 56 MMHG | HEART RATE: 90 BPM | TEMPERATURE: 98.8 F

## 2025-04-02 DIAGNOSIS — M86.271 SUBACUTE OSTEOMYELITIS OF RIGHT FOOT (MULTI): ICD-10-CM

## 2025-04-02 DIAGNOSIS — M06.9 RHEUMATOID ARTHRITIS INVOLVING BOTH SHOULDERS, UNSPECIFIED WHETHER RHEUMATOID FACTOR PRESENT (MULTI): ICD-10-CM

## 2025-04-02 DIAGNOSIS — E11.51 TYPE 2 DIABETES MELLITUS WITH DIABETIC PERIPHERAL ANGIOPATHY WITHOUT GANGRENE, WITHOUT LONG-TERM CURRENT USE OF INSULIN (MULTI): ICD-10-CM

## 2025-04-02 DIAGNOSIS — Z89.431 S/P TRANSMETATARSAL AMPUTATION OF FOOT, RIGHT (MULTI): Primary | ICD-10-CM

## 2025-04-02 DIAGNOSIS — I25.10 CORONARY ARTERY DISEASE INVOLVING NATIVE CORONARY ARTERY OF NATIVE HEART WITHOUT ANGINA PECTORIS: ICD-10-CM

## 2025-04-02 DIAGNOSIS — I73.9 PAD (PERIPHERAL ARTERY DISEASE) (CMS-HCC): ICD-10-CM

## 2025-04-02 DIAGNOSIS — M86.9 OSTEOMYELITIS OF FOOT, UNSPECIFIED LATERALITY, UNSPECIFIED TYPE: Primary | ICD-10-CM

## 2025-04-02 PROCEDURE — 99214 OFFICE O/P EST MOD 30 MIN: CPT | Performed by: INTERNAL MEDICINE

## 2025-04-02 PROCEDURE — 99308 SBSQ NF CARE LOW MDM 20: CPT | Performed by: INTERNAL MEDICINE

## 2025-04-02 ASSESSMENT — ENCOUNTER SYMPTOMS
CHILLS: 0
EXTREMITY WEAKNESS: 1
JOINT SWELLING: 0
COUGH: 0
ANOREXIA: 0
NUMBNESS: 0
NAUSEA: 0
CHANGE IN BOWEL HABIT: 0
ABDOMINAL PAIN: 0
ARTHRALGIAS: 0
FEVER: 0

## 2025-04-02 ASSESSMENT — PAIN SCALES - GENERAL: PAINLEVEL_OUTOF10: 0-NO PAIN

## 2025-04-02 NOTE — PROGRESS NOTES
"4/1/2025  Name: Diane Montemayor  YOB: 1945    Chief complaint: Impaired mobility. S/P L TMA.    HPI: Patient resting comfortably in recliner chair. Examination of R foot shows intact surgical incision. There is a small wound on the lateral aspect of R foot. Possibly associated with wearing surgical shoe. No drainage. She will complete Ertapenem course tomorrow. Afebrile. Lab work shows WBC of 8.48. Will see I/D service tomorrow. Review of chart show she is able to propel wheelchair > 25 feet. She is not walking yet.    Extremity Weakness  This is a new problem. The current episode started 1 to 4 weeks ago. The problem has been gradually improving. Pertinent negatives include no abdominal pain, anorexia, arthralgias, change in bowel habit, chest pain, chills, coughing, fever, joint swelling, nausea, numbness or urinary symptoms. Nothing aggravates the symptoms. She has tried relaxation, rest and walking for the symptoms. The treatment provided moderate relief.     Review of systems:   ROS negative except were noted in HPI.    Code Status: full code    /64   Pulse 82   Temp 36.8 °C (98.2 °F)   Resp 16   Ht 1.676 m (5' 6\")   Wt 73.5 kg (162 lb)   SpO2 96%   BMI 26.15 kg/m²      Physical Exam  Constitutional:       General: She is not in acute distress.  HENT:      Head: Normocephalic.      Nose: Nose normal.   Eyes:      Extraocular Movements: Extraocular movements intact.      Pupils: Pupils are equal, round, and reactive to light.   Cardiovascular:      Rate and Rhythm: Normal rate and regular rhythm.   Pulmonary:      Effort: Pulmonary effort is normal.      Breath sounds: Normal breath sounds. No wheezing or rales.   Abdominal:      General: Bowel sounds are normal. There is no distension.      Palpations: Abdomen is soft.      Tenderness: There is no abdominal tenderness.   Genitourinary:     Comments: voiding  Musculoskeletal:         General: Normal range of motion.      Cervical back: " Normal range of motion.      Comments: R foot with intact dressing  Skin:     General: Skin is warm and dry.      Capillary Refill: Capillary refill takes less than 2 seconds.   Neurological:      General: No focal deficit present.      Mental Status: She is alert and oriented to person, place, and time.   Psychiatric:         Mood and Affect: Mood normal.         Behavior: Behavior normal.     Medications reviewed during visit at facility.  Tylenol 650 mg po q 4 hours prn  MOM 30 ml po daily prn  Colace 100 mg po bid  Spironolactone 25 mg po daily  Levothyroxine 150 mcg po daily  Vitamin D 3 50 mcg po daily  Vitamin B 12 1000 mcg po daily  Losartan 25 mg po bid  Coenzyme Q 10 400 mg po daily  Lactobacillus one capsule po daily  Magnesium 400 mg po daily  Omeprazole 20 mg po daily  Diltiazem  mg po daily  Metformin 1000 mg po bid  Albuterol 108 two puffs q 4 hours  Empagliflozin 25 mg po daily  Aspirin 81 mg po daily  Vilazodone  40 mg po daily  Atorvastatin 20 mg po daily  Fluconazole 150 mg po daily  Mounjara 10 mg subcutaneous daily  Ertapenem 1 gram IV q hs x 14 days.  Labs reviewed at facility:    Laboratory: 3/31/2025 13:40 CBC and Differential / Comp Metabolic Panel  Latest Version (more available)  Reviewed by yi on 3/31/2025 18:09  Resident Information  Resident: Fide Montemayor ([id number])  Admit Date: 2025  Admitting Provider:   Attending Provider:   Copy to List:   Report Information  Collection Date: 3/31/2025 06:41  Received Date: 3/31/2025 06:41  Reported Date: 3/31/2025 13:40  Ord. Provider: Vinnie Harvey  Source Spangler: [id number]  Lab Information  Status: Completed  Flag:    Reporting Lab:   [facility]  Order #:   [id number]  Vendor Order #:   [id number]  Category:   Chemistry, Hematology, Unknown Category  Order Notes  Result for:  FIDE MONTEMAYOR  ( 1945, F)      Results   Show All Details Result Units Ref. Range Flag Status   CBC and Differential       White Blood  Cell Count  8.48 k/uL 3.70-11.00  Final           RBC  4.59 m/uL 3.90-5.20  Final           Hemoglobin  12.7 g/dL 11.5-15.5  Final           Hematocrit  38.8 % 36.0-46.0  Final           MCV  84.5 fL 80.0-100.0  Final           MCH  27.7 pg 26.0-34.0  Final           MCHC  32.7 g/dL 30.5-36.0  Final           RDW-CV  13.8 % 11.5-15.0  Final           Platelet Count  328 k/uL 150-400  Final           MPV  11.0 fL 9.0-12.7  Final           Neut%  63.2 %   Final           Abs Neut  5.37 k/uL 1.45-7.50  Final           Lymph%  24.2 %   Final           Abs Lymph  2.05 k/uL 1.00-4.00  Final           Mono%  8.4 %   Final           Abs Mono  0.71 k/uL <0.87  Final           Eosin%  2.4 %   Final           Abs Eosin  0.20 k/uL <0.46  Final           Baso%  1.2 %   Final           Abs Baso  0.10 k/uL <0.11  Final           Immature Gran %%  0.6 %   Final           Abs Immature Gran  0.05 k/uL <0.10  Final           NRBC  0.0 /100 WBC   Final           Absolute nRBC  <0.01 k/uL <0.01  Final           Diff Type  Auto    Final      Scheduled: 3/31/2025 7:00 AM  Scheduled: 3/31/2025 7:00 AM   Comp Metabolic Panel       Protein, Total  6.8 g/dL 6.3-8.0  Final           Albumin  3.8 g/dL 3.9-4.9 L Final           Calcium, Total  9.6 mg/dL 8.5-10.2  Final           Bilirubin, Total  0.6 mg/dL 0.2-1.3  Final           Alkaline Phosphatase  76 U/L   Final           AST  15 U/L 13-35  Final           ALT  6 U/L 7-38 L Final           Glucose  66 mg/dL 74-99 L Final   BUN  14 mg/dL 7-21  Final           Creatinine  0.87 mg/dL 0.58-0.96  Final           Sodium  139 mmol/L 136-144  Final           Potassium  4.4 mmol/L 3.7-5.1  Final           Chloride  99 mmol/L   Final           CO2  25 mmol/L 22-30  Final           Anion Gap  15 mmol/L 8-15  Final           Estimated Glomerular Filtration Rate  67 mL/min/1.73 meters squared >=60  Final    Assessment/Plan    Problem List Items Addressed This Visit       Type 2 diabetes  mellitus     Review blood sugars. Blood sugar today 96. Continue with Metformin, Mounjara  and Empagliflozin.          S/P transmetatarsal amputation of foot, right (Multi)     Schedule follow up with Dr. Haresh Antonio on 4/3/25. Can start transitioning to weight bear to the R heel with assistive devices.         Impaired functional mobility, balance, gait, and endurance - Primary     Review physical and occupational therapy notes. Discuss home going needs with social service.            Time:    Tony Gordon PA-C

## 2025-04-02 NOTE — LETTER
Patient: Diane Montemayor  : 1945    Encounter Date: 2025    Subjective  Patient ID: Diane Montemayor is a 79 y.o. female who is acute skilled care being seen and evaluated for multiple medical problems.    Today patient was seen for follow-up and patient's wound was inspected by me.  I see that patient has a slight erythema on the lateral aspect of the wound at the transmetatarsal amputation.  IV antibiotics are about to be over, patient will be seen by ID today but in absence of any drainage patient is not going to require any further IV antibiotics.  Patient continued to have a weight loss, she came here with weight of 178 pounds and now 162 pounds.  Patient has been partial weightbearing, remains on diltiazem, empagliflozin, tirzepatide.  She is anxious to go home provided IV antibiotics can be discontinued.  No nausea vomiting, other therapeutics are reviewed, she is immunocompromised because of diabetes and autoimmunity.         Review of Systems   Constitutional:  Negative for activity change and fatigue.        Wt loss   Respiratory:  Negative for cough and shortness of breath.    Cardiovascular:  Negative for chest pain and leg swelling.   Gastrointestinal:  Negative for diarrhea and nausea.   Musculoskeletal:  Positive for arthralgias.   Skin:  Positive for wound.   Neurological:  Positive for weakness. Negative for dizziness.   Psychiatric/Behavioral:  The patient is not nervous/anxious.        Objective  /59   Pulse 82   Wt 73.5 kg (162 lb)   BMI 26.15 kg/m²     Physical Exam  Vitals reviewed.   Constitutional:       Appearance: Normal appearance. She is normal weight.   HENT:      Head: Normocephalic.   Eyes:      Conjunctiva/sclera: Conjunctivae normal.   Cardiovascular:      Rate and Rhythm: Normal rate and regular rhythm.   Pulmonary:      Breath sounds: Normal breath sounds.   Abdominal:      Palpations: Abdomen is soft.   Musculoskeletal:         General: Normal range of motion.       Cervical back: Neck supple.   Skin:     General: Skin is warm and dry.      Findings: Wound present.   Neurological:      Mental Status: Mental status is at baseline.   Psychiatric:         Mood and Affect: Mood normal.         Assessment/Plan  Problem List Items Addressed This Visit             ICD-10-CM    Rheumatoid arthritis involving both shoulders, unspecified whether rheumatoid factor present (Multi) M06.9    Type 2 diabetes mellitus with diabetic peripheral angiopathy without gangrene, without long-term current use of insulin (Multi) E11.51    Coronary artery disease involving native coronary artery of native heart without angina pectoris I25.10    Subacute osteomyelitis of right foot (Multi) M86.271    S/P transmetatarsal amputation of foot, right (Multi) - Primary Z89.431   Patient's medical problems are reviewed, amputated site was inspected in presence of nursing staff, slight erythema but wound gaping is not seen purulence is not seen, blood sugars are reviewed reported blood sugar was 96.  Coronary artery disease is listed as a active problem no angina, it is a subacute osteomyelitis of the foot with amputation being performed, if IV antibiotics are discontinued which it was midline can be removed and patient should be considered for discharge.  Rest of therapy will not be disturbed.  Patient remains on Viibryd spironolactone, rosuvastatin, losartan, aspirin.  Mobility can be enhanced, protein intake could be enhanced, she has been manifesting features of vesting.  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  Nursing staff available at bedside.     Goals    None           Electronically Signed By: Vinnie Harvey MD   4/3/25  8:10 PM

## 2025-04-02 NOTE — PROGRESS NOTES
"Infectious Disease Progress Note       4/2/2025      79-year-old female with diabetes mellitus type 2 and peripheral arterial disease presented from podiatry clinic with right foot wound.  Concern for osteomyelitis second and third right foot digit.  MRI 2/18/2025 pending results  X-ray 2/17/2025 with erosions involving the distal third metatarsal and proximal third phalanx concerning for osteomyelitis  PVR pending final results but prelim is showing evidence of mild arterial occlusive disease right lower extremity and mild arterial occlusive disease in the left lower extremity.  Blood cultures x 2 remain normal drawn 2/17/2025.     Postop status post right foot TMA plus GR plus antibiotic impregnated cement and application of splint per podiatry note.  Operative site was closed.  Operative note reviewed  \"Distal shaft and head of the second and third metatarsals were noted to be soft consistent with diagnosis of osteomyelitis.  Bone at margins was noted to be hard.  Good fill of antibiotic impregnated cement to the second and third metatarsals.  Post gastrocnemius recession much improved ankle joint dorsiflexion. good closure of flaps noted\"     Cultures from bone resection x 3 obtained 2/22/2025 all negative to date    Last seen by ID 2/23/2025 for   Right foot osteomyelitis second and third metatarsals  2. Peripheral arterial disease demonstrated on PVR, angio planned tomorrow right TMA   3. Diabetes mellitus type 2    Discharged on Invanz for 2 weeks   All tissue cultures negative from 2/22/2025 bone resection margin  Surgical path from margin negative for OM second metatarsal and right foot  TMA 3rd metatarsal with chronic OM/ inflammation    Seen by podiatry last on 3/13/2025.  Last CRP 3/24/2025 0.4 and looks ok.     Lab Results   Component Value Date    WBC 9.6 02/25/2025    HGB 10.6 (L) 02/25/2025    HCT 33.2 (L) 02/25/2025    MCV 90 02/25/2025     02/25/2025     Lab Results   Component Value Date    " GLUCOSE 119 (H) 02/25/2025    CALCIUM 8.5 (L) 02/25/2025     02/25/2025    K 3.8 02/25/2025    CO2 28 02/25/2025     02/25/2025    BUN 17 02/25/2025    CREATININE 0.73 02/25/2025       WBC trends are being monitored. Antibiotic doses are being adjusted per most recent renal labs.     Patient is awake and alert  NAD  Neck supple  Heart S1S2  Chest: Equal expansion, bilaterally clear to auscultation  Abdomen: soft, ND, NTTP, no guarding  Extrem: no pain to palpation, wound photo uploaded  Skin: no rashes, no diaphoresis  Neuro: CNS intact  Affect appropriate and patient is interactive        Patient Active Problem List   Diagnosis    Constipation    Class 3 severe obesity due to excess calories with serious comorbidity and body mass index (BMI) of 40.0 to 44.9 in adult    Hypothyroidism    BMI 40.0-44.9, adult (Multi)    Type 2 diabetes mellitus    Morbidly obese (Multi)    Depression, unspecified    B12 deficiency    Dizziness    Fatigue    Hypercholesteremia    Hyperlipidemia    Low back pain    Malaise and fatigue    Medicare annual wellness visit, subsequent    Yeast infection    Rheumatoid arthritis involving both shoulders, unspecified whether rheumatoid factor present (Multi)    Acquired absence of left great toe (Multi)    Atherosclerosis of native artery of both lower extremities with rest pain    Other acute osteomyelitis, unspecified site    Benign carcinoid tumors of other sites    Pulmonary emphysema, unspecified emphysema type (Multi)    Moderate episode of recurrent major depressive disorder    Type 2 diabetes mellitus with diabetic peripheral angiopathy without gangrene, without long-term current use of insulin (Multi)    Stage 3a chronic kidney disease (Multi)    Coronary artery disease involving native coronary artery of native heart without angina pectoris    Hypertension    BMI 33.0-33.9,adult    Never smoked cigarettes    Wound of right foot    Class 1 obesity due to excess calories  without serious comorbidity with body mass index (BMI) of 33.0 to 33.9 in adult    Chronic combined systolic (congestive) and diastolic (congestive) heart failure    Ventricular tachycardia (Multi)    Subacute osteomyelitis of right foot (Multi)    Peripheral vascular disease, unspecified (CMS-HCC)    Equinus contracture of ankle    History of amputation of hallux (Multi)    S/P transmetatarsal amputation of foot, right (Multi)    Impaired functional mobility, balance, gait, and endurance           ASSESSMENT:  Right foot osteomyelitis second and third metatarsals  Peripheral arterial disease demonstrated on PVR, angio planned tomorrow right TMA   Diabetes mellitus type 2    PLAN:  Labs reviewed.  Surgical shoe was irritating the foot on the lateral part of the foot.  Photo obtained and uploaded.  There is no drainage and no warmth to the touch.  Patient is not complaining of any fevers or chills at home.  Discontinue midline.  Maintain nonweightbearing weightbearing status per podiatry instructions.  Maintain optimal glycemic control.  Plan on pursuing local wound care only with infectious disease follow-up as necessary.  No further antibiotics      Imaging and labs were reviewed per medical records and any ID pertinent labs were also addressed  Time spent before, during and after care today, including coordination of care >40 min      Olive Nicole DO

## 2025-04-03 VITALS
SYSTOLIC BLOOD PRESSURE: 118 MMHG | WEIGHT: 162 LBS | HEART RATE: 82 BPM | BODY MASS INDEX: 26.15 KG/M2 | DIASTOLIC BLOOD PRESSURE: 59 MMHG

## 2025-04-03 ASSESSMENT — ENCOUNTER SYMPTOMS
SHORTNESS OF BREATH: 0
DIZZINESS: 0
FATIGUE: 0
NERVOUS/ANXIOUS: 0
WEAKNESS: 1
ARTHRALGIAS: 1
COUGH: 0
DIARRHEA: 0
NAUSEA: 0
ACTIVITY CHANGE: 0
WOUND: 1

## 2025-04-03 NOTE — ASSESSMENT & PLAN NOTE
Schedule follow up with Dr. Haresh Antonio on 4/3/25. Can start transitioning to weight bear to the R heel with assistive devices.

## 2025-04-04 NOTE — PROGRESS NOTES
Subjective   Patient ID: Diane Montemayor is a 79 y.o. female who is acute skilled care being seen and evaluated for multiple medical problems.    Today patient was seen for follow-up and patient's wound was inspected by me.  I see that patient has a slight erythema on the lateral aspect of the wound at the transmetatarsal amputation.  IV antibiotics are about to be over, patient will be seen by ID today but in absence of any drainage patient is not going to require any further IV antibiotics.  Patient continued to have a weight loss, she came here with weight of 178 pounds and now 162 pounds.  Patient has been partial weightbearing, remains on diltiazem, empagliflozin, tirzepatide.  She is anxious to go home provided IV antibiotics can be discontinued.  No nausea vomiting, other therapeutics are reviewed, she is immunocompromised because of diabetes and autoimmunity.         Review of Systems   Constitutional:  Negative for activity change and fatigue.        Wt loss   Respiratory:  Negative for cough and shortness of breath.    Cardiovascular:  Negative for chest pain and leg swelling.   Gastrointestinal:  Negative for diarrhea and nausea.   Musculoskeletal:  Positive for arthralgias.   Skin:  Positive for wound.   Neurological:  Positive for weakness. Negative for dizziness.   Psychiatric/Behavioral:  The patient is not nervous/anxious.        Objective   /59   Pulse 82   Wt 73.5 kg (162 lb)   BMI 26.15 kg/m²     Physical Exam  Vitals reviewed.   Constitutional:       Appearance: Normal appearance. She is normal weight.   HENT:      Head: Normocephalic.   Eyes:      Conjunctiva/sclera: Conjunctivae normal.   Cardiovascular:      Rate and Rhythm: Normal rate and regular rhythm.   Pulmonary:      Breath sounds: Normal breath sounds.   Abdominal:      Palpations: Abdomen is soft.   Musculoskeletal:         General: Normal range of motion.      Cervical back: Neck supple.   Skin:     General: Skin is warm and  dry.      Findings: Wound present.   Neurological:      Mental Status: Mental status is at baseline.   Psychiatric:         Mood and Affect: Mood normal.         Assessment/Plan   Problem List Items Addressed This Visit             ICD-10-CM    Rheumatoid arthritis involving both shoulders, unspecified whether rheumatoid factor present (Multi) M06.9    Type 2 diabetes mellitus with diabetic peripheral angiopathy without gangrene, without long-term current use of insulin (Multi) E11.51    Coronary artery disease involving native coronary artery of native heart without angina pectoris I25.10    Subacute osteomyelitis of right foot (Multi) M86.271    S/P transmetatarsal amputation of foot, right (Multi) - Primary Z89.431   Patient's medical problems are reviewed, amputated site was inspected in presence of nursing staff, slight erythema but wound gaping is not seen purulence is not seen, blood sugars are reviewed reported blood sugar was 96.  Coronary artery disease is listed as a active problem no angina, it is a subacute osteomyelitis of the foot with amputation being performed, if IV antibiotics are discontinued which it was midline can be removed and patient should be considered for discharge.  Rest of therapy will not be disturbed.  Patient remains on Viibryd spironolactone, rosuvastatin, losartan, aspirin.  Mobility can be enhanced, protein intake could be enhanced, she has been manifesting features of vesting.  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  Nursing staff available at bedside.     Goals    None

## 2025-04-14 ENCOUNTER — PATIENT OUTREACH (OUTPATIENT)
Dept: PRIMARY CARE | Facility: CLINIC | Age: 80
End: 2025-04-14
Payer: MEDICARE

## 2025-04-14 NOTE — LETTER
AUTHORIZATION FOR RELEASE OF MEDICAL INFORMATION      Patient Name:  Date of Birth:  Address:  Diane Montemayor   1945  [address] [place] SSN:  Phone #   E-mail:   [phone]  [email or web address]  (**used for image release only**)       Location(s)/Facility  Treatment Date(s): 2/26/25 - 4/11/25    Please Release Medical Information to the Following Recipient:  -or- Same as Patient []   Name of Person/Org: Tiki Stern RN , BSN / / [facility] Phone # [phone]   Address: [address] [place] [id number]       Fax # [phone]     Purpose of Disclosure:____CONTINUITY OF CARE_____________________________________ -or- Patient's Request []    Description of Information to be Released:  [] Pertinent Summary (all * items)     [] Admission Form [] Facesheet/Demographics [] Physical Therapy   [x] *Discharge Summary [] Lab Reports [] Physician's Note   [] *Emergency Room Report [] *Radiology Report [] Entire Record   [] *History & Physical [] *EKG Report [] Radiology Images (electronic)   [] *Consultation Report [] *Pathology Report [x] Other__Medication Summary____   [] *Operative Report [] *Card Cath Report _______________________________________      I, the undersigned, authorize [facility] and its employees to release Information from my medical records as described above. I understand and acknowledge that the medical record may contain Information regarding psychiatric disorders, Human Immune Virus (HIV) test results, Acquired Immune Deficiency Syndrome (AIDS), AIDS-related conditions, alcohol, and/or drug dependence/abuse. I also understand that Information used or disclosed according to this authorization may be subject to redisclosure by the recipient and may no longer be protected. My failure to sign this authorization may result in my Information not being released. I understand that I have a right to revoke this  authorization at any time. I understand that if I revoke this authorization I must do so in writing and present my written revocation to the health information management department. I understand that the revocation will not apply to information that has already been released in response to this authorization. I understand that the revocation will not apply to my insurance company when the law provides my insurer with the right to contest a claim under my policy. Unless otherwise revoked, this authorization will  on the following date, event, or condition: __________________________________________________________________________________________________________________  If I fail to specify an expiration date, event or condition, this authorization will  in one year. I understand that treatment, payment, enrollment, or eligibility for benefits will not be conditioned on my failure to sign this authorization. I understand there may be charges for the copying and release of Information and accept financial responsibility.     X______________________________________________________________________________________________   Signature of Patient/Legal Representative**     _____/______/________  Date Signed   ________________________________________________________________________________________________   Description of Legal Representative's Authority to Act on Behalf of Patient (if applicable)    [x] Patient unable to sign    [] By signing this form as the patient's legal representative, I am certifying that there is no court order or other legal reason (such as a binding arbitration decision or final mediation agreement) prohibiting me from obtaining a copy of the requested records.       **This box must be checked for ALL releases of records authorized by legal representatives.**  [id number] Authorization for Release of Medical Information () [id number]

## 2025-04-14 NOTE — LETTER
Fax Transmission                                                                                                                                                       [facility]  T: [phone]  F:[phone]    To:MARY JO //  [facility] From: Isaias Stern RN, BSN  [facility], RN Care Manager   [phone]     Fax: [phone] Date: 4/14/25  1:06 PM   Telephone:             Comments:  Need today please.  Please send with ATTN: ISAIAS to [phone]. Thank you.                                                                                                                                                                              _____________    STATEMENT OF CONFIDENTIALITY: The information accompanying this transmission is STRICTLY CONFIDENTIAL and is intended for the use of the addressee only. [facility]  disclaims any responsibility for the unauthorized disclosure of this information to individuals or parties other than the addressee. This information has been disclosed to you from records whose confidentiality is protected by Federal and [place] law, including HIPAA, Federal Regulation (42 CFR Part 2) and [place] Revised Code sections 5122.3 I and 3701 .243 which prohibit you from making any further disclosure of it without the specific written consent of the person to whom it pertains, or as otherwise permitted by such laws. A general authorization for the release of medical or other information, including HIV/AIDS - related information is NOT sufficient for this purpose. If you received this information in error, please notify the sender immediately by e-mail, fax or telephone, or call   at  to arrange for return of the original documents as soon as possible. In addition, if you are not the intended recipient, any dissemination, distribution or duplication of this transmission is strictly prohibited.

## 2025-04-14 NOTE — PROGRESS NOTES
"Discharge facility:[facility]  Discharge diagnosis: Subacute osteomyelitis, right ankle and foot  Admission date: 2/26/2025  Discharge date: 4/11/2025    PCP Appointment Date: 4/24/2025 at 2:15PM.  Specialist Appointment Date: Podiatry 4/14  Hospital Encounter and Summary: not available at this time   See Discharge assessment below for further details     Wrap Up  Is the patient/caregiver familiar with Advance Care Planning?: Yes (4/14/2025 12:41 PM)  Would the patient like more information on Advance Care Planning?: No (4/14/2025 12:41 PM)  Wrap Up Additional Comments: Unable to acess patient discharge packet/ medication summary. Request sent to [place]. Patient does report improvement in right foot. However, does complain of worsening balance and dizziness. Denies any falls since discharge. She does have a Ortho prosthetic. Reports 4 toes and part of the right foot were removed. New wound on same foot, beleived to have resulted from surgical boot. Patient sees Podiatry tomorrow. She has [facility] order, however is unclear with what company as she requested new company besides [employer]. Nurse to FU once hears from [place] with discharge packets. Additionally, patient resports her thyropid levels are \"out of wack\". Discussed fall prevention measures, saftey and importance of medication regemin. Scheduled for PCP FU next week. (4/14/2025 12:41 PM)  Call End Time: 1312 (4/14/2025 12:41 PM)    Engagement  Call Start Time: 1242 (4/14/2025 12:41 PM)    Medications  Medications reviewed with patient/caregiver?: -- (Discharge packet not available. Request sent to [place].) (4/14/2025 12:41 PM)  Is the patient having any side effects they believe may be caused by any medication additions or changes?: No (4/14/2025 12:41 PM)  Does the patient have all medications ordered at discharge?: Yes (4/14/2025 12:41 PM)  Care Management Interventions: No intervention needed (4/14/2025 12:41 PM)  Prescription Comments: " Await [facility] packet from SNF. (4/14/2025 12:41 PM)  Is the patient taking all medications as directed (includes completed medication regime)?: Yes (4/14/2025 12:41 PM)  Care Management Interventions: -- (Requested discharge packet) (4/14/2025 12:41 PM)  Follow Up Tasks: Medication reconciliation issues (requested) (4/14/2025 12:41 PM)    Appointments  Does the patient have a primary care provider?: Yes (4/14/2025 12:41 PM)  Care Management Interventions: Verified appointment date/time/provider; Educated patient on importance of making appointment; Advised patient to make appointment (4/14/2025 12:41 PM)  Has the patient kept scheduled appointments due by today?: Yes (4/14/2025 12:41 PM)  Care Management Interventions: Advised patient to keep appointment; Educated on importance of keeping appointment; Advised to reschedule appointment; Advised to schedule with specialist (4/14/2025 12:41 PM)    Self Management  What is the home health agency?: Patient not sure - await discharge packet from [facility]. (4/14/2025 12:41 PM)  Has home health visited the patient within 72 hours of discharge?: Call prior to 72 hours (4/14/2025 12:41 PM)  What Durable Medical Equipment (DME) was ordered?: - (4/14/2025 12:41 PM)    Patient Teaching  What is the patient's perception of their health status since discharge?: Improving (4/14/2025 12:41 PM)  Is the patient/caregiver able to teach back the hierarchy of who to call/visit for symptoms/problems? PCP, Specialist, Home Health nurse, Urgent Care, ED, 911: Yes (4/14/2025 12:41 PM)

## 2025-04-15 ENCOUNTER — TELEPHONE (OUTPATIENT)
Dept: PRIMARY CARE | Facility: CLINIC | Age: 80
End: 2025-04-15
Payer: MEDICARE

## 2025-04-24 ENCOUNTER — APPOINTMENT (OUTPATIENT)
Dept: PRIMARY CARE | Facility: CLINIC | Age: 80
End: 2025-04-24
Payer: MEDICARE

## 2025-04-24 VITALS
WEIGHT: 161.2 LBS | BODY MASS INDEX: 25.91 KG/M2 | SYSTOLIC BLOOD PRESSURE: 110 MMHG | OXYGEN SATURATION: 96 % | DIASTOLIC BLOOD PRESSURE: 60 MMHG | HEIGHT: 66 IN | HEART RATE: 70 BPM | TEMPERATURE: 97.8 F

## 2025-04-24 DIAGNOSIS — E66.01 CLASS 3 SEVERE OBESITY DUE TO EXCESS CALORIES WITH SERIOUS COMORBIDITY AND BODY MASS INDEX (BMI) OF 40.0 TO 44.9 IN ADULT: ICD-10-CM

## 2025-04-24 DIAGNOSIS — E03.2 HYPOTHYROIDISM DUE TO MEDICATION: ICD-10-CM

## 2025-04-24 DIAGNOSIS — I47.20 VENTRICULAR TACHYCARDIA (MULTI): ICD-10-CM

## 2025-04-24 DIAGNOSIS — I73.9 PERIPHERAL VASCULAR DISEASE, UNSPECIFIED (CMS-HCC): ICD-10-CM

## 2025-04-24 DIAGNOSIS — E66.813 CLASS 3 SEVERE OBESITY DUE TO EXCESS CALORIES WITH SERIOUS COMORBIDITY AND BODY MASS INDEX (BMI) OF 40.0 TO 44.9 IN ADULT: ICD-10-CM

## 2025-04-24 DIAGNOSIS — D3A.098 BENIGN CARCINOID TUMORS OF OTHER SITES: ICD-10-CM

## 2025-04-24 DIAGNOSIS — N18.31 STAGE 3A CHRONIC KIDNEY DISEASE (MULTI): ICD-10-CM

## 2025-04-24 DIAGNOSIS — E11.51 TYPE 2 DIABETES MELLITUS WITH DIABETIC PERIPHERAL ANGIOPATHY WITHOUT GANGRENE, WITHOUT LONG-TERM CURRENT USE OF INSULIN (MULTI): ICD-10-CM

## 2025-04-24 DIAGNOSIS — I10 PRIMARY HYPERTENSION: Primary | ICD-10-CM

## 2025-04-24 DIAGNOSIS — M06.9 RHEUMATOID ARTHRITIS INVOLVING BOTH SHOULDERS, UNSPECIFIED WHETHER RHEUMATOID FACTOR PRESENT (MULTI): ICD-10-CM

## 2025-04-24 PROCEDURE — 3078F DIAST BP <80 MM HG: CPT | Performed by: FAMILY MEDICINE

## 2025-04-24 PROCEDURE — 3074F SYST BP LT 130 MM HG: CPT | Performed by: FAMILY MEDICINE

## 2025-04-24 PROCEDURE — 1159F MED LIST DOCD IN RCRD: CPT | Performed by: FAMILY MEDICINE

## 2025-04-24 PROCEDURE — 1036F TOBACCO NON-USER: CPT | Performed by: FAMILY MEDICINE

## 2025-04-24 PROCEDURE — 99214 OFFICE O/P EST MOD 30 MIN: CPT | Performed by: FAMILY MEDICINE

## 2025-04-24 PROCEDURE — 1157F ADVNC CARE PLAN IN RCRD: CPT | Performed by: FAMILY MEDICINE

## 2025-04-24 PROCEDURE — 1160F RVW MEDS BY RX/DR IN RCRD: CPT | Performed by: FAMILY MEDICINE

## 2025-04-24 NOTE — PROGRESS NOTES
Subjective   Patient ID: Diane Montemayor is a 79 y.o. female who presents for Hospital Follow-up.  [facility]    Hospital follow up. KYLER complete.   Admitted to [facility].   Admission dates 02/17/25-02/26/25.   Admitted for Osteomyelitis of right foot and amputation of the right foot.   Days since discharge 2 months.   Patient had weekly lab work, MRI, XR, CT, and a EKG.   Patient advised to follow up with PCP.   Patient was prescribed Ertapenmen 1,000 mg    Hospital has asked that BW results be faxed to [phone]    Patient had recently come of of rehab from her amputations of her right toes.     Patient would like to go over her BW from her time at the hospital. She was told that her thyroids are out of wack and medication needed to be adjusted. She denies palpitations.    Patients A1c came back 5%.     Patient is taking Viibryd for her mood.      Review of systems  ; Patient seen today for exam denies any problems with headaches or vision, denies any shortness of breath chest pain nausea or vomiting, no black stool no blood in the stool no heartburn type symptoms denies any problems with constipation or diarrhea, and no dysuria-type symptoms    The patient's allergies medications were reviewed with them today    The patient's social family and surgical history or also reviewed here today, along with her past medical history.     Objective     Alert and active in  no acute distress  HEENT TMs clear oropharynx negative nares clear no drainage noted neck supple  With no adenopathy   Heart regular rate and rhythm without murmur and no carotid bruits  Lungs- clear to auscultation bilaterally, no wheeze or rhonchi noted  Thyroid -negative masses or nodularity  Abdomen- soft times four quadrants , bowel sounds positive no masses or organomegaly, negative tenderness guarding or rebound    skin -no lesions noted      /60 (BP Location: Left arm, Patient Position: Sitting, BP Cuff Size: Adult long)   Pulse 70    "Temp 36.6 °C (97.8 °F) (Temporal)   Ht 1.676 m (5' 6\")   Wt 73.1 kg (161 lb 3.2 oz)   SpO2 96%   BMI 26.02 kg/m²     Allergies[1]    Assessment/Plan   Problem List Items Addressed This Visit       RESOLVED: Class 3 severe obesity due to excess calories with serious comorbidity and body mass index (BMI) of 40.0 to 44.9 in adult    Hypothyroidism    Relevant Orders    TSH    T4, free    RESOLVED: BMI 40.0-44.9, adult (Multi)    Rheumatoid arthritis involving both shoulders, unspecified whether rheumatoid factor present (Multi)    Benign carcinoid tumors of other sites    Type 2 diabetes mellitus with diabetic peripheral angiopathy without gangrene, without long-term current use of insulin (Multi)    Stage 3a chronic kidney disease (Multi)    Hypertension - Primary    Ventricular tachycardia (Multi)    Peripheral vascular disease, unspecified (CMS-HCC)       Patient has recovered well and has been doing fine since hospital discharge. Her blood pressure is also unremarkable. Her diabetes is well-controlled on medications. Given that her last A1c was 5% on 3/10/25, we discussed she can discontinue Jardiance at this time and continue taking Mounjaro and Metformin at the current dose.    Recommended to consume protein with every meal.  Recommended no sugar, sugary drinks, pops, apple/orange juice.    Will recheck thyroid level as the last lab showed lower reading.   Ordered labs (TSH, T4) today.    If anything worsens or changes please call us at once, follow up in the office as planned,       Scribe Attestation  By signing my name below, INighat Scribe   attest that this documentation has been prepared under the direction and in the presence of Ramses Black DO.    All medical record entries made by the Scribe were at my direction and personally dictated by me.   I have reviewed the chart and agree that the record accurately reflects my personal performance of the history, physical exam, discussion, and " plan.         [1]   Allergies  Allergen Reactions    Lisinopril Cough

## 2025-04-25 ENCOUNTER — PATIENT OUTREACH (OUTPATIENT)
Dept: PRIMARY CARE | Facility: CLINIC | Age: 80
End: 2025-04-25
Payer: MEDICARE

## 2025-04-25 DIAGNOSIS — E11.51 TYPE 2 DIABETES MELLITUS WITH DIABETIC PERIPHERAL ANGIOPATHY WITHOUT GANGRENE, WITHOUT LONG-TERM CURRENT USE OF INSULIN (MULTI): ICD-10-CM

## 2025-04-25 DIAGNOSIS — E03.2 HYPOTHYROIDISM DUE TO MEDICATION: ICD-10-CM

## 2025-04-25 NOTE — PROGRESS NOTES
Care Management Monthly Outreach  Chart review completed  Confirmation of at least 2 patient identifiers    Has patient been to ER/Urgent Care since last outreach? Yes, TCM complete 4/14 - Patient Outreach with Tiki Stern RN (04/14/2025)     Last Office Visit: 4/24/2025  Next Office Visit: Visit date not found   APC: Behavioral Health - request to defer for some time.  HIPAA: 4/2026  CSM: No    Chronic Conditions and Outreach Summary:   Hypothyroidism due to medication    Type 2 diabetes mellitus with diabetic peripheral angiopathy without gangrene, without long-term current use of insulin (Multi)  Diane did follow up with Dr. Black yesterday. Since TCM completion also saw Dr. Antonio who is her new podiatrist. She was contacted by [facility] for [facility] however they refused to tx due to open wound? Due to this patient has now been switched to care with [facility]. [facility] is set to come out today. Patient denies any new issues or concerns. Continues to change dressing on her own till [facility] comes in. Aware of labs for thyroid needing complete.     Dr. Antonio ordered orthopedic shoes and prostetics.     Notified patient GeoffreyDAVID reached out. Patient request nurse to notify geoffrey that at this time she would like to discontinue services and will reach back out shall things change.   Medications:   Are there medication changes since last visit? No  Refills needed? No    Care Gaps Addressed? Adressed  Social Drivers of Health: Complete  General Assessment: Complete  Care Plan addressed: yes     Upcoming Appointments:     Last filed Vital Signs Reviewed:  BP Readings from Last 2 Encounters:   04/24/25 110/60   04/02/25 118/59      Labs Reviewed:  Lab Results   Component Value Date    CREATININE 0.73 02/25/2025    GLUCOSE 119 (H) 02/25/2025    ALKPHOS 62 02/17/2025    K 3.8 02/25/2025    PROT 7.5 02/17/2025     02/25/2025    AST 13 02/17/2025    ALT 10 02/17/2025    BUN 17 02/25/2025    MG 2.06 02/22/2025    PHOS 3.3  03/12/2020    GFRF CANCELED 07/01/2023    GFRMALE CANCELED 07/01/2023     Lab Results   Component Value Date    TRIG 146 06/21/2023    CHOL 163 06/21/2023    HDL 47.6 06/21/2023     Lab Results   Component Value Date    HGBA1C 5 03/10/2025    HGBA1C 5.6 12/09/2024    HGBA1C 5.4 07/15/2024     Lab Results   Component Value Date    WBC 9.6 02/25/2025    RBC 3.68 (L) 02/25/2025    HGB 10.6 (L) 02/25/2025     02/25/2025   No other concerns at this time.  Agreeable to continue monthly outreaches.  Encouraged to call if questions or concerns arise.    Tiki Stern, RN, BSN  [facility], RN Care Manager   [phone]

## 2025-04-29 DIAGNOSIS — E11.69 TYPE 2 DIABETES MELLITUS WITH OTHER SPECIFIED COMPLICATION, WITHOUT LONG-TERM CURRENT USE OF INSULIN: ICD-10-CM

## 2025-04-30 RX ORDER — TIRZEPATIDE 10 MG/.5ML
INJECTION, SOLUTION SUBCUTANEOUS
Qty: 2 ML | Refills: 2 | Status: SHIPPED | OUTPATIENT
Start: 2025-04-30

## 2025-04-30 NOTE — PROGRESS NOTES
[facility] received message from Isaiah Stern that patient is requesting to close COCM services and appreciative to [facility]'s services.  [facility] did attempt outreach to patient earlier this month and has not heard back from patient.  COCM services will be closed.

## 2025-05-01 ENCOUNTER — TELEMEDICINE (OUTPATIENT)
Dept: PHARMACY | Facility: HOSPITAL | Age: 80
End: 2025-05-01
Payer: MEDICARE

## 2025-05-01 DIAGNOSIS — E11.69 TYPE 2 DIABETES MELLITUS WITH OTHER SPECIFIED COMPLICATION, UNSPECIFIED WHETHER LONG TERM INSULIN USE (MULTI): ICD-10-CM

## 2025-05-01 NOTE — ASSESSMENT & PLAN NOTE
Patient's goal A1c is < 7%.  Is pt at goal? Yes, very well controlled at A1c 5.0%  Patient's SMBGs are controlled without low sugars.     Rationale for plan: The patient is very well controlled and not experiencing any low sugars. Her recent wounds are healing well and she is no longer on antibiotics for the osteomyelitis. Given her therapy with Jardiance and Mounjaro also has cardiac and kidney benefits, and she has no low sugars, we will continue her current therapy and re-assess when next A1c is due in September.    Medication Changes:  CONTINUE  Jardiance 25 mg daily  Mounjaro 10 mg weekly  Metformin 1000 mg twice daily    Future Considerations:  Next A1c due about mid-September given she is controlled    Monitoring and Education:   Addressed all questions at this time

## 2025-05-01 NOTE — PROGRESS NOTES
Patient ID: Diane Montemayor is a 80 y.o. female who presents for Diabetes   Pt is here for Follow Up appointment.    PCP/ Referring Provider: Ramses Black DO  Last visit with PCP: 24  Next visit: not yet scheduled    Verbal consent to manage patient's drug therapy was obtained from patient. They were informed they may decline to participate or withdraw from participation in pharmacy services at any time. Patient is  agreeable to detailed voice messages if unable to be reached.     Patient enrolled in CCM- add visit time to Bety Laughlin      Subjective   Treatment Adherence:   Patient did take medications today.   Number of missed doses in last 7 days: 0.      Preferred pharmacy: [facility]  Can patient afford prescribed medications: Yes, denies cost issues at this time    Interval History:   Recently had another amputation for osteomyelitis - 4 toes and part of foot removed to control infection- has been seeing podiatry frequently since  A1c 5.0% at outside lab  Feels like she is healing okay after surgery  Working on balance, will be getting prosthetic to help with this  Happy to finally be off antibiotics  Dizziness thought to be related to thyroid, no recent falls, has been doing much better recently  Recognizing symptoms and able to sit in time  Blood work hopefully tomorrow    DIABETES MELLITUS TYPE 2:  Diagnosed with diabetes:  >40 years.   Known diabetic complications: toe amputation, neuropathy  Last optometry exam: ; no current concerns  Most recent visit in Podiatry: 25     Current diabetic medications include:  Mounjaro 10mg weekly on   Jardiance 25mg daily  Metformin XR 1000mg BID    Home blood glucose records (mg/dL)  FBs-90s; 112 this morning after pretzels last night    Any episodes of hypoglycemia? No, patient denies.    Did patient treat episode of hypoglycemia appropriately? N/A    Does pt have proteinuria? Yes; needs repeat urine albumin  If appropriate, is  patient on ACEi/ARB? Yes, losartan    Secondary Prevention  Statin? Yes  ACE-I/ARB? Yes  Aspirin? Yes    Pertinent PMH Review:  PMH of Pancreatitis: No  PMH of Retinopathy: No  PMH of Urinary Tract Infections: No; + yeast infection  PMH of MTC: No    Lifestyle:  Has lost about 92 lbs to date at this point  Son passed in the past year, working through the moods associated with this  Lots of fruit and yogurt - working on the protein intake lately    Hx:  Current exercise: nothing formal; walking in the hallway at home, grocery shopping, chair exercises, etc.  Current diet: cut out chips and snacks  Drinks 1 protein shake per day (30G)  Has lost 60 lb since start      Objective     There were no vitals taken for this visit.     Labs  Lab Results   Component Value Date    BILITOT 0.6 02/17/2025    CALCIUM 8.5 (L) 02/25/2025    CO2 28 02/25/2025     02/25/2025    CREATININE 0.73 02/25/2025    GLUCOSE 119 (H) 02/25/2025    ALKPHOS 62 02/17/2025    K 3.8 02/25/2025    PROT 7.5 02/17/2025     02/25/2025    AST 13 02/17/2025    ALT 10 02/17/2025    BUN 17 02/25/2025    ANIONGAP 10 02/25/2025    MG 2.06 02/22/2025    PHOS 3.3 03/12/2020    ALBUMIN 4.3 02/17/2025    GFRF CANCELED 07/01/2023    GFRMALE CANCELED 07/01/2023     Lab Results   Component Value Date    TRIG 146 06/21/2023    CHOL 163 06/21/2023    HDL 47.6 06/21/2023     Lab Results   Component Value Date    HGBA1C 5 03/10/2025       Current Outpatient Medications   Medication Instructions    albuterol 90 mcg/actuation inhaler INHALE 2 PUFFS BY MOUTH EVERY 4 HOURS AS DIRECTED AS NEEDED FOR COUGH OR WHEEZING    aspirin 81 mg, Nightly    cholecalciferol (Vitamin D-3) 50 MCG (2000 UT) tablet 2 tablets, Daily    coenzyme Q10 400 mg capsule 1 capsule, Nightly    cyanocobalamin (VITAMIN B-12) 1,000 mcg, Daily    DILT- mg, oral, Daily    docusate sodium (Colace) 100 mg capsule TAKE 1 CAPSULE(100 MG) BY MOUTH TWICE DAILY AS NEEDED FOR CONSTIPATION     empagliflozin (JARDIANCE) 25 mg, oral, Daily    fluconazole (DIFLUCAN) 150 mg, oral, Weekly (0600)    lactobacillus (Culturelle) 10 billion cell capsule 1 capsule, Daily    levothyroxine (Synthroid, Levoxyl) 150 mcg tablet TAKE 1 TABLET BY MOUTH EVERY DAY, EXPECT ON TUESDAY AND THURSDAY TAKE 2 TABLETS    losartan (COZAAR) 25 mg, oral, 2 times daily    magnesium oxide (MAG-OX) 400 mg, Daily    metFORMIN XR (GLUCOPHAGE-XR) 1,000 mg, oral, 2 times daily (morning and late afternoon), Take with meals.    Mounjaro 10 mg/0.5 mL pen injector ADMINISTER 10 MG UNDER THE SKIN 1 TIME EVERY WEEK    omeprazole (PriLOSEC) 40 mg DR capsule TAKE 1 CAPSULE BY MOUTH TWICE DAILY    rosuvastatin (Crestor) 10 mg tablet TAKE 1 TABLET(10 MG) BY MOUTH DAILY AT BEDTIME    spironolactone (ALDACTONE) 25 mg, oral, Daily    vilazodone (VIIBRYD) 40 mg, oral, Daily with breakfast        Assessment/Plan   Problem List Items Addressed This Visit           ICD-10-CM    Type 2 diabetes mellitus E11.9    Patient's goal A1c is < 7%.  Is pt at goal? Yes, very well controlled at A1c 5.0%  Patient's SMBGs are controlled without low sugars.     Rationale for plan: The patient is very well controlled and not experiencing any low sugars. Her recent wounds are healing well and she is no longer on antibiotics for the osteomyelitis. Given her therapy with Jardiance and Mounjaro also has cardiac and kidney benefits, and she has no low sugars, we will continue her current therapy and re-assess when next A1c is due in September.    Medication Changes:  CONTINUE  Jardiance 25 mg daily  Mounjaro 10 mg weekly  Metformin 1000 mg twice daily    Future Considerations:  Next A1c due about mid-September given she is controlled    Monitoring and Education:   Addressed all questions at this time         Relevant Orders    Referral to Clinical Pharmacy     Immunizations needed: RSV    Labs ordered:  none     Referrals:  none     Follow-up: 9/18 11:20AM    Time spent with pt:  Total length of time 14 (minutes) of the encounter and more than 50% was spent counseling the patient.    Caridad Espinal, PharmD  [phone]    Continue all meds under the continuation of care with the referring provider and clinical pharmacy team.    Verbal consent to manage patient's drug therapy was obtained from the patient. They were informed they may decline to participate or withdraw from participation in pharmacy services at any time.

## 2025-05-04 LAB
T4 FREE SERPL-MCNC: 1.3 NG/DL (ref 0.8–1.8)
TSH SERPL-ACNC: 1.63 MIU/L (ref 0.4–4.5)

## 2025-05-20 ENCOUNTER — APPOINTMENT (OUTPATIENT)
Dept: CARDIOLOGY | Facility: CLINIC | Age: 80
End: 2025-05-20
Payer: MEDICARE

## 2025-05-20 VITALS
SYSTOLIC BLOOD PRESSURE: 108 MMHG | HEART RATE: 75 BPM | HEIGHT: 66 IN | DIASTOLIC BLOOD PRESSURE: 58 MMHG | BODY MASS INDEX: 26.02 KG/M2

## 2025-05-20 DIAGNOSIS — R42 DIZZINESS: ICD-10-CM

## 2025-05-20 DIAGNOSIS — I25.10 CORONARY ARTERY DISEASE INVOLVING NATIVE CORONARY ARTERY OF NATIVE HEART WITHOUT ANGINA PECTORIS: ICD-10-CM

## 2025-05-20 DIAGNOSIS — I50.42 CHRONIC COMBINED SYSTOLIC (CONGESTIVE) AND DIASTOLIC (CONGESTIVE) HEART FAILURE: ICD-10-CM

## 2025-05-20 DIAGNOSIS — Z78.9 NEVER SMOKED CIGARETTES: ICD-10-CM

## 2025-05-20 DIAGNOSIS — E11.69 TYPE 2 DIABETES MELLITUS WITH OTHER SPECIFIED COMPLICATION, WITHOUT LONG-TERM CURRENT USE OF INSULIN: ICD-10-CM

## 2025-05-20 DIAGNOSIS — I10 PRIMARY HYPERTENSION: ICD-10-CM

## 2025-05-20 DIAGNOSIS — Z95.1 S/P CABG (CORONARY ARTERY BYPASS GRAFT): ICD-10-CM

## 2025-05-20 DIAGNOSIS — E78.2 MIXED HYPERLIPIDEMIA: ICD-10-CM

## 2025-05-20 DIAGNOSIS — E78.00 HYPERCHOLESTEREMIA: ICD-10-CM

## 2025-05-20 DIAGNOSIS — R42 DIZZINESS AND GIDDINESS: ICD-10-CM

## 2025-05-20 DIAGNOSIS — J43.9 PULMONARY EMPHYSEMA, UNSPECIFIED EMPHYSEMA TYPE (MULTI): ICD-10-CM

## 2025-05-20 DIAGNOSIS — E11.69 TYPE 2 DIABETES MELLITUS WITH OTHER SPECIFIED COMPLICATION, UNSPECIFIED WHETHER LONG TERM INSULIN USE (MULTI): ICD-10-CM

## 2025-05-20 PROBLEM — E66.09 CLASS 1 OBESITY DUE TO EXCESS CALORIES WITHOUT SERIOUS COMORBIDITY WITH BODY MASS INDEX (BMI) OF 33.0 TO 33.9 IN ADULT: Status: RESOLVED | Noted: 2024-12-09 | Resolved: 2025-05-20

## 2025-05-20 PROBLEM — E66.811 CLASS 1 OBESITY DUE TO EXCESS CALORIES WITHOUT SERIOUS COMORBIDITY WITH BODY MASS INDEX (BMI) OF 33.0 TO 33.9 IN ADULT: Status: RESOLVED | Noted: 2024-12-09 | Resolved: 2025-05-20

## 2025-05-20 PROCEDURE — 1159F MED LIST DOCD IN RCRD: CPT | Performed by: INTERNAL MEDICINE

## 2025-05-20 PROCEDURE — 3078F DIAST BP <80 MM HG: CPT | Performed by: INTERNAL MEDICINE

## 2025-05-20 PROCEDURE — 3074F SYST BP LT 130 MM HG: CPT | Performed by: INTERNAL MEDICINE

## 2025-05-20 PROCEDURE — 1036F TOBACCO NON-USER: CPT | Performed by: INTERNAL MEDICINE

## 2025-05-20 PROCEDURE — 99215 OFFICE O/P EST HI 40 MIN: CPT | Performed by: INTERNAL MEDICINE

## 2025-05-20 RX ORDER — LOSARTAN POTASSIUM 25 MG/1
25 TABLET ORAL DAILY
Qty: 90 TABLET | Refills: 3 | Status: SHIPPED | OUTPATIENT
Start: 2025-05-20 | End: 2026-05-20

## 2025-05-20 NOTE — PROGRESS NOTES
Referred by Dr. Darling ref. provider found provider found for   Chief Complaint   Patient presents with    Dizziness     Complaints of dizziness and syncope with a low heart rate and low blood pressure        History of Present Illness  Diane Montemayor is a 80 y.o. year old female patient here for follow-up.  She complained of occasional episode of dizziness and lower blood pressure most likely because she lost weight and she is taking hypertension medication.  I advised her to cut back smoking to 25 once a day instead of twice a day.  Also complained of decrease in the heart rate as low as 40 although I do not have any documentation of that.  Her EKG today showed sinus rhythm with a right bundle branch block pattern.  I told the patient to have 7 days monitor to see if there is any bradycardia or not.  She will check her blood pressure frequently.  Will call for any problem and follow-up as scheduled    Past Medical History  Medical History[1]    Social History  Social History[2]    Family History   Family History[3]    Review of Systems  As per HPI, all other systems reviewed and negative.    Allergies:  RX Allergies[4]     Outpatient Medications:  Current Outpatient Medications   Medication Instructions    albuterol 90 mcg/actuation inhaler INHALE 2 PUFFS BY MOUTH EVERY 4 HOURS AS DIRECTED AS NEEDED FOR COUGH OR WHEEZING    aspirin 81 mg, Nightly    cholecalciferol (Vitamin D-3) 50 MCG (2000 UT) tablet 2 tablets, Daily    coenzyme Q10 400 mg capsule 1 capsule, Nightly    cyanocobalamin (VITAMIN B-12) 1,000 mcg, Daily    DILT- mg, oral, Daily    docusate sodium (Colace) 100 mg capsule TAKE 1 CAPSULE(100 MG) BY MOUTH TWICE DAILY AS NEEDED FOR CONSTIPATION    empagliflozin (JARDIANCE) 25 mg, oral, Daily    fluconazole (DIFLUCAN) 150 mg, oral, Weekly (0600)    lactobacillus (Culturelle) 10 billion cell capsule 1 capsule, Daily    levothyroxine (Synthroid, Levoxyl) 150 mcg tablet TAKE 1 TABLET BY MOUTH EVERY DAY, EXPECT  ON TUESDAY AND THURSDAY TAKE 2 TABLETS    losartan (COZAAR) 25 mg, oral, 2 times daily    magnesium oxide (MAG-OX) 400 mg, Daily    metFORMIN XR (GLUCOPHAGE-XR) 1,000 mg, oral, 2 times daily (morning and late afternoon), Take with meals.    Mounjaro 10 mg/0.5 mL pen injector ADMINISTER 10 MG UNDER THE SKIN 1 TIME EVERY WEEK    omeprazole (PriLOSEC) 40 mg DR capsule TAKE 1 CAPSULE BY MOUTH TWICE DAILY    rosuvastatin (Crestor) 10 mg tablet TAKE 1 TABLET(10 MG) BY MOUTH DAILY AT BEDTIME    spironolactone (ALDACTONE) 25 mg, oral, Daily    vilazodone (VIIBRYD) 40 mg, oral, Daily with breakfast         Vitals:  Vitals:    05/20/25 1117   BP: 108/58   Pulse: 75       Physical Exam:  Physical Exam  Vitals and nursing note reviewed.   Constitutional:       Appearance: Normal appearance. She is normal weight.      Comments: Wheelchair for assistance    HENT:      Head: Normocephalic and atraumatic.   Eyes:      Extraocular Movements: Extraocular movements intact.      Pupils: Pupils are equal, round, and reactive to light.   Cardiovascular:      Rate and Rhythm: Normal rate and regular rhythm.      Pulses: Normal pulses.   Pulmonary:      Effort: Pulmonary effort is normal.      Breath sounds: Normal breath sounds.   Musculoskeletal:      Cervical back: Normal range of motion.      Right lower leg: No edema.      Left lower leg: No edema.   Skin:     General: Skin is warm and dry.   Neurological:      General: No focal deficit present.      Mental Status: She is alert and oriented to person, place, and time.             Assessment/Plan   Diagnoses and all orders for this visit:  Chronic combined systolic (congestive) and diastolic (congestive) heart failure  Coronary artery disease involving native coronary artery of native heart without angina pectoris  Hypercholesteremia  Mixed hyperlipidemia  Primary hypertension  Type 2 diabetes mellitus with other specified complication, unspecified whether long term insulin use  (Multi)  BMI 26.0-26.9,adult  S/P CABG (coronary artery bypass graft)  Pulmonary emphysema, unspecified emphysema type (Multi)  Dizziness  Never smoked cigarettes      I,Isaiah Gallegos RN   am scribing for, and in the presence of Dr. Connie Ashraf MD [facility]     I, Dr. Connie Ashraf MD [facility] , personally performed the services described in the documentation as scribed by ,Isaiah Gallegos RN   in my presence, and confirm it is both accurate and complete.      Connie Ashraf MD [facility]  Interventional Cardiology   of [facility]     Thank you for allowing me to participate in the care of this patient. Please do not hesitate to contact me with any further questions or concerns.         [1]   Past Medical History:  Diagnosis Date    Body mass index (BMI) 39.0-39.9, adult 02/23/2022    BMI 39.0-39.9,adult    Body mass index (BMI) 39.0-39.9, adult 06/30/2021    BMI 39.0-39.9,adult    Morbid (severe) obesity due to excess calories (Multi) 02/23/2022    Class 2 severe obesity due to excess calories with serious comorbidity and body mass index (BMI) of 39.0 to 39.9 in adult    Morbid (severe) obesity due to excess calories (Multi) 06/30/2021    Class 2 severe obesity due to excess calories with serious comorbidity and body mass index (BMI) of 37.0 to 37.9 in adult    Other acute sinusitis 12/31/2019    Other acute sinusitis, recurrence not specified    Personal history of other diseases of the musculoskeletal system and connective tissue     History of osteomyelitis    Personal history of other diseases of the respiratory system     History of chronic obstructive lung disease    Personal history of other drug therapy 11/14/2019    History of influenza vaccination    Personal history of other endocrine, nutritional and metabolic disease     History of hyperlipidemia    Personal history of other endocrine, nutritional and metabolic disease     History of hypothyroidism    Personal history of other mental and behavioral  disorders     History of major depression    Personal history of other specified conditions 09/04/2019    History of wheezing    Systemic lupus erythematosus, unspecified     Lupus    Type 2 diabetes mellitus with other diabetic neurological complication     Type 2 diabetes mellitus with other neurologic complication, with long-term current use of insulin   [2]   Social History  Tobacco Use    Smoking status: Never    Smokeless tobacco: Never   Vaping Use    Vaping status: Never Used   Substance Use Topics    Alcohol use: Not Currently    Drug use: Never   [3]   Family History  Problem Relation Name Age of Onset    Heart failure Mother      Heart attack Mother      Coronary artery disease Mother      Coronary artery disease Father      Heart failure Father      Kidney failure Father     [4]   Allergies  Allergen Reactions    Lisinopril Cough

## 2025-05-20 NOTE — PATIENT INSTRUCTIONS
Patient to follow up after testing with Dr. Connie Ashraf MD [facility]     Office will arrange 7 day holter monitor in near future.     Please REDUCE Losartan to 25mg once daily only.     Continue same medications and treatments.   Patient educated on proper medication use.   Patient educated on risk factor modification.   Please bring any lab results from other providers / physicians to your next appointment.     Please bring all medicines, vitamins, and herbal supplements with you when you come to the office.     Prescriptions will not be filled unless you are compliant with your follow up appointments or have a follow up appointment scheduled as per instruction of your physician. Refills should be requested at the time of your visit.    Isaiah COTE RN am scribing for and in the presence of Dr. Connie Ashraf MD [facility]

## 2025-05-21 RX ORDER — TIRZEPATIDE 10 MG/.5ML
10 INJECTION, SOLUTION SUBCUTANEOUS
Qty: 2 ML | Refills: 2 | Status: SHIPPED | OUTPATIENT
Start: 2025-05-21

## 2025-05-28 ENCOUNTER — PATIENT OUTREACH (OUTPATIENT)
Dept: PRIMARY CARE | Facility: CLINIC | Age: 80
End: 2025-05-28
Payer: MEDICARE

## 2025-05-28 DIAGNOSIS — E78.5 HYPERLIPIDEMIA, UNSPECIFIED HYPERLIPIDEMIA TYPE: ICD-10-CM

## 2025-05-28 DIAGNOSIS — E11.69 TYPE 2 DIABETES MELLITUS WITH OTHER SPECIFIED COMPLICATION, WITHOUT LONG-TERM CURRENT USE OF INSULIN: ICD-10-CM

## 2025-05-28 RX ORDER — ROSUVASTATIN CALCIUM 10 MG/1
10 TABLET, COATED ORAL NIGHTLY
Qty: 90 TABLET | Refills: 1 | Status: SHIPPED | OUTPATIENT
Start: 2025-05-28

## 2025-05-28 NOTE — PROGRESS NOTES
Care Management Monthly Outreach  Chart review completed  Confirmation of at least 2 patient identifiers.  Change in insurance? No    Has patient been to ER/Urgent Care since last outreach? No    Last Office Visit with PCP: 4/24/2025   Next Office Visit with PCP: Visit date not found   APC Collaboration: Pharmacy  HIPAA: 4/2026  CSM: No    Chronic Conditions and Outreach Summary:   Hyperlipidemia, unspecified hyperlipidemia type    Type 2 diabetes mellitus with other specified complication, without long-term current use of insulin    Reports ongoing low BPs, low HR and c/o dizziness. Says [facility] nurse/PT/OT encouraged her to reach out to Dr. Ashraf. She did see Dr. Ashraf on 5/20. States was told to discontinue PT/OT at this time till improved. Order was placed for a Holter monitor and she was instructed to report HR, BP and SpO2 to his office daily. Additionally he reduced Losartan to 25 mg (was 25 BID prior). June 10th Holter to be placed.    Reports foot wound healed well.    Does continue to have [facility] SN through [facility].     Has not received orthopedic shoes and prosthetics. Does report office in [place] received them and are to drop off at [place] office for patient to .     Currently juliocesar's grandson and grandsons wife is living with her and her son till the grandson and his family move to [place]. Reports has enough help getting to appointments and performing ADL's at this time.  Medications:   Are there medication changes since last visit? No  Refills needed? No    Care Gaps Addressed? Addressed in the last 6 months  Social Drivers of Health: Addressed in the last 6 months  General Assessment: Addressed previously  Care Plan addressed: yes     Upcoming Appointments:   Future Appointments       Date / Time Provider Department Dept Phone    6/10/2025 2:30 PM ELIZABETH MELENDREZ ECG/HOLTER [facility] [phone]    6/27/2025 2:30 PM Connie Ashraf MD [facility] [phone]    9/18/2025 11:20 AM  (Arrive by 11:05 AM) Caridad Espinal, PharmD [facility]  Arrive at: [facility] [phone]          Blood Pressures Reviewed  BP Readings from Last 3 Encounters:   05/20/25 108/58   04/24/25 110/60   04/02/25 118/59     Labs Reviewed:  Lab Results   Component Value Date    CREATININE 0.73 02/25/2025    GLUCOSE 119 (H) 02/25/2025    ALKPHOS 62 02/17/2025    K 3.8 02/25/2025    PROT 7.5 02/17/2025     02/25/2025    CALCIUM 8.5 (L) 02/25/2025    AST 13 02/17/2025    ALT 10 02/17/2025    BUN 17 02/25/2025    MG 2.06 02/22/2025    PHOS 3.3 03/12/2020    GFRF CANCELED 07/01/2023    GFRMALE CANCELED 07/01/2023     Lab Results   Component Value Date    TRIG 146 06/21/2023    CHOL 163 06/21/2023    HDL 47.6 06/21/2023     Lab Results   Component Value Date    HGBA1C 5 03/10/2025    HGBA1C 5.6 12/09/2024    HGBA1C 5.4 07/15/2024     Lab Results   Component Value Date    WBC 9.6 02/25/2025    RBC 3.68 (L) 02/25/2025    HGB 10.6 (L) 02/25/2025     02/25/2025   No other concerns at this time.  Agreeable to continue monthly outreaches.  Encouraged to call if questions or concerns arise.    Tiki Stern, RN, BSN  [facility], RN Care Manager   [phone]

## 2025-05-30 ENCOUNTER — TELEPHONE (OUTPATIENT)
Dept: CARDIOLOGY | Facility: CLINIC | Age: 80
End: 2025-05-30
Payer: MEDICARE

## 2025-05-30 NOTE — TELEPHONE ENCOUNTER
Patient last seen with Dr. Connie Ashraf MD [facility] on 5/20/25:  Diane Montemayor is a 80 y.o. year old female patient here for follow-up.  She complained of occasional episode of dizziness and lower blood pressure most likely because she lost weight and she is taking hypertension medication.  I advised her to cut back smoking to 25 once a day instead of twice a day.  Also complained of decrease in the heart rate as low as 40 although I do not have any documentation of that.  Her EKG today showed sinus rhythm with a right bundle branch block pattern.  I told the patient to have 7 days monitor to see if there is any bradycardia or not.  She will check her blood pressure frequently.  Will call for any problem and follow-up as scheduled.     Reduced Losartan to 25mg once daily     Also taking GDMT meds: Spironolactone 25mg Daily, and Jardiance 25mg Daily     Please see home BP numbers reported.

## 2025-05-30 NOTE — TELEPHONE ENCOUNTER
Patient called and LM stating she has BP readings. Returned call to patient.     BP readings:  5/26/25: 175/66, HR 67  5/27/25: 738/70, HR 68  5/28/25: 141/62, HR 66  5/29/25: 147/52, HR 69  5/30/25: 123/68, HR 74     Routed to Isaiah CROCKER RN

## 2025-06-10 ENCOUNTER — APPOINTMENT (OUTPATIENT)
Dept: CARDIOLOGY | Facility: CLINIC | Age: 80
End: 2025-06-10
Payer: MEDICARE

## 2025-06-10 DIAGNOSIS — R42 DIZZINESS: ICD-10-CM

## 2025-06-10 DIAGNOSIS — R42 DIZZINESS AND GIDDINESS: ICD-10-CM

## 2025-06-10 DIAGNOSIS — I10 PRIMARY HYPERTENSION: ICD-10-CM

## 2025-06-17 ENCOUNTER — PATIENT OUTREACH (OUTPATIENT)
Dept: PRIMARY CARE | Facility: CLINIC | Age: 80
End: 2025-06-17
Payer: MEDICARE

## 2025-06-17 DIAGNOSIS — E78.5 HYPERLIPIDEMIA, UNSPECIFIED HYPERLIPIDEMIA TYPE: ICD-10-CM

## 2025-06-17 DIAGNOSIS — I10 PRIMARY HYPERTENSION: ICD-10-CM

## 2025-06-17 DIAGNOSIS — E11.69 TYPE 2 DIABETES MELLITUS WITH OTHER SPECIFIED COMPLICATION, WITHOUT LONG-TERM CURRENT USE OF INSULIN: ICD-10-CM

## 2025-06-17 DIAGNOSIS — E03.2 HYPOTHYROIDISM DUE TO MEDICATION: ICD-10-CM

## 2025-06-17 NOTE — PROGRESS NOTES
Care Management Monthly Outreach  Chart review completed  Confirmation of at least 2 patient identifiers.  Change in insurance? No    Has patient been to ER/Urgent Care since last outreach? No    Last Office Visit with PCP: 4/24/2025   Next Office Visit with PCP: -  APC Collaboration: Pharmacy  HIPAA: 4/2026  CSM: No    Chronic Conditions and Outreach Summary:   Type 2 diabetes mellitus with other specified complication, without long-term current use of insulin  Hyperlipidemia, unspecified hyperlipidemia type  Primary hypertension  Long discussion with Diane. Overall she feels things are going well. She did follow up with Podiatry recently who told her things are looking improved. She still has not received her prosthetic for the R foot - says Podiatrist was looking into it. She is following up with podiatry every 2 weeks.     States she completed wearing the Holter monitor till today. Would like nurse to inquire if she needs to continue to call in BP, HR and SpO2 readings? Diane believes overall her dizzy spells have improved. She does continue to have them but less frequently or sever. Onset associated with change in position - standing, and when standing for a bit. Reports she has been able to identify when a dizzy spell is coming on and will sit down which resolves symptoms.     Due to dizzy spells and falls the past year her losartan has been adjusted several times by cardiology. Most recent encounter by telephone states   Connie Ashraf MD to Isaiah Gallegos RN        6/13/25 10:13 AM  Increase losartan to 50 mg p.o. daily    Reviewed this with patient. Patient denies being on 50 mg daily. States last she heard was to only be on 25 mg daily for SBP >140. Diane would like nurse to inquire with Dr. Ashraf and make Dr. Black aware. She states she would prefer to be on 50 mg daily as she feels when she skips days the following day her BP is elevated.     Reports recent vitals:   6/11/25: 155/70  6/12/25: 148/67,  159/78  6/13/25: 138/66  6/14/25: 148/63  6/15/25: 152/60  6/16/25: 139/61 - didn't take losartan  6/17/25: 163/63 - did take 25 mg losartan   HR 70-80's  SpO2: 90's since June 9th.   Glucose : 105, 95, 114, 105, 103    Does report one fall since last [facility] outreach. Occurred on 6/7 and attributes to tripping over a slipper rather than due to a dizzy spell. Denies any injury or hitting head. Had her [facility] nurse evaluate her and states only bruising was noted.     Diane reports onset and increase of edema to bilateral lower extremities. Denies worsening shortness of breath. Denies increase in sodium. Does report keeping legs elevated when sitting and trying to stay more active. Discussed reducing sodium intake and use of compression stockings. She denies drinking too many fluids     At this time she remains out of PT/OT which was discontinued when dizziness was worse. Reports she feels she could return now but will await till after follow up with Dr. Ashraf on 6/27. (Would like to continue PT/OT with [facility]).    Message out to Dr. Ashraf:    Medications:   Are there medication changes since last visit? No  Refills needed? No    Care Gaps Addressed? Addressed in the last 6 months  Social Drivers of Health: Addressed in the last 6 months  General Assessment: Addressed previously  Care Plan addressed: yes     Upcoming Appointments:   Future Appointments       Date / Time Provider Department Dept Phone    6/27/2025 2:30 PM Connie Ashraf MD [facility] [phone]    9/18/2025 11:20 AM (Arrive by 11:05 AM) Caridad Espinal, PharmD [facility]  Arrive at: [facility] [phone]          Blood Pressures Reviewed  BP Readings from Last 3 Encounters:   05/20/25 108/58   04/24/25 110/60   04/02/25 118/59     Labs Reviewed:  Lab Results   Component Value Date    CREATININE 0.73 02/25/2025    GLUCOSE 119 (H) 02/25/2025    ALKPHOS 62 02/17/2025    K 3.8 02/25/2025    PROT 7.5 02/17/2025      02/25/2025    CALCIUM 8.5 (L) 02/25/2025    AST 13 02/17/2025    ALT 10 02/17/2025    BUN 17 02/25/2025    MG 2.06 02/22/2025    PHOS 3.3 03/12/2020    GFRF CANCELED 07/01/2023    GFRMALE CANCELED 07/01/2023     Lab Results   Component Value Date    TRIG 146 06/21/2023    CHOL 163 06/21/2023    HDL 47.6 06/21/2023     Lab Results   Component Value Date    HGBA1C 5 03/10/2025    HGBA1C 5.6 12/09/2024    HGBA1C 5.4 07/15/2024     Lab Results   Component Value Date    WBC 9.6 02/25/2025    RBC 3.68 (L) 02/25/2025    HGB 10.6 (L) 02/25/2025     02/25/2025   No other concerns at this time.  Agreeable to continue monthly outreaches.  Encouraged to call if questions or concerns arise.    Tiki Stern, RN, BSN  [facility], RN Care Manager   [phone]

## 2025-06-17 NOTE — PROGRESS NOTES
Care Management Monthly Outreach  Chart review completed  6/5: FUV with Podiatry - Dr. Antonio.   6/5: Cardiology advised reducing Losartan to 25 mg once daily PRN for SBP >140   6/13: Increased Losartan back to 50 mg daily.  Last Office Visit: 4/24/2025  Next Office Visit: Visit date not found   APC Collaboration: Pharmacy  HIPAA: 4/2026  CSM: No    Has patient been to ER/Urgent Care since last outreach? No    Outreach attempted, left message on voicemail requesting return call at [phone]    Tiki Stern, RN, BSN  [facility], RN Care Manager   [phone]

## 2025-06-18 NOTE — PROGRESS NOTES
Connie Ashraf MD to Me (Selected Message)        6/18/25  3:30 PM  Okay to increase losartan to 50 mg daily    Called patient and discussed increase in medication. Patient reports she did take 50 mg today. States BP was still 150's. Aware of s/s to monitor for and to reach out should concerns arise.

## 2025-06-26 PROCEDURE — 93248 EXT ECG>7D<15D REV&INTERPJ: CPT | Performed by: INTERNAL MEDICINE

## 2025-06-27 ENCOUNTER — APPOINTMENT (OUTPATIENT)
Dept: CARDIOLOGY | Facility: CLINIC | Age: 80
End: 2025-06-27
Payer: MEDICARE

## 2025-06-27 VITALS
WEIGHT: 160 LBS | BODY MASS INDEX: 25.71 KG/M2 | HEART RATE: 64 BPM | SYSTOLIC BLOOD PRESSURE: 108 MMHG | HEIGHT: 66 IN | DIASTOLIC BLOOD PRESSURE: 54 MMHG

## 2025-06-27 DIAGNOSIS — J43.9 PULMONARY EMPHYSEMA, UNSPECIFIED EMPHYSEMA TYPE (MULTI): ICD-10-CM

## 2025-06-27 DIAGNOSIS — Z95.1 S/P CABG (CORONARY ARTERY BYPASS GRAFT): ICD-10-CM

## 2025-06-27 DIAGNOSIS — E78.00 HYPERCHOLESTEREMIA: ICD-10-CM

## 2025-06-27 DIAGNOSIS — E11.69 TYPE 2 DIABETES MELLITUS WITH OTHER SPECIFIED COMPLICATION, UNSPECIFIED WHETHER LONG TERM INSULIN USE (MULTI): ICD-10-CM

## 2025-06-27 DIAGNOSIS — I25.10 CORONARY ARTERY DISEASE INVOLVING NATIVE CORONARY ARTERY OF NATIVE HEART WITHOUT ANGINA PECTORIS: ICD-10-CM

## 2025-06-27 DIAGNOSIS — Z78.9 NEVER SMOKED CIGARETTES: ICD-10-CM

## 2025-06-27 DIAGNOSIS — E78.2 MIXED HYPERLIPIDEMIA: ICD-10-CM

## 2025-06-27 DIAGNOSIS — R42 DIZZINESS: ICD-10-CM

## 2025-06-27 DIAGNOSIS — I50.42 CHRONIC COMBINED SYSTOLIC (CONGESTIVE) AND DIASTOLIC (CONGESTIVE) HEART FAILURE: ICD-10-CM

## 2025-06-27 DIAGNOSIS — I10 PRIMARY HYPERTENSION: ICD-10-CM

## 2025-06-27 PROCEDURE — 1159F MED LIST DOCD IN RCRD: CPT | Performed by: INTERNAL MEDICINE

## 2025-06-27 PROCEDURE — 99214 OFFICE O/P EST MOD 30 MIN: CPT | Performed by: INTERNAL MEDICINE

## 2025-06-27 PROCEDURE — 3078F DIAST BP <80 MM HG: CPT | Performed by: INTERNAL MEDICINE

## 2025-06-27 PROCEDURE — 1036F TOBACCO NON-USER: CPT | Performed by: INTERNAL MEDICINE

## 2025-06-27 PROCEDURE — 3074F SYST BP LT 130 MM HG: CPT | Performed by: INTERNAL MEDICINE

## 2025-06-27 NOTE — PATIENT INSTRUCTIONS
You are being scheduled for a Lexiscan stress test  Will call patient with test results once reviewed by physician  Follow up office visit in 6 months.  Continue same medications/treatment.  Patient educated on proper medication use.  Patient educated on risk factor modification.  Please bring any lab results from other providers / physicians to your next appointment.    Please bring all medicines, vitamins and herbal supplements with you when you come to the office.    Prescriptions will not be filled unless you are compliant with your follow up appointments or have a follow up  appointment scheduled as per instruction of your physician.  Refills should be requested at the time of  Your visit.

## 2025-06-27 NOTE — PROGRESS NOTES
Referred by Dr. Ashraf, Connie BARRIOS MD provider found for   Chief Complaint   Patient presents with    Results     Follow up to review recent monitor results and management of ongoing Chronic combined systolic (congestive) and diastolic (congestive) heart failure  Coronary artery disease involving native coronary artery of native heart without angina pectoris  Hypercholesteremia  Mixed hyperlipidemia  Primary hypertension          Chief complaint:   Chief Complaint   Patient presents with    Results     Follow up to review recent monitor results and management of ongoing Chronic combined systolic (congestive) and diastolic (congestive) heart failure  Coronary artery disease involving native coronary artery of native heart without angina pectoris  Hypercholesteremia  Mixed hyperlipidemia  Primary hypertension          History of Present Illness  Diane Montemayor is a 80 y.o. year old female patient is here for a follow-up Holter monitor showed occasional PVCs and PACs.  Blood pressure was normal today although she stated her blood pressure was 5 years at home.  Discussed with the patient manage she will check blood pressures polypectomies.  She will continue medication will call if problem and follow-up as scheduled.  Symptoms do not see's scalp Sandra patient was    Past Medical History  Medical History[1]    Social History  Social History[2]    Family History   Family History[3]    Review of Systems  As per HPI, all other systems reviewed and negative.    Allergies:  RX Allergies[4]     Outpatient Medications:  Current Outpatient Medications   Medication Instructions    albuterol 90 mcg/actuation inhaler INHALE 2 PUFFS BY MOUTH EVERY 4 HOURS AS DIRECTED AS NEEDED FOR COUGH OR WHEEZING    aspirin 81 mg, Nightly    cholecalciferol (Vitamin D-3) 50 MCG (2000 UT) tablet 2 tablets, Daily    coenzyme Q10 400 mg capsule 1 capsule, Nightly    cyanocobalamin (VITAMIN B-12) 1,000 mcg, Daily    DILT- mg, oral, Daily    docusate  sodium (Colace) 100 mg capsule TAKE 1 CAPSULE(100 MG) BY MOUTH TWICE DAILY AS NEEDED FOR CONSTIPATION    empagliflozin (JARDIANCE) 25 mg, oral, Daily    fluconazole (DIFLUCAN) 150 mg, oral, Weekly (0600)    lactobacillus (Culturelle) 10 billion cell capsule 1 capsule, Daily    levothyroxine (Synthroid, Levoxyl) 150 mcg tablet TAKE 1 TABLET BY MOUTH EVERY DAY, EXPECT ON TUESDAY AND THURSDAY TAKE 2 TABLETS    losartan (COZAAR) 25 mg, oral, 2 times daily    magnesium oxide (MAG-OX) 400 mg, Daily    metFORMIN XR (GLUCOPHAGE-XR) 1,000 mg, oral, 2 times daily (morning and late afternoon), Take with meals.    Mounjaro 10 mg, subcutaneous, Every 7 days    omeprazole (PriLOSEC) 40 mg DR capsule TAKE 1 CAPSULE BY MOUTH TWICE DAILY    rosuvastatin (CRESTOR) 10 mg, oral, Nightly    spironolactone (ALDACTONE) 25 mg, oral, Daily    vilazodone (VIIBRYD) 40 mg, oral, Daily with breakfast         Vitals:  Vitals:    06/27/25 1454   BP: 108/54   Pulse: 64       Physical Exam:  Physical Exam  Vitals and nursing note reviewed.   Constitutional:       Appearance: Normal appearance.   HENT:      Head: Normocephalic.   Eyes:      Pupils: Pupils are equal, round, and reactive to light.   Cardiovascular:      Rate and Rhythm: Normal rate and regular rhythm.      Pulses: Normal pulses.      Heart sounds: Normal heart sounds.   Pulmonary:      Effort: Pulmonary effort is normal.      Breath sounds: Normal breath sounds.   Musculoskeletal:         General: Normal range of motion.      Cervical back: Normal range of motion.   Skin:     General: Skin is dry.   Neurological:      General: No focal deficit present.      Mental Status: She is alert and oriented to person, place, and time.   Psychiatric:         Behavior: Behavior is cooperative.             Assessment/Plan   Diagnoses and all orders for this visit:  Chronic combined systolic (congestive) and diastolic (congestive) heart failure  -     Follow Up In Cardiology  Coronary artery  disease involving native coronary artery of native heart without angina pectoris  -     Follow Up In Cardiology  Hypercholesteremia  -     Follow Up In Cardiology  Mixed hyperlipidemia  -     Follow Up In Cardiology  Primary hypertension  -     Follow Up In Cardiology  Type 2 diabetes mellitus with other specified complication, unspecified whether long term insulin use (Multi)  -     Follow Up In Cardiology  BMI 26.0-26.9,adult  -     Follow Up In Cardiology  S/P CABG (coronary artery bypass graft)  -     Follow Up In Cardiology  Pulmonary emphysema, unspecified emphysema type (Multi)  -     Follow Up In Cardiology  Dizziness  -     Follow Up In Cardiology  Never smoked cigarettes  -     Follow Up In Cardiology      ILulu LPN am scribing for, and in the presence of Connie Ashraf M.D..    Connie COTE M.D., personally performed the services described in the documentation as scribed by Lulu Geronimo LPN in my presence, and confirm it is both accurate and complete.      Connie Ashraf MD [facility]  Interventional Cardiology   of [facility]     Thank you for allowing me to participate in the care of this patient. Please do not hesitate to contact me with any further questions or concerns.         [1]   Past Medical History:  Diagnosis Date    Body mass index (BMI) 39.0-39.9, adult 02/23/2022    BMI 39.0-39.9,adult    Body mass index (BMI) 39.0-39.9, adult 06/30/2021    BMI 39.0-39.9,adult    Morbid (severe) obesity due to excess calories (Multi) 02/23/2022    Class 2 severe obesity due to excess calories with serious comorbidity and body mass index (BMI) of 39.0 to 39.9 in adult    Morbid (severe) obesity due to excess calories (Multi) 06/30/2021    Class 2 severe obesity due to excess calories with serious comorbidity and body mass index (BMI) of 37.0 to 37.9 in adult    Other acute sinusitis 12/31/2019    Other acute sinusitis, recurrence not specified    Personal history of other diseases  of the musculoskeletal system and connective tissue     History of osteomyelitis    Personal history of other diseases of the respiratory system     History of chronic obstructive lung disease    Personal history of other drug therapy 11/14/2019    History of influenza vaccination    Personal history of other endocrine, nutritional and metabolic disease     History of hyperlipidemia    Personal history of other endocrine, nutritional and metabolic disease     History of hypothyroidism    Personal history of other mental and behavioral disorders     History of major depression    Personal history of other specified conditions 09/04/2019    History of wheezing    Systemic lupus erythematosus, unspecified     Lupus    Type 2 diabetes mellitus with other diabetic neurological complication     Type 2 diabetes mellitus with other neurologic complication, with long-term current use of insulin   [2]   Social History  Tobacco Use    Smoking status: Never    Smokeless tobacco: Never   Vaping Use    Vaping status: Never Used   Substance Use Topics    Alcohol use: Not Currently    Drug use: Never   [3]   Family History  Problem Relation Name Age of Onset    Heart failure Mother      Heart attack Mother      Coronary artery disease Mother      Coronary artery disease Father      Heart failure Father      Kidney failure Father     [4]   Allergies  Allergen Reactions    Lisinopril Cough

## 2025-06-27 NOTE — H&P (VIEW-ONLY)
Referred by Dr. Ashraf, Connie BARRIOS MD provider found for   Chief Complaint   Patient presents with    Results     Follow up to review recent monitor results and management of ongoing Chronic combined systolic (congestive) and diastolic (congestive) heart failure  Coronary artery disease involving native coronary artery of native heart without angina pectoris  Hypercholesteremia  Mixed hyperlipidemia  Primary hypertension          Chief complaint:   Chief Complaint   Patient presents with    Results     Follow up to review recent monitor results and management of ongoing Chronic combined systolic (congestive) and diastolic (congestive) heart failure  Coronary artery disease involving native coronary artery of native heart without angina pectoris  Hypercholesteremia  Mixed hyperlipidemia  Primary hypertension          History of Present Illness  Diane Montemayor is a 80 y.o. year old female patient is here for a follow-up Holter monitor showed occasional PVCs and PACs.  Blood pressure was normal today although she stated her blood pressure was 5 years at home.  Discussed with the patient manage she will check blood pressures polypectomies.  She will continue medication will call if problem and follow-up as scheduled.  Symptoms do not see's scalp Sandra patient was    Past Medical History  Medical History[1]    Social History  Social History[2]    Family History   Family History[3]    Review of Systems  As per HPI, all other systems reviewed and negative.    Allergies:  RX Allergies[4]     Outpatient Medications:  Current Outpatient Medications   Medication Instructions    albuterol 90 mcg/actuation inhaler INHALE 2 PUFFS BY MOUTH EVERY 4 HOURS AS DIRECTED AS NEEDED FOR COUGH OR WHEEZING    aspirin 81 mg, Nightly    cholecalciferol (Vitamin D-3) 50 MCG (2000 UT) tablet 2 tablets, Daily    coenzyme Q10 400 mg capsule 1 capsule, Nightly    cyanocobalamin (VITAMIN B-12) 1,000 mcg, Daily    DILT- mg, oral, Daily    docusate  sodium (Colace) 100 mg capsule TAKE 1 CAPSULE(100 MG) BY MOUTH TWICE DAILY AS NEEDED FOR CONSTIPATION    empagliflozin (JARDIANCE) 25 mg, oral, Daily    fluconazole (DIFLUCAN) 150 mg, oral, Weekly (0600)    lactobacillus (Culturelle) 10 billion cell capsule 1 capsule, Daily    levothyroxine (Synthroid, Levoxyl) 150 mcg tablet TAKE 1 TABLET BY MOUTH EVERY DAY, EXPECT ON TUESDAY AND THURSDAY TAKE 2 TABLETS    losartan (COZAAR) 25 mg, oral, 2 times daily    magnesium oxide (MAG-OX) 400 mg, Daily    metFORMIN XR (GLUCOPHAGE-XR) 1,000 mg, oral, 2 times daily (morning and late afternoon), Take with meals.    Mounjaro 10 mg, subcutaneous, Every 7 days    omeprazole (PriLOSEC) 40 mg DR capsule TAKE 1 CAPSULE BY MOUTH TWICE DAILY    rosuvastatin (CRESTOR) 10 mg, oral, Nightly    spironolactone (ALDACTONE) 25 mg, oral, Daily    vilazodone (VIIBRYD) 40 mg, oral, Daily with breakfast         Vitals:  Vitals:    06/27/25 1454   BP: 108/54   Pulse: 64       Physical Exam:  Physical Exam  Vitals and nursing note reviewed.   Constitutional:       Appearance: Normal appearance.   HENT:      Head: Normocephalic.   Eyes:      Pupils: Pupils are equal, round, and reactive to light.   Cardiovascular:      Rate and Rhythm: Normal rate and regular rhythm.      Pulses: Normal pulses.      Heart sounds: Normal heart sounds.   Pulmonary:      Effort: Pulmonary effort is normal.      Breath sounds: Normal breath sounds.   Musculoskeletal:         General: Normal range of motion.      Cervical back: Normal range of motion.   Skin:     General: Skin is dry.   Neurological:      General: No focal deficit present.      Mental Status: She is alert and oriented to person, place, and time.   Psychiatric:         Behavior: Behavior is cooperative.             Assessment/Plan   Diagnoses and all orders for this visit:  Chronic combined systolic (congestive) and diastolic (congestive) heart failure  -     Follow Up In Cardiology  Coronary artery  disease involving native coronary artery of native heart without angina pectoris  -     Follow Up In Cardiology  Hypercholesteremia  -     Follow Up In Cardiology  Mixed hyperlipidemia  -     Follow Up In Cardiology  Primary hypertension  -     Follow Up In Cardiology  Type 2 diabetes mellitus with other specified complication, unspecified whether long term insulin use (Multi)  -     Follow Up In Cardiology  BMI 26.0-26.9,adult  -     Follow Up In Cardiology  S/P CABG (coronary artery bypass graft)  -     Follow Up In Cardiology  Pulmonary emphysema, unspecified emphysema type (Multi)  -     Follow Up In Cardiology  Dizziness  -     Follow Up In Cardiology  Never smoked cigarettes  -     Follow Up In Cardiology      ILulu LPN am scribing for, and in the presence of Connie Ashraf M.D..    Connie COTE M.D., personally performed the services described in the documentation as scribed by Lulu Geronimo LPN in my presence, and confirm it is both accurate and complete.      Connie Ashraf MD [facility]  Interventional Cardiology   of [facility]     Thank you for allowing me to participate in the care of this patient. Please do not hesitate to contact me with any further questions or concerns.         [1]   Past Medical History:  Diagnosis Date    Body mass index (BMI) 39.0-39.9, adult 02/23/2022    BMI 39.0-39.9,adult    Body mass index (BMI) 39.0-39.9, adult 06/30/2021    BMI 39.0-39.9,adult    Morbid (severe) obesity due to excess calories (Multi) 02/23/2022    Class 2 severe obesity due to excess calories with serious comorbidity and body mass index (BMI) of 39.0 to 39.9 in adult    Morbid (severe) obesity due to excess calories (Multi) 06/30/2021    Class 2 severe obesity due to excess calories with serious comorbidity and body mass index (BMI) of 37.0 to 37.9 in adult    Other acute sinusitis 12/31/2019    Other acute sinusitis, recurrence not specified    Personal history of other diseases  of the musculoskeletal system and connective tissue     History of osteomyelitis    Personal history of other diseases of the respiratory system     History of chronic obstructive lung disease    Personal history of other drug therapy 11/14/2019    History of influenza vaccination    Personal history of other endocrine, nutritional and metabolic disease     History of hyperlipidemia    Personal history of other endocrine, nutritional and metabolic disease     History of hypothyroidism    Personal history of other mental and behavioral disorders     History of major depression    Personal history of other specified conditions 09/04/2019    History of wheezing    Systemic lupus erythematosus, unspecified     Lupus    Type 2 diabetes mellitus with other diabetic neurological complication     Type 2 diabetes mellitus with other neurologic complication, with long-term current use of insulin   [2]   Social History  Tobacco Use    Smoking status: Never    Smokeless tobacco: Never   Vaping Use    Vaping status: Never Used   Substance Use Topics    Alcohol use: Not Currently    Drug use: Never   [3]   Family History  Problem Relation Name Age of Onset    Heart failure Mother      Heart attack Mother      Coronary artery disease Mother      Coronary artery disease Father      Heart failure Father      Kidney failure Father     [4]   Allergies  Allergen Reactions    Lisinopril Cough

## 2025-06-30 DIAGNOSIS — K59.00 CONSTIPATION, UNSPECIFIED CONSTIPATION TYPE: ICD-10-CM

## 2025-06-30 DIAGNOSIS — I10 HYPERTENSION, UNSPECIFIED TYPE: ICD-10-CM

## 2025-06-30 RX ORDER — DOCUSATE SODIUM 100 MG/1
CAPSULE, LIQUID FILLED ORAL
Qty: 60 CAPSULE | Refills: 2 | Status: SHIPPED | OUTPATIENT
Start: 2025-06-30

## 2025-06-30 RX ORDER — DILTIAZEM HYDROCHLORIDE 240 MG/1
240 CAPSULE, EXTENDED RELEASE ORAL DAILY
Qty: 90 CAPSULE | Refills: 1 | Status: SHIPPED | OUTPATIENT
Start: 2025-06-30

## 2025-07-07 ENCOUNTER — TELEPHONE (OUTPATIENT)
Dept: CARDIOLOGY | Facility: CLINIC | Age: 80
End: 2025-07-07
Payer: MEDICARE

## 2025-07-07 DIAGNOSIS — I10 PRIMARY HYPERTENSION: ICD-10-CM

## 2025-07-07 NOTE — TELEPHONE ENCOUNTER
Patient called in BP readings today per Dr. Connie Ashraf M.D.  request.  She would like a call back if she needs to continue doing this or is this sufficient.  [phone] Fwd to Isaiah MALDONADO to review with Dr. Connie Ashraf M.D.      6/28 126/54   71  6/29 156/61   72  6/30 189/56   73 (patient was ill that day)  7/1 140/56   68  7/2 142/60   80  7/3 163/74   75  7/5 149/62   67  7/6 142/57   68  7/7 158/69   70

## 2025-07-08 ENCOUNTER — PATIENT OUTREACH (OUTPATIENT)
Dept: PRIMARY CARE | Facility: CLINIC | Age: 80
End: 2025-07-08
Payer: MEDICARE

## 2025-07-08 DIAGNOSIS — I10 HYPERTENSION, UNSPECIFIED TYPE: ICD-10-CM

## 2025-07-08 DIAGNOSIS — E11.69 TYPE 2 DIABETES MELLITUS WITH OTHER SPECIFIED COMPLICATION, WITHOUT LONG-TERM CURRENT USE OF INSULIN: ICD-10-CM

## 2025-07-08 NOTE — PROGRESS NOTES
Care Management Monthly Outreach  Chart review completed  6/17: Losartan 25 mg BID  6/24: Debridement of R foot ulcer.   6/27: Lexiscan stress test ordered by Dr. Ashraf  Last Office Visit: 4/24/2025  Next Office Visit: -   APC Collaboration: Pharmacy  HIPAA: 4/2026  CSM: No    Has patient been to ER/Urgent Care since last outreach? No    Outreach attempted, left message on voicemail requesting return call at [phone]    Tiki Stern, RN, BSN  [facility], RN Care Manager   [phone]

## 2025-07-08 NOTE — TELEPHONE ENCOUNTER
Patient last seen with Dr. Connie Ashraf MD [facility] on 6/27/25:  Diane Montemayor is a 80 y.o. year old female patient is here for a follow-up Holter monitor showed occasional PVCs and PACs.  Blood pressure was normal today although she stated her blood pressure was 5 years at home.  Discussed with the patient manage she will check blood pressures polypectomies.  She will continue medication will call if problem and follow-up as scheduled.  Symptoms do not see's scalp Sandra patient was     HTN meds on board:  Diltiazem XR 240mg Daily   Losartan 25mg BID  Spironolactone 25mg Daily     Routing to Dr. Connie Ashraf MD [facility] for review today in clinic.

## 2025-07-09 DIAGNOSIS — E11.69 TYPE 2 DIABETES MELLITUS WITH OTHER SPECIFIED COMPLICATION, WITHOUT LONG-TERM CURRENT USE OF INSULIN: ICD-10-CM

## 2025-07-09 DIAGNOSIS — E11.69 TYPE 2 DIABETES MELLITUS WITH OTHER SPECIFIED COMPLICATION, UNSPECIFIED WHETHER LONG TERM INSULIN USE (MULTI): ICD-10-CM

## 2025-07-09 DIAGNOSIS — I10 HYPERTENSION, UNSPECIFIED TYPE: ICD-10-CM

## 2025-07-09 DIAGNOSIS — K21.9 GASTROESOPHAGEAL REFLUX DISEASE WITHOUT ESOPHAGITIS: ICD-10-CM

## 2025-07-09 RX ORDER — LOSARTAN POTASSIUM 50 MG/1
50 TABLET ORAL 2 TIMES DAILY
Qty: 180 TABLET | Refills: 3 | Status: SHIPPED | OUTPATIENT
Start: 2025-07-09 | End: 2026-07-09

## 2025-07-10 RX ORDER — TIRZEPATIDE 7.5 MG/.5ML
INJECTION, SOLUTION SUBCUTANEOUS
Qty: 2 ML | Refills: 1 | Status: SHIPPED | OUTPATIENT
Start: 2025-07-10

## 2025-07-10 RX ORDER — SPIRONOLACTONE 25 MG/1
25 TABLET ORAL DAILY
Qty: 90 TABLET | Refills: 0 | Status: SHIPPED | OUTPATIENT
Start: 2025-07-10

## 2025-07-10 RX ORDER — TIRZEPATIDE 10 MG/.5ML
INJECTION, SOLUTION SUBCUTANEOUS
Qty: 2 ML | Refills: 1 | Status: SHIPPED | OUTPATIENT
Start: 2025-07-10

## 2025-07-10 RX ORDER — OMEPRAZOLE 40 MG/1
CAPSULE, DELAYED RELEASE ORAL
Qty: 180 CAPSULE | Refills: 0 | Status: SHIPPED | OUTPATIENT
Start: 2025-07-10

## 2025-07-11 ENCOUNTER — APPOINTMENT (OUTPATIENT)
Dept: RADIOLOGY | Facility: HOSPITAL | Age: 80
End: 2025-07-11
Payer: MEDICARE

## 2025-07-11 ENCOUNTER — HOSPITAL ENCOUNTER (OUTPATIENT)
Dept: RADIOLOGY | Facility: HOSPITAL | Age: 80
Discharge: HOME | End: 2025-07-11
Payer: MEDICARE

## 2025-07-11 ENCOUNTER — HOSPITAL ENCOUNTER (OUTPATIENT)
Dept: CARDIOLOGY | Facility: HOSPITAL | Age: 80
Discharge: HOME | End: 2025-07-11
Payer: MEDICARE

## 2025-07-11 ENCOUNTER — APPOINTMENT (OUTPATIENT)
Dept: CARDIOLOGY | Facility: HOSPITAL | Age: 80
End: 2025-07-11
Payer: MEDICARE

## 2025-07-11 DIAGNOSIS — Z95.1 S/P CABG (CORONARY ARTERY BYPASS GRAFT): ICD-10-CM

## 2025-07-11 DIAGNOSIS — I25.10 CORONARY ARTERY DISEASE INVOLVING NATIVE CORONARY ARTERY OF NATIVE HEART WITHOUT ANGINA PECTORIS: ICD-10-CM

## 2025-07-11 PROCEDURE — 93017 CV STRESS TEST TRACING ONLY: CPT

## 2025-07-11 PROCEDURE — A9502 TC99M TETROFOSMIN: HCPCS | Performed by: INTERNAL MEDICINE

## 2025-07-11 PROCEDURE — 3430000001 HC RX 343 DIAGNOSTIC RADIOPHARMACEUTICALS: Performed by: INTERNAL MEDICINE

## 2025-07-11 PROCEDURE — 2500000004 HC RX 250 GENERAL PHARMACY W/ HCPCS (ALT 636 FOR OP/ED): Performed by: INTERNAL MEDICINE

## 2025-07-11 PROCEDURE — 78452 HT MUSCLE IMAGE SPECT MULT: CPT

## 2025-07-11 RX ORDER — REGADENOSON 0.08 MG/ML
0.4 INJECTION, SOLUTION INTRAVENOUS ONCE
Status: COMPLETED | OUTPATIENT
Start: 2025-07-11 | End: 2025-07-11

## 2025-07-11 RX ADMIN — TETROFOSMIN 9.4 MILLICURIE: 0.23 INJECTION, POWDER, LYOPHILIZED, FOR SOLUTION INTRAVENOUS at 10:15

## 2025-07-11 RX ADMIN — REGADENOSON 0.4 MG: 0.08 INJECTION, SOLUTION INTRAVENOUS at 11:19

## 2025-07-11 RX ADMIN — TETROFOSMIN 31 MILLICURIE: 0.23 INJECTION, POWDER, LYOPHILIZED, FOR SOLUTION INTRAVENOUS at 11:19

## 2025-07-15 ENCOUNTER — PREP FOR PROCEDURE (OUTPATIENT)
Dept: CARDIOLOGY | Facility: CLINIC | Age: 80
End: 2025-07-15
Payer: MEDICARE

## 2025-07-15 ENCOUNTER — TELEPHONE (OUTPATIENT)
Dept: CARDIOLOGY | Facility: CLINIC | Age: 80
End: 2025-07-15
Payer: MEDICARE

## 2025-07-15 DIAGNOSIS — Z95.1 S/P CABG (CORONARY ARTERY BYPASS GRAFT): ICD-10-CM

## 2025-07-15 DIAGNOSIS — I25.10 CORONARY ARTERY DISEASE INVOLVING NATIVE CORONARY ARTERY OF NATIVE HEART WITHOUT ANGINA PECTORIS: ICD-10-CM

## 2025-07-15 DIAGNOSIS — R94.39 ABNORMAL NUCLEAR STRESS TEST: Primary | ICD-10-CM

## 2025-07-15 NOTE — TELEPHONE ENCOUNTER
----- Message from Connie Ashraf sent at 7/14/2025  3:45 PM EDT -----  Abnormal stress test needs cardiac cath  ----- Message -----  From: Interface, Radiology Results In  Sent: 7/11/2025   4:33 PM EDT  To: Connie Ashraf MD

## 2025-07-15 NOTE — TELEPHONE ENCOUNTER
Patient has abnormal nuclear imaging- to be scheduled for [facility] with Dr. Connie Ashraf MD [facility] in near future.     Routine preop lab work- orders placed.   Patient to HOLD Metformin AM of procedure  Patient to HOLD Mounjaro day of procedure (if that day is the injection day)   All other medications are fine to take with a sip of water day of procedure, including Aspirin.     Orders placed and sent to provider for signing.     Patient advised, she will speak with her son and call back by Friday 7/18 to schedule.   -------------------------------        Pre-Procedure Patient Information    You have been scheduled for:   At:   With:   Date of procedure:     1. Please have transportation to and from the hospital. While you should plan for same-day discharge there is a possibility you will need to stay overnight.    2. You will receive a call from the hospital 24 - 72 hours before your procedure providing you with fasting instructions, procedure location detail, and time of arrival.  If you have not received a call from the hospital by 6 pm the day before your scheduled procedure, please call [phone].    3. Please bring a current list of medications with you to the hospital.      4. Medications to hold:     - If you are on aspirin, Plavix (Clopidogrel), Effient (Prasugrel), or Brilinta (Ticagrelor), please CONTINUE THRU DAY OF PROCEDURE.         - If you are diabetic on oral pills: please hold your morning oral diabetes medications (that includes metformin, glipizide).     - If you are on Jardiance, Ozempic, Wgovy, Monjaro ect, please hold DO NOT TAKE INJECTION DAY OF PROCEDURE.     - Otherwise, you may continue your medications in the morning with sips of water.     5. Nothing to eat after midnight before the procedure. OK to take morning medications, with above exceptions, the day of the procedure with a small sip of water.     6. Please bring to our attention if you have any contrast, latex or metal allergies.    7.  Please have your blood work as instructed completed at least a day before your procedure.    8. If you have any questions, please contact the office at [phone].

## 2025-07-16 PROBLEM — R94.39 ABNORMAL NUCLEAR STRESS TEST: Status: ACTIVE | Noted: 2025-07-15

## 2025-07-16 NOTE — TELEPHONE ENCOUNTER
Patient returned call, more questions answered.     Patient is willing to schedule. Transferred call to procedure  at this time.

## 2025-07-25 ENCOUNTER — HOSPITAL ENCOUNTER (OUTPATIENT)
Facility: HOSPITAL | Age: 80
Setting detail: OUTPATIENT SURGERY
Discharge: HOME | End: 2025-07-25
Attending: INTERNAL MEDICINE | Admitting: INTERNAL MEDICINE
Payer: MEDICARE

## 2025-07-25 ENCOUNTER — APPOINTMENT (OUTPATIENT)
Dept: CARDIOLOGY | Facility: HOSPITAL | Age: 80
End: 2025-07-25
Payer: MEDICARE

## 2025-07-25 VITALS
HEIGHT: 65 IN | BODY MASS INDEX: 26.7 KG/M2 | DIASTOLIC BLOOD PRESSURE: 66 MMHG | SYSTOLIC BLOOD PRESSURE: 162 MMHG | HEART RATE: 68 BPM | RESPIRATION RATE: 18 BRPM | TEMPERATURE: 98.2 F | OXYGEN SATURATION: 100 % | WEIGHT: 160.27 LBS

## 2025-07-25 DIAGNOSIS — I25.10 CORONARY ARTERY DISEASE INVOLVING NATIVE CORONARY ARTERY OF NATIVE HEART WITHOUT ANGINA PECTORIS: ICD-10-CM

## 2025-07-25 DIAGNOSIS — Z95.1 S/P CABG (CORONARY ARTERY BYPASS GRAFT): ICD-10-CM

## 2025-07-25 DIAGNOSIS — I20.9 ANGINA PECTORIS, UNSPECIFIED: ICD-10-CM

## 2025-07-25 DIAGNOSIS — R94.39 ABNORMAL NUCLEAR STRESS TEST: Primary | ICD-10-CM

## 2025-07-25 LAB
ANION GAP SERPL CALC-SCNC: 13 MMOL/L (ref 10–20)
BUN SERPL-MCNC: 18 MG/DL (ref 6–23)
CALCIUM SERPL-MCNC: 9.4 MG/DL (ref 8.6–10.3)
CHLORIDE SERPL-SCNC: 101 MMOL/L (ref 98–107)
CO2 SERPL-SCNC: 26 MMOL/L (ref 21–32)
CREAT SERPL-MCNC: 0.86 MG/DL (ref 0.5–1.05)
EGFRCR SERPLBLD CKD-EPI 2021: 68 ML/MIN/1.73M*2
ERYTHROCYTE [DISTWIDTH] IN BLOOD BY AUTOMATED COUNT: 13.7 % (ref 11.5–14.5)
GLUCOSE SERPL-MCNC: 89 MG/DL (ref 74–99)
HCT VFR BLD AUTO: 36.7 % (ref 36–46)
HGB BLD-MCNC: 12.1 G/DL (ref 12–16)
MCH RBC QN AUTO: 28.9 PG (ref 26–34)
MCHC RBC AUTO-ENTMCNC: 33 G/DL (ref 32–36)
MCV RBC AUTO: 88 FL (ref 80–100)
NRBC BLD-RTO: 0 /100 WBCS (ref 0–0)
PLATELET # BLD AUTO: 259 X10*3/UL (ref 150–450)
POTASSIUM SERPL-SCNC: 4.1 MMOL/L (ref 3.5–5.3)
RBC # BLD AUTO: 4.19 X10*6/UL (ref 4–5.2)
SODIUM SERPL-SCNC: 136 MMOL/L (ref 136–145)
WBC # BLD AUTO: 8.9 X10*3/UL (ref 4.4–11.3)

## 2025-07-25 PROCEDURE — C1894 INTRO/SHEATH, NON-LASER: HCPCS | Performed by: INTERNAL MEDICINE

## 2025-07-25 PROCEDURE — 80048 BASIC METABOLIC PNL TOTAL CA: CPT | Performed by: NURSE PRACTITIONER

## 2025-07-25 PROCEDURE — 2500000004 HC RX 250 GENERAL PHARMACY W/ HCPCS (ALT 636 FOR OP/ED): Performed by: INTERNAL MEDICINE

## 2025-07-25 PROCEDURE — 99152 MOD SED SAME PHYS/QHP 5/>YRS: CPT | Performed by: INTERNAL MEDICINE

## 2025-07-25 PROCEDURE — 7100000010 HC PHASE TWO TIME - EACH INCREMENTAL 1 MINUTE: Performed by: INTERNAL MEDICINE

## 2025-07-25 PROCEDURE — 7100000002 HC RECOVERY ROOM TIME - EACH INCREMENTAL 1 MINUTE: Performed by: INTERNAL MEDICINE

## 2025-07-25 PROCEDURE — 7100000001 HC RECOVERY ROOM TIME - INITIAL BASE CHARGE: Performed by: INTERNAL MEDICINE

## 2025-07-25 PROCEDURE — 2500000001 HC RX 250 WO HCPCS SELF ADMINISTERED DRUGS (ALT 637 FOR MEDICARE OP): Performed by: NURSE PRACTITIONER

## 2025-07-25 PROCEDURE — 99024 POSTOP FOLLOW-UP VISIT: CPT | Performed by: NURSE PRACTITIONER

## 2025-07-25 PROCEDURE — 7100000009 HC PHASE TWO TIME - INITIAL BASE CHARGE: Performed by: INTERNAL MEDICINE

## 2025-07-25 PROCEDURE — 93005 ELECTROCARDIOGRAM TRACING: CPT | Mod: 59

## 2025-07-25 PROCEDURE — 99153 MOD SED SAME PHYS/QHP EA: CPT | Performed by: INTERNAL MEDICINE

## 2025-07-25 PROCEDURE — 93459 L HRT ART/GRFT ANGIO: CPT | Performed by: INTERNAL MEDICINE

## 2025-07-25 PROCEDURE — 2720000007 HC OR 272 NO HCPCS: Performed by: INTERNAL MEDICINE

## 2025-07-25 PROCEDURE — 2550000001 HC RX 255 CONTRASTS: Performed by: INTERNAL MEDICINE

## 2025-07-25 PROCEDURE — 85027 COMPLETE CBC AUTOMATED: CPT | Performed by: NURSE PRACTITIONER

## 2025-07-25 RX ORDER — IOPAMIDOL 755 MG/ML
INJECTION, SOLUTION INTRAVASCULAR AS NEEDED
Status: DISCONTINUED | OUTPATIENT
Start: 2025-07-25 | End: 2025-07-25 | Stop reason: HOSPADM

## 2025-07-25 RX ORDER — MIDAZOLAM HYDROCHLORIDE 1 MG/ML
INJECTION, SOLUTION INTRAMUSCULAR; INTRAVENOUS AS NEEDED
Status: DISCONTINUED | OUTPATIENT
Start: 2025-07-25 | End: 2025-07-25 | Stop reason: HOSPADM

## 2025-07-25 RX ORDER — FENTANYL CITRATE 50 UG/ML
INJECTION, SOLUTION INTRAMUSCULAR; INTRAVENOUS AS NEEDED
Status: DISCONTINUED | OUTPATIENT
Start: 2025-07-25 | End: 2025-07-25 | Stop reason: HOSPADM

## 2025-07-25 RX ORDER — RANOLAZINE 500 MG/1
500 TABLET, EXTENDED RELEASE ORAL ONCE
Status: COMPLETED | OUTPATIENT
Start: 2025-07-25 | End: 2025-07-25

## 2025-07-25 RX ORDER — LIDOCAINE HYDROCHLORIDE 20 MG/ML
INJECTION, SOLUTION INFILTRATION; PERINEURAL AS NEEDED
Status: DISCONTINUED | OUTPATIENT
Start: 2025-07-25 | End: 2025-07-25 | Stop reason: HOSPADM

## 2025-07-25 RX ORDER — ASPIRIN 325 MG
325 TABLET ORAL ONCE
Status: DISCONTINUED | OUTPATIENT
Start: 2025-07-25 | End: 2025-07-25

## 2025-07-25 RX ADMIN — RANOLAZINE 500 MG: 500 TABLET, FILM COATED, EXTENDED RELEASE ORAL at 11:02

## 2025-07-25 ASSESSMENT — PAIN SCALES - GENERAL
PAINLEVEL_OUTOF10: 0 - NO PAIN

## 2025-07-25 ASSESSMENT — COLUMBIA-SUICIDE SEVERITY RATING SCALE - C-SSRS
6. HAVE YOU EVER DONE ANYTHING, STARTED TO DO ANYTHING, OR PREPARED TO DO ANYTHING TO END YOUR LIFE?: NO
1. IN THE PAST MONTH, HAVE YOU WISHED YOU WERE DEAD OR WISHED YOU COULD GO TO SLEEP AND NOT WAKE UP?: NO
2. HAVE YOU ACTUALLY HAD ANY THOUGHTS OF KILLING YOURSELF?: NO

## 2025-07-25 ASSESSMENT — PAIN - FUNCTIONAL ASSESSMENT: PAIN_FUNCTIONAL_ASSESSMENT: 0-10

## 2025-07-25 NOTE — POST-PROCEDURE NOTE
Physician Transition of Care Summary  Invasive Cardiovascular Lab    Procedure Date: 7/25/2025  Attending:    * Connie Ashraf - Primary  Resident/Fellow/Other Assistant: Surgeons and Role:  * No surgeons found with a matching role *    Indications:   Pre-op Diagnosis      * Abnormal nuclear stress test [R94.39]     * S/P CABG (coronary artery bypass graft) [Z95.1]     * Coronary artery disease involving native coronary artery of native heart without angina pectoris [I25.10]    Post-procedure diagnosis:   Post-op Diagnosis     * Abnormal nuclear stress test [R94.39]     * S/P CABG (coronary artery bypass graft) [Z95.1]     * Coronary artery disease involving native coronary artery of native heart without angina pectoris [I25.10]    Procedure(s):   Left Heart Catheterization With Coronary Angiography And Grafts  [id number] - [place] CATH [facility]/ARTS/FTS WNJX & ANGIO IMG S&I        Procedure Findings:   Triple-vessel coronary disease, patent vein graft to right coronary artery, patent vein graft to obtuse marginal branch, patent left internal mammary artery to LAD, normal left ventricular function    Description of the Procedure:   Left heart catheterization coronary angiography left ventriculogram with grafts    Complications:   None    Stents/Implants:   Implants       No implant documentation for this case.            Anticoagulation/Antiplatelet Plan:   None    Estimated Blood Loss:   * No values recorded between 7/25/2025 12:07 PM and 7/25/2025 12:47 PM *    Anesthesia: Moderate Sedation Anesthesia Staff: No anesthesia staff entered.    Any Specimen(s) Removed:   Order Name Source Comment Collection Info Order Time   BASIC METABOLIC PANEL Blood, Venous  Collected By: Sudha Bee RN 7/25/2025 10:40 AM     [place] [email or web address]   Immediate        CBC Blood, Venous  Collected By: Sudha Bee RN 7/25/2025 10:40 AM     Release result to MyChart   Immediate            Disposition:   Medical  therapy      Electronically signed by: Connie Ashraf MD, 7/25/2025 12:47 PM

## 2025-07-25 NOTE — NURSING NOTE
Patient returned from cath lab, s/p [facility]. Right groin site closed with manual pressure and quickclot, dressing is CDI and soft with palpable pedal pulses. Patient to remain flat for 1 hour post procedure. Patient is A&Ox4 and has no c/o at this time. Will continue to monitor.

## 2025-07-25 NOTE — PROGRESS NOTES
Patient is stable status post LHC under the care of Dr. Ashraf.  Discussed results of procedure with patient and her son.  Findings of the LHC revealed known triple-vessel coronary artery disease, patent SVG to RCA, patent SVG to OM, patent LIMA to LAD and a normal LVEF.  Continued medical management is advised.  Patient will be scheduled to follow-up with Dr. Ashraf in 2 to 3 weeks for further management.  Postprocedural activity, restrictions, potential complications, medications and future follow-up discussed at length.  All questions answered.  Both verbalized and understanding.

## 2025-07-25 NOTE — Clinical Note
Closure device placed in the right femoral artery. Site closed by manually applied. With a quick clot

## 2025-07-25 NOTE — NURSING NOTE
Discharge instructions reviewed with patient and son. Discussed in depth post procedure restrictions, medications and follow up appointments. Questions and concerns addressed. Patient and wife verbalized understanding. Right groin site remains stable and unchanged. Plan to dc home.

## 2025-07-25 NOTE — DISCHARGE INSTRUCTIONS
CARDIAC CATHETERIZATION DISCHARGE INSTRUCTIONS     FOR SUDDEN AND SEVERE CHEST PAIN, SHORTNESS OF BREATH, EXCESSIVE BLEEDING, SIGNS OF STROKE, OR CHANGES IN MENTAL STATUS YOU SHOULD CALL 911 IMMEDIATELY.     If your provider has prescribed aspirin---DO NOT STOP THESE MEDICATIONS for any reason without talking to your cardiologist first. If any of these were prescribed, you must take them every day without missing a single dose. If you are getting low on these medications, contact your provider immediately for a refill.     FOR NEXT 24 HOURS  - Upon discharge, you should return home and rest for the remainder of the day and evening. You do not have to stay on bed rest but should not be very active.  It is recommended a responsible adult be with you for the first 24 hours after the procedure.    - No driving for 24 hours after procedure. Please arrange for someone to drive you home from the hospital today.     - Do not drive, operate machinery, or use power tools for 24 hours after your procedure.     - Do not make any legal decisions for 24 hours after your procedure.     - Do not drink alcoholic beverages for 24 hours after your procedure.    WOUND CARE   *FOR FEMORAL (LEG) ACCESS*  ·      Avoid heavy lifting (over 10 pounds) for 3 days, squatting or excessive bending for 2 days, and strenuous exercise for 7 days.  ·      No submerged bathing, swimming, or hot tubs for the next 7 days, or until fully healed.  ·      Avoid sexual activity for 3-4 days until any groin discomfort has ceased.    - The transparent dressing should be removed from the site 24 hours after the procedure.  Wash the site gently with soap and water. Rinse well and pat dry. Keep the area clean and dry. You may apply a Band-Aid to the site. Avoid lotions, ointments, or powders until fully healed.     - You may shower the day after your procedure.      - It is normal to notice a small bruise around the puncture site and/or a small grape  sized or smaller lump. Any large bruising or large lump warrants a call to the office.     - If bleeding should occur, lay down and apply pressure to the affected area for 10 minutes.  If the bleeding stops notify your physician.  If there is a large amount of bleeding or spurting of blood CALL 911 immediately.  DO NOT drive yourself to the hospital.    - You may experience some tenderness, bruising or minimal inflammation.  If you have any concerns, you may contact the Cath Lab or if any of these symptoms become excessive, contact your cardiologist or go to the emergency room.     OTHER INSTRUCTIONS  - You may take acetaminophen (Tylenol) as directed for discomfort.  If pain is not relieved with acetaminophen (Tylenol), contact your doctor.    - If you notice or experience any of the following, you should notify your doctor or seek medical attention  Chest pain or discomfort  Change in mental status or weakness in extremities.  Dizziness, light headedness, or feeling faint.  Change in the site where the procedure was performed, such as bleeding or an increased area of bruising or swelling.  Tingling, numbness, pain, or coolness in the leg/arm beyond the site where the procedure was performed.  Signs of infection (i.e. shaking chills, temperature > 100 degrees Fahrenheit, warmth, redness) in the leg/arm area where the procedure was performed.  Changes in urination   Bloody or black stools  Vomiting blood  Severe nose bleeds  Any excessive bleeding    - If you DO NOT have an appointment with your cardiologist within 2-4 weeks following your procedure, please contact their office.

## 2025-07-26 LAB
ATRIAL RATE: 67 BPM
P AXIS: 90 DEGREES
P OFFSET: 92 MS
P ONSET: 49 MS
PR INTERVAL: 342 MS
Q ONSET: 220 MS
QRS COUNT: 11 BEATS
QRS DURATION: 150 MS
QT INTERVAL: 444 MS
QTC CALCULATION(BAZETT): 469 MS
QTC FREDERICIA: 461 MS
R AXIS: 87 DEGREES
T AXIS: 16 DEGREES
T OFFSET: 442 MS
VENTRICULAR RATE: 67 BPM

## 2025-07-28 VITALS
RESPIRATION RATE: 18 BRPM | HEART RATE: 68 BPM | WEIGHT: 160.27 LBS | HEIGHT: 65 IN | OXYGEN SATURATION: 100 % | DIASTOLIC BLOOD PRESSURE: 66 MMHG | TEMPERATURE: 98.2 F | BODY MASS INDEX: 26.7 KG/M2 | SYSTOLIC BLOOD PRESSURE: 162 MMHG

## 2025-08-01 DIAGNOSIS — F32.A DEPRESSION, UNSPECIFIED DEPRESSION TYPE: ICD-10-CM

## 2025-08-01 RX ORDER — VILAZODONE HYDROCHLORIDE 40 MG/1
40 TABLET ORAL
Qty: 90 TABLET | Refills: 0 | Status: SHIPPED | OUTPATIENT
Start: 2025-08-01

## 2025-08-04 DIAGNOSIS — E11.69 TYPE 2 DIABETES MELLITUS WITH OTHER SPECIFIED COMPLICATION, WITHOUT LONG-TERM CURRENT USE OF INSULIN: ICD-10-CM

## 2025-08-04 RX ORDER — TIRZEPATIDE 10 MG/.5ML
10 INJECTION, SOLUTION SUBCUTANEOUS
Qty: 2 ML | Refills: 1 | Status: SHIPPED | OUTPATIENT
Start: 2025-08-04

## 2025-08-11 ENCOUNTER — TELEPHONE (OUTPATIENT)
Dept: CARDIOLOGY | Facility: CLINIC | Age: 80
End: 2025-08-11
Payer: MEDICARE

## 2025-08-12 ENCOUNTER — APPOINTMENT (OUTPATIENT)
Dept: CARDIOLOGY | Facility: CLINIC | Age: 80
End: 2025-08-12
Payer: MEDICARE

## 2025-08-15 ENCOUNTER — APPOINTMENT (OUTPATIENT)
Dept: CARDIOLOGY | Facility: CLINIC | Age: 80
End: 2025-08-15
Payer: MEDICARE

## 2025-08-19 ENCOUNTER — APPOINTMENT (OUTPATIENT)
Dept: CARDIOLOGY | Facility: CLINIC | Age: 80
End: 2025-08-19
Payer: MEDICARE

## 2025-08-21 ENCOUNTER — PATIENT OUTREACH (OUTPATIENT)
Dept: PRIMARY CARE | Facility: CLINIC | Age: 80
End: 2025-08-21
Payer: MEDICARE

## 2025-08-21 DIAGNOSIS — I10 HYPERTENSION, UNSPECIFIED TYPE: ICD-10-CM

## 2025-08-21 DIAGNOSIS — E11.69 TYPE 2 DIABETES MELLITUS WITH OTHER SPECIFIED COMPLICATION, WITHOUT LONG-TERM CURRENT USE OF INSULIN: ICD-10-CM

## 2025-08-21 DIAGNOSIS — F32.A DEPRESSION, UNSPECIFIED DEPRESSION TYPE: ICD-10-CM

## 2025-08-26 ENCOUNTER — PATIENT OUTREACH (OUTPATIENT)
Dept: PRIMARY CARE | Facility: CLINIC | Age: 80
End: 2025-08-26
Payer: MEDICARE

## 2025-08-26 DIAGNOSIS — I10 HYPERTENSION, UNSPECIFIED TYPE: ICD-10-CM

## 2025-08-26 DIAGNOSIS — E03.2 HYPOTHYROIDISM DUE TO MEDICATION: ICD-10-CM

## 2025-08-26 DIAGNOSIS — E11.69 TYPE 2 DIABETES MELLITUS WITH OTHER SPECIFIED COMPLICATION, WITHOUT LONG-TERM CURRENT USE OF INSULIN: ICD-10-CM

## 2025-09-01 DIAGNOSIS — I10 HYPERTENSION, UNSPECIFIED TYPE: ICD-10-CM

## 2025-09-03 RX ORDER — SPIRONOLACTONE 25 MG/1
25 TABLET ORAL DAILY
Qty: 90 TABLET | Refills: 0 | Status: SHIPPED | OUTPATIENT
Start: 2025-09-03

## 2025-09-05 ENCOUNTER — APPOINTMENT (OUTPATIENT)
Dept: CARDIOLOGY | Facility: CLINIC | Age: 80
End: 2025-09-05
Payer: MEDICARE

## 2025-09-09 ENCOUNTER — APPOINTMENT (OUTPATIENT)
Dept: CARDIOLOGY | Facility: CLINIC | Age: 80
End: 2025-09-09
Payer: MEDICARE

## 2025-09-12 ENCOUNTER — APPOINTMENT (OUTPATIENT)
Dept: CARDIOLOGY | Facility: CLINIC | Age: 80
End: 2025-09-12
Payer: MEDICARE

## 2025-09-18 ENCOUNTER — APPOINTMENT (OUTPATIENT)
Dept: PHARMACY | Facility: HOSPITAL | Age: 80
End: 2025-09-18
Payer: MEDICARE

## 2025-09-29 ENCOUNTER — APPOINTMENT (OUTPATIENT)
Dept: PRIMARY CARE | Facility: CLINIC | Age: 80
End: 2025-09-29
Payer: MEDICARE

## 2025-10-17 ENCOUNTER — APPOINTMENT (OUTPATIENT)
Dept: CARDIOLOGY | Facility: CLINIC | Age: 80
End: 2025-10-17
Payer: MEDICARE

## 2025-12-30 ENCOUNTER — APPOINTMENT (OUTPATIENT)
Dept: CARDIOLOGY | Facility: CLINIC | Age: 80
End: 2025-12-30
Payer: MEDICARE

## (undated) DEVICE — ACCESS KIT, S-MAK MINI, 5FR 10CM 0.018IN 40CM, SS/SS, ECHO ENHANCE NEEDLE

## (undated) DEVICE — CAUTERY, PENCIL, PUSH BUTTON, SMOKE EVAC, 70MM

## (undated) DEVICE — BLADE, OSCILLATING/SAGITTAL, 25MM X 9MM

## (undated) DEVICE — DRESSING, ABDOMINAL PAD, CURITY, 7.5 X 8 IN

## (undated) DEVICE — CATHETER, DIAGNOSTIC, 4FR-AR MOD

## (undated) DEVICE — BANDAGE, QUIKCLOT, INTERVENTIONAL HEMO, W/O SLIT

## (undated) DEVICE — CATHETER, 5 FR. 100CM, ANGLE TAPER AT TIP

## (undated) DEVICE — SPLINT, SAFETY, PRE-CUT, 4 X 30 IN

## (undated) DEVICE — Device

## (undated) DEVICE — DRESSING, NON-ADHERENT, TELFA, OUCHLESS, 3 X 8 IN, STERILE

## (undated) DEVICE — CUFF, TOURNIQUET, 30 X 4, DUAL PORT/SNGL BLADDER, DISP, LF

## (undated) DEVICE — SUTURE, ETHILON, 4-0, BLK, MONO, PS-2 18

## (undated) DEVICE — GLOVE, SURGICAL, PROTEXIS PI , 7.0, PF, LF

## (undated) DEVICE — GLOVE, SURGICAL, PROTEXIS PI BLUE W/NEUTHERA, 7.5, PF, LF

## (undated) DEVICE — CATHETER, DIAGNOSTIC, 4FR-IM

## (undated) DEVICE — TRAY, SKIN SCRUB, WET PREP, WITH 4 COMPARMENT

## (undated) DEVICE — GUIDEWIRE, STIFF SHAFT, ANGLE TIP, .035 DIA, 260 CM,  3 CM TIP"

## (undated) DEVICE — SYRINGE, CONTROL, ANGIOGRAPHIC, FIXED MALE LUER, 10 CC

## (undated) DEVICE — CLOSURE SYSTEM, VASCULAR, VASCADE, 5 F

## (undated) DEVICE — CATHETER, TEMPO, UNIV FLUSH, 4FR, 65CM

## (undated) DEVICE — CATHETER, DIAGNOSTIC, 4FR-PIG 145 DEG-6SH,MICRO LOOP

## (undated) DEVICE — TOWEL PACK, STERILE, 16X24, XRAY DETECTABLE, BLUE, 4/PK

## (undated) DEVICE — SHEATH, PINNACLE, W/.038 GW 10CM, 5FR INTRODUCER, 2.5 CM DIALATOR

## (undated) DEVICE — CATHETER, DIAGNOSTIC, 5FR, IM

## (undated) DEVICE — SHEATH, PINNACLE, W/.035 GUIDEWIRE, 10 CM,  4FR INTRODUCER, 4FR DIA, 2.5 CM DIALATOR

## (undated) DEVICE — DRESSING, GAUZE, SPONGE, 8 PLY, CURITY, 2 X 2 IN, STERILE

## (undated) DEVICE — STRAP, VELCRO, BODY, 4 X 60IN, NS

## (undated) DEVICE — SUTURE, ETHILON, 2-0, FSLX 30, BLACK

## (undated) DEVICE — DRAPE, SHEET, EXTREMITY, W/ARM BOARD COVERS, 87 X 106 X 128 IN, DISPOSABLE, LF, STERILE

## (undated) DEVICE — DRESSING, GAUZE, SPONGE, 12 PLY, 4 X 4 IN, PLASTIC POUCH, STRL 10PK

## (undated) DEVICE — CUP, MEDICINE, GRADUATED, 2 OZ, PLASTIC, DISP, LF

## (undated) DEVICE — BANDAGE, ESMARK 4 IN X 9 FT, STERILE

## (undated) DEVICE — CATHETER, DIAGNOSTIC, 4 FR-JL 4

## (undated) DEVICE — DRAPE, SHEET, U, W/ADHESIVE STRIP, IMPERVIOUS, 60 X 70 IN, DISPOSABLE, LF, STERILE

## (undated) DEVICE — BANDAGE, ELASTIC, MATRIX, SELF-CLOSURE, 4 IN X 5 YD, LF

## (undated) DEVICE — GUIDEWIRE, ANGLE TIP,  .035 DIA, 260 CM, 3 CM TIP"

## (undated) DEVICE — NEEDLE, SAFETY, 25 GA X 1.5 IN

## (undated) DEVICE — TUBING, MANIFOLD, LOW PRESSURE